# Patient Record
Sex: MALE | Race: WHITE | NOT HISPANIC OR LATINO | Employment: FULL TIME | URBAN - METROPOLITAN AREA
[De-identification: names, ages, dates, MRNs, and addresses within clinical notes are randomized per-mention and may not be internally consistent; named-entity substitution may affect disease eponyms.]

---

## 2017-01-16 RX ORDER — METFORMIN HYDROCHLORIDE 500 MG/1
2000 TABLET, EXTENDED RELEASE ORAL
Qty: 120 TABLET | Refills: 0 | Status: SHIPPED | OUTPATIENT
Start: 2017-01-16 | End: 2017-02-13 | Stop reason: SDUPTHER

## 2017-01-26 ENCOUNTER — OFFICE VISIT (OUTPATIENT)
Dept: INTERNAL MEDICINE | Age: 57
End: 2017-01-26

## 2017-01-26 VITALS
HEIGHT: 68 IN | WEIGHT: 256.7 LBS | DIASTOLIC BLOOD PRESSURE: 70 MMHG | HEART RATE: 86 BPM | OXYGEN SATURATION: 95 % | TEMPERATURE: 97.4 F | SYSTOLIC BLOOD PRESSURE: 130 MMHG | BODY MASS INDEX: 38.91 KG/M2

## 2017-01-26 DIAGNOSIS — E78.5 HYPERLIPIDEMIA, UNSPECIFIED HYPERLIPIDEMIA TYPE: ICD-10-CM

## 2017-01-26 DIAGNOSIS — I10 ESSENTIAL HYPERTENSION: Primary | ICD-10-CM

## 2017-01-26 DIAGNOSIS — E11.9 TYPE 2 DIABETES MELLITUS WITHOUT COMPLICATION, WITHOUT LONG-TERM CURRENT USE OF INSULIN (HCC): ICD-10-CM

## 2017-01-26 DIAGNOSIS — F52.21 ERECTILE DYSFUNCTION OF NONORGANIC ORIGIN: ICD-10-CM

## 2017-01-26 PROBLEM — G47.33 OBSTRUCTIVE SLEEP APNEA SYNDROME: Status: ACTIVE | Noted: 2017-01-26

## 2017-01-26 PROBLEM — D36.9 TUBULAR ADENOMA: Status: ACTIVE | Noted: 2017-01-26

## 2017-01-26 PROBLEM — K21.9 GASTROESOPHAGEAL REFLUX DISEASE: Status: ACTIVE | Noted: 2017-01-26

## 2017-01-26 PROBLEM — B00.9 HERPES SIMPLEX VIRUS (HSV) INFECTION: Status: ACTIVE | Noted: 2017-01-26

## 2017-01-26 PROCEDURE — 99214 OFFICE O/P EST MOD 30 MIN: CPT | Performed by: INTERNAL MEDICINE

## 2017-01-26 RX ORDER — TRAZODONE HYDROCHLORIDE 50 MG/1
100 TABLET ORAL NIGHTLY
Qty: 30 TABLET | Refills: 3 | Status: SHIPPED | OUTPATIENT
Start: 2017-01-26 | End: 2017-05-23 | Stop reason: SDUPTHER

## 2017-01-26 NOTE — MR AVS SNAPSHOT
Carrington Biggs   1/26/2017 7:20 AM   Office Visit    Dept Phone:  [phone]   Encounter #:  [id number]    Provider:  Moreno Tapia MD   Department:  [facility] PRIMARY [facility]ARE                Your Full Care Plan              Today's Medication Changes          These changes are accurate as of: 1/26/17  7:48 AM.  If you have any questions, ask your nurse or doctor.               Medication(s)that have changed:     traZODone 50 MG tablet   Commonly known as:  DESYREL   Take 2 tablets by mouth Every Night.   What changed:  See the new instructions.   Changed by:  Moreno Tapia MD            Where to Get Your Medications      These medications were sent to [facility]/pharmacy #[id number] - [place], [place] - [facility] [address] AT [facility] - [phone]  - [phone] [place] [address]    Hours:  24-hours Phone:  [phone]     traZODone 50 MG tablet                  Your Updated Medication List          This list is accurate as of: 1/26/17  7:48 AM.  Always use your most recent med list.                atorvastatin 40 MG tablet   Commonly known as:  LIPITOR   TAKE 1 TABLET BY MOUTH EVERY DAY       losartan 100 MG tablet   Commonly known as:  COZAAR   TAKE 1 TABLET BY MOUTH DAILY       metFORMIN  MG 24 hr tablet   Commonly known as:  GLUCOPHAGE-XR   Take 4 tablets by mouth Daily With Dinner.       pioglitazone 30 MG tablet   Commonly known as:  ACTOS   TAKE 1 TABLET ONCE DAILY.       traZODone 50 MG tablet   Commonly known as:  DESYREL   Take 2 tablets by mouth Every Night.               We Performed the Following     Ambulatory Referral to Urology     Comprehensive Metabolic Panel     Hemoglobin A1c     Lipid Panel     Microalbumin / Creatinine Urine Ratio       You Were Diagnosed With        Codes Comments    Essential hypertension    -  Primary ICD-10-CM: I10  ICD-9-CM: 401.9     Type 2 diabetes mellitus without complication, without long-term  "current use of insulin     ICD-10-CM: E11.9  ICD-9-CM: 250.00     Hyperlipidemia, unspecified hyperlipidemia type     ICD-10-CM: E78.5  ICD-9-CM: 272.4     Erectile dysfunction of nonorganic origin     ICD-10-CM: F52.21  ICD-9-CM: 302.72       Instructions     None    Patient Instructions History      Upcoming Appointments     Visit Type Date Time Department    OFFICE VISIT 2017  7:20 AM [facility]    OFFICE VISIT 2017  7:40 AM [facility]      [employer] Signup     [facility] [employer] allows you to send messages to your doctor, view your test results, renew your prescriptions, schedule appointments, and more. To sign up, go to eBrevia and click on the Sign Up Now link in the New User? box. Enter your Tr[employer] Activation Code exactly as it appears below along with the last four digits of your Social Security Number and your Date of Birth () to complete the sign-up process. If you do not sign up before the expiration date, you must request a new code.    Tr[employer]ates Activation Code: [email or web address]  Expires: 2017  7:47 AM    If you have questions, you can email [email or web address] or call [phone] to talk to our Tr[employer] staff. Remember, Tr[employer]ates is NOT to be used for urgent needs. For medical emergencies, dial 911.               Other Info from Your Visit           Your Appointments     May 23, 2017  7:40 AM EDT   Office Visit with Moreno Tapia MD   [facility] (--)    [address]  [facility] [id number] [phone]           Arrive 15 minutes prior to appointment.              Allergies     Rosuvastatin Calcium        Reason for Visit     Diabetes     Hypertension           Vital Signs     Blood Pressure Pulse Temperature Height Weight Oxygen Saturation    130/70 86 97.4 °F (36.3 °C) 68\" (172.7 cm) 256 lb 11.2 oz (116 kg) 95%    Body Mass Index Smoking Status                39.03 kg/m2 Current Every Day Smoker   "        Problems and Diagnoses Noted     Adiposity    Diabetes    Sexual dysfunction    Acid reflux disease    Herpes simplex virus (HSV) infection    High cholesterol or triglycerides    High blood pressure    Sleep apnea    Tubular adenoma

## 2017-01-26 NOTE — PROGRESS NOTES
"Carrington Shell / 56 y.o. / male  01/26/2017    VITALS    Visit Vitals   • /70   • Pulse 86   • Temp 97.4 °F (36.3 °C)   • Ht 68\" (172.7 cm)   • Wt 256 lb 11.2 oz (116 kg)   • SpO2 95%   • BMI 39.03 kg/m2     BP Readings from Last 3 Encounters:   01/26/17 130/70   01/04/16 148/74   08/10/15 138/76     Wt Readings from Last 3 Encounters:   01/26/17 256 lb 11.2 oz (116 kg)   01/04/16 264 lb 8.1 oz (120 kg)   08/10/15 261 lb 15.9 oz (119 kg)      Body mass index is 39.03 kg/(m^2).    CC:  Main reason(s) for today's visit: Diabetes and Hypertension      HPI:     Patient presents today for follow-up of several medical issues.  He was last seen by me approximately one year ago.    Hypertension: He is compliant with medication, dietary salt restriction and walking.  Of note he has lost approximately 8 pounds since last year.  There are no medication side effects noted.    Diabetes: He is compliant with to medications.  Because his last A1c was 9.5, insulin was begun, however because of financial issues, insulin is prohibitively expensive, and that was discontinued as of 2/2016.    Hyperlipidemia: Patient's compliant with medication, dietary fat restriction, and is fasting for lab work today    Health maintenance: Patient is advised to give flu shot, and see the ophthalmologist sometime within next 6 months    ______________________________________________________    ASSESSMENT & PLAN:    1. Essential hypertension    2. Type 2 diabetes mellitus without complication, without long-term current use of insulin    3. Hyperlipidemia, unspecified hyperlipidemia type    4. Erectile dysfunction of nonorganic origin      Orders Placed This Encounter   Procedures   • Comprehensive Metabolic Panel   • Lipid Panel   • Hemoglobin A1c   • Microalbumin / Creatinine Urine Ratio   • Ambulatory Referral to Urology       Summary/Discussion:     · #1 hypertension stable on current medical regimen.  Plan: Same meds, check CMP, blood pressure " check 4 months.  ·   · #2 diabetes type 2 status to be determined on 2 medications at this time.  Patient was on insulin, but was unable to continue because of the cost.  We will reassess today with A1c and decide what treatment can be provided with cost effective versus referral on to endocrinology for consideration of other interventions.  ·   · #3 hyperlipidemia on medication, status to be determined plan: Check lipids today adjust dosage if indicated.  ·   · #4 patient continues to note problems with ED.  Causes for this include his weight, underlying hypertension, medication usage diabetes and smoking.  he would like to see a urologist since the cost of Viagra is also prohibitive at this point  .    Return in about 4 months (around 5/26/2017) for Blood pressure check.    No future appointments.    ______________________________________________________    REVIEW OF SYSTEMS    Review of Systems   Respiratory: Negative for chest tightness and shortness of breath.    Cardiovascular: Negative for chest pain and palpitations.   Neurological: Negative for dizziness, syncope, speech difficulty, weakness, light-headedness and headaches.       PHYSICAL EXAMINATION    Physical Exam   Constitutional: He is oriented to person, place, and time. He appears well-developed. No distress.   Obese   Cardiovascular: Normal rate, regular rhythm, normal heart sounds and intact distal pulses.  Exam reveals no gallop and no friction rub.    No murmur heard.  Pulmonary/Chest: Effort normal and breath sounds normal. He has no wheezes. He has no rales.   Musculoskeletal: He exhibits no edema.   Neurological: He is alert and oriented to person, place, and time.   Skin: Skin is warm and dry. No rash noted.   Psychiatric: He has a normal mood and affect. His behavior is normal. Judgment and thought content normal.   Nursing note and vitals reviewed.        REVIEWED DATA:    Labs:   Lab Results   Component Value Date     08/10/2015    K  4.4 08/10/2015    AST 14 08/10/2015    ALT 19 08/10/2015    BUN 11 08/10/2015    CREATININE 0.77 08/10/2015    EGFRIFNONA 103 08/10/2015    EGFRIFAFRI 119 08/10/2015       Lab Results   Component Value Date     (H) 08/10/2015    HGBA1C 9.5 (H) 01/04/2016    HGBA1C 7.2 (H) 08/10/2015    HGBA1C 7.2 (H) 03/18/2015       Lab Results   Component Value Date    LDL 59 08/10/2015    HDL 38 (L) 08/10/2015    TRIG 335 (H) 08/10/2015       No results found for: TSH, FREET4       Lab Results   Component Value Date    WBC 10.7 08/10/2015    HGB 15.9 08/10/2015     08/10/2015        Imaging:        Medical Tests:        Summary of old records / correspondence / consultant report:        Request outside records:          ALLERGIES:    Rosuvastatin calcium    MEDICATIONS:    Current Outpatient Prescriptions   Medication Sig Dispense Refill   • atorvastatin (LIPITOR) 40 MG tablet TAKE 1 TABLET BY MOUTH EVERY DAY 90 tablet 0   • losartan (COZAAR) 100 MG tablet TAKE 1 TABLET BY MOUTH DAILY 30 tablet 3   • metFORMIN XR (GLUCOPHAGE-XR) 500 MG 24 hr tablet Take 4 tablets by mouth Daily With Dinner. 120 tablet 0   • pioglitazone (ACTOS) 30 MG tablet TAKE 1 TABLET ONCE DAILY. 30 tablet 3   • traZODone (DESYREL) 50 MG tablet Take 2 tablets by mouth Every Night. 30 tablet 3     No current facility-administered medications for this visit.        [facility]    The following portions of the patient's history were reviewed and updated as appropriate: Allergies / Current Medications / Past Medical History / Surgical History / Social History / Family History    PROBLEM LIST:    Patient Active Problem List   Diagnosis   • Colon polyp   • Diabetes mellitus   • Erectile dysfunction of nonorganic origin   • Essential hypertension   • Hyperlipidemia   • Hypertension   • Adiposity   • Gastroesophageal reflux disease   • Herpes simplex virus (HSV) infection   • Obstructive sleep apnea syndrome   • Tubular adenoma       PAST MEDICAL HX:    History  reviewed. No pertinent past medical history.    PAST SURGICAL HX:    History reviewed. No pertinent past surgical history.    SOCIAL HX:    Social History     Social History   • Marital status:      Spouse name: N/A   • Number of children: N/A   • Years of education: N/A     Social History Main Topics   • Smoking status: Current Every Day Smoker     Packs/day: 1.00     Years: 40.00     Types: Cigarettes   • Smokeless tobacco: None   • Alcohol use None   • Drug use: None   • Sexual activity: Not Asked     Other Topics Concern   • None     Social History Narrative   • None       FAMILY HX:    History reviewed. No pertinent family history.

## 2017-01-27 DIAGNOSIS — E11.9 TYPE 2 DIABETES MELLITUS WITHOUT COMPLICATION, WITHOUT LONG-TERM CURRENT USE OF INSULIN (HCC): Primary | ICD-10-CM

## 2017-01-27 LAB
ALBUMIN SERPL-MCNC: 4.6 G/DL (ref 3.5–5.2)
ALBUMIN/CREAT UR: 316.6 MG/G CREAT (ref 0–30)
ALBUMIN/GLOB SERPL: 1.9 G/DL
ALP SERPL-CCNC: 89 U/L (ref 39–117)
ALT SERPL-CCNC: 34 U/L (ref 1–41)
AST SERPL-CCNC: 19 U/L (ref 1–40)
BILIRUB SERPL-MCNC: 0.4 MG/DL (ref 0.1–1.2)
BUN SERPL-MCNC: 14 MG/DL (ref 6–20)
BUN/CREAT SERPL: 16.7 (ref 7–25)
CALCIUM SERPL-MCNC: 10.1 MG/DL (ref 8.6–10.5)
CHLORIDE SERPL-SCNC: 96 MMOL/L (ref 98–107)
CHOLEST SERPL-MCNC: 163 MG/DL (ref 0–200)
CO2 SERPL-SCNC: 29.1 MMOL/L (ref 22–29)
CREAT SERPL-MCNC: 0.84 MG/DL (ref 0.76–1.27)
CREAT UR-MCNC: 132.5 MG/DL
GLOBULIN SER CALC-MCNC: 2.4 GM/DL
GLUCOSE SERPL-MCNC: 245 MG/DL (ref 65–99)
HBA1C MFR BLD: 8.95 % (ref 4.8–5.6)
HDLC SERPL-MCNC: 35 MG/DL (ref 40–60)
LDLC SERPL CALC-MCNC: 68 MG/DL (ref 0–100)
MICROALBUMIN UR-MCNC: 419.5 UG/ML
POTASSIUM SERPL-SCNC: 4.8 MMOL/L (ref 3.5–5.2)
PROT SERPL-MCNC: 7 G/DL (ref 6–8.5)
SODIUM SERPL-SCNC: 139 MMOL/L (ref 136–145)
TRIGL SERPL-MCNC: 298 MG/DL (ref 0–150)
VLDLC SERPL CALC-MCNC: 59.6 MG/DL (ref 5–40)

## 2017-01-27 RX ORDER — GLIMEPIRIDE 4 MG/1
4 TABLET ORAL
Qty: 30 TABLET | Refills: 3 | Status: SHIPPED | OUTPATIENT
Start: 2017-01-27 | End: 2017-05-23 | Stop reason: SDUPTHER

## 2017-02-13 RX ORDER — METFORMIN HYDROCHLORIDE 500 MG/1
TABLET, EXTENDED RELEASE ORAL
Qty: 120 TABLET | Refills: 5 | Status: SHIPPED | OUTPATIENT
Start: 2017-02-13 | End: 2017-05-23 | Stop reason: SDUPTHER

## 2017-02-22 RX ORDER — ATORVASTATIN CALCIUM 40 MG/1
TABLET, FILM COATED ORAL
Qty: 90 TABLET | Refills: 1 | Status: SHIPPED | OUTPATIENT
Start: 2017-02-22 | End: 2017-08-30 | Stop reason: SDUPTHER

## 2017-03-13 RX ORDER — PIOGLITAZONEHYDROCHLORIDE 30 MG/1
TABLET ORAL
Qty: 30 TABLET | Refills: 3 | Status: SHIPPED | OUTPATIENT
Start: 2017-03-13 | End: 2017-07-12 | Stop reason: SDUPTHER

## 2017-03-28 ENCOUNTER — TELEPHONE (OUTPATIENT)
Dept: INTERNAL MEDICINE | Age: 57
End: 2017-03-28

## 2017-03-28 DIAGNOSIS — Z87.891 FORMER SMOKER: Primary | ICD-10-CM

## 2017-03-28 NOTE — TELEPHONE ENCOUNTER
Patient satisfies criteria for low-dose CT scan (age 56, current smoker, 40 year pack history).  Please schedule low-dose CT.

## 2017-04-03 ENCOUNTER — HOSPITAL ENCOUNTER (OUTPATIENT)
Dept: CT IMAGING | Facility: HOSPITAL | Age: 57
Discharge: HOME | End: 2017-04-03
Admitting: INTERNAL MEDICINE

## 2017-04-03 PROCEDURE — G0297 LDCT FOR LUNG CA SCREEN: HCPCS

## 2017-04-10 RX ORDER — TRAZODONE HYDROCHLORIDE 50 MG/1
TABLET ORAL
Qty: 30 TABLET | Refills: 3 | Status: SHIPPED | OUTPATIENT
Start: 2017-04-10 | End: 2017-05-23 | Stop reason: DRUGHIGH

## 2017-04-13 ENCOUNTER — HOSPITAL ENCOUNTER (OUTPATIENT)
Dept: GENERAL RADIOLOGY | Facility: HOSPITAL | Age: 57
Discharge: HOME | End: 2017-04-13
Admitting: INTERNAL MEDICINE

## 2017-04-13 ENCOUNTER — TELEPHONE (OUTPATIENT)
Dept: INTERNAL MEDICINE | Age: 57
End: 2017-04-13

## 2017-04-13 DIAGNOSIS — S93.401A SPRAIN OF RIGHT ANKLE, UNSPECIFIED LIGAMENT, INITIAL ENCOUNTER: Primary | ICD-10-CM

## 2017-04-13 PROCEDURE — 73610 X-RAY EXAM OF ANKLE: CPT

## 2017-04-24 RX ORDER — LOSARTAN POTASSIUM 100 MG/1
TABLET ORAL
Qty: 30 TABLET | Refills: 3 | Status: SHIPPED | OUTPATIENT
Start: 2017-04-24 | End: 2017-09-06 | Stop reason: SDUPTHER

## 2017-05-08 RX ORDER — TRAZODONE HYDROCHLORIDE 50 MG/1
TABLET ORAL
Qty: 30 TABLET | Refills: 3 | OUTPATIENT
Start: 2017-05-08

## 2017-05-08 RX ORDER — PIOGLITAZONEHYDROCHLORIDE 30 MG/1
TABLET ORAL
Qty: 30 TABLET | Refills: 3 | OUTPATIENT
Start: 2017-05-08

## 2017-05-08 RX ORDER — METFORMIN HYDROCHLORIDE 500 MG/1
TABLET, EXTENDED RELEASE ORAL
Qty: 120 TABLET | Refills: 3 | OUTPATIENT
Start: 2017-05-08

## 2017-05-23 ENCOUNTER — OFFICE VISIT (OUTPATIENT)
Dept: INTERNAL MEDICINE | Age: 57
End: 2017-05-23

## 2017-05-23 VITALS
TEMPERATURE: 98.1 F | BODY MASS INDEX: 39.59 KG/M2 | HEIGHT: 68 IN | DIASTOLIC BLOOD PRESSURE: 74 MMHG | HEART RATE: 80 BPM | WEIGHT: 261.2 LBS | OXYGEN SATURATION: 95 % | SYSTOLIC BLOOD PRESSURE: 152 MMHG

## 2017-05-23 DIAGNOSIS — M25.571 CHRONIC PAIN OF RIGHT ANKLE: ICD-10-CM

## 2017-05-23 DIAGNOSIS — G89.29 CHRONIC PAIN OF RIGHT ANKLE: ICD-10-CM

## 2017-05-23 DIAGNOSIS — E11.9 TYPE 2 DIABETES MELLITUS WITHOUT COMPLICATION, WITHOUT LONG-TERM CURRENT USE OF INSULIN (HCC): ICD-10-CM

## 2017-05-23 DIAGNOSIS — I10 ESSENTIAL HYPERTENSION: Primary | ICD-10-CM

## 2017-05-23 DIAGNOSIS — Z12.11 COLON CANCER SCREENING: ICD-10-CM

## 2017-05-23 DIAGNOSIS — Z23 ENCOUNTER FOR IMMUNIZATION: ICD-10-CM

## 2017-05-23 PROCEDURE — 90471 IMMUNIZATION ADMIN: CPT | Performed by: INTERNAL MEDICINE

## 2017-05-23 PROCEDURE — 99214 OFFICE O/P EST MOD 30 MIN: CPT | Performed by: INTERNAL MEDICINE

## 2017-05-23 PROCEDURE — 90715 TDAP VACCINE 7 YRS/> IM: CPT | Performed by: INTERNAL MEDICINE

## 2017-05-23 RX ORDER — TRAZODONE HYDROCHLORIDE 100 MG/1
100 TABLET ORAL NIGHTLY
Qty: 30 TABLET | Refills: 3 | Status: SHIPPED | OUTPATIENT
Start: 2017-05-23 | End: 2017-09-16 | Stop reason: SDUPTHER

## 2017-05-23 RX ORDER — METFORMIN HYDROCHLORIDE 500 MG/1
2000 TABLET, EXTENDED RELEASE ORAL
Qty: 120 TABLET | Refills: 5 | COMMUNITY
Start: 2017-05-23 | End: 2018-02-15 | Stop reason: SDUPTHER

## 2017-05-23 RX ORDER — GLIMEPIRIDE 4 MG/1
TABLET ORAL
Qty: 90 TABLET | Refills: 1 | Status: SHIPPED | OUTPATIENT
Start: 2017-05-23 | End: 2017-06-29 | Stop reason: SDUPTHER

## 2017-05-24 RX ORDER — GLIMEPIRIDE 4 MG/1
TABLET ORAL
Qty: 30 TABLET | Refills: 3 | OUTPATIENT
Start: 2017-05-24

## 2017-06-02 ENCOUNTER — TELEPHONE (OUTPATIENT)
Dept: INTERNAL MEDICINE | Age: 57
End: 2017-06-02

## 2017-06-02 DIAGNOSIS — G89.29 CHRONIC PAIN OF RIGHT ANKLE: Primary | ICD-10-CM

## 2017-06-02 DIAGNOSIS — M25.571 CHRONIC PAIN OF RIGHT ANKLE: Primary | ICD-10-CM

## 2017-06-02 NOTE — TELEPHONE ENCOUNTER
Pt called inquiring about his MRI of rt ankle results. I had [facility] fax results, so results are now in pt's chart for Dr Tapia to see.  Pt's # [phone]  Thanks SL

## 2017-06-29 ENCOUNTER — OFFICE VISIT (OUTPATIENT)
Dept: ENDOCRINOLOGY | Age: 57
End: 2017-06-29

## 2017-06-29 VITALS
OXYGEN SATURATION: 94 % | DIASTOLIC BLOOD PRESSURE: 72 MMHG | HEIGHT: 68 IN | HEART RATE: 84 BPM | SYSTOLIC BLOOD PRESSURE: 148 MMHG | BODY MASS INDEX: 39.89 KG/M2 | WEIGHT: 263.2 LBS

## 2017-06-29 DIAGNOSIS — E11.9 TYPE 2 DIABETES MELLITUS WITHOUT COMPLICATION, WITHOUT LONG-TERM CURRENT USE OF INSULIN (HCC): Primary | ICD-10-CM

## 2017-06-29 DIAGNOSIS — E78.5 HYPERLIPIDEMIA, UNSPECIFIED HYPERLIPIDEMIA TYPE: ICD-10-CM

## 2017-06-29 DIAGNOSIS — Z59.89 INSURANCE COVERAGE PROBLEMS: ICD-10-CM

## 2017-06-29 DIAGNOSIS — R80.9 MICROALBUMINURIA: ICD-10-CM

## 2017-06-29 DIAGNOSIS — G47.33 OBSTRUCTIVE SLEEP APNEA SYNDROME: ICD-10-CM

## 2017-06-29 DIAGNOSIS — I10 ESSENTIAL HYPERTENSION: ICD-10-CM

## 2017-06-29 PROBLEM — Z59.71 INSURANCE COVERAGE PROBLEMS: Status: ACTIVE | Noted: 2017-06-29

## 2017-06-29 PROCEDURE — 99204 OFFICE O/P NEW MOD 45 MIN: CPT | Performed by: INTERNAL MEDICINE

## 2017-06-29 RX ORDER — GLIMEPIRIDE 4 MG/1
TABLET ORAL
Qty: 180 TABLET | Refills: 2 | Status: SHIPPED | OUTPATIENT
Start: 2017-06-29 | End: 2018-04-07 | Stop reason: SDUPTHER

## 2017-06-29 SDOH — ECONOMIC STABILITY - INCOME SECURITY: OTHER PROBLEMS RELATED TO HOUSING AND ECONOMIC CIRCUMSTANCES: Z59.89

## 2017-06-29 NOTE — PROGRESS NOTES
Subjective   Carrington Biggs is a 56 y.o. male.     HPI Comments: New pt ref by Dr Moreno Tapia  For dm2, hypertension / testing bs  0 x day / last dm eye exam 4 yrs ago / last dm foot exam today with dr Anaya    Diabetes   Hypoglycemia symptoms include headaches.   Hypertension   Associated symptoms include headaches.      Patient's a 56-year-old male who was referred for diabetic consultation by Dr. Tapia.  He has known diabetes mellitus since 2012 and was started on insulin in 2013.  He stopped using insulin one year ago because of a high deductible and co-pay.  He is on metformin  mg 4 tablets once a day, glimepiride 4 mg every morning and Actos 30 mg once a day.  He does not check his blood sugars at home.  He denies any hypoglycemic episode.  He is not compliant with his diet.  He has not seen a dietitian or diabetes educator in the past.  He has gained 7 pounds since January 2017.  Hemoglobin A1c done in January 2017 was 8.95%    He has occasional blurry vision.  His last eye examination was in 2013.  He has microalbuminuria on urine sample taken last January 2017.  He is on losartan He has intermittent numbness on his right hand when he worsen his computer.    He has hyperlipidemia and has been on Lipitor 40 mg once a day.  He denies any myalgia.  His last meal was 9 AM.    He has hypertension and is on losartan 100 mg once a day.  He denies any history of heart attack or stroke.  He denies any chest pain or shortness of breath.    He is currently seeing an orthopedic surgeon for chronic swelling of his right ankle.  MRI showed posterior tibial and Achilles tendinopathy.    He has sleep apnea but is unable to tolerate a CPAP.    The following portions of the patient's history were reviewed and updated as appropriate: allergies, current medications, past family history, past medical history, past social history, past surgical history and problem list.    Review of Systems   Constitutional: Negative.   "  Eyes: Positive for visual disturbance ( blurry ).   Respiratory: Negative.    Cardiovascular: Positive for leg swelling (ankles ).   Gastrointestinal: Negative.    Endocrine: Negative.    Genitourinary: Positive for frequency.   Musculoskeletal: Negative.    Skin: Negative.    Allergic/Immunologic: Negative.    Neurological: Positive for headaches.   Hematological: Bruises/bleeds easily ( bruise ).   Psychiatric/Behavioral: Positive for sleep disturbance (sleep apnea on taking trazadone ).       Objective      Vitals:    06/29/17 1106   BP: 148/72   BP Location: Right arm   Patient Position: Sitting   Cuff Size: Large Adult   Pulse: 84   SpO2: 94%   Weight: 263 lb 3.2 oz (119 kg)   Height: 68\" (172.7 cm)     Physical Exam   Constitutional: He is oriented to person, place, and time. He appears well-developed and well-nourished. No distress.   HENT:   Head: Normocephalic.   Nose: Nose normal.   Mouth/Throat: No oropharyngeal exudate.   Eyes: Conjunctivae and EOM are normal. Right eye exhibits no discharge. Left eye exhibits no discharge. No scleral icterus.   Neck: Neck supple. No JVD present. No tracheal deviation present. No thyromegaly present.   Cardiovascular: Normal rate, regular rhythm and normal heart sounds.  Exam reveals no friction rub.    No murmur heard.  Pulmonary/Chest: Effort normal and breath sounds normal. No respiratory distress. He has no wheezes. He has no rales.   Abdominal: Soft. Bowel sounds are normal. He exhibits no distension and no mass. There is no tenderness.   Musculoskeletal: Normal range of motion. He exhibits no edema or deformity.   Mild tenderness on right posterior tibial tendon.  No tenderness on right Achilles tendon   Lymphadenopathy:     He has no cervical adenopathy.   Neurological: He is alert and oriented to person, place, and time. He displays normal reflexes.   Intact light touch on both upper and lower extremities.  Negative Tinel's sign bilaterally.     Skin: Skin is " warm and dry. No erythema.     Office Visit on 01/26/2017   Component Date Value Ref Range Status   • Glucose 01/26/2017 245* 65 - 99 mg/dL Final   • BUN 01/26/2017 14  6 - 20 mg/dL Final   • Creatinine 01/26/2017 0.84  0.76 - 1.27 mg/dL Final   • eGFR Non African Am 01/26/2017 95  >60 mL/min/1.73 Final   • eGFR African Am 01/26/2017 115  >60 mL/min/1.73 Final   • BUN/Creatinine Ratio 01/26/2017 16.7  7.0 - 25.0 Final   • Sodium 01/26/2017 139  136 - 145 mmol/L Final   • Potassium 01/26/2017 4.8  3.5 - 5.2 mmol/L Final   • Chloride 01/26/2017 96* 98 - 107 mmol/L Final   • Total CO2 01/26/2017 29.1* 22.0 - 29.0 mmol/L Final   • Calcium 01/26/2017 10.1  8.6 - 10.5 mg/dL Final   • Total Protein 01/26/2017 7.0  6.0 - 8.5 g/dL Final   • Albumin 01/26/2017 4.60  3.50 - 5.20 g/dL Final   • Globulin 01/26/2017 2.4  gm/dL Final   • A/G Ratio 01/26/2017 1.9  g/dL Final   • Total Bilirubin 01/26/2017 0.4  0.1 - 1.2 mg/dL Final   • Alkaline Phosphatase 01/26/2017 89  39 - 117 U/L Final   • AST (SGOT) 01/26/2017 19  1 - 40 U/L Final   • ALT (SGPT) 01/26/2017 34  1 - 41 U/L Final   • Total Cholesterol 01/26/2017 163  0 - 200 mg/dL Final   • Triglycerides 01/26/2017 298* 0 - 150 mg/dL Final   • HDL Cholesterol 01/26/2017 35* 40 - 60 mg/dL Final   • VLDL Cholesterol 01/26/2017 59.6* 5 - 40 mg/dL Final   • LDL Cholesterol  01/26/2017 68  0 - 100 mg/dL Final   • Hemoglobin A1C 01/26/2017 8.95* 4.80 - 5.60 % Final    Comment: Hemoglobin A1C Ranges:  Increased Risk for Diabetes  5.7% to 6.4%  Diabetes                     >= 6.5%  Diabetic Goal                < 7.0%     • Creatinine, Urine 01/26/2017 132.5  Not Estab. mg/dL Final   • Microalbumin, Urine 01/26/2017 419.5  Not Estab. ug/mL Final    Comment: Results confirmed on  dilution.     • Microalbumin/Creatinine Ratio Urine 01/26/2017 316.6* 0.0 - 30.0 mg/g creat Final     Assessment/Plan   Carrington was seen today for diabetes and hypertension.    Diagnoses and all orders for this  visit:    Type 2 diabetes mellitus without complication, without long-term current use of insulin  -     Comprehensive Metabolic Panel  -     Hemoglobin A1c  -     C-Peptide  -     Microalbumin / Creatinine Urine Ratio  -     Glutamic Acid Decarboxylase  -     Ambulatory Referral to Diabetic Education    Hyperlipidemia, unspecified hyperlipidemia type  -     Lipid Panel    Essential hypertension    Obstructive sleep apnea syndrome    Microalbuminuria  -     Microalbumin / Creatinine Urine Ratio    Insurance coverage problems    Other orders  -     glimepiride (AMARYL) 4 MG tablet; One tablet twice a day      Increase glimepiride to 4 mg twice a day.  Continue metformin  mg 4 tablets once a day.  Appointment with diabetes educator.  Consider starting on Relion N.  Recheck urine microalbumin.  Continue losartan.  Continue Lipitor  Follow-up with orthopedic surgeon.    Send copy of my notes and labs to Dr. Tapia.    RTC 4 mos

## 2017-07-05 LAB
ALBUMIN SERPL-MCNC: 4.6 G/DL (ref 3.5–5.2)
ALBUMIN/CREAT UR: 181.8 MG/G CREAT (ref 0–30)
ALBUMIN/GLOB SERPL: 1.8 G/DL
ALP SERPL-CCNC: 78 U/L (ref 39–117)
ALT SERPL-CCNC: 20 U/L (ref 1–41)
AST SERPL-CCNC: 18 U/L (ref 1–40)
BILIRUB SERPL-MCNC: 0.2 MG/DL (ref 0.1–1.2)
BUN SERPL-MCNC: 12 MG/DL (ref 6–20)
BUN/CREAT SERPL: 16.2 (ref 7–25)
C PEPTIDE SERPL-MCNC: 6.6 NG/ML (ref 1.1–4.4)
CALCIUM SERPL-MCNC: 10 MG/DL (ref 8.6–10.5)
CHLORIDE SERPL-SCNC: 101 MMOL/L (ref 98–107)
CHOLEST SERPL-MCNC: 172 MG/DL (ref 0–200)
CO2 SERPL-SCNC: 30.1 MMOL/L (ref 22–29)
CREAT SERPL-MCNC: 0.74 MG/DL (ref 0.76–1.27)
CREAT UR-MCNC: 87.9 MG/DL
GAD65 AB SER IA-ACNC: <5 U/ML (ref 0–5)
GLOBULIN SER CALC-MCNC: 2.6 GM/DL
GLUCOSE SERPL-MCNC: 213 MG/DL (ref 65–99)
HBA1C MFR BLD: 7.12 % (ref 4.8–5.6)
HDLC SERPL-MCNC: 39 MG/DL (ref 40–60)
LDLC SERPL CALC-MCNC: ABNORMAL MG/DL
MICROALBUMIN UR-MCNC: 159.8 UG/ML
POTASSIUM SERPL-SCNC: 4.3 MMOL/L (ref 3.5–5.2)
PROT SERPL-MCNC: 7.2 G/DL (ref 6–8.5)
SODIUM SERPL-SCNC: 145 MMOL/L (ref 136–145)
TRIGL SERPL-MCNC: 428 MG/DL (ref 0–150)
VLDLC SERPL CALC-MCNC: ABNORMAL MG/DL

## 2017-07-07 ENCOUNTER — PREP FOR SURGERY (OUTPATIENT)
Dept: OTHER | Facility: HOSPITAL | Age: 57
End: 2017-07-07

## 2017-07-07 DIAGNOSIS — Z86.010 HISTORY OF COLON POLYPS: Primary | ICD-10-CM

## 2017-07-07 RX ORDER — SODIUM CHLORIDE, SODIUM LACTATE, POTASSIUM CHLORIDE, CALCIUM CHLORIDE 600; 310; 30; 20 MG/100ML; MG/100ML; MG/100ML; MG/100ML
30 INJECTION, SOLUTION INTRAVENOUS CONTINUOUS
Status: CANCELLED | OUTPATIENT
Start: 2017-08-07

## 2017-07-07 RX ORDER — SODIUM CHLORIDE 0.9 % (FLUSH) 0.9 %
1-10 SYRINGE (ML) INJECTION AS NEEDED
Status: CANCELLED | OUTPATIENT
Start: 2017-08-07

## 2017-07-11 ENCOUNTER — OFFICE VISIT (OUTPATIENT)
Dept: ORTHOPEDIC SURGERY | Facility: CLINIC | Age: 57
End: 2017-07-11

## 2017-07-11 VITALS — BODY MASS INDEX: 39.86 KG/M2 | TEMPERATURE: 97.7 F | HEIGHT: 68 IN | WEIGHT: 263 LBS

## 2017-07-11 DIAGNOSIS — M67.88 ACHILLES TENDONOSIS: ICD-10-CM

## 2017-07-11 DIAGNOSIS — M25.571 CHRONIC PAIN OF RIGHT ANKLE: Primary | ICD-10-CM

## 2017-07-11 DIAGNOSIS — G89.29 CHRONIC PAIN OF RIGHT ANKLE: Primary | ICD-10-CM

## 2017-07-11 DIAGNOSIS — M76.821 TIBIALIS POSTERIOR TENDONITIS, RIGHT: ICD-10-CM

## 2017-07-11 PROBLEM — M76.829 TIBIALIS POSTERIOR TENDONITIS: Status: ACTIVE | Noted: 2017-07-11

## 2017-07-11 PROCEDURE — 99204 OFFICE O/P NEW MOD 45 MIN: CPT | Performed by: ORTHOPAEDIC SURGERY

## 2017-07-11 NOTE — PROGRESS NOTES
"Patient:  Carrington Biggs is a 56 y.o. male    Chief Complaint/ Reason for Visit:    Chief Complaint   Patient presents with   • Right Ankle - Establish Care, Pain       HPI:  The patient presents today complaining of mild pain in the medial and anteromedial aspects of his right ankle present now to some degree for about 4 months.  He says that it all started when he was doing some work at his home at [facility] and a \"[place]\" rolled and hit the anteromedial aspect of his right leg.  He had no ankle pain before that.  He had no antecedent ankle pain, nor does he have any history of prior injury or problems with his right ankle.  The pain is typically aching in nature.  He did have some bruising after this first injury, but that is largely resolved.  The patient has a sedentary job and says when he is seated or driving his car really doesn't bother her much.  He does say that he has a mild ache when he walks across the parking lot such as the come into the office today.  When he gets out of bed in the morning, he has no pain.  He had some swelling initially, but doesn't have much swelling anymore.  He has no calf pain, chest pain, or shortness of breath.  He has no numbness, tingling, weakness, or any sensory or neurologic complaints.      PMH:    Past Medical History:   Diagnosis Date   • Erectile dysfunction    • Hypertension    • Sleep apnea    • Type 2 diabetes mellitus        PSH:    Past Surgical History:   Procedure Laterality Date   • COLONOSCOPY  2012    Dr. Arizmendi   • INCISION AND DRAINAGE ABSCESS         Social Hx:    Social History     Social History   • Marital status:      Spouse name: N/A   • Number of children: 0   • Years of education: N/A     Occupational History   • salesman      Social History Main Topics   • Smoking status: Current Every Day Smoker     Packs/day: 1.00     Years: 40.00     Types: Cigarettes   • Smokeless tobacco: Never Used      Comment: April 3, 2017 low-dose CT scan " negative   • Alcohol use 3.6 oz/week     6 Shots of liquor per week   • Drug use: No   • Sexual activity: Not on file     Other Topics Concern   • Not on file     Social History Narrative       Family Hx:    Family History   Problem Relation Age of Onset   • Stroke Mother    • Diabetes Mother    • Stroke Father    • Aneurysm Father    • Rheum arthritis Sister    • Hypertension Brother    • Diabetes Sister    • Hypertension Sister    • Hyperlipidemia Sister    • Hypertension Brother        Meds:    Current Outpatient Prescriptions:   •  atorvastatin (LIPITOR) 40 MG tablet, TAKE 1 TABLET BY MOUTH EVERY DAY, Disp: 90 tablet, Rfl: 1  •  glimepiride (AMARYL) 4 MG tablet, One tablet twice a day, Disp: 180 tablet, Rfl: 2  •  losartan (COZAAR) 100 MG tablet, TAKE 1 TABLET BY MOUTH DAILY, Disp: 30 tablet, Rfl: 3  •  metFORMIN ER (GLUCOPHAGE-XR) 500 MG 24 hr tablet, Take 4 tablets by mouth Daily With Dinner., Disp: 120 tablet, Rfl: 5  •  pioglitazone (ACTOS) 30 MG tablet, TAKE 1 TABLET ONCE DAILY., Disp: 30 tablet, Rfl: 3  •  traZODone (DESYREL) 100 MG tablet, Take 1 tablet by mouth Every Night., Disp: 30 tablet, Rfl: 3    Allergies:    Allergies   Allergen Reactions   • Rosuvastatin Calcium Myalgia       ROS:  Review of Systems   Constitutional: Negative for chills, diaphoresis, fever and unexpected weight change.   HENT: Negative for hearing loss, nosebleeds, sore throat and tinnitus.    Eyes: Negative for pain and visual disturbance.   Respiratory: Negative for cough, shortness of breath and wheezing.    Cardiovascular: Negative for chest pain and palpitations.   Gastrointestinal: Negative for abdominal pain, diarrhea, nausea and vomiting.   Endocrine: Negative for cold intolerance, heat intolerance and polydipsia.   Genitourinary: Negative for difficulty urinating, dysuria and hematuria.   Musculoskeletal: Positive for arthralgias. Negative for joint swelling and myalgias.   Skin: Negative for rash and wound.  "  Allergic/Immunologic: Negative for environmental allergies.   Neurological: Negative for dizziness, syncope and numbness.   Hematological: Does not bruise/bleed easily.   Psychiatric/Behavioral: Negative for dysphoric mood and sleep disturbance. The patient is not nervous/anxious.        Vitals:    07/11/17 0807   Temp: 97.7 °F (36.5 °C)   TempSrc: Temporal Artery    Weight: 263 lb (119 kg)   Height: 68\" (172.7 cm)   Body mass index is 39.99 kg/(m^2).      Physical Exam    The patient is awake, alert, and oriented ×3.  The patient is in no acute distress.  He has a very pleasant affect.  Judgment, comprehension, and insight are all within normal limits for the purposes of today's examination.  Breathing is regular and unlabored with a respiratory rate of 14/m.  Extraocular movements and pupillary responses are symmetrically intact. Sclerae are anicteric.   Hearing is within normal limits.  Speech is within normal limits.  There is no jugular venous distention.    The patient's gait is lumbering, though it is a smooth, symmetrical heel toe progression.  Balance is normal.  Motor strength is 5 over 5 throughout both lower extremities in all muscle groups.  There is no atrophy or asymmetry.  The patient's pedal pulses are 1+ and regular symmetrically in both feet with a current heart rate of about 72 bpm.  Skin and nails are unremarkable.  Both calves are soft and nontender with no palpable venous cord.  There is no cellulitis, lymphangitis, or popliteal lymphadenopathy in either lower extremity.    Sensory exam symmetrically intact light touch in both lower extremities.    Right ankle:  There is no swelling, bruising, or discoloration.  There is a small scar less than 10 mm in diameter on the anteromedial distal right leg where the \"[place]\" contacted.  He has focal point tenderness at the tip of the medial malleolus, and the underlying tibialis posterior tendon.  The tenderness is actually more so over the bone " itself as opposed to the tibialis posterior tendon at this time.  There is no swelling, bruising, or discoloration.  There is no crepitus, and the patient has a full active range of motion of the right ankle and subtalar joints.  Anterior drawer 0.  He has no subtalar laxity.  He has no lateral tenderness whatsoever.  The peroneal tendon function is intact.  His Achilles tendons are very tight, but they themselves are intact and nontender.      Radiology: X-rays: 3 views of the patient's right ankle were reviewed on the [facility] system.  These were dated April 13 of this year, and did not show any obvious acute abnormalities.  Incidental note was made of some calcific Achilles tendinosis in the form of some small calcific inclusions in the distal right Achilles tendon.  I see no evidence of fracture or other obvious acute abnormality.    MRI right ankle: Also was reviewed today from May this year.  It appears to me as though the patient has some subtle edema in the medial malleolus, and perhaps some tendinosis in the right tibialis posterior.  Incidental note is made of the Achilles tendinosis that was suggested on the plain x-rays.  Comparison images were not available for either the MRI or the plain x-rays that I reviewed today, other than comparing these 2 studies to each other.          Assessment:  1. Chronic pain of right ankle    - Ambulatory Referral to Physical Therapy Evaluate and treat, Ortho    2. Tibialis posterior tendonitis, right      3. Achilles tendonosis          Plan:     I performed a prescription medication management today in that I prescribed a transcutaneous anti-inflammatory/analgesic preparation that the patient is going to acquire at [facility] pharmacy today.  Proper usage instructions as well as precautions were discussed with the patient and he voiced understanding.    I've also prescribed physical therapy and emphasized its importance.  I specifically advised the patient  that he would need to adopt Achilles tendon stretching as a lifelong commitment.    I also recommended that he consult with his primary care physician, Dr. Tapia, regarding safe, sustainable, permanent weight loss strategies, as obviously weight loss will help facilitate her recovery from this ankle pain.    In addition, we discussed footwear and it seems as though he is more comfortable in a supportive boots and he should continue those as needed.  I do not feel any instability on exam, and therefore do not think bracing would add anything to this patient's care.    I will see him back as needed.       Orders Placed This Encounter   Procedures   • Ambulatory Referral to Physical Therapy Evaluate and treat, Ortho     Referral Priority:   Routine     Referral Type:   Therapy     Referral Reason:   Specialty Services Required     Requested Specialty:   Physical Therapy     Number of Visits Requested:   1

## 2017-07-12 RX ORDER — PIOGLITAZONEHYDROCHLORIDE 30 MG/1
TABLET ORAL
Qty: 30 TABLET | Refills: 2 | Status: SHIPPED | OUTPATIENT
Start: 2017-07-12 | End: 2018-02-15 | Stop reason: SDUPTHER

## 2017-07-13 RX ORDER — PIOGLITAZONEHYDROCHLORIDE 30 MG/1
TABLET ORAL
Qty: 30 TABLET | Refills: 2 | Status: SHIPPED | OUTPATIENT
Start: 2017-07-13 | End: 2017-11-02 | Stop reason: SDUPTHER

## 2017-08-04 ENCOUNTER — TELEPHONE (OUTPATIENT)
Dept: GASTROENTEROLOGY | Facility: CLINIC | Age: 57
End: 2017-08-04

## 2017-08-07 ENCOUNTER — HOSPITAL ENCOUNTER (OUTPATIENT)
Facility: HOSPITAL | Age: 57
Setting detail: HOSPITAL OUTPATIENT SURGERY
Discharge: HOME | End: 2017-08-07
Attending: INTERNAL MEDICINE | Admitting: INTERNAL MEDICINE

## 2017-08-07 ENCOUNTER — ANESTHESIA EVENT (OUTPATIENT)
Dept: GASTROENTEROLOGY | Facility: HOSPITAL | Age: 57
End: 2017-08-07

## 2017-08-07 ENCOUNTER — ANESTHESIA (OUTPATIENT)
Dept: GASTROENTEROLOGY | Facility: HOSPITAL | Age: 57
End: 2017-08-07

## 2017-08-07 VITALS
DIASTOLIC BLOOD PRESSURE: 72 MMHG | SYSTOLIC BLOOD PRESSURE: 140 MMHG | OXYGEN SATURATION: 97 % | RESPIRATION RATE: 16 BRPM | HEIGHT: 68 IN | BODY MASS INDEX: 38.95 KG/M2 | TEMPERATURE: 98 F | HEART RATE: 65 BPM | WEIGHT: 257 LBS

## 2017-08-07 DIAGNOSIS — Z86.010 HISTORY OF COLON POLYPS: ICD-10-CM

## 2017-08-07 LAB — GLUCOSE BLDC GLUCOMTR-MCNC: 167 MG/DL (ref 70–130)

## 2017-08-07 PROCEDURE — 82962 GLUCOSE BLOOD TEST: CPT

## 2017-08-07 PROCEDURE — 25010000002 PROPOFOL 10 MG/ML EMULSION: Performed by: ANESTHESIOLOGY

## 2017-08-07 RX ORDER — PROMETHAZINE HYDROCHLORIDE 25 MG/1
25 TABLET ORAL ONCE AS NEEDED
Status: DISCONTINUED | OUTPATIENT
Start: 2017-08-07 | End: 2017-08-07 | Stop reason: HOSPADM

## 2017-08-07 RX ORDER — SODIUM CHLORIDE, SODIUM LACTATE, POTASSIUM CHLORIDE, CALCIUM CHLORIDE 600; 310; 30; 20 MG/100ML; MG/100ML; MG/100ML; MG/100ML
30 INJECTION, SOLUTION INTRAVENOUS CONTINUOUS
Status: DISCONTINUED | OUTPATIENT
Start: 2017-08-07 | End: 2017-08-07 | Stop reason: HOSPADM

## 2017-08-07 RX ORDER — HYDRALAZINE HYDROCHLORIDE 20 MG/ML
5 INJECTION INTRAMUSCULAR; INTRAVENOUS
Status: DISCONTINUED | OUTPATIENT
Start: 2017-08-07 | End: 2017-08-07 | Stop reason: HOSPADM

## 2017-08-07 RX ORDER — EPHEDRINE SULFATE 50 MG/ML
5 INJECTION, SOLUTION INTRAVENOUS ONCE AS NEEDED
Status: DISCONTINUED | OUTPATIENT
Start: 2017-08-07 | End: 2017-08-07 | Stop reason: HOSPADM

## 2017-08-07 RX ORDER — LABETALOL HYDROCHLORIDE 5 MG/ML
5 INJECTION, SOLUTION INTRAVENOUS
Status: DISCONTINUED | OUTPATIENT
Start: 2017-08-07 | End: 2017-08-07 | Stop reason: HOSPADM

## 2017-08-07 RX ORDER — PROMETHAZINE HYDROCHLORIDE 25 MG/ML
12.5 INJECTION, SOLUTION INTRAMUSCULAR; INTRAVENOUS ONCE AS NEEDED
Status: DISCONTINUED | OUTPATIENT
Start: 2017-08-07 | End: 2017-08-07 | Stop reason: HOSPADM

## 2017-08-07 RX ORDER — DIPHENHYDRAMINE HYDROCHLORIDE 50 MG/ML
12.5 INJECTION INTRAMUSCULAR; INTRAVENOUS
Status: DISCONTINUED | OUTPATIENT
Start: 2017-08-07 | End: 2017-08-07 | Stop reason: HOSPADM

## 2017-08-07 RX ORDER — PROMETHAZINE HYDROCHLORIDE 25 MG/1
25 SUPPOSITORY RECTAL ONCE AS NEEDED
Status: DISCONTINUED | OUTPATIENT
Start: 2017-08-07 | End: 2017-08-07 | Stop reason: HOSPADM

## 2017-08-07 RX ORDER — HYDROMORPHONE HYDROCHLORIDE 1 MG/ML
0.5 INJECTION, SOLUTION INTRAMUSCULAR; INTRAVENOUS; SUBCUTANEOUS
Status: DISCONTINUED | OUTPATIENT
Start: 2017-08-07 | End: 2017-08-07 | Stop reason: HOSPADM

## 2017-08-07 RX ORDER — NALOXONE HCL 0.4 MG/ML
0.2 VIAL (ML) INJECTION AS NEEDED
Status: DISCONTINUED | OUTPATIENT
Start: 2017-08-07 | End: 2017-08-07 | Stop reason: HOSPADM

## 2017-08-07 RX ORDER — ONDANSETRON 2 MG/ML
4 INJECTION INTRAMUSCULAR; INTRAVENOUS ONCE AS NEEDED
Status: DISCONTINUED | OUTPATIENT
Start: 2017-08-07 | End: 2017-08-07 | Stop reason: HOSPADM

## 2017-08-07 RX ORDER — PROPOFOL 10 MG/ML
VIAL (ML) INTRAVENOUS AS NEEDED
Status: DISCONTINUED | OUTPATIENT
Start: 2017-08-07 | End: 2017-08-07 | Stop reason: SURG

## 2017-08-07 RX ORDER — PROMETHAZINE HYDROCHLORIDE 12.5 MG/1
12.5 TABLET ORAL ONCE AS NEEDED
Status: DISCONTINUED | OUTPATIENT
Start: 2017-08-07 | End: 2017-08-07 | Stop reason: HOSPADM

## 2017-08-07 RX ORDER — LIDOCAINE HYDROCHLORIDE 20 MG/ML
INJECTION, SOLUTION INFILTRATION; PERINEURAL AS NEEDED
Status: DISCONTINUED | OUTPATIENT
Start: 2017-08-07 | End: 2017-08-07 | Stop reason: SURG

## 2017-08-07 RX ORDER — OXYCODONE AND ACETAMINOPHEN 7.5; 325 MG/1; MG/1
1 TABLET ORAL ONCE AS NEEDED
Status: DISCONTINUED | OUTPATIENT
Start: 2017-08-07 | End: 2017-08-07 | Stop reason: HOSPADM

## 2017-08-07 RX ORDER — PROPOFOL 10 MG/ML
VIAL (ML) INTRAVENOUS CONTINUOUS PRN
Status: DISCONTINUED | OUTPATIENT
Start: 2017-08-07 | End: 2017-08-07 | Stop reason: SURG

## 2017-08-07 RX ORDER — FLUMAZENIL 0.1 MG/ML
0.2 INJECTION INTRAVENOUS AS NEEDED
Status: DISCONTINUED | OUTPATIENT
Start: 2017-08-07 | End: 2017-08-07 | Stop reason: HOSPADM

## 2017-08-07 RX ORDER — FENTANYL CITRATE 50 UG/ML
50 INJECTION, SOLUTION INTRAMUSCULAR; INTRAVENOUS
Status: DISCONTINUED | OUTPATIENT
Start: 2017-08-07 | End: 2017-08-07 | Stop reason: HOSPADM

## 2017-08-07 RX ORDER — HYDROCODONE BITARTRATE AND ACETAMINOPHEN 7.5; 325 MG/1; MG/1
1 TABLET ORAL ONCE AS NEEDED
Status: DISCONTINUED | OUTPATIENT
Start: 2017-08-07 | End: 2017-08-07 | Stop reason: HOSPADM

## 2017-08-07 RX ORDER — SODIUM CHLORIDE 0.9 % (FLUSH) 0.9 %
1-10 SYRINGE (ML) INJECTION AS NEEDED
Status: DISCONTINUED | OUTPATIENT
Start: 2017-08-07 | End: 2017-08-07 | Stop reason: HOSPADM

## 2017-08-07 RX ADMIN — SODIUM CHLORIDE, POTASSIUM CHLORIDE, SODIUM LACTATE AND CALCIUM CHLORIDE 30 ML/HR: 600; 310; 30; 20 INJECTION, SOLUTION INTRAVENOUS at 10:42

## 2017-08-07 RX ADMIN — LIDOCAINE HYDROCHLORIDE 50 MG: 20 INJECTION, SOLUTION INFILTRATION; PERINEURAL at 11:25

## 2017-08-07 RX ADMIN — PROPOFOL 200 MCG/KG/MIN: 10 INJECTION, EMULSION INTRAVENOUS at 11:28

## 2017-08-07 RX ADMIN — PROPOFOL 100 MG: 10 INJECTION, EMULSION INTRAVENOUS at 11:28

## 2017-08-07 NOTE — ANESTHESIA PREPROCEDURE EVALUATION
Anesthesia Evaluation     Patient summary reviewed and Nursing notes reviewed          Airway   Mallampati: III  possible difficult intubation  Dental - normal exam     Pulmonary     breath sounds clear to auscultation  (+) sleep apnea,   Cardiovascular     ECG reviewed  Rhythm: regular  Rate: normal    (+) hypertension, hyperlipidemia      Neuro/Psych  (+) psychiatric history,    GI/Hepatic/Renal/Endo    (+) morbid obesity, GERD, renal disease,     Musculoskeletal (-) negative ROS    Abdominal    Substance History - negative use     OB/GYN negative ob/gyn ROS         Other                                        Anesthesia Plan    ASA 3     MAC   total IV anesthesia  intravenous induction   Anesthetic plan and risks discussed with patient.

## 2017-08-07 NOTE — H&P
CC: Here for endoscopic evaluation.     HPI: History of colon polyps.       Past Medical History:   Diagnosis Date   • Erectile dysfunction    • Hypertension    • Sleep apnea    • Type 2 diabetes mellitus        Past Surgical History:   Procedure Laterality Date   • COLONOSCOPY  2012    Dr. Arizmendi   • INCISION AND DRAINAGE ABSCESS         Prior to Admission medications    Medication Sig Start Date End Date Taking? Authorizing Provider   atorvastatin (LIPITOR) 40 MG tablet TAKE 1 TABLET BY MOUTH EVERY DAY 2/22/17  Yes Moreno Tapia MD   glimepiride (AMARYL) 4 MG tablet One tablet twice a day 6/29/17  Yes Bayron Anaya MD   losartan (COZAAR) 100 MG tablet TAKE 1 TABLET BY MOUTH DAILY 4/24/17  Yes Moreno Tapia MD   metFORMIN ER (GLUCOPHAGE-XR) 500 MG 24 hr tablet Take 4 tablets by mouth Daily With Dinner. 5/23/17  Yes Moreno Tapia MD   pioglitazone (ACTOS) 30 MG tablet TAKE 1 TABLET ONCE DAILY. 7/12/17  Yes Moreno Tapia MD   traZODone (DESYREL) 100 MG tablet Take 1 tablet by mouth Every Night. 5/23/17  Yes Moreno Tapia MD   pioglitazone (ACTOS) 30 MG tablet TAKE 1 TABLET ONCE DAILY. 7/13/17   Moreno Tapia MD       Allergies   Allergen Reactions   • Rosuvastatin Calcium Myalgia       Family History   Problem Relation Age of Onset   • Stroke Mother    • Diabetes Mother    • Stroke Father    • Aneurysm Father    • Rheum arthritis Sister    • Hypertension Brother    • Diabetes Sister    • Hypertension Sister    • Hyperlipidemia Sister    • Hypertension Brother        OBJECTIVE:    Patient's vital signs reviewed. No acute distress.     Chest is clear, no wheezing or rales. Normal symmetric air entry throughout both lung fields. No chest wall deformities or tenderness.    S1 and S2 normal, no murmurs, clicks, gallops or rubs. Regular rate and rhythm. Chest is clear; no wheezes or rales. No edema or JVD.    The abdomen is soft without tenderness, guarding, mass, rebound or organomegaly. Bowel sounds are  normal. No CVA tenderness or inguinal adenopathy noted.    Patient is alert, oriented and with an intact memory.    Assessment: History of colon polyps.     Plan: Colonoscopy.

## 2017-08-07 NOTE — PLAN OF CARE
Problem: Patient Care Overview (Adult)  Goal: Plan of Care Review  Outcome: Ongoing (interventions implemented as appropriate)    08/07/17 1035   Coping/Psychosocial Response Interventions   Plan Of Care Reviewed With patient   Patient Care Overview   Progress progress toward functional goals as expected       Goal: Adult Individualization and Mutuality  Outcome: Ongoing (interventions implemented as appropriate)    08/07/17 1035   Individualization   Patient Specific Preferences [facility]       Goal: Discharge Needs Assessment  Outcome: Ongoing (interventions implemented as appropriate)    08/07/17 1035   Discharge Needs Assessment   Concerns To Be Addressed denies needs/concerns at this time   Discharge Disposition home or self-care   Living Environment   Transportation Available car         Problem: GI Endoscopy (Adult)  Goal: Signs and Symptoms of Listed Potential Problems Will be Absent or Manageable (GI Endoscopy)  Outcome: Ongoing (interventions implemented as appropriate)    08/07/17 1035   GI Endoscopy   Problems Assessed (GI Endoscopy) all   Problems Present (GI Endoscopy) none

## 2017-08-07 NOTE — ANESTHESIA POSTPROCEDURE EVALUATION
"Patient: Carrington Biggs    Procedure Summary     Date Anesthesia Start Anesthesia Stop Room / Location    08/07/17 1125 1147  [facility]       Procedure Diagnosis Surgeon Provider    COLONOSCOPY to [facility] and TI (N/A ) History of colon polyps  (History of colon polyps [Z86.010]) MD Dylon Pruitt MD          Anesthesia Type: MAC  Last vitals  BP        Temp        Pulse       Resp        SpO2          Post Anesthesia Care and Evaluation    Patient location during evaluation: bedside  Patient participation: complete - patient participated  Level of consciousness: awake  Pain score: 2  Pain management: adequate  Airway patency: patent  Anesthetic complications: No anesthetic complications    Cardiovascular status: acceptable  Respiratory status: acceptable  Hydration status: acceptable    Comments: /76 (BP Location: Left arm, Patient Position: Lying)  Pulse 71  Temp 36.7 °C (98 °F) (Oral)   Resp 16  Ht 68\" (172.7 cm)  Wt 257 lb (117 kg)  SpO2 94%  BMI 39.08 kg/m2        "

## 2017-08-08 RX ORDER — METFORMIN HYDROCHLORIDE 500 MG/1
TABLET, EXTENDED RELEASE ORAL
Qty: 120 TABLET | Refills: 3 | Status: SHIPPED | OUTPATIENT
Start: 2017-08-08 | End: 2017-11-02 | Stop reason: SDUPTHER

## 2017-08-14 ENCOUNTER — APPOINTMENT (OUTPATIENT)
Dept: DIABETES SERVICES | Facility: HOSPITAL | Age: 57
End: 2017-08-14
Attending: INTERNAL MEDICINE

## 2017-08-30 RX ORDER — ATORVASTATIN CALCIUM 40 MG/1
TABLET, FILM COATED ORAL
Qty: 90 TABLET | Refills: 1 | Status: SHIPPED | OUTPATIENT
Start: 2017-08-30 | End: 2018-02-19 | Stop reason: SDUPTHER

## 2017-09-06 RX ORDER — LOSARTAN POTASSIUM 100 MG/1
TABLET ORAL
Qty: 30 TABLET | Refills: 3 | Status: SHIPPED | OUTPATIENT
Start: 2017-09-06 | End: 2018-01-09 | Stop reason: SDUPTHER

## 2017-09-18 RX ORDER — TRAZODONE HYDROCHLORIDE 100 MG/1
TABLET ORAL
Qty: 30 TABLET | Refills: 3 | Status: SHIPPED | OUTPATIENT
Start: 2017-09-18 | End: 2018-01-09 | Stop reason: SDUPTHER

## 2017-10-10 ENCOUNTER — TELEPHONE (OUTPATIENT)
Dept: GASTROENTEROLOGY | Facility: CLINIC | Age: 57
End: 2017-10-10

## 2017-10-10 NOTE — TELEPHONE ENCOUNTER
----- Message from Audrey Gongora sent at 10/5/2017  3:59 PM EDT -----  Contact: PT  Pt calling to talk to you again about Dr Danielle needs to change the code to preventative maintenance on his Colonoscopy he just had stating he has never had polyps.  Stated he needs this taken care of and once it is done then everybody will be happy.  CALLED [facility] [phone] TO GET COPY OF PATIENTS LAST COLONOSCOPY ON 8-5-2005. TO SEE IF IT MENTIONS POLYPS. PATIENT STATED WHEN DOING OPEN ACCESS HEALTH HISTORYHE HAD POLYPS BEFORE.. WILL SEE IF IT IS DOCUMENTED THERE. SPOKE TO LARISSA AT [facility] RECORDS HAVE BEEN DESTROYED. WILL TALK TO DR. DANIELLE

## 2017-11-01 ENCOUNTER — TELEPHONE (OUTPATIENT)
Dept: GASTROENTEROLOGY | Facility: CLINIC | Age: 57
End: 2017-11-01

## 2017-11-01 NOTE — TELEPHONE ENCOUNTER
----- Message from Chano Danielle MD sent at 11/1/2017 10:08 AM EDT -----  Correction made.  ----- Message -----     From: Leni Michelle MA     Sent: 10/20/2017   8:49 AM       To: Chano Danielle MD, Leni Michelle MA    1960  Pt has called several times complaining of his c/scope done 8-7-17 is coded.he says he does not have a personal history of colon polyps. His referral from Dr. Tapia was for screening c/scope. The only documentation of polyps is where I did his Open Access health history. Will you please review and see if you can change coding to reflect screening. He is very upset if it stays coded this way it will cost him $300. Thanks    Called let patient let him know DR. Danielle has fixed procedure to reflect he did not have a history of polyps. He is to call me after he talks to his insurance and everything is resolved.

## 2017-11-02 ENCOUNTER — OFFICE VISIT (OUTPATIENT)
Dept: ENDOCRINOLOGY | Age: 57
End: 2017-11-02

## 2017-11-02 VITALS
HEIGHT: 68 IN | WEIGHT: 263.8 LBS | DIASTOLIC BLOOD PRESSURE: 68 MMHG | SYSTOLIC BLOOD PRESSURE: 124 MMHG | HEART RATE: 76 BPM | BODY MASS INDEX: 39.98 KG/M2 | OXYGEN SATURATION: 96 %

## 2017-11-02 DIAGNOSIS — Z23 NEED FOR IMMUNIZATION AGAINST INFLUENZA: ICD-10-CM

## 2017-11-02 DIAGNOSIS — E11.9 TYPE 2 DIABETES MELLITUS WITHOUT COMPLICATION, WITHOUT LONG-TERM CURRENT USE OF INSULIN (HCC): Primary | ICD-10-CM

## 2017-11-02 DIAGNOSIS — Z59.89 INSURANCE COVERAGE PROBLEMS: ICD-10-CM

## 2017-11-02 DIAGNOSIS — R80.9 MICROALBUMINURIA: ICD-10-CM

## 2017-11-02 DIAGNOSIS — I10 ESSENTIAL HYPERTENSION: ICD-10-CM

## 2017-11-02 DIAGNOSIS — E78.5 HYPERLIPIDEMIA, UNSPECIFIED HYPERLIPIDEMIA TYPE: ICD-10-CM

## 2017-11-02 DIAGNOSIS — Z23 NEED FOR IMMUNIZATION AGAINST INFLUENZA: Primary | ICD-10-CM

## 2017-11-02 PROCEDURE — 90686 IIV4 VACC NO PRSV 0.5 ML IM: CPT | Performed by: INTERNAL MEDICINE

## 2017-11-02 PROCEDURE — 90471 IMMUNIZATION ADMIN: CPT | Performed by: INTERNAL MEDICINE

## 2017-11-02 PROCEDURE — 99214 OFFICE O/P EST MOD 30 MIN: CPT | Performed by: INTERNAL MEDICINE

## 2017-11-02 SDOH — ECONOMIC STABILITY - INCOME SECURITY: OTHER PROBLEMS RELATED TO HOUSING AND ECONOMIC CIRCUMSTANCES: Z59.89

## 2017-11-02 NOTE — PROGRESS NOTES
Subjective   Carrington Biggs is a 56 y.o. male.     HPI Comments: F/u for dm 2, hyperlipidemia, hypertension, sleep apnea  / testing bs 1-3 x day  / last dm eye exam 8/15/17 with dr Cannon / last dm foot exam 6/29/17 with dr kuo       Diabetes     Hypertension   Identifiable causes of hypertension include sleep apnea.   Hyperlipidemia     Sleep Apnea   Associated symptoms include numbness (in fingers  ).          Patient's a 56-year-old male who came in for followup.    He has known diabetes mellitus since 2012 and was started on insulin in 2013.  He stopped using insulin one year ago because of a high deductible and co-pay.  He is on metformin  mg 4 tablets once a day, glimepiride 4 mg BID and Actos 30 mg once a day.  He denies any hypoglycemic episode.  He is more compliant with his diet.  He has no weight change since 6/17.  He has not seen a dietitian or diabetes educator in the past.  -241.  -238.  He denies any hypoglycemic episodes.       He has occasional blurry vision.  His last eye examination was in 8/17.  He has microalbuminuria on urine sample taken last June 2017.  He is on losartan He has intermittent numbness on his right hand when he works on his computer.     He has hyperlipidemia and has been on Lipitor 40 mg once a day.  He denies any myalgia.  His last meal was 7 AM.  Lipid profile done in October 2017 are as follows: Cholesterol 146.  Triglycerides 193.  HDL 38.  LDL 70.     He has hypertension and is on losartan 100 mg once a day.  He denies any history of heart attack or stroke.  He denies any chest pain or shortness of breath.     He has seen an orthopedic surgeon for chronic swelling of his right ankle.  MRI showed posterior tibial and Achilles tendinopathy.  He did not get steroid injection.     He has sleep apnea but is unable to tolerate a CPAP.  He occasionally wakes himself snoring.  He wakes up rested.     The following portions of the patient's history were  "reviewed and updated as appropriate: allergies, current medications, past family history, past medical history, past social history, past surgical history and problem list.    Review of Systems   Constitutional: Negative.    HENT: Negative.    Eyes: Positive for visual disturbance.   Respiratory: Negative.    Cardiovascular: Negative.    Gastrointestinal: Negative.    Endocrine: Negative.    Genitourinary: Negative.    Musculoskeletal: Negative.    Skin: Negative.    Allergic/Immunologic: Negative.    Neurological: Positive for numbness (in fingers  ).   Hematological: Negative.    Psychiatric/Behavioral: Sleep disturbance:  sleep apnea unable to tolerate machine        Objective      Vitals:    11/02/17 1024   BP: 124/68   BP Location: Right arm   Patient Position: Sitting   Cuff Size: Large Adult   Pulse: 76   SpO2: 96%   Weight: 263 lb 12.8 oz (120 kg)   Height: 68\" (172.7 cm)     Physical Exam   Constitutional: He is oriented to person, place, and time. He appears well-developed and well-nourished. No distress.   HENT:   Head: Normocephalic.   Nose: Nose normal.   Mouth/Throat: No oropharyngeal exudate.   Eyes: Conjunctivae and EOM are normal. Right eye exhibits no discharge. Left eye exhibits no discharge. No scleral icterus.   Neck: Normal range of motion. Neck supple. No JVD present. No tracheal deviation present. No thyromegaly present.   Cardiovascular: Normal rate, regular rhythm, normal heart sounds and intact distal pulses.  Exam reveals no gallop and no friction rub.    No murmur heard.  Pulmonary/Chest: Effort normal and breath sounds normal. No respiratory distress. He has no wheezes. He has no rales. He exhibits no tenderness.   Abdominal: Soft. Bowel sounds are normal. He exhibits no distension and no mass. There is no tenderness.   Musculoskeletal: Normal range of motion. He exhibits no edema, tenderness or deformity.   Lymphadenopathy:     He has no cervical adenopathy.   Neurological: He is alert " and oriented to person, place, and time. He has normal reflexes. He displays normal reflexes. Coordination normal.   Intact light touch   Skin: Skin is warm and dry. No rash noted. No erythema.   Psychiatric: He has a normal mood and affect. His behavior is normal.     Admission on 08/07/2017, Discharged on 08/07/2017   Component Date Value Ref Range Status   • Glucose 08/07/2017 167* 70 - 130 mg/dL Final     Assessment/Plan   Carrington was seen today for diabetes, hypertension, hyperlipidemia and sleep apnea.    Diagnoses and all orders for this visit:    Type 2 diabetes mellitus without complication, without long-term current use of insulin  -     Hemoglobin A1c  -     Comprehensive Metabolic Panel    Hyperlipidemia, unspecified hyperlipidemia type    Microalbuminuria    Essential hypertension    Insurance coverage problems      Continue metformin ER, glimepiride, and Actos.  Check hemoglobin A1c.  Continue Lipitor 40 mg once a day and low-fat diet.  Continue losartan 100 mg per day per Dr. Tapia.  Advised to use CPAP regularly.  Flu vaccine given today.    Send copy of my notes and labs to Dr. Tapia.    RTC 4 mos.

## 2017-11-03 LAB
ALBUMIN SERPL-MCNC: 4.8 G/DL (ref 3.5–5.2)
ALBUMIN/GLOB SERPL: 1.9 G/DL
ALP SERPL-CCNC: 74 U/L (ref 39–117)
ALT SERPL-CCNC: 24 U/L (ref 1–41)
AST SERPL-CCNC: 18 U/L (ref 1–40)
BILIRUB SERPL-MCNC: 0.5 MG/DL (ref 0.1–1.2)
BUN SERPL-MCNC: 13 MG/DL (ref 6–20)
BUN/CREAT SERPL: 15.5 (ref 7–25)
CALCIUM SERPL-MCNC: 10 MG/DL (ref 8.6–10.5)
CHLORIDE SERPL-SCNC: 100 MMOL/L (ref 98–107)
CO2 SERPL-SCNC: 29.1 MMOL/L (ref 22–29)
CREAT SERPL-MCNC: 0.84 MG/DL (ref 0.76–1.27)
GFR SERPLBLD CREATININE-BSD FMLA CKD-EPI: 115 ML/MIN/1.73
GFR SERPLBLD CREATININE-BSD FMLA CKD-EPI: 95 ML/MIN/1.73
GLOBULIN SER CALC-MCNC: 2.5 GM/DL
GLUCOSE SERPL-MCNC: 117 MG/DL (ref 65–99)
HBA1C MFR BLD: 6.57 % (ref 4.8–5.6)
POTASSIUM SERPL-SCNC: 5.1 MMOL/L (ref 3.5–5.2)
PROT SERPL-MCNC: 7.3 G/DL (ref 6–8.5)
SODIUM SERPL-SCNC: 142 MMOL/L (ref 136–145)

## 2018-01-09 RX ORDER — PIOGLITAZONEHYDROCHLORIDE 30 MG/1
TABLET ORAL
Qty: 30 TABLET | Refills: 2 | Status: SHIPPED | OUTPATIENT
Start: 2018-01-09 | End: 2018-04-07 | Stop reason: SDUPTHER

## 2018-01-09 RX ORDER — TRAZODONE HYDROCHLORIDE 100 MG/1
TABLET ORAL
Qty: 30 TABLET | Refills: 3 | Status: SHIPPED | OUTPATIENT
Start: 2018-01-09 | End: 2018-04-02 | Stop reason: SDUPTHER

## 2018-01-09 RX ORDER — LOSARTAN POTASSIUM 100 MG/1
TABLET ORAL
Qty: 30 TABLET | Refills: 3 | Status: SHIPPED | OUTPATIENT
Start: 2018-01-09 | End: 2018-04-07 | Stop reason: SDUPTHER

## 2018-01-29 RX ORDER — METFORMIN HYDROCHLORIDE 500 MG/1
TABLET, EXTENDED RELEASE ORAL
Qty: 120 TABLET | Refills: 3 | Status: SHIPPED | OUTPATIENT
Start: 2018-01-29 | End: 2018-07-01 | Stop reason: SDUPTHER

## 2018-02-15 ENCOUNTER — OFFICE VISIT (OUTPATIENT)
Dept: INTERNAL MEDICINE | Age: 58
End: 2018-02-15

## 2018-02-15 ENCOUNTER — HOSPITAL ENCOUNTER (OUTPATIENT)
Dept: GENERAL RADIOLOGY | Facility: HOSPITAL | Age: 58
Discharge: HOME | End: 2018-02-15
Admitting: INTERNAL MEDICINE

## 2018-02-15 VITALS
WEIGHT: 263.8 LBS | OXYGEN SATURATION: 96 % | HEART RATE: 84 BPM | DIASTOLIC BLOOD PRESSURE: 82 MMHG | HEIGHT: 68 IN | BODY MASS INDEX: 39.98 KG/M2 | TEMPERATURE: 98 F | SYSTOLIC BLOOD PRESSURE: 152 MMHG

## 2018-02-15 DIAGNOSIS — R07.89 CHEST WALL PAIN: Primary | ICD-10-CM

## 2018-02-15 PROCEDURE — 71046 X-RAY EXAM CHEST 2 VIEWS: CPT

## 2018-02-15 PROCEDURE — 99214 OFFICE O/P EST MOD 30 MIN: CPT | Performed by: INTERNAL MEDICINE

## 2018-02-15 RX ORDER — MELOXICAM 15 MG/1
15 TABLET ORAL DAILY
Qty: 10 TABLET | Refills: 0 | Status: SHIPPED | OUTPATIENT
Start: 2018-02-15 | End: 2018-03-01 | Stop reason: SDUPTHER

## 2018-02-15 NOTE — PROGRESS NOTES
"Carrington Shell / 57 y.o. / male  02/15/2018  VITALS    Visit Vitals   • /82   • Pulse 84   • Temp 98 °F (36.7 °C)   • Ht 172.7 cm (67.99\")   • Wt 120 kg (263 lb 12.8 oz)   • SpO2 96%   • BMI 40.12 kg/m2       BP Readings from Last 3 Encounters:   02/15/18 152/82   11/02/17 124/68   08/07/17 140/72     Wt Readings from Last 3 Encounters:   02/15/18 120 kg (263 lb 12.8 oz)   11/02/17 120 kg (263 lb 12.8 oz)   08/07/17 117 kg (257 lb)      Body mass index is 40.12 kg/(m^2).    CC:  Main reason(s) for today's visit: URI      HPI: Patient presents today with pleuritic chest pain and back pain.  Symptoms have been noted over the past 3-4 days.  Symptoms began gradually, over 10 last night, today for over 10 in severity aching in quality and has been continuous.  Last evening, patient was only able to lie flat on his back, but feels better now that he is up and around at work today.  He tried some of his wife's albuterol last evening but noticed no significant change in symptoms.  Patient continues to smoke.  He is aware of the health risk associated with smoking and was counseled to quit today.  Family history significant for the fact that his wife recently had the flu and pneumonia within the past 2 weeks.  Laboratory November 2017 creatinine 0.84 with a GFR of 95.       Patient Care Team:  Moreno Tapia MD as PCP - General  Dann Alvarado MD as Consulting Physician (Orthopedic Surgery)  Deidra Cannon MD as Consulting Physician (Ophthalmology)  Bayron Anaya MD as Consulting Physician (Endocrinology)  ____________________________________________________________________    ASSESSMENT & PLAN:    Problem List Items Addressed This Visit     None        No orders of the defined types were placed in this encounter.      Summary/Discussion:  · #1 chest wall pain in [place] by physical exam today in the exam room.  I suspect this may be due to an ongoing viral illness.  We'll check chest x-ray today to rule out " any other sort of pathology, will treat with anti-inflammatory as above.  Patient will use this over the weekend, contact me early next week if his symptoms have not improved.  If they worsen, he is advised to proceed to the emergency room.          No Follow-up on file.    Future Appointments  Date Time Provider Department Center   3/16/2018 11:00 AM Bayron Anaya MD [facility] None       ______________________________________________________    REVIEW OF SYSTEMS    Review of Systems   Constitutional: Negative for appetite change, chills, diaphoresis, fatigue and fever.   Respiratory: Positive for wheezing. Negative for cough and shortness of breath.    Gastrointestinal: Negative for diarrhea, nausea and vomiting.   Skin: Negative for rash.   Hematological: Negative for adenopathy.         PHYSICAL EXAMINATION    Physical Exam   Constitutional: He is oriented to person, place, and time. He appears well-developed and well-nourished. No distress.   HENT:   Right Ear: Tympanic membrane and ear canal normal.   Left Ear: Tympanic membrane and ear canal normal.   Nose: Right sinus exhibits no maxillary sinus tenderness and no frontal sinus tenderness. Left sinus exhibits no maxillary sinus tenderness and no frontal sinus tenderness.   Neck: Normal range of motion. Neck supple.   Pulmonary/Chest: Effort normal and breath sounds normal.   Musculoskeletal:   Chest wall is tender to palpation.  T2 and T3 right side costochondral junction reproduces pain that he has been having.   Lymphadenopathy:     He has no cervical adenopathy.   Neurological: He is alert and oriented to person, place, and time.   Skin: Skin is warm and dry. He is not diaphoretic.   Psychiatric: He has a normal mood and affect. His behavior is normal. Judgment and thought content normal.   Nursing note and vitals reviewed.      REVIEWED DATA:    Labs:     Lab Results   Component Value Date     11/02/2017    K 5.1 11/02/2017    AST 18  11/02/2017    ALT 24 11/02/2017    BUN 13 11/02/2017    CREATININE 0.84 11/02/2017    CREATININE 0.74 (L) 06/29/2017    CREATININE 0.84 01/26/2017    EGFRIFNONA 95 11/02/2017    EGFRIFAFRI 115 11/02/2017       Lab Results   Component Value Date    HGBA1C 6.57 (H) 11/02/2017    HGBA1C 7.12 (H) 06/29/2017    HGBA1C 8.95 (H) 01/26/2017     (H) 11/02/2017     (H) 06/29/2017     (H) 01/26/2017    MICROALBUR 159.8 06/29/2017       Lab Results   Component Value Date    LDL CANCELED 06/29/2017    LDL 68 01/26/2017    LDL 59 08/10/2015    HDL 39 (L) 06/29/2017    TRIG 428 (H) 06/29/2017       No results found for: TSH, FREET4    Lab Results   Component Value Date    WBC 10.7 08/10/2015    HGB 15.9 08/10/2015     08/10/2015       Imaging:         Medical Tests:         Summary of old records / correspondence / consultant report:         Request outside records:         ALLERGIES  Allergies   Allergen Reactions   • Rosuvastatin Calcium Myalgia        MEDICATIONS  Current Outpatient Prescriptions   Medication Sig Dispense Refill   • atorvastatin (LIPITOR) 40 MG tablet TAKE 1 TABLET BY MOUTH EVERY DAY 90 tablet 1   • glimepiride (AMARYL) 4 MG tablet One tablet twice a day 180 tablet 2   • losartan (COZAAR) 100 MG tablet TAKE 1 TABLET BY MOUTH DAILY 30 tablet 3   • metFORMIN ER (GLUCOPHAGE-XR) 500 MG 24 hr tablet TAKE 4 TABLETS BY MOUTH EVERY DAY WITH DINNER 120 tablet 3   • pioglitazone (ACTOS) 30 MG tablet TAKE 1 TABLET ONCE DAILY. 30 tablet 2   • traZODone (DESYREL) 100 MG tablet TAKE 1 TABLET BY MOUTH EVERY NIGHT. 30 tablet 3     No current facility-administered medications for this visit.        PFSH:     The following portions of the patient's history were reviewed and updated as appropriate: Allergies / Current Medications / Past Medical History / Surgical History / Social History / Family History    PROBLEM LIST   Patient Active Problem List   Diagnosis   • Colon polyp   • Type 2 diabetes  mellitus, without long-term current use of insulin   • Erectile dysfunction of nonorganic origin   • Essential hypertension   • Hyperlipidemia   • Adiposity   • Gastroesophageal reflux disease   • Herpes simplex virus (HSV) infection   • Obstructive sleep apnea syndrome   • Tubular adenoma   • Encounter for immunization   • Colon cancer screening   • Chronic pain of right ankle   • Microalbuminuria   • Insurance coverage problems   • Tibialis posterior tendonitis   • Achilles tendonosis       PAST MEDICAL HISTORY  Past Medical History:   Diagnosis Date   • Erectile dysfunction    • Hypertension    • Sleep apnea    • Type 2 diabetes mellitus        SURGICAL HISTORY  Past Surgical History:   Procedure Laterality Date   • COLONOSCOPY  2012    Dr. Arizmendi   • COLONOSCOPY N/A 8/7/2017    Procedure: COLONOSCOPY to Cecum and TI;  Surgeon: Chano Danielle MD;  Location: [facility] ENDOSCOPY;  Service:    • INCISION AND DRAINAGE ABSCESS         SOCIAL HISTORY  Social History     Social History   • Marital status:      Spouse name: N/A   • Number of children: 0   • Years of education: N/A     Occupational History   • salesman      Social History Main Topics   • Smoking status: Current Every Day Smoker     Packs/day: 1.00     Years: 40.00     Types: Cigarettes   • Smokeless tobacco: Never Used      Comment: April 3, 2017 low-dose CT scan negative   • Alcohol use 3.6 oz/week     6 Shots of liquor per week   • Drug use: No   • Sexual activity: Not Asked     Other Topics Concern   • None     Social History Narrative       FAMILY HISTORY  Family History   Problem Relation Age of Onset   • Stroke Mother    • Diabetes Mother    • Stroke Father    • Aneurysm Father    • Rheum arthritis Sister    • Hypertension Brother    • Diabetes Sister    • Hypertension Sister    • Hyperlipidemia Sister    • Hypertension Brother          **Faiza Disclaimer:   Much of this encounter note is an electronic transcription/translation of spoken  language to printed text. The electronic translation of spoken language may permit erroneous, or at times, nonsensical words or phrases to be inadvertently transcribed. Although I have reviewed the note for such errors, some may still exist.

## 2018-02-19 DIAGNOSIS — E78.5 HYPERLIPIDEMIA, UNSPECIFIED HYPERLIPIDEMIA TYPE: Primary | ICD-10-CM

## 2018-02-20 RX ORDER — ATORVASTATIN CALCIUM 40 MG/1
TABLET, FILM COATED ORAL
Qty: 90 TABLET | Refills: 1 | Status: SHIPPED | OUTPATIENT
Start: 2018-02-20 | End: 2018-08-30 | Stop reason: SDUPTHER

## 2018-03-01 ENCOUNTER — TELEPHONE (OUTPATIENT)
Dept: INTERNAL MEDICINE | Age: 58
End: 2018-03-01

## 2018-03-01 RX ORDER — MELOXICAM 15 MG/1
15 TABLET ORAL DAILY
Qty: 10 TABLET | Refills: 0 | Status: SHIPPED | OUTPATIENT
Start: 2018-03-01 | End: 2018-03-26

## 2018-03-01 NOTE — TELEPHONE ENCOUNTER
Pt called requesting 10 more of the Meloxicam, stating he is still recovering from the soreness. Pt stated if this doesn't help he will be back in to see Dr Tapia.  Pt's # [phone]  [facility]  Thanks SP

## 2018-03-26 ENCOUNTER — OFFICE VISIT (OUTPATIENT)
Dept: INTERNAL MEDICINE | Age: 58
End: 2018-03-26

## 2018-03-26 ENCOUNTER — TELEPHONE (OUTPATIENT)
Dept: INTERNAL MEDICINE | Age: 58
End: 2018-03-26

## 2018-03-26 ENCOUNTER — HOSPITAL ENCOUNTER (OUTPATIENT)
Dept: GENERAL RADIOLOGY | Facility: HOSPITAL | Age: 58
Discharge: HOME | End: 2018-03-26
Admitting: INTERNAL MEDICINE

## 2018-03-26 VITALS
HEIGHT: 68 IN | OXYGEN SATURATION: 97 % | WEIGHT: 262.4 LBS | BODY MASS INDEX: 39.77 KG/M2 | SYSTOLIC BLOOD PRESSURE: 160 MMHG | TEMPERATURE: 97.8 F | DIASTOLIC BLOOD PRESSURE: 86 MMHG | HEART RATE: 83 BPM

## 2018-03-26 DIAGNOSIS — M62.838 CERVICAL PARASPINAL MUSCLE SPASM: Primary | ICD-10-CM

## 2018-03-26 PROCEDURE — 99214 OFFICE O/P EST MOD 30 MIN: CPT | Performed by: INTERNAL MEDICINE

## 2018-03-26 PROCEDURE — 72050 X-RAY EXAM NECK SPINE 4/5VWS: CPT

## 2018-03-26 RX ORDER — NAPROXEN 375 MG/1
375 TABLET ORAL 2 TIMES DAILY WITH MEALS
Qty: 30 TABLET | Refills: 1 | Status: SHIPPED | OUTPATIENT
Start: 2018-03-26 | End: 2018-10-25

## 2018-03-26 RX ORDER — CYCLOBENZAPRINE HCL 5 MG
5 TABLET ORAL 3 TIMES DAILY
Qty: 45 TABLET | Refills: 1 | Status: SHIPPED | OUTPATIENT
Start: 2018-03-26 | End: 2018-05-08 | Stop reason: SDUPTHER

## 2018-03-26 NOTE — PROGRESS NOTES
"    Carrington Shell / 57 y.o. / male  03/26/2018  VITALS    Visit Vitals  /86   Pulse 83   Temp 97.8 °F (36.6 °C)   Ht 172.7 cm (67.99\")   Wt 119 kg (262 lb 6.4 oz)   SpO2 97%   BMI 39.91 kg/m²       BP Readings from Last 3 Encounters:   03/26/18 160/86   02/15/18 152/82   11/02/17 124/68     Wt Readings from Last 3 Encounters:   03/26/18 119 kg (262 lb 6.4 oz)   02/15/18 120 kg (263 lb 12.8 oz)   11/02/17 120 kg (263 lb 12.8 oz)      Body mass index is 39.91 kg/m².    CC:  Main reason(s) for today's visit: Neck Pain (x 5 weeks )      HPI:     Patient presents this afternoon with pain and reduced range of motion in his neck.  This is been present for about the past 5 weeks, although the more significant discomfort occurred over the last 2 days or so.  Patient notes a feeling of tension from the area inferior to his ears to the area of the cervical spinous processes.  Pain increases with lateral rotation of the skull.,  Sharp in quality.  He does note some radiation of pain into the upper extremity on the left in the area of the triceps.  He also notes some radiation of pain to the anterior chest on the left side at the level of T2 just inferior to the sternoclavicular joint.  He has taken meloxicam for 2 prescriptions, which seemed to help some, over the last 2 weeks since he has been off the medication, the symptoms have seemed to worsen.  When he was here in February, chest x-ray was obtained and showed no evidence of disease.  Laboratory review creatinine 0.84 with an estimated GFR of 95 cc/m November 2, 2017.    Patient Care Team:  Moreno Tapia MD as PCP - General  Dann Alvarado MD as Consulting Physician (Orthopedic Surgery)  Deidra Cannon MD as Consulting Physician (Ophthalmology)  Bayron Anaya MD as Consulting Physician (Endocrinology)  ____________________________________________________________________    ASSESSMENT & PLAN:    Problem List Items Addressed This Visit     None      Visit " Diagnoses     Cervical paraspinal muscle spasm    -  Primary    Relevant Medications    naproxen (NAPROSYN) 375 MG tablet    cyclobenzaprine (FLEXERIL) 5 MG tablet    Other Relevant Orders    XR spine cervical complete 4 or 5 vw    Ambulatory Referral to Physical Therapy Evaluate and treat        Orders Placed This Encounter   Procedures   • XR spine cervical complete 4 or 5 vw   • Ambulatory Referral to Physical Therapy Evaluate and treat       Summary/Discussion:    Number-one trapezius spasm, bilaterally but right greater than left.  Patient is significantly impaired respecting movement at this time.  Will treat with naproxen 375 mg twice a day and cyclobenzaprine 5 mg 3 times a day for 15 days with 1 refill each.  We'll check x-ray today to rule out any bony pathology.  We'll also schedule physical therapy evaluation for additional modality treatment.  I do not believe based on symptoms that  this represents a radiculopathy.  he'll contact me by the end of the week with an update on his symptoms. Patient may supplement with local heat 3-4 times a day for 20 minutes as his schedule permits.  He was reminded not to use a heating pad in bed.    No Follow-up on file.    Future Appointments  Date Time Provider Department Center   7/30/2018 2:30 PM Bayron Anaya MD [facility] None       ______________________________________________________    REVIEW OF SYSTEMS    Review of Systems   Constitutional: Positive for appetite change. Negative for chills, diaphoresis and fever.   Respiratory: Negative for cough, shortness of breath and wheezing.    Cardiovascular: Negative for chest pain.   Skin: Negative for rash.   Hematological: Negative for adenopathy.         PHYSICAL EXAMINATION    Physical Exam   Constitutional: He is oriented to person, place, and time. He appears well-developed.   obese   Neck:   Neck shows reduced range of motion with respect to rotation, forward flexion, extension, there is bilateral spasm  in the range of the trapezius, more so on the right side.  There is no tenderness to palpation over the cervical spine spinous process.   Cardiovascular: Intact distal pulses.    Neurological: He is alert and oriented to person, place, and time. He has normal strength and normal reflexes. No sensory deficit.   Skin: Skin is warm and dry.   Psychiatric: He has a normal mood and affect. His behavior is normal. Judgment and thought content normal.   Nursing note and vitals reviewed.      REVIEWED DATA:    Labs:     Lab Results   Component Value Date     11/02/2017    K 5.1 11/02/2017    AST 18 11/02/2017    ALT 24 11/02/2017    BUN 13 11/02/2017    CREATININE 0.84 11/02/2017    CREATININE 0.74 (L) 06/29/2017    CREATININE 0.84 01/26/2017    EGFRIFNONA 95 11/02/2017    EGFRIFAFRI 115 11/02/2017       Lab Results   Component Value Date    HGBA1C 6.57 (H) 11/02/2017    HGBA1C 7.12 (H) 06/29/2017    HGBA1C 8.95 (H) 01/26/2017    MICROALBUR 159.8 06/29/2017       Lab Results   Component Value Date    LDL CANCELED 06/29/2017    LDL 68 01/26/2017    LDL 59 08/10/2015    HDL 39 (L) 06/29/2017    TRIG 428 (H) 06/29/2017       No results found for: TSH, FREET4    Lab Results   Component Value Date    WBC 10.7 08/10/2015    HGB 15.9 08/10/2015     08/10/2015       Imaging:         Medical Tests:         Summary of old records / correspondence / consultant report:         Request outside records:         ALLERGIES  Allergies   Allergen Reactions   • Rosuvastatin Calcium Myalgia        MEDICATIONS  Current Outpatient Prescriptions   Medication Sig Dispense Refill   • atorvastatin (LIPITOR) 40 MG tablet TAKE 1 TABLET BY MOUTH EVERY DAY 90 tablet 1   • glimepiride (AMARYL) 4 MG tablet One tablet twice a day 180 tablet 2   • losartan (COZAAR) 100 MG tablet TAKE 1 TABLET BY MOUTH DAILY 30 tablet 3   • metFORMIN ER (GLUCOPHAGE-XR) 500 MG 24 hr tablet TAKE 4 TABLETS BY MOUTH EVERY DAY WITH DINNER 120 tablet 3   •  pioglitazone (ACTOS) 30 MG tablet TAKE 1 TABLET ONCE DAILY. 30 tablet 2   • traZODone (DESYREL) 100 MG tablet TAKE 1 TABLET BY MOUTH EVERY NIGHT. 30 tablet 3   • cyclobenzaprine (FLEXERIL) 5 MG tablet Take 1 tablet by mouth 3 (Three) Times a Day. 45 tablet 1   • naproxen (NAPROSYN) 375 MG tablet Take 1 tablet by mouth 2 (Two) Times a Day With Meals. 30 tablet 1     No current facility-administered medications for this visit.        PFSH:     The following portions of the patient's history were reviewed and updated as appropriate: Allergies / Current Medications / Past Medical History / Surgical History / Social History / Family History    PROBLEM LIST   Patient Active Problem List   Diagnosis   • Colon polyp   • Type 2 diabetes mellitus, without long-term current use of insulin   • Erectile dysfunction of nonorganic origin   • Essential hypertension   • Hyperlipidemia   • Adiposity   • Gastroesophageal reflux disease   • Herpes simplex virus (HSV) infection   • Obstructive sleep apnea syndrome   • Tubular adenoma   • Encounter for immunization   • Colon cancer screening   • Chronic pain of right ankle   • Microalbuminuria   • Insurance coverage problems   • Tibialis posterior tendonitis   • Achilles tendonosis       PAST MEDICAL HISTORY  Past Medical History:   Diagnosis Date   • Erectile dysfunction    • Hypertension    • Sleep apnea    • Type 2 diabetes mellitus        SURGICAL HISTORY  Past Surgical History:   Procedure Laterality Date   • COLONOSCOPY  2012    Dr. Arizmendi   • COLONOSCOPY N/A 8/7/2017    Procedure: COLONOSCOPY to Cecum and TI;  Surgeon: Chano Danielle MD;  Location: [facility] ENDOSCOPY;  Service:    • INCISION AND DRAINAGE ABSCESS         SOCIAL HISTORY  Social History     Social History   • Marital status:    • Number of children: 0     Occupational History   • salesman      Social History Main Topics   • Smoking status: Current Every Day Smoker     Packs/day: 1.00     Years: 40.00      Types: Cigarettes   • Smokeless tobacco: Never Used      Comment: April 3, 2017 low-dose CT scan negative   • Alcohol use 3.6 oz/week     6 Shots of liquor per week   • Drug use: No     Other Topics Concern   • Not on file       FAMILY HISTORY  Family History   Problem Relation Age of Onset   • Stroke Mother    • Diabetes Mother    • Stroke Father    • Aneurysm Father    • Rheum arthritis Sister    • Hypertension Brother    • Diabetes Sister    • Hypertension Sister    • Hyperlipidemia Sister    • Hypertension Brother          **Faiza Disclaimer:   Much of this encounter note is an electronic transcription/translation of spoken language to printed text. The electronic translation of spoken language may permit erroneous, or at times, nonsensical words or phrases to be inadvertently transcribed. Although I have reviewed the note for such errors, some may still exist.

## 2018-03-26 NOTE — TELEPHONE ENCOUNTER
----- Message from Moreno Tapia MD sent at 3/26/2018  1:40 PM EDT -----  X-ray showed evidence of arthritic changes as expected.  Please let me know if the medication is not helpful.

## 2018-04-02 RX ORDER — TRAZODONE HYDROCHLORIDE 100 MG/1
TABLET ORAL
Qty: 30 TABLET | Refills: 0 | Status: SHIPPED | OUTPATIENT
Start: 2018-04-02 | End: 2018-04-26 | Stop reason: SDUPTHER

## 2018-04-09 ENCOUNTER — TREATMENT (OUTPATIENT)
Dept: PHYSICAL THERAPY | Facility: CLINIC | Age: 58
End: 2018-04-09

## 2018-04-09 DIAGNOSIS — M62.838 CERVICAL PARASPINAL MUSCLE SPASM: Primary | ICD-10-CM

## 2018-04-09 DIAGNOSIS — M54.2 PAIN, NECK: ICD-10-CM

## 2018-04-09 PROCEDURE — 97110 THERAPEUTIC EXERCISES: CPT | Performed by: PHYSICAL THERAPIST

## 2018-04-09 PROCEDURE — 97161 PT EVAL LOW COMPLEX 20 MIN: CPT | Performed by: PHYSICAL THERAPIST

## 2018-04-09 RX ORDER — LOSARTAN POTASSIUM 100 MG/1
TABLET ORAL
Qty: 30 TABLET | Refills: 0 | Status: SHIPPED | OUTPATIENT
Start: 2018-04-09 | End: 2018-05-14 | Stop reason: SDUPTHER

## 2018-04-09 RX ORDER — PIOGLITAZONEHYDROCHLORIDE 30 MG/1
TABLET ORAL
Qty: 30 TABLET | Refills: 2 | Status: SHIPPED | OUTPATIENT
Start: 2018-04-09 | End: 2018-09-14 | Stop reason: SDUPTHER

## 2018-04-09 RX ORDER — GLIMEPIRIDE 4 MG/1
TABLET ORAL
Qty: 180 TABLET | Refills: 0 | Status: SHIPPED | OUTPATIENT
Start: 2018-04-09 | End: 2018-07-12 | Stop reason: SDUPTHER

## 2018-04-09 NOTE — PROGRESS NOTES
Physical Therapy Initial Evaluation and Plan of Care    Patient: Carrington Biggs   : 1960  Diagnosis/ICD-10 Code:  Cervical paraspinal muscle spasm [M62.838]  Referring practitioner: Moreno Tapia MD    Subjective Evaluation    History of Present Illness  Mechanism of injury: Pt presents with neck pain for the last several weeks.  Insidious onset, but possibly riding on a [employer] on the weekends.  Been riding [employer] for 20 yrs.  The symptoms are much better since starting the muscle relaxer and anti-inflammatory.  The meds have helped a lot, but the symptoms are not gone.      No prior Hx of neck injury.      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      Quality of life: good    Pain  No pain reported  Current pain ratin  At best pain ratin  At worst pain ratin  Location: Cervical Paraspinals  Quality: pressure, dull ache and tight  Relieving factors: medications and heat (Ice Hot)  Aggravating factors: sleeping (Cervical Extension, Driving [employer], driving)  Progression: improved    Social Support  Lives in: one-story house and multiple-level home  Lives with: Cat.    Hand dominance: right    Diagnostic Tests  X-ray: normal             Objective       Active Range of Motion   Cervical/Thoracic Spine   Cervical    Flexion: 56 degrees   Extension: 43 degrees   Left lateral flexion: 20 degrees   Right lateral flexion: 26 degrees   Left rotation: 41 degrees   Right rotation: 60 degrees     Strength/Myotome Testing     Left Shoulder     Planes of Motion   Flexion: 5   Abduction: 5   External rotation at 0°: 5     Isolated Muscles   Middle trapezius: 5   Serratus anterior: 4+     Right Shoulder     Planes of Motion   Flexion: 5   Abduction: 5   External rotation at 0°: 5     Isolated Muscles   Middle trapezius: 5   Serratus anterior: 4     Left Elbow   Flexion: 5  Extension: 5    Right Elbow    Flexion: 5  Extension: 5    Left Wrist/Hand   Wrist extension: 5  Wrist flexion: 5    Right Wrist/Hand   Wrist extension: 5  Wrist flexion: 5         Assessment & Plan     Assessment  Impairments: abnormal or restricted ROM, activity intolerance, impaired physical strength, lacks appropriate home exercise program and pain with function  Assessment details: Pt presents to PT with symptoms consistent with a cervical strain.  Pt would benefit from skilled PT intervention to address the deficits noted.     Prognosis: good  Functional Limitations: carrying objects and lifting  Goals  Plan Goals: SHORT TERM GOALS:  Time for Goal Achievement: 4 weeks   1.  Patient to be compliant with HEP  2.  Pt to report increased ease to drive and exercise with less pain.  3.  Increased cervical and thoracic segmental mobility to allow for increased ease with driving and ADL's.  4.  Pt. to be independent with CT stabilization exercises to allow for improved posture while in clinic with fatiguing exercises.    LONG TERM GOALS: Time for Goal Achievement: 8 weeks  1.  Pt to score < 4% perceived disability on Neck Index  2.  Pain level < 3/10 at worse with all activities  3.  Increased cervical AROM to WFL to allow for driving and household tasks without restrictions.  4.  Pt able to perform sports, drive, lift and daily activities without complaints of weakness or pain limiting function.        Plan  Therapy options: will be seen for skilled physical therapy services  Planned modality interventions: cryotherapy, electrical stimulation/Russian stimulation, TENS, thermotherapy (hydrocollator packs) and ultrasound  Other planned modality interventions: Dry Needling  Planned therapy interventions: manual therapy, home exercise program, functional ROM exercises, flexibility, neuromuscular re-education, postural training, soft tissue mobilization, spinal/joint mobilization, strengthening, stretching and body mechanics training  Frequency: 2x  week  Duration in visits: 10  Treatment plan discussed with: patient        Manual Therapy:         mins  31250;  Therapeutic Exercise:    10     mins  58657;     Neuromuscular Nichelle:        mins  27155;    Therapeutic Activity:          mins  45158;     Gait Training:           mins  87102;     Ultrasound:          mins  29454;    Electrical Stimulation:         mins  58071 ( );  Dry Needling          mins self-pay    Timed Treatment:   10   mins   Total Treatment:     60   mins    PT SIGNATURE: Fabien Meyers PT   [place] Lic #[id number]    DATE TREATMENT INITIATED: 4-9-18    Initial Certification   Certification Period: 7-8-18  I certify that the therapy services are furnished while this patient is under my care.  The services outlined above are required by this patient, and will be reviewed every 90 days.     PHYSICIAN: Moreno Tapia MD      DATE:     Please sign and return via fax to [phone].. Thank you, [facility].

## 2018-04-13 ENCOUNTER — TREATMENT (OUTPATIENT)
Dept: PHYSICAL THERAPY | Facility: CLINIC | Age: 58
End: 2018-04-13

## 2018-04-13 DIAGNOSIS — M62.838 CERVICAL PARASPINAL MUSCLE SPASM: Primary | ICD-10-CM

## 2018-04-13 DIAGNOSIS — M54.2 PAIN, NECK: ICD-10-CM

## 2018-04-13 PROCEDURE — 97110 THERAPEUTIC EXERCISES: CPT | Performed by: PHYSICAL THERAPIST

## 2018-04-13 PROCEDURE — 97140 MANUAL THERAPY 1/> REGIONS: CPT | Performed by: PHYSICAL THERAPIST

## 2018-04-13 NOTE — PROGRESS NOTES
Physical Therapy Daily Progress Note  Visit: 2    Carrington Biggs reports: I am doing ok, but no real difference yet in my symptoms.  The heat seems to help more then the cold.    Subjective     Objective   See Exercise, Manual, and Modality Logs for complete treatment.     Reviewed HEP.    Assessment & Plan     Assessment  Assessment details: Pt fausto (I) w/ HEP.  The pt romy Rx well while in the clinic.      Plan  Plan details: Progress ROM / strengthening /stabilization / functional activity as tolerated                   Manual Therapy:    15     mins  49119;  Therapeutic Exercise:    25     mins  96307;     Neuromuscular Nichelle:        mins  55768;    Therapeutic Activity:          mins  36112;     Gait Training:           mins  70736;     Ultrasound:          mins  88675;    Electrical Stimulation:         mins  84414 ( );  Dry Needling          mins self-pay    Timed Treatment:   40   mins   Total Treatment:     52   mins    Fabien Meyers PT  [place] Lic. # [id number]  Physical Therapist

## 2018-04-18 ENCOUNTER — TREATMENT (OUTPATIENT)
Dept: PHYSICAL THERAPY | Facility: CLINIC | Age: 58
End: 2018-04-18

## 2018-04-18 DIAGNOSIS — M62.838 CERVICAL PARASPINAL MUSCLE SPASM: Primary | ICD-10-CM

## 2018-04-18 DIAGNOSIS — M54.2 PAIN, NECK: ICD-10-CM

## 2018-04-18 PROCEDURE — 97140 MANUAL THERAPY 1/> REGIONS: CPT | Performed by: PHYSICAL THERAPIST

## 2018-04-18 PROCEDURE — 97110 THERAPEUTIC EXERCISES: CPT | Performed by: PHYSICAL THERAPIST

## 2018-04-18 NOTE — PROGRESS NOTES
Physical Therapy Daily Progress Note  Visit: 3    Carrington Biggs reports: My neck really felt better after my last visit.  My symptoms are overall feeling better.      Subjective     Objective   See Exercise, Manual, and Modality Logs for complete treatment.       Assessment & Plan     Assessment  Assessment details: Pt demnano improved cervical mobility upon MT.  Will continue work to restore the cervical and thoracic mobility.      Plan  Plan details: Progress ROM / strengthening / stabilization / functional activity as tolerated                   Manual Therapy:    12     mins  20508;  Therapeutic Exercise:    30     mins  02116;     Neuromuscular Nichelle:        mins  16012;    Therapeutic Activity:          mins  20735;     Gait Training:           mins  05779;     Ultrasound:          mins  89039;    Electrical Stimulation:         mins  36054 ( );  Dry Needling          mins self-pay    Timed Treatment:   42   mins   Total Treatment:     57   mins    Fabien Meyers PT  [place] Lic. # [id number]  Physical Therapist

## 2018-04-20 ENCOUNTER — TREATMENT (OUTPATIENT)
Dept: PHYSICAL THERAPY | Facility: CLINIC | Age: 58
End: 2018-04-20

## 2018-04-20 DIAGNOSIS — M62.838 CERVICAL PARASPINAL MUSCLE SPASM: Primary | ICD-10-CM

## 2018-04-20 DIAGNOSIS — M54.2 PAIN, NECK: ICD-10-CM

## 2018-04-20 PROCEDURE — 97110 THERAPEUTIC EXERCISES: CPT | Performed by: PHYSICAL THERAPIST

## 2018-04-20 PROCEDURE — 97140 MANUAL THERAPY 1/> REGIONS: CPT | Performed by: PHYSICAL THERAPIST

## 2018-04-20 NOTE — PROGRESS NOTES
Physical Therapy Daily Progress Note  Visit: 4    Carrington Biggs reports: I am really sore and stiff this morning.  I think that I may have over done it with my HEP and stretching on my own.      Subjective     Objective   See Exercise, Manual, and Modality Logs for complete treatment.       Assessment & Plan     Assessment  Assessment details: Pt is progressing well, with improved cervical mobility.      Plan  Plan details: Progress ROM / strengthening /stabilization / functional activity as tolerated                   Manual Therapy:    12     mins  83844;  Therapeutic Exercise:    35     mins  32122;     Neuromuscular Nichelle:        mins  29516;    Therapeutic Activity:          mins  04600;     Gait Training:           mins  99861;     Ultrasound:          mins  16005;    Electrical Stimulation:         mins  44869 ( );  Dry Needling          mins self-pay    Timed Treatment:   47   mins   Total Treatment:     62   mins    Fabien Meyers PT  [place] Lic. # [id number]  Physical Therapist

## 2018-04-25 ENCOUNTER — TREATMENT (OUTPATIENT)
Dept: PHYSICAL THERAPY | Facility: CLINIC | Age: 58
End: 2018-04-25

## 2018-04-25 DIAGNOSIS — M62.838 CERVICAL PARASPINAL MUSCLE SPASM: Primary | ICD-10-CM

## 2018-04-25 DIAGNOSIS — M54.2 PAIN, NECK: ICD-10-CM

## 2018-04-25 PROCEDURE — 97110 THERAPEUTIC EXERCISES: CPT | Performed by: PHYSICAL THERAPIST

## 2018-04-25 PROCEDURE — 97140 MANUAL THERAPY 1/> REGIONS: CPT | Performed by: PHYSICAL THERAPIST

## 2018-04-25 NOTE — PROGRESS NOTES
Physical Therapy Daily Progress Note  Visit: 5    Carrington Biggs reports: My neck is feeling better but it is pulling on the front (L) side.      Subjective     Objective   See Exercise, Manual, and Modality Logs for complete treatment.       Assessment & Plan     Assessment  Assessment details: The pt is romy Rx well.  Was more aggressive with MT to promote cervical rotation with good tolerance.      Plan  Plan details: Progress ROM / strengthening /stabilization / functional activity as tolerated                   Manual Therapy:    15     mins  93265;  Therapeutic Exercise:    40     mins  75061;     Neuromuscular Nichelle:        mins  50498;    Therapeutic Activity:          mins  63112;     Gait Training:           mins  31697;     Ultrasound:          mins  78328;    Electrical Stimulation:         mins  06004 ( );  Dry Needling          mins self-pay    Timed Treatment:   55   mins   Total Treatment:     70   mins    Fabien Meyers PT  [place] Lic. # [id number]  Physical Therapist

## 2018-04-26 RX ORDER — TRAZODONE HYDROCHLORIDE 100 MG/1
TABLET ORAL
Qty: 30 TABLET | Refills: 5 | Status: SHIPPED | OUTPATIENT
Start: 2018-04-26 | End: 2018-09-19 | Stop reason: SDUPTHER

## 2018-04-30 ENCOUNTER — TREATMENT (OUTPATIENT)
Dept: PHYSICAL THERAPY | Facility: CLINIC | Age: 58
End: 2018-04-30

## 2018-04-30 DIAGNOSIS — M62.838 CERVICAL PARASPINAL MUSCLE SPASM: Primary | ICD-10-CM

## 2018-04-30 DIAGNOSIS — M54.2 PAIN, NECK: ICD-10-CM

## 2018-04-30 PROCEDURE — 97140 MANUAL THERAPY 1/> REGIONS: CPT | Performed by: PHYSICAL THERAPIST

## 2018-04-30 PROCEDURE — 97110 THERAPEUTIC EXERCISES: CPT | Performed by: PHYSICAL THERAPIST

## 2018-04-30 NOTE — PROGRESS NOTES
Physical Therapy Daily Progress Note  Visit: 6    Carrington Biggs reports: I am feeling pretty good.  I have been doing my HEP and it is helping.    Subjective     Objective   See Exercise, Manual, and Modality Logs for complete treatment.       Assessment & Plan     Assessment  Assessment details: Pt fausto improved mobility of the cervical spine upon MT.      Plan  Plan details: Progress ROM / strengthening / stabilization / functional activity as tolerated                   Manual Therapy:    15     mins  57581;  Therapeutic Exercise:    40     mins  26842;     Neuromuscular Nichelle:        mins  46237;    Therapeutic Activity:          mins  03081;     Gait Training:           mins  72624;     Ultrasound:          mins  35618;    Electrical Stimulation:         mins  12060 ( );  Dry Needling          mins self-pay    Timed Treatment:   55   mins   Total Treatment:     70   mins    Fabien Meyers PT  [place] Lic. # [id number]  Physical Therapist

## 2018-05-08 DIAGNOSIS — M62.838 CERVICAL PARASPINAL MUSCLE SPASM: ICD-10-CM

## 2018-05-08 RX ORDER — CYCLOBENZAPRINE HCL 5 MG
5 TABLET ORAL 3 TIMES DAILY
Qty: 45 TABLET | Refills: 1 | Status: SHIPPED | OUTPATIENT
Start: 2018-05-08 | End: 2018-07-31 | Stop reason: SDUPTHER

## 2018-05-14 RX ORDER — LOSARTAN POTASSIUM 100 MG/1
100 TABLET ORAL DAILY
Qty: 90 TABLET | Refills: 1 | Status: SHIPPED | OUTPATIENT
Start: 2018-05-14 | End: 2018-11-16 | Stop reason: SDUPTHER

## 2018-07-02 RX ORDER — METFORMIN HYDROCHLORIDE 500 MG/1
TABLET, EXTENDED RELEASE ORAL
Qty: 120 TABLET | Refills: 3 | Status: SHIPPED | OUTPATIENT
Start: 2018-07-02 | End: 2018-10-25 | Stop reason: SDUPTHER

## 2018-07-12 RX ORDER — GLIMEPIRIDE 4 MG/1
4 TABLET ORAL 2 TIMES DAILY
Qty: 60 TABLET | Refills: 0 | Status: SHIPPED | OUTPATIENT
Start: 2018-07-12 | End: 2018-10-25 | Stop reason: SDUPTHER

## 2018-07-31 DIAGNOSIS — M62.838 CERVICAL PARASPINAL MUSCLE SPASM: ICD-10-CM

## 2018-08-01 RX ORDER — CYCLOBENZAPRINE HCL 5 MG
TABLET ORAL
Qty: 45 TABLET | Refills: 1 | Status: SHIPPED | OUTPATIENT
Start: 2018-08-01 | End: 2018-10-25

## 2018-08-21 RX ORDER — METFORMIN HYDROCHLORIDE 500 MG/1
TABLET, EXTENDED RELEASE ORAL
Qty: 120 TABLET | Refills: 3 | Status: SHIPPED | OUTPATIENT
Start: 2018-08-21 | End: 2019-05-17 | Stop reason: SDUPTHER

## 2018-08-30 DIAGNOSIS — E78.5 HYPERLIPIDEMIA, UNSPECIFIED HYPERLIPIDEMIA TYPE: ICD-10-CM

## 2018-08-30 RX ORDER — ATORVASTATIN CALCIUM 40 MG/1
TABLET, FILM COATED ORAL
Qty: 30 TABLET | Refills: 0 | Status: SHIPPED | OUTPATIENT
Start: 2018-08-30 | End: 2018-10-09 | Stop reason: SDUPTHER

## 2018-09-14 RX ORDER — PIOGLITAZONEHYDROCHLORIDE 30 MG/1
TABLET ORAL
Qty: 30 TABLET | Refills: 0 | Status: SHIPPED | OUTPATIENT
Start: 2018-09-14 | End: 2018-10-25 | Stop reason: ALTCHOICE

## 2018-09-19 RX ORDER — TRAZODONE HYDROCHLORIDE 100 MG/1
TABLET ORAL
Qty: 30 TABLET | Refills: 1 | Status: SHIPPED | OUTPATIENT
Start: 2018-09-19 | End: 2019-01-07 | Stop reason: SDUPTHER

## 2018-10-09 DIAGNOSIS — E78.5 HYPERLIPIDEMIA, UNSPECIFIED HYPERLIPIDEMIA TYPE: ICD-10-CM

## 2018-10-09 RX ORDER — ATORVASTATIN CALCIUM 40 MG/1
TABLET, FILM COATED ORAL
Qty: 15 TABLET | Refills: 0 | Status: SHIPPED | OUTPATIENT
Start: 2018-10-09 | End: 2019-01-04 | Stop reason: SDUPTHER

## 2018-10-15 RX ORDER — GLIMEPIRIDE 4 MG/1
TABLET ORAL
Qty: 60 TABLET | Refills: 0 | Status: SHIPPED | OUTPATIENT
Start: 2018-10-15 | End: 2018-10-25 | Stop reason: SDUPTHER

## 2018-10-22 RX ORDER — PIOGLITAZONEHYDROCHLORIDE 30 MG/1
TABLET ORAL
Qty: 30 TABLET | Refills: 0 | OUTPATIENT
Start: 2018-10-22

## 2018-10-23 DIAGNOSIS — E78.5 HYPERLIPIDEMIA, UNSPECIFIED HYPERLIPIDEMIA TYPE: ICD-10-CM

## 2018-10-23 RX ORDER — ATORVASTATIN CALCIUM 40 MG/1
TABLET, FILM COATED ORAL
Qty: 15 TABLET | Refills: 0 | OUTPATIENT
Start: 2018-10-23

## 2018-10-25 ENCOUNTER — OFFICE VISIT (OUTPATIENT)
Dept: ENDOCRINOLOGY | Age: 58
End: 2018-10-25

## 2018-10-25 VITALS
BODY MASS INDEX: 40.41 KG/M2 | WEIGHT: 266.6 LBS | SYSTOLIC BLOOD PRESSURE: 132 MMHG | DIASTOLIC BLOOD PRESSURE: 78 MMHG | HEIGHT: 68 IN

## 2018-10-25 DIAGNOSIS — E78.5 HYPERLIPIDEMIA, UNSPECIFIED HYPERLIPIDEMIA TYPE: ICD-10-CM

## 2018-10-25 DIAGNOSIS — R80.9 MICROALBUMINURIA: ICD-10-CM

## 2018-10-25 DIAGNOSIS — E11.9 TYPE 2 DIABETES MELLITUS WITHOUT COMPLICATION, WITHOUT LONG-TERM CURRENT USE OF INSULIN (HCC): Primary | ICD-10-CM

## 2018-10-25 DIAGNOSIS — G47.33 OBSTRUCTIVE SLEEP APNEA SYNDROME: ICD-10-CM

## 2018-10-25 DIAGNOSIS — I10 ESSENTIAL HYPERTENSION: ICD-10-CM

## 2018-10-25 PROCEDURE — 99214 OFFICE O/P EST MOD 30 MIN: CPT | Performed by: INTERNAL MEDICINE

## 2018-10-25 RX ORDER — GLIMEPIRIDE 4 MG/1
TABLET ORAL
Qty: 180 TABLET | Refills: 2 | Status: SHIPPED | OUTPATIENT
Start: 2018-10-25 | End: 2018-12-04 | Stop reason: SDUPTHER

## 2018-10-25 NOTE — PROGRESS NOTES
Subjective   Carrington Biggs is a 57 y.o. male here to be seen for DM2, HLD, HTN, and sleep apnea. Test BS 1-3x daily. Last DM eye exam 8/15/17 with Dr. Cannon. Last DM foot exam 6/29/17 with Dr. Anaya.     History of Present Illness        Patient's a 57 year-old male who has been lost to follow-up since 11/17     He has known diabetes mellitus since 2012 and was started on insulin in 2013.  He stopped using insulin one year ago because of a high deductible ($ 9300)and co-pay.  He is on metformin  mg 4 tablets once a day, glimepiride 4 mg BID and Actos 30 mg once a day.  He denies any hypoglycemic episode.  He is more compliant with his diet. -263.  RBS at supper 127-335.  He has gained 3 lbs since 11/17.  He has not seen a dietitian or diabetes educator in the past.  -241.  -238.  He denies any hypoglycemic episodes.       He has occasional blurry vision.  His last eye examination was in 8/17.  He has microalbuminuria on urine sample taken last June 2017.  He is on losartan He has intermittent numbness on his right hand when he works on his computer.     He has hyperlipidemia and has been on Lipitor 40 mg once a day.  He denies any myalgia.  His last meal was 8 AM     He has hypertension and is on losartan 100 mg once a day.  He denies any history of heart attack or stroke.  He denies any chest pain or shortness of breath.     He has sleep apnea but is unable to tolerate a CPAP.  He occasionally wakes himself snoring.  He wakes up rested.    The following portions of the patient's history were reviewed and updated as appropriate: current medications, past family history, past medical history, past social history, past surgical history and problem list.    Review of Systems   Constitutional: Positive for fatigue.   HENT: Negative for trouble swallowing.    Eyes: Positive for visual disturbance.   Respiratory: Negative for choking.    Cardiovascular: Negative for palpitations.  "  Gastrointestinal: Negative for constipation.   Endocrine: Negative for cold intolerance.   Genitourinary: Negative for difficulty urinating.   Musculoskeletal: Positive for myalgias.   Skin: Negative for rash.   Allergic/Immunologic: Negative.    Neurological: Negative for headaches.   Hematological: Does not bruise/bleed easily.   Psychiatric/Behavioral: Negative for sleep disturbance.       Objective      Vitals:    10/25/18 1555   BP: 132/78   Weight: 121 kg (266 lb 9.6 oz)   Height: 172.7 cm (67.99\")     Physical Exam   Constitutional: He is oriented to person, place, and time. He appears well-developed and well-nourished. No distress.   HENT:   Head: Normocephalic.   Nose: Nose normal.   Mouth/Throat: No oropharyngeal exudate.   Eyes: Conjunctivae and EOM are normal. Right eye exhibits no discharge. Left eye exhibits no discharge. No scleral icterus.   Neck: Neck supple. No JVD present. No tracheal deviation present. No thyromegaly present.   Cardiovascular: Normal rate, regular rhythm, normal heart sounds and intact distal pulses.  Exam reveals no gallop and no friction rub.    No murmur heard.  Pulmonary/Chest: Effort normal and breath sounds normal. No respiratory distress. He has no wheezes. He has no rales. He exhibits no tenderness.   Abdominal: Soft. Bowel sounds are normal. He exhibits no distension and no mass. There is no tenderness. There is no rebound and no guarding.   Musculoskeletal: Normal range of motion. He exhibits no edema or deformity.   Lymphadenopathy:     He has no cervical adenopathy.   Neurological: He is alert and oriented to person, place, and time.   Intact light touch in distal lower ext   Skin: Skin is warm and dry. No rash noted.   Psychiatric: He has a normal mood and affect. His behavior is normal.         Assessment/Plan   Carrington was seen today for follow-up.    Diagnoses and all orders for this visit:    Type 2 diabetes mellitus without complication, without long-term " current use of insulin (CMS/[facility])  -     Comprehensive Metabolic Panel  -     Hemoglobin A1c  -     TSH  -     T4, Free  -     Microalbumin / Creatinine Urine Ratio - Urine, Clean Catch  -     Semaglutide (OZEMPIC) 0.25 or 0.5 MG/DOSE solution pen-injector; Inject 0.5 mg under the skin into the appropriate area as directed 1 (One) Time Per Week.  -     glimepiride (AMARYL) 4 MG tablet; one tablet twice a day    Hyperlipidemia, unspecified hyperlipidemia type  -     Comprehensive Metabolic Panel  -     Lipid Panel  -     TSH  -     T4, Free    Essential hypertension  -     Comprehensive Metabolic Panel    Obstructive sleep apnea syndrome  -     Comprehensive Metabolic Panel  -     TSH  -     T4, Free    Microalbuminuria  -     Comprehensive Metabolic Panel  -     Microalbumin / Creatinine Urine Ratio - Urine, Clean Catch      Discontinue Actos.  Start Ozempic 0.25 mg weekly for 4 weeks then 0.5 mg weekly thereafter.  Side effects and precautions discussed with patient.  Continue glimepiride 4 mg twice a day.  May decrease glimepiride to 4 mg once a day if he developes recurrent low sugars after Ozempic is started.  Continue metformin  mg 4 tablets once a day.  Continue Lipitor 40 mg per day.  Continue losartan 100 mg per day.  Flu vaccine this fall.    Send copy of my note to Dr. Moreno Tapia.    RTC 4 mos.

## 2018-11-16 RX ORDER — LOSARTAN POTASSIUM 100 MG/1
TABLET ORAL
Qty: 90 TABLET | Refills: 1 | Status: SHIPPED | OUTPATIENT
Start: 2018-11-16 | End: 2019-01-04 | Stop reason: SDUPTHER

## 2018-11-30 DIAGNOSIS — I10 ESSENTIAL HYPERTENSION: Primary | ICD-10-CM

## 2018-11-30 DIAGNOSIS — E78.49 OTHER HYPERLIPIDEMIA: ICD-10-CM

## 2018-11-30 DIAGNOSIS — E11.9 TYPE 2 DIABETES MELLITUS WITHOUT COMPLICATION, WITHOUT LONG-TERM CURRENT USE OF INSULIN (HCC): ICD-10-CM

## 2018-11-30 DIAGNOSIS — R68.89 ABNORMAL ENDOCRINE LABORATORY TEST FINDING: ICD-10-CM

## 2018-12-04 DIAGNOSIS — E11.9 TYPE 2 DIABETES MELLITUS WITHOUT COMPLICATION, WITHOUT LONG-TERM CURRENT USE OF INSULIN (HCC): ICD-10-CM

## 2018-12-04 RX ORDER — GLIMEPIRIDE 4 MG/1
TABLET ORAL
Qty: 180 TABLET | Refills: 1 | Status: SHIPPED | OUTPATIENT
Start: 2018-12-04 | End: 2020-07-15 | Stop reason: SDUPTHER

## 2018-12-13 DIAGNOSIS — M62.838 CERVICAL PARASPINAL MUSCLE SPASM: ICD-10-CM

## 2018-12-13 RX ORDER — CYCLOBENZAPRINE HCL 5 MG
TABLET ORAL
Qty: 45 TABLET | Refills: 1 | OUTPATIENT
Start: 2018-12-13

## 2018-12-18 RX ORDER — ATORVASTATIN CALCIUM 40 MG/1
TABLET, FILM COATED ORAL
Qty: 30 TABLET | Refills: 0 | Status: SHIPPED | OUTPATIENT
Start: 2018-12-18 | End: 2019-01-04

## 2018-12-24 RX ORDER — TRAZODONE HYDROCHLORIDE 100 MG/1
TABLET ORAL
Qty: 30 TABLET | Refills: 5 | OUTPATIENT
Start: 2018-12-24

## 2018-12-28 DIAGNOSIS — M62.838 CERVICAL PARASPINAL MUSCLE SPASM: ICD-10-CM

## 2018-12-28 RX ORDER — TRAZODONE HYDROCHLORIDE 100 MG/1
TABLET ORAL
Qty: 30 TABLET | Refills: 5 | OUTPATIENT
Start: 2018-12-28

## 2018-12-31 RX ORDER — CYCLOBENZAPRINE HCL 5 MG
TABLET ORAL
Qty: 45 TABLET | Refills: 1 | OUTPATIENT
Start: 2018-12-31

## 2019-01-04 ENCOUNTER — OFFICE VISIT (OUTPATIENT)
Dept: INTERNAL MEDICINE | Age: 59
End: 2019-01-04

## 2019-01-04 VITALS
TEMPERATURE: 97.4 F | DIASTOLIC BLOOD PRESSURE: 60 MMHG | WEIGHT: 256.2 LBS | SYSTOLIC BLOOD PRESSURE: 155 MMHG | HEART RATE: 82 BPM | HEIGHT: 68 IN | BODY MASS INDEX: 38.83 KG/M2 | OXYGEN SATURATION: 92 %

## 2019-01-04 DIAGNOSIS — E66.01 CLASS 2 SEVERE OBESITY DUE TO EXCESS CALORIES WITH SERIOUS COMORBIDITY AND BODY MASS INDEX (BMI) OF 39.0 TO 39.9 IN ADULT (HCC): ICD-10-CM

## 2019-01-04 DIAGNOSIS — Z23 NEED FOR INFLUENZA VACCINATION: ICD-10-CM

## 2019-01-04 DIAGNOSIS — F17.200 CURRENT SMOKER: ICD-10-CM

## 2019-01-04 DIAGNOSIS — E78.5 HYPERLIPIDEMIA, UNSPECIFIED HYPERLIPIDEMIA TYPE: ICD-10-CM

## 2019-01-04 DIAGNOSIS — I10 ESSENTIAL HYPERTENSION: Primary | ICD-10-CM

## 2019-01-04 LAB
ALBUMIN SERPL-MCNC: 4.5 G/DL (ref 3.5–5.2)
ALBUMIN/GLOB SERPL: 2.4 G/DL
ALP SERPL-CCNC: 109 U/L (ref 39–117)
ALT SERPL-CCNC: 33 U/L (ref 1–41)
AST SERPL-CCNC: 21 U/L (ref 1–40)
BILIRUB SERPL-MCNC: 0.4 MG/DL (ref 0.1–1.2)
BUN SERPL-MCNC: 14 MG/DL (ref 6–20)
BUN/CREAT SERPL: 20.3 (ref 7–25)
CALCIUM SERPL-MCNC: 9.9 MG/DL (ref 8.6–10.5)
CHLORIDE SERPL-SCNC: 95 MMOL/L (ref 98–107)
CHOLEST SERPL-MCNC: 174 MG/DL (ref 0–200)
CO2 SERPL-SCNC: 29.8 MMOL/L (ref 22–29)
CREAT SERPL-MCNC: 0.69 MG/DL (ref 0.76–1.27)
GLOBULIN SER CALC-MCNC: 1.9 GM/DL
GLUCOSE SERPL-MCNC: 253 MG/DL (ref 65–99)
HDLC SERPL-MCNC: 34 MG/DL (ref 40–60)
LDLC SERPL CALC-MCNC: 76 MG/DL (ref 0–100)
POTASSIUM SERPL-SCNC: 4.7 MMOL/L (ref 3.5–5.2)
PROT SERPL-MCNC: 6.4 G/DL (ref 6–8.5)
SODIUM SERPL-SCNC: 139 MMOL/L (ref 136–145)
TRIGL SERPL-MCNC: 319 MG/DL (ref 0–150)
VLDLC SERPL CALC-MCNC: 63.8 MG/DL (ref 5–40)

## 2019-01-04 PROCEDURE — 90674 CCIIV4 VAC NO PRSV 0.5 ML IM: CPT | Performed by: INTERNAL MEDICINE

## 2019-01-04 PROCEDURE — 90471 IMMUNIZATION ADMIN: CPT | Performed by: INTERNAL MEDICINE

## 2019-01-04 PROCEDURE — 99214 OFFICE O/P EST MOD 30 MIN: CPT | Performed by: INTERNAL MEDICINE

## 2019-01-04 RX ORDER — LOSARTAN POTASSIUM 100 MG/1
100 TABLET ORAL DAILY
Qty: 54 TABLET | Refills: 0 | Status: SHIPPED | OUTPATIENT
Start: 2019-01-04 | End: 2019-04-19 | Stop reason: SDUPTHER

## 2019-01-04 RX ORDER — ATORVASTATIN CALCIUM 40 MG/1
40 TABLET, FILM COATED ORAL DAILY
Qty: 54 TABLET | Refills: 0 | Status: SHIPPED | OUTPATIENT
Start: 2019-01-04 | End: 2019-01-28 | Stop reason: SDUPTHER

## 2019-01-04 RX ORDER — HYDROCHLOROTHIAZIDE 25 MG/1
25 TABLET ORAL DAILY
Qty: 30 TABLET | Refills: 3 | Status: SHIPPED | OUTPATIENT
Start: 2019-01-04 | End: 2019-05-08 | Stop reason: SDUPTHER

## 2019-01-04 NOTE — PROGRESS NOTES
"[facility] INTERNAL MEDICINE  MD Carrington BENOIT Fabien Shell / 58 y.o. / male  01/04/2019      ASSESSMENT & PLAN:    Problem List Items Addressed This Visit        Unprioritized    Essential hypertension - Primary    Relevant Medications    losartan (COZAAR) 100 MG tablet    hydrochlorothiazide (HYDRODIURIL) 25 MG tablet    Other Relevant Orders    Comprehensive Metabolic Panel    Lipid Panel    Adiposity      Other Visit Diagnoses     Current smoker            Orders Placed This Encounter   Procedures   • Comprehensive Metabolic Panel   • Lipid Panel       Summary/Discussion:    Number-one hypertension stage I systolic elevation, needs improved control.  Goal treatment for someone with diabetes is in the 130 range.  Consequently I will add hydrochlorothiazide 25 mg per day recommend blood pressure check in 4 weeks.  Patient will see  after I leave the area.heck CMP and lipid today follow-up results online.Answers for HPI/ROS submitted by the patient on 1/3/2019   Sore throat  Chronicity: new  Onset: in the past 7 days  Progression since onset: rapidly improving  Fever: no fever  Fever duration: less than 1 day  Pain - numeric: 2/10  hoarse voice: Yes  plugged ear sensation: No  swollen glands: No  strep: No  mono: No      #2 weight control encouraged patient to continue his efforts at exercise and diet.    #3 current smoker encouraged patient to quit again today, he will inquire with his insurance coverage whether or not they will cover a low-dose CT scan for lung cancer screening.      Return in about 4 weeks (around 2/1/2019), or DR Perry, for Blood pressure check.    ____________________________________________________________________    VITALS    Visit Vitals  /60   Pulse 82   Temp 97.4 °F (36.3 °C)   Ht 172.7 cm (67.99\")   Wt 116 kg (256 lb 3.2 oz)   SpO2 92%   BMI 38.96 kg/m²       BP Readings from Last 3 Encounters:   01/04/19 155/60   10/25/18 132/78   03/26/18 160/86     Wt Readings from " Last 3 Encounters:   01/04/19 116 kg (256 lb 3.2 oz)   10/25/18 121 kg (266 lb 9.6 oz)   03/26/18 119 kg (262 lb 6.4 oz)      Body mass index is 38.96 kg/m².    CC:  Main reason(s) for today's visit: Hypertension and URI      HPI:     Patient presents this morning for follow-up of hypertension.  He is compliant with medication, dietary salt restriction, is exercising with walking, and is actually lost about 10 pounds since October.    Upper respiratory symptoms since about December 22.  He did no cough, sore throat, runny nose but the seemingly have improved at this point.  Still mildly productive but not purulent.  Patient has not as yet received his influenza vaccine.  Fever with the illness that he had.  He is not ill with anything similar to this.     She continues to smoke, he realizes that as a smoker is more prone to respiratory infections, and he'll take longer to resolve.  Is also aware of the risk of death secondary to lung cancer as he continues to smoke.  Of note, he has reduced the amount of smoking by 50% over the past 5 months.  Last chest x-ray February 2018 no evidence of disease.  Patient is interested in low dose CT scanning.  He will check with his insurance company to see if they are willing to cover this test as he is only 58 years old at this point.    Patient Care Team:  Moreno Tapia MD as PCP - General  Dann Alvarado MD as Consulting Physician (Orthopedic Surgery)  Deidra Cannon MD as Consulting Physician (Ophthalmology)  Bayron Anaya MD as Consulting Physician (Endocrinology)  ____________________________________________________________________    REVIEW OF SYSTEMS    Review of Systems   Constitutional: Negative for appetite change, chills, diaphoresis and fever.   HENT: Positive for congestion and sore throat. Negative for drooling, ear discharge, ear pain and trouble swallowing.    Respiratory: Positive for cough. Negative for chest tightness, shortness of breath and  stridor.    Cardiovascular: Negative for chest pain and palpitations.   Gastrointestinal: Positive for abdominal pain and diarrhea. Negative for vomiting.   Musculoskeletal: Positive for neck pain.   Neurological: Positive for headaches. Negative for dizziness, syncope, speech difficulty, weakness and light-headedness.         PHYSICAL EXAMINATION    Physical Exam   Constitutional: He is oriented to person, place, and time. He appears well-developed. No distress.   obese   Cardiovascular: Normal rate, regular rhythm, normal heart sounds and intact distal pulses. Exam reveals no gallop and no friction rub.   No murmur heard.  Pulmonary/Chest: Effort normal and breath sounds normal. He has no wheezes. He has no rales.   Musculoskeletal: He exhibits no edema.   Neurological: He is alert and oriented to person, place, and time.   Skin: Skin is warm and dry. No rash noted.   Psychiatric: He has a normal mood and affect. His behavior is normal. Judgment and thought content normal.   Nursing note and vitals reviewed.        REVIEWED DATA:    Labs:     Lab Results   Component Value Date     11/02/2017    K 5.1 11/02/2017    AST 18 11/02/2017    ALT 24 11/02/2017    BUN 13 11/02/2017    CREATININE 0.84 11/02/2017    CREATININE 0.74 (L) 06/29/2017    CREATININE 0.84 01/26/2017    EGFRIFNONA 95 11/02/2017    EGFRIFAFRI 115 11/02/2017       Lab Results   Component Value Date    HGBA1C 6.57 (H) 11/02/2017    HGBA1C 7.12 (H) 06/29/2017    HGBA1C 8.95 (H) 01/26/2017    MICROALBUR 159.8 06/29/2017       Lab Results   Component Value Date    LDL CANCELED 06/29/2017    LDL 68 01/26/2017    LDL 59 08/10/2015    HDL 39 (L) 06/29/2017    TRIG 428 (H) 06/29/2017       No results found for: TSH, FREET4    Lab Results   Component Value Date    WBC 10.7 08/10/2015    HGB 15.9 08/10/2015     08/10/2015       Lab Results   Component Value Date    PSA 1.0 08/10/2015         Imaging:         Medical Tests:         Summary of old  records / correspondence / consultant report:         Request outside records:         ALLERGIES  Allergies   Allergen Reactions   • Rosuvastatin Calcium Myalgia        MEDICATIONS  Current Outpatient Medications   Medication Sig Dispense Refill   • atorvastatin (LIPITOR) 40 MG tablet TAKE 1 TABLET BY MOUTH EVERY DAY **MAKE APPT** 15 tablet 0   • glimepiride (AMARYL) 4 MG tablet one tablet twice a day 180 tablet 1   • hydrochlorothiazide (HYDRODIURIL) 25 MG tablet Take 1 tablet by mouth Daily. 30 tablet 3   • losartan (COZAAR) 100 MG tablet TAKE 1 TABLET BY MOUTH EVERY DAY 90 tablet 1   • metFORMIN ER (GLUCOPHAGE-XR) 500 MG 24 hr tablet TAKE 4 TABLETS BY MOUTH EVERY DAY WITH DINNER 120 tablet 3   • Semaglutide (OZEMPIC) 0.25 or 0.5 MG/DOSE solution pen-injector Inject 0.5 mg under the skin into the appropriate area as directed 1 (One) Time Per Week. 3 pen 2   • traZODone (DESYREL) 100 MG tablet TAKE 1 TABLET BY MOUTH EVERY NIGHT. 30 tablet 1     No current facility-administered medications for this visit.        PFSH:     The following portions of the patient's history were reviewed and updated as appropriate: Allergies / Current Medications / Past Medical History / Surgical History / Social History / Family History    PROBLEM LIST   Patient Active Problem List   Diagnosis   • Colon polyp   • Type 2 diabetes mellitus, without long-term current use of insulin (CMS/[facility])   • Erectile dysfunction of nonorganic origin   • Essential hypertension   • Hyperlipidemia   • Adiposity   • Gastroesophageal reflux disease   • Herpes simplex virus (HSV) infection   • Obstructive sleep apnea syndrome   • Tubular adenoma   • Encounter for immunization   • Colon cancer screening   • Chronic pain of right ankle   • Microalbuminuria   • Insurance coverage problems   • Tibialis posterior tendonitis   • Achilles tendonosis       PAST MEDICAL HISTORY  Past Medical History:   Diagnosis Date   • Erectile dysfunction    • Hypertension    •  Injury of neck    • Sleep apnea    • Type 2 diabetes mellitus (CMS/HCC)        SURGICAL HISTORY  Past Surgical History:   Procedure Laterality Date   • COLONOSCOPY  2012    Dr. Arizmendi   • COLONOSCOPY N/A 8/7/2017    Procedure: COLONOSCOPY to Cecum and TI;  Surgeon: Chano Danielle MD;  Location: [facility] ENDOSCOPY;  Service:    • INCISION AND DRAINAGE ABSCESS         SOCIAL HISTORY  Social History     Socioeconomic History   • Marital status:      Spouse name: Not on file   • Number of children: 0   • Years of education: Not on file   • Highest education level: Not on file   Occupational History   • Occupation: [employer]   Tobacco Use   • Smoking status: Current Every Day Smoker     Packs/day: 1.00     Years: 40.00     Pack years: 40.00     Types: Cigarettes   • Smokeless tobacco: Never Used   • Tobacco comment: April 3, 2017 low-dose CT scan negative   Substance and Sexual Activity   • Alcohol use: Yes     Alcohol/week: 6.0 oz     Types: 10 Shots of liquor per week   • Drug use: No       FAMILY HISTORY  Family History   Problem Relation Age of Onset   • Stroke Mother    • Diabetes Mother    • Stroke Father    • Aneurysm Father    • Rheum arthritis Sister    • Hypertension Brother    • Diabetes Sister    • Hypertension Sister    • Hyperlipidemia Sister    • Hypertension Brother        Health Maintenance Due   Topic   • ANNUAL PHYSICAL    • HEPATITIS A VACCINE ADULT (1 of 2)   • PNEUMOCOCCAL VACCINE (19-64 MEDIUM RISK) (1 of 1 - PPSV23)   • ZOSTER VACCINE (1 of 2)   • HEPATITIS C SCREENING    • LUNG CANCER SCREENING    • HEMOGLOBIN A1C    • DIABETIC FOOT EXAM    • LIPID PANEL    • URINE MICROALBUMIN    • INFLUENZA VACCINE    • DIABETIC EYE EXAM        Overdue health maintenance interventions  reviewed with patient.    Dictated utilizing Dragon voice recognition software

## 2019-01-07 ENCOUNTER — TELEPHONE (OUTPATIENT)
Dept: INTERNAL MEDICINE | Age: 59
End: 2019-01-07

## 2019-01-07 RX ORDER — TRAZODONE HYDROCHLORIDE 100 MG/1
100 TABLET ORAL NIGHTLY
Qty: 30 TABLET | Refills: 3 | Status: SHIPPED | OUTPATIENT
Start: 2019-01-07 | End: 2019-05-23 | Stop reason: SDUPTHER

## 2019-01-07 NOTE — PROGRESS NOTES
Glucose and triglycerides will improve with continued weight loss. HDL will increase with more exercise. All other results are acceptable. Please follow up with Endocrinology regarding control of your sugar.

## 2019-01-07 NOTE — TELEPHONE ENCOUNTER
Patient called in today stating that he was seen last Friday and wants to know if he can get a refill on his Trazadone 100mg. Patient states he forgot to ask, and he wasn't asked if he needed refills.

## 2019-01-28 ENCOUNTER — OFFICE VISIT (OUTPATIENT)
Dept: INTERNAL MEDICINE | Age: 59
End: 2019-01-28

## 2019-01-28 VITALS
HEIGHT: 68 IN | SYSTOLIC BLOOD PRESSURE: 118 MMHG | WEIGHT: 254 LBS | BODY MASS INDEX: 38.49 KG/M2 | OXYGEN SATURATION: 97 % | DIASTOLIC BLOOD PRESSURE: 66 MMHG | RESPIRATION RATE: 12 BRPM | TEMPERATURE: 98.4 F | HEART RATE: 83 BPM

## 2019-01-28 DIAGNOSIS — I10 ESSENTIAL HYPERTENSION: Primary | ICD-10-CM

## 2019-01-28 DIAGNOSIS — E78.5 HYPERLIPIDEMIA, UNSPECIFIED HYPERLIPIDEMIA TYPE: ICD-10-CM

## 2019-01-28 DIAGNOSIS — F17.210 CIGARETTE NICOTINE DEPENDENCE WITHOUT COMPLICATION: ICD-10-CM

## 2019-01-28 PROCEDURE — 99406 BEHAV CHNG SMOKING 3-10 MIN: CPT | Performed by: INTERNAL MEDICINE

## 2019-01-28 PROCEDURE — 99214 OFFICE O/P EST MOD 30 MIN: CPT | Performed by: INTERNAL MEDICINE

## 2019-01-28 RX ORDER — ATORVASTATIN CALCIUM 40 MG/1
40 TABLET, FILM COATED ORAL DAILY
Qty: 90 TABLET | Refills: 1 | Status: SHIPPED | OUTPATIENT
Start: 2019-01-28 | End: 2019-09-12 | Stop reason: SDUPTHER

## 2019-01-28 NOTE — PROGRESS NOTES
"  Carrington Biggs is a 58 y.o. male who presents with   Chief Complaint   Patient presents with   • Hypertension   • Diabetes     Endocrinology treatment and management with     • Nicotine Dependence     1.5 packs of cigarettes daily for about 30 years.  Patient requesting low-dose CT scan of the lungs follow-up.  Last one in April 2017-negative.   .    58-year-old male presents for continuity of medical care.  He is a former patient of Dr. Tapia and presents for blood pressure check; persisting cough and hoarseness since around [place].  Also he is a cigarette smoker and admits to at least 1.5 packs of cigarettes daily for the last 30 years or so.  He does not express any desire to quit smoking and says he tried Chantix in the past and it caused him to gain weight so he quit taking it and went back to smoking.  He is not interested in trying the NicoDerm or Nicorette modalities of smoking cessation and says he likes to smoke and enjoys it and has no desire to quit.      Hypertension   This is a chronic problem. The current episode started more than 1 year ago. The problem is controlled. Current antihypertension treatment includes diuretics and angiotensin blockers. Compliance problems include diet and exercise.    Diabetes   He has type 2 (Endocrinology treatment, management and lab testing as per history.) diabetes mellitus.   Nicotine Dependence   Presents for follow-up visit. His urge triggers include company of smokers. His first smoke is from 6 to 8 AM. Number of cigarettes per day: 30.      \"I advised the patient of the risks in continuing to use tobacco,\" and \"I provided this patient with smoking cessation advice and discussed how to quit smoking,\" and \"patient has expressed no willingness to quit\".  Total time spent with this patient in discussing these matters and also discussing a follow-up low-dose CT scan of the lungs-5 minutes    The following portions of the patient's history were reviewed " and updated as appropriate: allergies, current medications, past medical history and problem list.    Review of Systems   Constitutional: Negative.    HENT: Negative.    Eyes: Negative.    Respiratory: Negative.    Cardiovascular: Negative.    Genitourinary: Negative.    Musculoskeletal: Negative.    Skin: Negative.    Neurological: Negative.    Psychiatric/Behavioral: Negative.        Objective   Physical Exam   Constitutional: He is oriented to person, place, and time. He appears well-developed and well-nourished. No distress.   HENT:   Head: Normocephalic and atraumatic.   Eyes: Conjunctivae and EOM are normal. Pupils are equal, round, and reactive to light.   Neck: Normal range of motion. Neck supple. No thyromegaly present.   Neck exam negative.  Carotid auscultation normal-no bruits heard.   Cardiovascular: Normal rate, regular rhythm, normal heart sounds and intact distal pulses. Exam reveals no gallop and no friction rub.   No murmur heard.  Pulmonary/Chest: Effort normal and breath sounds normal. No respiratory distress. He has no wheezes. He has no rales. He exhibits no tenderness.   Neurological: He is alert and oriented to person, place, and time.   Psychiatric: He has a normal mood and affect. His behavior is normal. Judgment and thought content normal.   Nursing note and vitals reviewed.      Assessment/Plan   Carrington was seen today for hypertension, diabetes and nicotine dependence.    Diagnoses and all orders for this visit:    Essential hypertension    Cigarette nicotine dependence without complication  -     CT Chest Low Dose Wo; Future        Plan: The patient's blood pressure appears adequate and stable at 118/66.  He will continue all current medication as prescribed and I will see him in 6 months for follow-up for this.    His diabetes will continue to be managed by endocrinology () and all lab testing, prescription medication and treatment will be rendered through that office as it has  been done in the past.    As noted above I spent 5 minutes with this patient discussing smoking cessation.  He has no desire at this time to quit smoking in spite of the warnings regarding the possibilities of lung cancer.

## 2019-02-12 ENCOUNTER — HOSPITAL ENCOUNTER (OUTPATIENT)
Dept: CT IMAGING | Facility: HOSPITAL | Age: 59
Discharge: HOME | End: 2019-02-12
Admitting: INTERNAL MEDICINE

## 2019-02-12 DIAGNOSIS — F17.210 CIGARETTE NICOTINE DEPENDENCE WITHOUT COMPLICATION: ICD-10-CM

## 2019-02-12 PROCEDURE — G0297 LDCT FOR LUNG CA SCREEN: HCPCS

## 2019-02-14 ENCOUNTER — TELEPHONE (OUTPATIENT)
Dept: INTERNAL MEDICINE | Age: 59
End: 2019-02-14

## 2019-02-14 NOTE — TELEPHONE ENCOUNTER
Patient wanted CT results from CT done on 2/12/19. I told him we were waiting on Dr. Perry to review. Patient would appreciate either sending to his my chart or calling him back.

## 2019-02-18 NOTE — TELEPHONE ENCOUNTER
These results have been dictated by DARLINE Brennan. They have been released to [facility]. MM

## 2019-02-27 ENCOUNTER — RESULTS ENCOUNTER (OUTPATIENT)
Dept: ENDOCRINOLOGY | Age: 59
End: 2019-02-27

## 2019-02-27 DIAGNOSIS — I10 ESSENTIAL HYPERTENSION: ICD-10-CM

## 2019-02-27 DIAGNOSIS — R68.89 ABNORMAL ENDOCRINE LABORATORY TEST FINDING: ICD-10-CM

## 2019-02-27 DIAGNOSIS — E78.49 OTHER HYPERLIPIDEMIA: ICD-10-CM

## 2019-02-27 DIAGNOSIS — E11.9 TYPE 2 DIABETES MELLITUS WITHOUT COMPLICATION, WITHOUT LONG-TERM CURRENT USE OF INSULIN (HCC): ICD-10-CM

## 2019-02-28 LAB
ALBUMIN SERPL-MCNC: 4.7 G/DL (ref 3.5–5.2)
ALBUMIN/CREAT UR: 117.2 MG/G CREAT (ref 0–30)
ALBUMIN/GLOB SERPL: 1.9 G/DL
ALP SERPL-CCNC: 99 U/L (ref 39–117)
ALT SERPL-CCNC: 45 U/L (ref 1–41)
AST SERPL-CCNC: 29 U/L (ref 1–40)
BILIRUB SERPL-MCNC: 0.4 MG/DL (ref 0.1–1.2)
BUN SERPL-MCNC: 15 MG/DL (ref 6–20)
BUN/CREAT SERPL: 17.9 (ref 7–25)
CALCIUM SERPL-MCNC: 10.2 MG/DL (ref 8.6–10.5)
CHLORIDE SERPL-SCNC: 94 MMOL/L (ref 98–107)
CHOLEST SERPL-MCNC: 137 MG/DL (ref 0–200)
CO2 SERPL-SCNC: 28.6 MMOL/L (ref 22–29)
CREAT SERPL-MCNC: 0.84 MG/DL (ref 0.76–1.27)
CREAT UR-MCNC: 134.7 MG/DL
GLOBULIN SER CALC-MCNC: 2.5 GM/DL
GLUCOSE SERPL-MCNC: 231 MG/DL (ref 65–99)
HBA1C MFR BLD: 9.8 % (ref 4.8–5.6)
HDLC SERPL-MCNC: 26 MG/DL (ref 40–60)
INTERPRETATION: NORMAL
LDLC SERPL CALC-MCNC: 57 MG/DL (ref 0–100)
Lab: NORMAL
MICROALBUMIN UR-MCNC: 157.9 UG/ML
POTASSIUM SERPL-SCNC: 4.7 MMOL/L (ref 3.5–5.2)
PROT SERPL-MCNC: 7.2 G/DL (ref 6–8.5)
SODIUM SERPL-SCNC: 140 MMOL/L (ref 136–145)
T4 FREE SERPL-MCNC: 1.18 NG/DL (ref 0.93–1.7)
TRIGL SERPL-MCNC: 268 MG/DL (ref 0–150)
TSH SERPL DL<=0.005 MIU/L-ACNC: 2.34 MIU/ML (ref 0.27–4.2)
VLDLC SERPL CALC-MCNC: 53.6 MG/DL (ref 5–40)

## 2019-03-12 NOTE — PROGRESS NOTES
Subjective   Carrington Biggs is a 58 y.o. male.     F/u for dm 2, hyperlipidemia, hypertension,sleep apnea/ testing bs 2  x day /last dm eye exam 8/15/17 with dr Cannon / last dm foot exam today with dr Anaya     Diabetes   He presents for his follow-up diabetic visit. He has type 2 diabetes mellitus. No MedicAlert identification noted. The initial diagnosis of diabetes was made 10 years ago. Pertinent negatives for hypoglycemia include no confusion, dizziness, headaches, hunger, mood changes, nervousness/anxiousness, pallor, seizures, sleepiness, speech difficulty, sweats or tremors. Associated symptoms include foot paresthesias, polyuria and visual change. Pertinent negatives for diabetes include no chest pain, no fatigue, no foot ulcerations, no polydipsia, no polyphagia, no weakness and no weight loss. Pertinent negatives for hypoglycemia complications include no blackouts, no hospitalization, no nocturnal hypoglycemia, no required assistance and no required glucagon injection. Symptoms are worsening. Diabetic complications include PVD. Pertinent negatives for diabetic complications include no CVA, heart disease, nephropathy, peripheral neuropathy or retinopathy. There are no known risk factors for coronary artery disease. Current diabetic treatment includes oral agent (triple therapy). He is compliant with treatment some of the time. Insulin injections are given by patient. Rotation sites for injection include the abdominal wall. His weight is fluctuating minimally. He is following a generally healthy and low salt diet. When asked about meal planning, he reported none. He has not had a previous visit with a dietitian. He rarely participates in exercise. He monitors blood glucose at home 1-2 x per day. He monitors urine at home <1 x per month. Blood glucose monitoring compliance is good. His home blood glucose trend is increasing steadily. His breakfast blood glucose is taken after 10 am. His breakfast  blood glucose range is generally >200 mg/dl. His lunch blood glucose is taken after 3 pm. His lunch blood glucose range is generally >200 mg/dl. His dinner blood glucose is taken between 5-6 pm. His dinner blood glucose range is generally >200 mg/dl. His highest blood glucose is 180-200 mg/dl. His overall blood glucose range is 180-200 mg/dl. He does not see a podiatrist.Eye exam is not current.          Patient's a 57 year-old male who has been lost to follow-up since 11/17     He has known diabetes mellitus since 2012 and was started on insulin in 2013.  He is on metformin  mg 4 tablets once a day, glimepiride 4 mg BID, and Ozempic 0.5 mg weekly.  -350.  -358.  He has lost 15 lbs since 10/18. He denies any hypoglycemic episodes.       He has occasional blurry vision.  His last eye examination was in 8/17.  He has microalbuminuria on urine sample taken last 2/19.  He is on losartan.  He has burning sensation in his feet.for past 2 weeks.     He has hyperlipidemia and has been on Lipitor 40 mg once a day.  He denies any myalgia.       He has hypertension and is on losartan 100 mg once a day.  He denies any history of heart attack or stroke.  He denies any chest pain or shortness of breath.     He has sleep apnea but is unable to tolerate a CPAP.  He occasionally wakes himself snoring.  He wakes up rested.    He has reduced his alcohol intake.  He smokes 1 ppd.     The following portions of the patient's history were reviewed and updated as appropriate: allergies, current medications, past family history, past medical history, past social history, past surgical history and problem list.    Review of Systems   Constitutional: Negative.  Negative for fatigue and weight loss.   HENT: Negative.    Eyes: Negative.    Respiratory: Negative.    Cardiovascular: Negative for chest pain and leg swelling.   Gastrointestinal: Negative.    Endocrine: Positive for polyuria. Negative for polydipsia and  "polyphagia.   Genitourinary: Negative.    Musculoskeletal: Negative.    Skin: Negative for pallor.   Allergic/Immunologic: Negative.    Neurological: Positive for numbness (feet ). Negative for dizziness, tremors, seizures, speech difficulty, weakness and headaches.   Psychiatric/Behavioral: Positive for sleep disturbance (sleep apnea unable to tolerate CPAP machine ). Negative for confusion. The patient is not nervous/anxious.        Objective      Vitals:    03/13/19 0900   BP: 134/72   BP Location: Right arm   Patient Position: Sitting   Cuff Size: Large Adult   Pulse: 83   SpO2: 94%   Weight: 114 kg (251 lb 9.6 oz)   Height: 172.7 cm (67.99\")     Physical Exam   Constitutional: He is oriented to person, place, and time. He appears well-developed and well-nourished. No distress.   HENT:   Head: Normocephalic.   Nose: Nose normal.   Mouth/Throat: No oropharyngeal exudate.   Eyes: Conjunctivae and EOM are normal. Right eye exhibits no discharge. Left eye exhibits no discharge. No scleral icterus.   Neck: Neck supple. No JVD present. No tracheal deviation present. No thyromegaly present.   Cardiovascular: Normal rate, regular rhythm, normal heart sounds and intact distal pulses. Exam reveals no gallop and no friction rub.   No murmur heard.  Pulmonary/Chest: Effort normal and breath sounds normal. No stridor. No respiratory distress. He has no wheezes. He has no rales.   Abdominal: Soft. Bowel sounds are normal. He exhibits no distension and no mass. There is no tenderness. There is no guarding.   Musculoskeletal: Normal range of motion. He exhibits no edema, tenderness or deformity.   Lymphadenopathy:     He has no cervical adenopathy.   Neurological: He is alert and oriented to person, place, and time. He displays normal reflexes.   Intact light touch in distal lower ext   Skin: Skin is warm and dry.   Psychiatric: He has a normal mood and affect. His behavior is normal.     Results Encounter on 02/27/2019 "   Component Date Value Ref Range Status   • Glucose 02/27/2019 231* 65 - 99 mg/dL Final   • BUN 02/27/2019 15  6 - 20 mg/dL Final   • Creatinine 02/27/2019 0.84  0.76 - 1.27 mg/dL Final   • eGFR Non  Am 02/27/2019 94  >60 mL/min/1.73 Final   • eGFR African Am 02/27/2019 114  >60 mL/min/1.73 Final   • BUN/Creatinine Ratio 02/27/2019 17.9  7.0 - 25.0 Final   • Sodium 02/27/2019 140  136 - 145 mmol/L Final   • Potassium 02/27/2019 4.7  3.5 - 5.2 mmol/L Final   • Chloride 02/27/2019 94* 98 - 107 mmol/L Final   • Total CO2 02/27/2019 28.6  22.0 - 29.0 mmol/L Final   • Calcium 02/27/2019 10.2  8.6 - 10.5 mg/dL Final   • Total Protein 02/27/2019 7.2  6.0 - 8.5 g/dL Final   • Albumin 02/27/2019 4.70  3.50 - 5.20 g/dL Final   • Globulin 02/27/2019 2.5  gm/dL Final   • A/G Ratio 02/27/2019 1.9  g/dL Final   • Total Bilirubin 02/27/2019 0.4  0.1 - 1.2 mg/dL Final   • Alkaline Phosphatase 02/27/2019 99  39 - 117 U/L Final   • AST (SGOT) 02/27/2019 29  1 - 40 U/L Final   • ALT (SGPT) 02/27/2019 45* 1 - 41 U/L Final   • Total Cholesterol 02/27/2019 137  0 - 200 mg/dL Final   • Triglycerides 02/27/2019 268* 0 - 150 mg/dL Final   • HDL Cholesterol 02/27/2019 26* 40 - 60 mg/dL Final   • VLDL Cholesterol 02/27/2019 53.6* 5 - 40 mg/dL Final   • LDL Cholesterol  02/27/2019 57  0 - 100 mg/dL Final   • Hemoglobin A1C 02/27/2019 9.80* 4.80 - 5.60 % Final    Comment: Hemoglobin A1C Ranges:  Increased Risk for Diabetes  5.7% to 6.4%  Diabetes                     >= 6.5%  Diabetic Goal                < 7.0%     • Creatinine, Urine 02/27/2019 134.7  Not Estab. mg/dL Final   • Microalbumin, Urine 02/27/2019 157.9  Not Estab. ug/mL Final   • Microalbumin/Creatinine Ratio 02/27/2019 117.2* 0.0 - 30.0 mg/g creat Final    Comment:                      Normal:                0.0 -  30.0                       Albuminuria:          31.0 - 300.0                       Clinical albuminuria:       >300.0     • Free T4 02/27/2019 1.18  0.93 - 1.70  ng/dL Final   • TSH 02/27/2019 2.340  0.270 - 4.200 mIU/mL Final   • Interpretation 02/27/2019 Note   Final    Supplemental report is available.   • PDF Image 02/27/2019 Not applicable   Final     Assessment/Plan   Carrington was seen today for diabetes, hyperlipidemia, hypertension and sleep apnea.    Diagnoses and all orders for this visit:    Microalbuminuria    Essential hypertension    Type 2 diabetes mellitus with peripheral neuropathy (CMS/HCC)    Hyperlipidemia, unspecified hyperlipidemia type    Type 2 diabetes mellitus with microalbuminuria, without long-term current use of insulin (CMS/[facility])    Other orders  -     Insulin Glargine (TOUJEO SOLOSTAR) 300 UNIT/ML solution pen-injector; Inject 15 Units under the skin into the appropriate area as directed Every Evening.      Discontinue Ozempic.  Start Toujeo 15 units every evening.  Continue glimepiride 4 mg twice a day,and metformin  mg 4 tablets once a day.  Check blood sugar twice a day call with results in 1 week.  Advised to have a eye exam and yearly thereafter  Continue losartan 100 mg/day.  Continue Lipitor 40 mg/day.  Advised to discontinue smoking    Send copy of my note to Dr. Perry    RTC 4 mos

## 2019-03-13 ENCOUNTER — OFFICE VISIT (OUTPATIENT)
Dept: ENDOCRINOLOGY | Age: 59
End: 2019-03-13

## 2019-03-13 VITALS
HEIGHT: 68 IN | SYSTOLIC BLOOD PRESSURE: 134 MMHG | OXYGEN SATURATION: 94 % | WEIGHT: 251.6 LBS | DIASTOLIC BLOOD PRESSURE: 72 MMHG | BODY MASS INDEX: 38.13 KG/M2 | HEART RATE: 83 BPM

## 2019-03-13 DIAGNOSIS — R80.9 TYPE 2 DIABETES MELLITUS WITH MICROALBUMINURIA, WITHOUT LONG-TERM CURRENT USE OF INSULIN (HCC): ICD-10-CM

## 2019-03-13 DIAGNOSIS — R80.9 MICROALBUMINURIA: Primary | ICD-10-CM

## 2019-03-13 DIAGNOSIS — I10 ESSENTIAL HYPERTENSION: ICD-10-CM

## 2019-03-13 DIAGNOSIS — E78.5 HYPERLIPIDEMIA, UNSPECIFIED HYPERLIPIDEMIA TYPE: ICD-10-CM

## 2019-03-13 DIAGNOSIS — E11.29 TYPE 2 DIABETES MELLITUS WITH MICROALBUMINURIA, WITHOUT LONG-TERM CURRENT USE OF INSULIN (HCC): ICD-10-CM

## 2019-03-13 DIAGNOSIS — E11.42 TYPE 2 DIABETES MELLITUS WITH PERIPHERAL NEUROPATHY (HCC): ICD-10-CM

## 2019-03-13 PROCEDURE — 99214 OFFICE O/P EST MOD 30 MIN: CPT | Performed by: INTERNAL MEDICINE

## 2019-03-13 RX ORDER — BLOOD SUGAR DIAGNOSTIC
1 STRIP MISCELLANEOUS DAILY
Qty: 100 EACH | Refills: 5 | Status: SHIPPED | OUTPATIENT
Start: 2019-03-13

## 2019-04-19 RX ORDER — LOSARTAN POTASSIUM 100 MG/1
100 TABLET ORAL DAILY
Qty: 90 TABLET | Refills: 0 | Status: SHIPPED | OUTPATIENT
Start: 2019-04-19 | End: 2019-10-01 | Stop reason: SDUPTHER

## 2019-05-08 DIAGNOSIS — I10 ESSENTIAL HYPERTENSION: ICD-10-CM

## 2019-05-08 RX ORDER — HYDROCHLOROTHIAZIDE 25 MG/1
25 TABLET ORAL DAILY
Qty: 90 TABLET | Refills: 1 | Status: SHIPPED | OUTPATIENT
Start: 2019-05-08 | End: 2019-11-02 | Stop reason: SDUPTHER

## 2019-05-17 RX ORDER — METFORMIN HYDROCHLORIDE 500 MG/1
500 TABLET, EXTENDED RELEASE ORAL
Qty: 120 TABLET | Refills: 2 | Status: SHIPPED | OUTPATIENT
Start: 2019-05-17 | End: 2019-07-31 | Stop reason: SDUPTHER

## 2019-05-23 ENCOUNTER — TELEPHONE (OUTPATIENT)
Dept: INTERNAL MEDICINE | Age: 59
End: 2019-05-23

## 2019-05-23 RX ORDER — TRAZODONE HYDROCHLORIDE 100 MG/1
100 TABLET ORAL NIGHTLY
Qty: 30 TABLET | Refills: 2 | Status: SHIPPED | OUTPATIENT
Start: 2019-05-23 | End: 2019-07-24 | Stop reason: SDUPTHER

## 2019-07-18 DIAGNOSIS — R80.9 TYPE 2 DIABETES MELLITUS WITH MICROALBUMINURIA, WITHOUT LONG-TERM CURRENT USE OF INSULIN (HCC): ICD-10-CM

## 2019-07-18 DIAGNOSIS — E11.29 TYPE 2 DIABETES MELLITUS WITH MICROALBUMINURIA, WITHOUT LONG-TERM CURRENT USE OF INSULIN (HCC): ICD-10-CM

## 2019-07-18 DIAGNOSIS — I10 ESSENTIAL HYPERTENSION: Primary | ICD-10-CM

## 2019-07-18 DIAGNOSIS — E78.5 HYPERLIPIDEMIA, UNSPECIFIED HYPERLIPIDEMIA TYPE: ICD-10-CM

## 2019-07-24 RX ORDER — TRAZODONE HYDROCHLORIDE 100 MG/1
TABLET ORAL
Qty: 30 TABLET | Refills: 2 | Status: SHIPPED | OUTPATIENT
Start: 2019-07-24 | End: 2019-10-30 | Stop reason: SDUPTHER

## 2019-07-26 ENCOUNTER — RESULTS ENCOUNTER (OUTPATIENT)
Dept: ENDOCRINOLOGY | Age: 59
End: 2019-07-26

## 2019-07-26 DIAGNOSIS — I10 ESSENTIAL HYPERTENSION: ICD-10-CM

## 2019-07-26 DIAGNOSIS — R80.9 TYPE 2 DIABETES MELLITUS WITH MICROALBUMINURIA, WITHOUT LONG-TERM CURRENT USE OF INSULIN (HCC): ICD-10-CM

## 2019-07-26 DIAGNOSIS — E11.29 TYPE 2 DIABETES MELLITUS WITH MICROALBUMINURIA, WITHOUT LONG-TERM CURRENT USE OF INSULIN (HCC): ICD-10-CM

## 2019-07-26 DIAGNOSIS — E78.5 HYPERLIPIDEMIA, UNSPECIFIED HYPERLIPIDEMIA TYPE: ICD-10-CM

## 2019-07-26 LAB
ALBUMIN SERPL-MCNC: 4.6 G/DL (ref 3.5–5.2)
ALBUMIN/GLOB SERPL: 1.6 G/DL
ALP SERPL-CCNC: 92 U/L (ref 39–117)
ALT SERPL-CCNC: 33 U/L (ref 1–41)
AST SERPL-CCNC: 23 U/L (ref 1–40)
BILIRUB SERPL-MCNC: 0.5 MG/DL (ref 0.2–1.2)
BUN SERPL-MCNC: 14 MG/DL (ref 6–20)
BUN/CREAT SERPL: 17.1 (ref 7–25)
CALCIUM SERPL-MCNC: 10.3 MG/DL (ref 8.6–10.5)
CHLORIDE SERPL-SCNC: 93 MMOL/L (ref 98–107)
CHOLEST SERPL-MCNC: 181 MG/DL (ref 0–200)
CO2 SERPL-SCNC: 31.3 MMOL/L (ref 22–29)
CREAT SERPL-MCNC: 0.82 MG/DL (ref 0.76–1.27)
GLOBULIN SER CALC-MCNC: 2.8 GM/DL
GLUCOSE SERPL-MCNC: 357 MG/DL (ref 65–99)
HBA1C MFR BLD: 9.7 % (ref 4.8–5.6)
HDLC SERPL-MCNC: 36 MG/DL (ref 40–60)
INTERPRETATION: NORMAL
LDLC SERPL CALC-MCNC: 70 MG/DL (ref 0–100)
Lab: NORMAL
POTASSIUM SERPL-SCNC: 4.7 MMOL/L (ref 3.5–5.2)
PROT SERPL-MCNC: 7.4 G/DL (ref 6–8.5)
SODIUM SERPL-SCNC: 138 MMOL/L (ref 136–145)
TRIGL SERPL-MCNC: 374 MG/DL (ref 0–150)
VLDLC SERPL CALC-MCNC: 74.8 MG/DL

## 2019-07-31 ENCOUNTER — TELEPHONE (OUTPATIENT)
Dept: ENDOCRINOLOGY | Age: 59
End: 2019-07-31

## 2019-07-31 ENCOUNTER — TELEPHONE (OUTPATIENT)
Dept: INTERNAL MEDICINE | Age: 59
End: 2019-07-31

## 2019-07-31 RX ORDER — METFORMIN HYDROCHLORIDE 500 MG/1
500 TABLET, EXTENDED RELEASE ORAL
Qty: 120 TABLET | Refills: 5 | Status: SHIPPED | OUTPATIENT
Start: 2019-07-31 | End: 2020-01-20

## 2019-07-31 NOTE — TELEPHONE ENCOUNTER
Pt notified that Endo should be filling, pt stated he was unsure and wanted to start with PCP. Pt stated he will reach out to Endo for refill. MM

## 2019-07-31 NOTE — TELEPHONE ENCOUNTER
If he is seeing endocrinology for diabetic treatment and management than logically they should be the ones to be refilling his metformin

## 2019-07-31 NOTE — TELEPHONE ENCOUNTER
Pt currently sees Endo for DM care.  Pt was NC/NS on 7/30/19.   Do you want us to refill metformin or send to Endo.?

## 2019-07-31 NOTE — TELEPHONE ENCOUNTER
Pt called and stated he is out of his metformin and cant get it filled till aug 7th but before shee took over the pt stated pelaez had him taking 4pills a day.     Pls advise,    [phone]

## 2019-07-31 NOTE — TELEPHONE ENCOUNTER
----- Message from Dannielle Parson sent at 7/31/2019  4:04 PM EDT -----  Contact: patient   Dr castanon office needs yson to write script for   Metformin 4 x daily   Send to   [facility]     Patient is out due to take them 4 x daily

## 2019-08-08 NOTE — PROGRESS NOTES
Subjective   Carrington Biggs is a 58 y.o. male.     F/u for dm 2, hyperlipidemia, hypertension, sleep apnea / testing bs 2 x day / last dm eye exam 8/5/17 with dr Cannon  / last dm foot exam 3/13/19 with dr Monte        He has known diabetes mellitus since 2012 and was started on insulin in 2013.  He is on metformin  mg 4 tablets once a day, and glimepiride 4 mg BID.  He was on Toujeo 15 units once a day until 1 month ago when he did not refill the prescription because of high cost.  Fasting glucose off Toujeo has been in the 200s.  He denies hypoglycemic episodes.  He has lost 8 pounds since March 2019. He denies any hypoglycemic episodes.  Hemoglobin A1c done in July 2019 was 9.7%.  He wants to switch to human insulin to reduce cost.  He has a high deductible plan    He has occasional blurry vision.  His last eye examination was in 8/17.  He has microalbuminuria on urine sample taken last 2/19.  He is on losartan.  He denies burning in his feet     He has hyperlipidemia and has been on Lipitor 40 mg once a day.  He denies any myalgia.  Lipid profile done in July 2019 are as follows: Cholesterol 181.  HDL 36.  LDL 70.  Triglycerides 374.     He has hypertension and is on losartan 100 mg once a day.  He denies any history of heart attack or stroke.  He denies any chest pain or shortness of breath.     He has sleep apnea but is unable to tolerate a CPAP.  He occasionally wakes himself snoring.  He wakes up rested.     He drinks 4 shots of bourbon every other day.  He smokes 1 ppd.  He denies alcohol or tobacco related illness.      The following portions of the patient's history were reviewed and updated as appropriate: allergies, current medications, past family history, past medical history, past social history, past surgical history and problem list.    Review of Systems   Constitutional: Negative.  Negative for fatigue.   HENT: Negative.  Negative for ear pain and sinus pressure.    Eyes: Negative.   "  Respiratory: Positive for wheezing.    Cardiovascular: Negative.    Gastrointestinal: Negative.    Endocrine: Negative.    Genitourinary: Negative.  Negative for dysuria.   Musculoskeletal: Negative.  Negative for arthralgias.   Skin: Negative.    Allergic/Immunologic: Negative.    Neurological: Negative.  Negative for dizziness and numbness.   Hematological: Negative.    Psychiatric/Behavioral: Sleep disturbance: sleep apnea unable to tolerate CPAP machine        Objective      Vitals:    08/09/19 1007   BP: 116/72   BP Location: Right arm   Patient Position: Sitting   Cuff Size: Large Adult   Pulse: 82   SpO2: 93%   Weight: 110 kg (243 lb)   Height: 172.7 cm (67.99\")     Physical Exam   Constitutional: He appears well-developed and well-nourished. No distress.   HENT:   Head: Normocephalic.   Right Ear: External ear normal.   Left Ear: External ear normal.   Nose: Nose normal.   Mouth/Throat: Oropharynx is clear and moist. No oropharyngeal exudate.   Eyes: Conjunctivae and EOM are normal. Right eye exhibits no discharge. Left eye exhibits no discharge. No scleral icterus.   Neck: Neck supple. No JVD present. No tracheal deviation present. No thyromegaly present.   Cardiovascular: Normal rate, regular rhythm, normal heart sounds and intact distal pulses. Exam reveals no gallop and no friction rub.   No murmur heard.  Pulmonary/Chest: Effort normal and breath sounds normal. No stridor. No respiratory distress. He has no wheezes. He has no rales. He exhibits no tenderness.   Abdominal: Soft. Bowel sounds are normal. He exhibits no distension and no mass. There is no tenderness. There is no guarding.   Musculoskeletal: Normal range of motion. He exhibits no edema, tenderness or deformity.   Lymphadenopathy:     He has no cervical adenopathy.   Neurological: He is alert. He displays normal reflexes. He exhibits normal muscle tone. Coordination normal.   Skin: Skin is warm and dry. No rash noted. No erythema. "   Psychiatric: He has a normal mood and affect. His behavior is normal.     Results Encounter on 07/26/2019   Component Date Value Ref Range Status   • Glucose 07/26/2019 357* 65 - 99 mg/dL Final   • BUN 07/26/2019 14  6 - 20 mg/dL Final   • Creatinine 07/26/2019 0.82  0.76 - 1.27 mg/dL Final   • eGFR Non  Am 07/26/2019 96  >60 mL/min/1.73 Final   • eGFR African Am 07/26/2019 117  >60 mL/min/1.73 Final   • BUN/Creatinine Ratio 07/26/2019 17.1  7.0 - 25.0 Final   • Sodium 07/26/2019 138  136 - 145 mmol/L Final   • Potassium 07/26/2019 4.7  3.5 - 5.2 mmol/L Final   • Chloride 07/26/2019 93* 98 - 107 mmol/L Final   • Total CO2 07/26/2019 31.3* 22.0 - 29.0 mmol/L Final   • Calcium 07/26/2019 10.3  8.6 - 10.5 mg/dL Final   • Total Protein 07/26/2019 7.4  6.0 - 8.5 g/dL Final   • Albumin 07/26/2019 4.60  3.50 - 5.20 g/dL Final   • Globulin 07/26/2019 2.8  gm/dL Final   • A/G Ratio 07/26/2019 1.6  g/dL Final   • Total Bilirubin 07/26/2019 0.5  0.2 - 1.2 mg/dL Final   • Alkaline Phosphatase 07/26/2019 92  39 - 117 U/L Final   • AST (SGOT) 07/26/2019 23  1 - 40 U/L Final   • ALT (SGPT) 07/26/2019 33  1 - 41 U/L Final   • Total Cholesterol 07/26/2019 181  0 - 200 mg/dL Final   • Triglycerides 07/26/2019 374* 0 - 150 mg/dL Final   • HDL Cholesterol 07/26/2019 36* 40 - 60 mg/dL Final   • VLDL Cholesterol 07/26/2019 74.8  mg/dL Final   • LDL Cholesterol  07/26/2019 70  0 - 100 mg/dL Final   • Hemoglobin A1C 07/26/2019 9.70* 4.80 - 5.60 % Final    Comment: Hemoglobin A1C Ranges:  Increased Risk for Diabetes  5.7% to 6.4%  Diabetes                     >= 6.5%  Diabetic Goal                < 7.0%     • Interpretation 07/26/2019 Note   Final    Supplemental report is available.   • PDF Image 07/26/2019 Not applicable   Final     Assessment/Plan   Carrington was seen today for diabetes, hyperlipidemia, hypertension and sleep apnea.    Diagnoses and all orders for this visit:    Type 2 diabetes mellitus with microalbuminuria,  without long-term current use of insulin (CMS/[facility])    Type 2 diabetes mellitus with peripheral neuropathy (CMS/[facility])    Microalbuminuria    Essential hypertension    Hyperlipidemia, unspecified hyperlipidemia type    Tobacco abuse    Other orders  -     insulin NPH (humuLIN N,novoLIN N) 100 UNIT/ML injection; 20 units at bedtime  -     Insulin Syringes, Disposable, U-100 0.5 ML misc; Use daily      Discontinue Toujeo because of cost.  Start Relion N 20 units at bedtime.  Continue glimepiride 4 mg twice daily and metformin  mg 4 tablets daily.  Check blood sugar twice a day and call with results in 1 week.  Advised to have an eye examination yearly  Continue losartan  Continue Lipitor 40 mg/day.  Discussed about smoking cessation.  Reduce alcohol intake.  Flu vaccine this fall    Copy of my note sent to Dr. Perry and Dr. Cannon    RTC 4 mos.

## 2019-08-09 ENCOUNTER — OFFICE VISIT (OUTPATIENT)
Dept: ENDOCRINOLOGY | Age: 59
End: 2019-08-09

## 2019-08-09 VITALS
SYSTOLIC BLOOD PRESSURE: 116 MMHG | HEART RATE: 82 BPM | BODY MASS INDEX: 36.83 KG/M2 | DIASTOLIC BLOOD PRESSURE: 72 MMHG | OXYGEN SATURATION: 93 % | WEIGHT: 243 LBS | HEIGHT: 68 IN

## 2019-08-09 DIAGNOSIS — R80.9 TYPE 2 DIABETES MELLITUS WITH MICROALBUMINURIA, WITHOUT LONG-TERM CURRENT USE OF INSULIN (HCC): Primary | ICD-10-CM

## 2019-08-09 DIAGNOSIS — I10 ESSENTIAL HYPERTENSION: ICD-10-CM

## 2019-08-09 DIAGNOSIS — Z72.0 TOBACCO ABUSE: ICD-10-CM

## 2019-08-09 DIAGNOSIS — E11.42 TYPE 2 DIABETES MELLITUS WITH PERIPHERAL NEUROPATHY (HCC): ICD-10-CM

## 2019-08-09 DIAGNOSIS — R80.9 MICROALBUMINURIA: ICD-10-CM

## 2019-08-09 DIAGNOSIS — E11.29 TYPE 2 DIABETES MELLITUS WITH MICROALBUMINURIA, WITHOUT LONG-TERM CURRENT USE OF INSULIN (HCC): Primary | ICD-10-CM

## 2019-08-09 DIAGNOSIS — E78.5 HYPERLIPIDEMIA, UNSPECIFIED HYPERLIPIDEMIA TYPE: ICD-10-CM

## 2019-08-09 PROCEDURE — 99214 OFFICE O/P EST MOD 30 MIN: CPT | Performed by: INTERNAL MEDICINE

## 2019-09-12 DIAGNOSIS — E78.5 HYPERLIPIDEMIA, UNSPECIFIED HYPERLIPIDEMIA TYPE: ICD-10-CM

## 2019-09-12 RX ORDER — ATORVASTATIN CALCIUM 40 MG/1
40 TABLET, FILM COATED ORAL DAILY
Qty: 30 TABLET | Refills: 0 | Status: SHIPPED | OUTPATIENT
Start: 2019-09-12 | End: 2019-10-30 | Stop reason: SDUPTHER

## 2019-10-01 RX ORDER — LOSARTAN POTASSIUM 100 MG/1
TABLET ORAL
Qty: 90 TABLET | Refills: 0 | Status: SHIPPED | OUTPATIENT
Start: 2019-10-01 | End: 2019-12-30

## 2019-10-21 RX ORDER — TRAZODONE HYDROCHLORIDE 100 MG/1
TABLET ORAL
Qty: 90 TABLET | Refills: 0 | OUTPATIENT
Start: 2019-10-21

## 2019-10-24 ENCOUNTER — TELEPHONE (OUTPATIENT)
Dept: INTERNAL MEDICINE | Age: 59
End: 2019-10-24

## 2019-10-24 NOTE — TELEPHONE ENCOUNTER
Regarding: Prescription Question  Contact: [phone]  ----- Message from [employer], Generic sent at 10/24/2019 11:06 AM EDT -----    I WOULD LIKE TO RE SCRIPT MY TRAZADONE PLEASE AND ATORVASTATIN PLEASE WITH [facility] [address]     WITH THIS GREAT INSURANCE I HAVE THE DOCTORS VISIT WILL COST ME $189   PLEASE ADVISE

## 2019-10-24 NOTE — TELEPHONE ENCOUNTER
Pt needs appt for refills - please see refill Hx     11.30.17 Tapia - Canceled   12.27.17 Tapia - Canceled   02.15.18 Shee - Acute OV   03.26.18 Shee - Acute OV   01.24.19  Shee - Canceled   01.28.19 Shee - Seen Regular Check OV   07.30.19 Shee - N/S     Please advise

## 2019-10-24 NOTE — TELEPHONE ENCOUNTER
I cannot help what kind of insurance he has or does not have but I cannot refill medications without the recommended office visits being performed.  He was due to an appointment in July according to the last notation entered.  Give him enough medication to last 30 days pending an appointment being made.

## 2019-10-30 ENCOUNTER — OFFICE VISIT (OUTPATIENT)
Dept: INTERNAL MEDICINE | Age: 59
End: 2019-10-30

## 2019-10-30 VITALS
OXYGEN SATURATION: 98 % | DIASTOLIC BLOOD PRESSURE: 70 MMHG | TEMPERATURE: 97.6 F | BODY MASS INDEX: 37.44 KG/M2 | RESPIRATION RATE: 13 BRPM | WEIGHT: 247 LBS | SYSTOLIC BLOOD PRESSURE: 120 MMHG | HEART RATE: 72 BPM | HEIGHT: 68 IN

## 2019-10-30 DIAGNOSIS — I10 ESSENTIAL HYPERTENSION: Primary | ICD-10-CM

## 2019-10-30 DIAGNOSIS — E11.42 TYPE 2 DIABETES MELLITUS WITH PERIPHERAL NEUROPATHY (HCC): ICD-10-CM

## 2019-10-30 DIAGNOSIS — Z23 ENCOUNTER FOR IMMUNIZATION: ICD-10-CM

## 2019-10-30 DIAGNOSIS — E78.5 HYPERLIPIDEMIA, UNSPECIFIED HYPERLIPIDEMIA TYPE: ICD-10-CM

## 2019-10-30 PROCEDURE — 99214 OFFICE O/P EST MOD 30 MIN: CPT | Performed by: INTERNAL MEDICINE

## 2019-10-30 RX ORDER — ATORVASTATIN CALCIUM 40 MG/1
40 TABLET, FILM COATED ORAL DAILY
Qty: 90 TABLET | Refills: 1 | Status: SHIPPED | OUTPATIENT
Start: 2019-10-30 | End: 2020-05-27

## 2019-10-30 RX ORDER — TRAZODONE HYDROCHLORIDE 100 MG/1
100 TABLET ORAL
Qty: 90 TABLET | Refills: 1 | Status: SHIPPED | OUTPATIENT
Start: 2019-10-30 | End: 2020-04-15

## 2019-10-30 NOTE — PROGRESS NOTES
Carrington Biggs is a 58 y.o. male who presents with   Chief Complaint   Patient presents with   • Hypertension     Hydrochlorothiazide 25 mg; losartan 100 mg   • Hyperlipidemia     Atorvastatin 40 mg daily.  Lab testing being done through endocrinology   • Diabetes     Treatment, management and lab testing being done through endocrinology-Dr. Anaya   • Requests flu shot   • Refill requests     Trazodone for sleep; atorvastatin-lipids   .    58-year-old male.  Blood pressure checkup.  No complaints or problems.  Diabetic management and lipid lab testing being done through endocrinology as noted above.      Hypertension   This is a chronic problem. The current episode started more than 1 year ago. The problem is unchanged. The problem is controlled. Treatments tried: As above.  Efficacious.  Tolerated. Compliance problems include diet and exercise.    Hyperlipidemia   This is a chronic problem. The current episode started more than 1 year ago. The problem is controlled. Recent lipid tests were reviewed and are normal. Current antihyperlipidemic treatment includes statins. The current treatment provides moderate improvement of lipids. Compliance problems include adherence to diet and adherence to exercise.    Diabetes   He has type 2 (Care, treatment, management and lab testing all being done through endocrinology) diabetes mellitus.        The following portions of the patient's history were reviewed and updated as appropriate: allergies, current medications, past medical history and problem list.    Review of Systems   Constitutional: Negative.    HENT: Negative.    Eyes: Negative.    Respiratory: Negative.    Cardiovascular: Negative.    Genitourinary: Negative.    Musculoskeletal: Negative.    Skin: Negative.    Neurological: Negative.    Psychiatric/Behavioral: Negative.        Objective   Physical Exam   Constitutional: He is oriented to person, place, and time. He appears well-developed and well-nourished.  No distress.   HENT:   Head: Normocephalic and atraumatic.   Eyes: Conjunctivae and EOM are normal. Pupils are equal, round, and reactive to light.   Neck: Normal range of motion. Neck supple. No thyromegaly present.   Neck exam negative.  Carotid auscultation normal-no bruits heard.   Cardiovascular: Normal rate, regular rhythm, normal heart sounds and intact distal pulses. Exam reveals no gallop and no friction rub.   No murmur heard.  Pulmonary/Chest: Effort normal and breath sounds normal. No respiratory distress. He has no wheezes. He has no rales. He exhibits no tenderness.   Neurological: He is alert and oriented to person, place, and time.   Psychiatric: He has a normal mood and affect. His behavior is normal. Judgment and thought content normal.   Nursing note and vitals reviewed.      Assessment/Plan   Carrington was seen today for hypertension, hyperlipidemia, diabetes, requests flu shot and refill requests.    Diagnoses and all orders for this visit:    Essential hypertension    Hyperlipidemia, unspecified hyperlipidemia type  -     atorvastatin (LIPITOR) 40 MG tablet; Take 1 tablet by mouth Daily.    Type 2 diabetes mellitus with peripheral neuropathy (CMS/[facility])    Encounter for immunization    Other orders  -     traZODone (DESYREL) 100 MG tablet; Take 1 tablet by mouth every night at bedtime.        Plan: Blood pressure stable at 120/70.  Continue current treatment as prescribed.    Annual physical advised for 6 months.  He will likely not need to come fasting since labs are being done through endocrinology.    After the physical we will see him in 6 months for a blood pressure check and going forward we will continue the same pattern of a physical yearly with 6-month visits in between for a general blood pressure checkup.    Flu shot given per request

## 2019-11-02 DIAGNOSIS — I10 ESSENTIAL HYPERTENSION: ICD-10-CM

## 2019-11-04 RX ORDER — HYDROCHLOROTHIAZIDE 25 MG/1
TABLET ORAL
Qty: 90 TABLET | Refills: 3 | Status: SHIPPED | OUTPATIENT
Start: 2019-11-04 | End: 2020-11-16

## 2019-11-06 DIAGNOSIS — R80.9 TYPE 2 DIABETES MELLITUS WITH MICROALBUMINURIA, WITHOUT LONG-TERM CURRENT USE OF INSULIN (HCC): Primary | ICD-10-CM

## 2019-11-06 DIAGNOSIS — E78.5 HYPERLIPIDEMIA, UNSPECIFIED HYPERLIPIDEMIA TYPE: ICD-10-CM

## 2019-11-06 DIAGNOSIS — I10 ESSENTIAL HYPERTENSION: ICD-10-CM

## 2019-11-06 DIAGNOSIS — E11.29 TYPE 2 DIABETES MELLITUS WITH MICROALBUMINURIA, WITHOUT LONG-TERM CURRENT USE OF INSULIN (HCC): Primary | ICD-10-CM

## 2019-11-25 ENCOUNTER — RESULTS ENCOUNTER (OUTPATIENT)
Dept: ENDOCRINOLOGY | Age: 59
End: 2019-11-25

## 2019-11-25 DIAGNOSIS — E78.5 HYPERLIPIDEMIA, UNSPECIFIED HYPERLIPIDEMIA TYPE: ICD-10-CM

## 2019-11-25 DIAGNOSIS — E11.29 TYPE 2 DIABETES MELLITUS WITH MICROALBUMINURIA, WITHOUT LONG-TERM CURRENT USE OF INSULIN (HCC): ICD-10-CM

## 2019-11-25 DIAGNOSIS — R80.9 TYPE 2 DIABETES MELLITUS WITH MICROALBUMINURIA, WITHOUT LONG-TERM CURRENT USE OF INSULIN (HCC): ICD-10-CM

## 2019-11-25 DIAGNOSIS — I10 ESSENTIAL HYPERTENSION: ICD-10-CM

## 2019-11-26 LAB
ALBUMIN SERPL-MCNC: 4.9 G/DL (ref 3.5–5.2)
ALBUMIN/GLOB SERPL: 2 G/DL
ALP SERPL-CCNC: 95 U/L (ref 39–117)
ALT SERPL-CCNC: 31 U/L (ref 1–41)
AST SERPL-CCNC: 20 U/L (ref 1–40)
BILIRUB SERPL-MCNC: 0.4 MG/DL (ref 0.2–1.2)
BUN SERPL-MCNC: 13 MG/DL (ref 6–20)
BUN/CREAT SERPL: 14.1 (ref 7–25)
CALCIUM SERPL-MCNC: 10.2 MG/DL (ref 8.6–10.5)
CHLORIDE SERPL-SCNC: 94 MMOL/L (ref 98–107)
CHOLEST SERPL-MCNC: 153 MG/DL (ref 0–200)
CO2 SERPL-SCNC: 31.7 MMOL/L (ref 22–29)
CREAT SERPL-MCNC: 0.92 MG/DL (ref 0.76–1.27)
GLOBULIN SER CALC-MCNC: 2.4 GM/DL
GLUCOSE SERPL-MCNC: 244 MG/DL (ref 65–99)
HBA1C MFR BLD: 9 % (ref 4.8–5.6)
HDLC SERPL-MCNC: 33 MG/DL (ref 40–60)
INTERPRETATION: NORMAL
LDLC SERPL CALC-MCNC: 57 MG/DL (ref 0–100)
Lab: NORMAL
POTASSIUM SERPL-SCNC: 5 MMOL/L (ref 3.5–5.2)
PROT SERPL-MCNC: 7.3 G/DL (ref 6–8.5)
SODIUM SERPL-SCNC: 140 MMOL/L (ref 136–145)
TRIGL SERPL-MCNC: 315 MG/DL (ref 0–150)
VLDLC SERPL CALC-MCNC: 63 MG/DL

## 2019-12-09 NOTE — PROGRESS NOTES
Subjective   Carrington Biggs is a 58 y.o. male.     F/u for dm 2, hyperlipidemia, hypertension, sleep apnea / testing bs 1  x day / last dm eye exam 8/5/17 with dr Cannon / last dm foot exam 3/13/19 with dr Monte / flu vaccine @ [facility]         He has known diabetes mellitus since 2012 and was started on insulin in 2013.  He is on  Novolin N 20 q hs,  metformin  mg 4 tablets once a day, and glimepiride 4 mg BID.   Fasting glucose 156-252.  Blood sugars 2 hours after supper 197-328 he denies hypoglycemic episodes.  He has gained 3 pounds since August 2019.  He has a high deductible plan.  Hemoglobin A1c done in November 2019 is 9.0%.     He has occasional blurry vision.  His last eye examination was in 8/17.  He has an appt next week. He has microalbuminuria on urine sample taken last 2/19.  He is on losartan.  He denies burning in his feet     He has hyperlipidemia and has been on Lipitor 40 mg once a day.  He denies any myalgia.    Lipid profile done in November 2019 are as follows: Cholesterol 153.  HDL 33.  LDL 57.  Triglycerides 315.     He has hypertension and is on losartan 100 mg once a day.  He denies any history of heart attack or stroke.  He denies any chest pain or shortness of breath.     He has sleep apnea but is unable to tolerate a CPAP.  He occasionally wakes himself snoring.  He wakes up rested.     He drinks 4 shots of bourbon every week.  He smokes 1.5  ppd.  He denies alcohol or tobacco related illness.      The following portions of the patient's history were reviewed and updated as appropriate: allergies, current medications, past family history, past medical history, past social history, past surgical history and problem list.    Review of Systems   Psychiatric/Behavioral: Positive for sleep disturbance (sleep apnea unable to tolerate CPAP machine ).     Objective      Vitals:    12/10/19 1225   BP: 124/68   BP Location: Right arm   Patient Position: Sitting   Cuff Size: Large Adult  "  Pulse: 75   SpO2: 96%   Weight: 112 kg (246 lb 12.8 oz)   Height: 172.7 cm (67.99\")     Physical Exam   Constitutional: He is oriented to person, place, and time. He appears well-developed and well-nourished. No distress.   HENT:   Head: Normocephalic.   Right Ear: External ear normal.   Left Ear: External ear normal.   Nose: Nose normal.   Mouth/Throat: Oropharynx is clear and moist. No oropharyngeal exudate.   Eyes: Conjunctivae and EOM are normal. Right eye exhibits no discharge. Left eye exhibits no discharge.   Neck: Neck supple. No JVD present. No tracheal deviation present. No thyromegaly present.   Cardiovascular: Normal rate, regular rhythm, normal heart sounds and intact distal pulses. Exam reveals no gallop and no friction rub.   No murmur heard.  Pulmonary/Chest: Effort normal and breath sounds normal. No stridor. He has no wheezes.   Abdominal: Soft. Bowel sounds are normal. There is no tenderness. There is no guarding.   Musculoskeletal: Normal range of motion. He exhibits no edema, tenderness or deformity.   Lymphadenopathy:     He has no cervical adenopathy.   Neurological: He is alert and oriented to person, place, and time. He displays normal reflexes.   Intact light touch on lower ext     Results Encounter on 11/25/2019   Component Date Value Ref Range Status   • Hemoglobin A1C 11/26/2019 9.00* 4.80 - 5.60 % Final    Comment: Hemoglobin A1C Ranges:  Increased Risk for Diabetes  5.7% to 6.4%  Diabetes                     >= 6.5%  Diabetic Goal                < 7.0%     • Glucose 11/26/2019 244* 65 - 99 mg/dL Final   • BUN 11/26/2019 13  6 - 20 mg/dL Final   • Creatinine 11/26/2019 0.92  0.76 - 1.27 mg/dL Final   • eGFR Non  Am 11/26/2019 84  >60 mL/min/1.73 Final   • eGFR African Am 11/26/2019 102  >60 mL/min/1.73 Final   • BUN/Creatinine Ratio 11/26/2019 14.1  7.0 - 25.0 Final   • Sodium 11/26/2019 140  136 - 145 mmol/L Final   • Potassium 11/26/2019 5.0  3.5 - 5.2 mmol/L Final   • " Chloride 11/26/2019 94* 98 - 107 mmol/L Final   • Total CO2 11/26/2019 31.7* 22.0 - 29.0 mmol/L Final   • Calcium 11/26/2019 10.2  8.6 - 10.5 mg/dL Final   • Total Protein 11/26/2019 7.3  6.0 - 8.5 g/dL Final   • Albumin 11/26/2019 4.90  3.50 - 5.20 g/dL Final   • Globulin 11/26/2019 2.4  gm/dL Final   • A/G Ratio 11/26/2019 2.0  g/dL Final   • Total Bilirubin 11/26/2019 0.4  0.2 - 1.2 mg/dL Final   • Alkaline Phosphatase 11/26/2019 95  39 - 117 U/L Final   • AST (SGOT) 11/26/2019 20  1 - 40 U/L Final   • ALT (SGPT) 11/26/2019 31  1 - 41 U/L Final   • Total Cholesterol 11/26/2019 153  0 - 200 mg/dL Final   • Triglycerides 11/26/2019 315* 0 - 150 mg/dL Final   • HDL Cholesterol 11/26/2019 33* 40 - 60 mg/dL Final   • VLDL Cholesterol 11/26/2019 63  mg/dL Final   • LDL Cholesterol  11/26/2019 57  0 - 100 mg/dL Final   • Interpretation 11/26/2019 Note   Final    Supplemental report is available.   • PDF Image 11/26/2019 Not applicable   Final     Assessment/Plan   Carrington was seen today for diabetes, hyperlipidemia, hypertension and sleep apnea.    Diagnoses and all orders for this visit:    Type 2 diabetes mellitus with microalbuminuria, without long-term current use of insulin (CMS/[facility])    Type 2 diabetes mellitus with peripheral neuropathy (CMS/[facility])    Essential hypertension    Hyperlipidemia, unspecified hyperlipidemia type    Obstructive sleep apnea syndrome    Microalbuminuria      Increase Novolin N to 24 units at bedtime  Continue metformin  mg 4 tab once a day and glimepiride 4 mg BID.  Continiue Lipitor.  Continue losartan.    Copy of my note sent to Dr. Perry    RTC 4 mos.

## 2019-12-10 ENCOUNTER — OFFICE VISIT (OUTPATIENT)
Dept: ENDOCRINOLOGY | Age: 59
End: 2019-12-10

## 2019-12-10 VITALS
BODY MASS INDEX: 37.41 KG/M2 | WEIGHT: 246.8 LBS | HEIGHT: 68 IN | HEART RATE: 75 BPM | SYSTOLIC BLOOD PRESSURE: 124 MMHG | OXYGEN SATURATION: 96 % | DIASTOLIC BLOOD PRESSURE: 68 MMHG

## 2019-12-10 DIAGNOSIS — R80.9 TYPE 2 DIABETES MELLITUS WITH MICROALBUMINURIA, WITHOUT LONG-TERM CURRENT USE OF INSULIN (HCC): Primary | ICD-10-CM

## 2019-12-10 DIAGNOSIS — E11.42 TYPE 2 DIABETES MELLITUS WITH PERIPHERAL NEUROPATHY (HCC): ICD-10-CM

## 2019-12-10 DIAGNOSIS — R80.9 MICROALBUMINURIA: ICD-10-CM

## 2019-12-10 DIAGNOSIS — G47.33 OBSTRUCTIVE SLEEP APNEA SYNDROME: ICD-10-CM

## 2019-12-10 DIAGNOSIS — E78.5 HYPERLIPIDEMIA, UNSPECIFIED HYPERLIPIDEMIA TYPE: ICD-10-CM

## 2019-12-10 DIAGNOSIS — E11.29 TYPE 2 DIABETES MELLITUS WITH MICROALBUMINURIA, WITHOUT LONG-TERM CURRENT USE OF INSULIN (HCC): Primary | ICD-10-CM

## 2019-12-10 DIAGNOSIS — I10 ESSENTIAL HYPERTENSION: ICD-10-CM

## 2019-12-10 PROCEDURE — 99214 OFFICE O/P EST MOD 30 MIN: CPT | Performed by: INTERNAL MEDICINE

## 2019-12-13 DIAGNOSIS — E11.9 TYPE 2 DIABETES MELLITUS WITHOUT COMPLICATION, WITHOUT LONG-TERM CURRENT USE OF INSULIN (HCC): ICD-10-CM

## 2019-12-13 RX ORDER — GLIMEPIRIDE 4 MG/1
TABLET ORAL
Qty: 180 TABLET | Refills: 1 | Status: SHIPPED | OUTPATIENT
Start: 2019-12-13 | End: 2020-07-15 | Stop reason: SDUPTHER

## 2019-12-30 RX ORDER — LOSARTAN POTASSIUM 100 MG/1
TABLET ORAL
Qty: 90 TABLET | Refills: 0 | Status: SHIPPED | OUTPATIENT
Start: 2019-12-30 | End: 2020-03-30

## 2020-01-20 RX ORDER — METFORMIN HYDROCHLORIDE 500 MG/1
TABLET, EXTENDED RELEASE ORAL
Qty: 360 TABLET | Refills: 1 | Status: SHIPPED | OUTPATIENT
Start: 2020-01-20 | End: 2020-09-21

## 2020-03-26 DIAGNOSIS — E11.9 TYPE 2 DIABETES MELLITUS WITHOUT COMPLICATION, WITHOUT LONG-TERM CURRENT USE OF INSULIN (HCC): Primary | ICD-10-CM

## 2020-03-26 DIAGNOSIS — I10 ESSENTIAL HYPERTENSION: ICD-10-CM

## 2020-03-26 DIAGNOSIS — E78.5 HYPERLIPIDEMIA, UNSPECIFIED HYPERLIPIDEMIA TYPE: ICD-10-CM

## 2020-03-30 RX ORDER — LOSARTAN POTASSIUM 100 MG/1
TABLET ORAL
Qty: 90 TABLET | Refills: 2 | Status: SHIPPED | OUTPATIENT
Start: 2020-03-30 | End: 2021-02-19 | Stop reason: SDUPTHER

## 2020-04-07 ENCOUNTER — RESULTS ENCOUNTER (OUTPATIENT)
Dept: ENDOCRINOLOGY | Age: 60
End: 2020-04-07

## 2020-04-07 DIAGNOSIS — E11.9 TYPE 2 DIABETES MELLITUS WITHOUT COMPLICATION, WITHOUT LONG-TERM CURRENT USE OF INSULIN (HCC): ICD-10-CM

## 2020-04-07 DIAGNOSIS — E78.5 HYPERLIPIDEMIA, UNSPECIFIED HYPERLIPIDEMIA TYPE: ICD-10-CM

## 2020-04-07 DIAGNOSIS — I10 ESSENTIAL HYPERTENSION: ICD-10-CM

## 2020-04-15 RX ORDER — TRAZODONE HYDROCHLORIDE 100 MG/1
TABLET ORAL
Qty: 90 TABLET | Refills: 1 | Status: SHIPPED | OUTPATIENT
Start: 2020-04-15 | End: 2020-10-16

## 2020-05-20 ENCOUNTER — OFFICE VISIT (OUTPATIENT)
Dept: INTERNAL MEDICINE | Age: 60
End: 2020-05-20

## 2020-05-20 VITALS
WEIGHT: 244 LBS | DIASTOLIC BLOOD PRESSURE: 80 MMHG | BODY MASS INDEX: 36.98 KG/M2 | HEART RATE: 84 BPM | RESPIRATION RATE: 13 BRPM | SYSTOLIC BLOOD PRESSURE: 140 MMHG | HEIGHT: 68 IN | OXYGEN SATURATION: 93 % | TEMPERATURE: 97.4 F

## 2020-05-20 DIAGNOSIS — R80.9 TYPE 2 DIABETES MELLITUS WITH MICROALBUMINURIA, WITHOUT LONG-TERM CURRENT USE OF INSULIN (HCC): ICD-10-CM

## 2020-05-20 DIAGNOSIS — E78.5 HYPERLIPIDEMIA, UNSPECIFIED HYPERLIPIDEMIA TYPE: ICD-10-CM

## 2020-05-20 DIAGNOSIS — I10 ESSENTIAL HYPERTENSION: ICD-10-CM

## 2020-05-20 DIAGNOSIS — Z00.00 ANNUAL PHYSICAL EXAM: Primary | ICD-10-CM

## 2020-05-20 DIAGNOSIS — E11.29 TYPE 2 DIABETES MELLITUS WITH MICROALBUMINURIA, WITHOUT LONG-TERM CURRENT USE OF INSULIN (HCC): ICD-10-CM

## 2020-05-20 PROCEDURE — 99396 PREV VISIT EST AGE 40-64: CPT | Performed by: INTERNAL MEDICINE

## 2020-05-20 NOTE — PROGRESS NOTES
"  Carrington Biggs is a 59 y.o. male who presents with   Chief Complaint   Patient presents with   • Annual Exam   • Hypertension     Hydrochlorothiazide 25 mg daily; losartan 100 mg daily.  Patient is not monitoring his blood pressure at home   • Hyperlipidemia     Endocrinology care, treatment, management and lab testing-    • Diabetes     Endocrinology care, treatment, management and lab testing-Dr. Anaya patient is due to see him in July he says.   .    59-year-old male.  Annual physical.  No complaints.  History as above with endocrinology care, treatment and management of his diabetes and hyperlipidemia.    Hypertension   This is a chronic problem. The current episode started more than 1 year ago. The problem has been waxing and waning since onset. The problem is controlled. Current antihypertension treatment includes beta blockers. The current treatment provides mild improvement. Compliance problems include diet and exercise.    Hyperlipidemia   This is a chronic (Care, treatment, management and lab testing per endocrinology as noted) problem. The current episode started more than 1 year ago. The problem is controlled.   Diabetes   He has type 2 (Care, treatment, management and lab testing per endocrinology as noted) diabetes mellitus.        /80   Pulse 84   Temp 97.4 °F (36.3 °C) (Temporal)   Resp 13   Ht 172.7 cm (67.99\")   Wt 111 kg (244 lb)   SpO2 93%   BMI 37.11 kg/m²     The following portions of the patient's history were reviewed and updated as appropriate: allergies, current medications, past family history, past medical history, past social history, past surgical history and problem list.    Review of Systems   Constitutional: Negative.    HENT: Negative.    Eyes: Negative.    Respiratory: Negative.    Cardiovascular: Negative.    Genitourinary: Negative.    Musculoskeletal: Negative.    Skin: Negative.    Neurological: Negative.    Psychiatric/Behavioral: Negative.  "       Objective   Physical Exam   Constitutional: He is oriented to person, place, and time. He appears well-developed and well-nourished. No distress.   HENT:   Head: Normocephalic and atraumatic.   Right Ear: External ear normal.   Left Ear: External ear normal.   Nose: Nose normal.   Mouth/Throat: Oropharynx is clear and moist. No oropharyngeal exudate.   Eyes: Pupils are equal, round, and reactive to light. Conjunctivae and EOM are normal. Right eye exhibits no discharge. Left eye exhibits no discharge. No scleral icterus.   Neck: Normal range of motion. Neck supple. No JVD present. No tracheal deviation present. No thyromegaly present.   Neck exam negative.  Carotid auscultation normal-no bruits heard.   Cardiovascular: Normal rate, regular rhythm, normal heart sounds and intact distal pulses. Exam reveals no gallop and no friction rub.   No murmur heard.  Pulmonary/Chest: Effort normal and breath sounds normal. No respiratory distress. He has no wheezes. He has no rales. He exhibits no tenderness.   Abdominal: Soft. Bowel sounds are normal. He exhibits no distension and no mass. There is no tenderness. No hernia.   Genitourinary:   Genitourinary Comments: MULUGETA's and PSA evaluations being done via urology-Dr. Perez   Musculoskeletal: Normal range of motion. He exhibits no edema, tenderness or deformity.   Lymphadenopathy:     He has no cervical adenopathy.   Neurological: He is alert and oriented to person, place, and time. He has normal reflexes. He displays normal reflexes. No cranial nerve deficit. He exhibits normal muscle tone. Coordination normal.   Skin: Skin is warm and dry. No rash noted. He is not diaphoretic. No erythema. No pallor.   Psychiatric: He has a normal mood and affect. His behavior is normal. Judgment and thought content normal.   Nursing note and vitals reviewed.      Assessment/Plan   Carrington was seen today for annual exam, hypertension, hyperlipidemia and diabetes.    Diagnoses and all  orders for this visit:    Annual physical exam    Essential hypertension    Hyperlipidemia, unspecified hyperlipidemia type    Type 2 diabetes mellitus with microalbuminuria, without long-term current use of insulin (CMS/[facility])      Plan: The patient will continue endocrinology care, treatment, management and lab testing for his hyperlipidemia and diabetes.  He is to see his endocrinologist-Dr. Anaya in July.    Patient blood pressure is mildly elevated today at 140/80.  He was advised to monitor this at home trying to keep it consistently at or below 130/80.  If it is there consistently we will see him in 6 months for a general blood pressure checkup.  If not we will see him in the next 4 weeks or so as needed.    Follow-up annual physical with any necessary lab updates advised for 1 year.    Smoking cessation counseling not done today.  The patient states he has no desire to quit smoking cigarettes.  Low-dose CT scanning of the lungs done in 2017-negative.  He does not seem interested in pursuing this at this time and is willing to assume the risks of his decision.    Included in today's exam was face to face time spent in preventative counseling regarding weight loss, daily exercise, and minimizing sweets and carbohydrates.  Additionally, health maintenance needs were discussed and reviewed as well.

## 2020-05-27 DIAGNOSIS — E78.5 HYPERLIPIDEMIA, UNSPECIFIED HYPERLIPIDEMIA TYPE: ICD-10-CM

## 2020-05-27 RX ORDER — ATORVASTATIN CALCIUM 40 MG/1
TABLET, FILM COATED ORAL
Qty: 90 TABLET | Refills: 1 | Status: SHIPPED | OUTPATIENT
Start: 2020-05-27 | End: 2020-12-18

## 2020-07-08 NOTE — PROGRESS NOTES
Subjective   Carrington Biggs is a 59 y.o. male.     F/u for dm 2, hyperlipidemia, hypertension, sleep apnea / testing bs 1 x day / last dm eye exam 8/5/17 with dr Cannon / last dm foot exam 7/15/20  with dr Anaya      He has known diabetes mellitus since 2012 and was started on insulin in 2013.  He is on  Novolin N 24 q hs,  metformin  mg 4 tablets once a day, and glimepiride 4 mg BID.   Fasting glucose 160-170.  Suppertime glucose around 150.  Blood sugars 1 hours after supper 220.  He denies hypoglycemic episodes.  He has lost 3 pounds since 12/19.  He has a high deductible plan.      He has occasional blurry vision.  His last eye examination was in 8/17.  He has microalbuminuria on urine sample taken last 2/19.  He is on losartan.  He denies burning in his feet     He has hyperlipidemia and has been on Lipitor 40 mg once a day.  He denies any myalgia.    Lipid profile done in July 23, 2020 are as follows: Cholesterol 162.  HDL 34.  LDL 59.  Triglycerides 347.     He has hypertension and is on losartan 100 mg once a day.  He denies any history of heart attack or stroke.  He denies any chest pain or shortness of breath.     He has sleep apnea but is unable to tolerate a CPAP.  He occasionally wakes himself snoring.  He wakes up rested.     He drinks 4 shots of bourbon every week.  He smokes 1.5  ppd.  He denies alcohol or tobacco related illness.      He has been seeing Dr. Perez for erectile dysfunction.  He is using sildenafil 20 mg 5 tabs/day as needed.    The following portions of the patient's history were reviewed and updated as appropriate: allergies, current medications, past family history, past medical history, past social history, past surgical history and problem list.    Review of Systems   Constitutional: Negative.  Negative for chills, fatigue and fever.   HENT: Negative.    Eyes: Negative.    Respiratory: Negative.  Negative for chest tightness, shortness of breath and wheezing.   "  Cardiovascular: Negative.  Negative for chest pain, palpitations and leg swelling.   Gastrointestinal: Negative.  Negative for abdominal distention and abdominal pain.   Endocrine: Negative.    Genitourinary: Negative.  Negative for difficulty urinating, frequency and urgency.   Musculoskeletal: Negative.  Negative for back pain, joint swelling, myalgias and neck pain.   Skin: Negative for color change, rash and wound.   Neurological: Negative.  Negative for dizziness, tremors, seizures, light-headedness, numbness and headaches.   Hematological: Negative.  Does not bruise/bleed easily.   Psychiatric/Behavioral: Positive for sleep disturbance (sleep apnea unable to tolerate CPAP machine ). Negative for agitation and confusion. The patient is nervous/anxious (work ).      Objective      Vitals:    07/15/20 1506   BP: 126/60   BP Location: Right arm   Patient Position: Sitting   Cuff Size: Large Adult   Pulse: 70   SpO2: 97%   Weight: 110 kg (243 lb 6.4 oz)   Height: 172.7 cm (67.99\")     Physical Exam   Constitutional: He is oriented to person, place, and time. He appears well-developed and well-nourished. No distress.   HENT:   Head: Normocephalic.   Right Ear: External ear normal.   Left Ear: External ear normal.   Nose: Nose normal.   Mouth/Throat: Oropharynx is clear and moist. No oropharyngeal exudate.   Eyes: Conjunctivae and EOM are normal. Right eye exhibits no discharge. Left eye exhibits no discharge. No scleral icterus.   Neck: Neck supple. No JVD present. No tracheal deviation present. No thyromegaly present.   Cardiovascular: Normal rate, regular rhythm, normal heart sounds and intact distal pulses. Exam reveals no gallop and no friction rub.   No murmur heard.  Pulmonary/Chest: Effort normal and breath sounds normal. No stridor. No respiratory distress. He has no wheezes. He has no rales. He exhibits no tenderness.   Abdominal: Soft. Bowel sounds are normal. He exhibits no distension and no mass. There " is no tenderness. There is no rebound and no guarding.   Musculoskeletal: Normal range of motion. He exhibits no edema, tenderness or deformity.   Lymphadenopathy:     He has no cervical adenopathy.   Neurological: He is alert and oriented to person, place, and time. He displays normal reflexes.   Intact light touch on lower ext   Skin: Skin is warm and dry. No rash noted. He is not diaphoretic. No erythema. No pallor.   Psychiatric: He has a normal mood and affect. His behavior is normal.     Results Encounter on 04/07/2020   Component Date Value Ref Range Status   • Glucose 07/08/2020 174* 65 - 99 mg/dL Final   • BUN 07/08/2020 13  6 - 24 mg/dL Final   • Creatinine 07/08/2020 0.94  0.76 - 1.27 mg/dL Final   • eGFR Non  Am 07/08/2020 88  >59 mL/min/1.73 Final   • eGFR African Am 07/08/2020 102  >59 mL/min/1.73 Final   • BUN/Creatinine Ratio 07/08/2020 14  9 - 20 Final   • Sodium 07/08/2020 139  134 - 144 mmol/L Final   • Potassium 07/08/2020 4.5  3.5 - 5.2 mmol/L Final   • Chloride 07/08/2020 92* 96 - 106 mmol/L Final   • Total CO2 07/08/2020 29  20 - 29 mmol/L Final   • Calcium 07/08/2020 10.2  8.7 - 10.2 mg/dL Final   • Total Protein 07/08/2020 7.1  6.0 - 8.5 g/dL Final   • Albumin 07/08/2020 4.7  3.8 - 4.9 g/dL Final   • Globulin 07/08/2020 2.4  1.5 - 4.5 g/dL Final   • A/G Ratio 07/08/2020 2.0  1.2 - 2.2 Final   • Total Bilirubin 07/08/2020 0.6  0.0 - 1.2 mg/dL Final   • Alkaline Phosphatase 07/08/2020 93  39 - 117 IU/L Final   • AST (SGOT) 07/08/2020 25  0 - 40 IU/L Final   • ALT (SGPT) 07/08/2020 28  0 - 44 IU/L Final   • Total Cholesterol 07/08/2020 162  100 - 199 mg/dL Final   • Triglycerides 07/08/2020 347* 0 - 149 mg/dL Final   • HDL Cholesterol 07/08/2020 34* >39 mg/dL Final   • VLDL Cholesterol 07/08/2020 69* 5 - 40 mg/dL Final   • LDL Cholesterol  07/08/2020 59  0 - 99 mg/dL Final   • Hemoglobin A1C 07/08/2020 9.3* 4.8 - 5.6 % Final    Comment:          Prediabetes: 5.7 - 6.4           Diabetes:  >6.4           Glycemic control for adults with diabetes: <7.0     • Interpretation 07/08/2020 Note   Final    Supplemental report is available.   • PDF Image 07/08/2020 Not applicable   Final     Assessment/Plan   Carrington was seen today for diabetes, hyperlipidemia, hypertension and sleep apnea.    Diagnoses and all orders for this visit:    Type 2 diabetes mellitus with microalbuminuria, without long-term current use of insulin (CMS/[facility])  -     Microalbumin / Creatinine Urine Ratio - Urine, Clean Catch    Type 2 diabetes mellitus without complication, without long-term current use of insulin (CMS/[facility])  -     glimepiride (AMARYL) 4 MG tablet; one tablet twice a day  -     Microalbumin / Creatinine Urine Ratio - Urine, Clean Catch    Essential hypertension    Hyperlipidemia, unspecified hyperlipidemia type    Obstructive sleep apnea syndrome    Type 2 diabetes mellitus with peripheral neuropathy (CMS/[facility])  -     Microalbumin / Creatinine Urine Ratio - Urine, Clean Catch    Microalbuminuria  -     Microalbumin / Creatinine Urine Ratio - Urine, Clean Catch    Insurance coverage problems    Tobacco abuse    Other orders  -     insulin NPH (humuLIN N,novoLIN N) 100 UNIT/ML injection; 30 units at bedtime      Increase Novolin N 30 units at bedtime.  Continue metformin  mg 4 tablets once a day, and glimepiride 4 mg twice daily.  Continue Lipitor 40 mg/day.  Continue losartan 100 mg/day.  Discussed about smoking cessation.  Suggest Pneumovax.  Flu vaccine this fall.    Copy of my note sent to Dr. Perry and Dr. Perez.    RTC 4 mos.

## 2020-07-15 ENCOUNTER — OFFICE VISIT (OUTPATIENT)
Dept: ENDOCRINOLOGY | Age: 60
End: 2020-07-15

## 2020-07-15 VITALS
HEART RATE: 70 BPM | BODY MASS INDEX: 36.89 KG/M2 | HEIGHT: 68 IN | SYSTOLIC BLOOD PRESSURE: 126 MMHG | WEIGHT: 243.4 LBS | DIASTOLIC BLOOD PRESSURE: 60 MMHG | OXYGEN SATURATION: 97 %

## 2020-07-15 DIAGNOSIS — E78.5 HYPERLIPIDEMIA, UNSPECIFIED HYPERLIPIDEMIA TYPE: ICD-10-CM

## 2020-07-15 DIAGNOSIS — R80.9 MICROALBUMINURIA: ICD-10-CM

## 2020-07-15 DIAGNOSIS — R80.9 TYPE 2 DIABETES MELLITUS WITH MICROALBUMINURIA, WITHOUT LONG-TERM CURRENT USE OF INSULIN (HCC): Primary | ICD-10-CM

## 2020-07-15 DIAGNOSIS — Z59.89 INSURANCE COVERAGE PROBLEMS: ICD-10-CM

## 2020-07-15 DIAGNOSIS — I10 ESSENTIAL HYPERTENSION: ICD-10-CM

## 2020-07-15 DIAGNOSIS — E11.9 TYPE 2 DIABETES MELLITUS WITHOUT COMPLICATION, WITHOUT LONG-TERM CURRENT USE OF INSULIN (HCC): ICD-10-CM

## 2020-07-15 DIAGNOSIS — G47.33 OBSTRUCTIVE SLEEP APNEA SYNDROME: ICD-10-CM

## 2020-07-15 DIAGNOSIS — E11.42 TYPE 2 DIABETES MELLITUS WITH PERIPHERAL NEUROPATHY (HCC): ICD-10-CM

## 2020-07-15 DIAGNOSIS — Z72.0 TOBACCO ABUSE: ICD-10-CM

## 2020-07-15 DIAGNOSIS — E11.29 TYPE 2 DIABETES MELLITUS WITH MICROALBUMINURIA, WITHOUT LONG-TERM CURRENT USE OF INSULIN (HCC): Primary | ICD-10-CM

## 2020-07-15 PROCEDURE — 99214 OFFICE O/P EST MOD 30 MIN: CPT | Performed by: INTERNAL MEDICINE

## 2020-07-15 RX ORDER — GLIMEPIRIDE 4 MG/1
TABLET ORAL
Qty: 180 TABLET | Refills: 1 | Status: SHIPPED | OUTPATIENT
Start: 2020-07-15 | End: 2021-04-20

## 2020-07-15 SDOH — ECONOMIC STABILITY - INCOME SECURITY: OTHER PROBLEMS RELATED TO HOUSING AND ECONOMIC CIRCUMSTANCES: Z59.89

## 2020-07-16 LAB
ALBUMIN SERPL-MCNC: 4.7 G/DL (ref 3.8–4.9)
ALBUMIN/CREAT UR: 199 MG/G CREAT (ref 0–29)
ALBUMIN/GLOB SERPL: 2 {RATIO} (ref 1.2–2.2)
ALP SERPL-CCNC: 93 IU/L (ref 39–117)
ALT SERPL-CCNC: 28 IU/L (ref 0–44)
AST SERPL-CCNC: 25 IU/L (ref 0–40)
BILIRUB SERPL-MCNC: 0.6 MG/DL (ref 0–1.2)
BUN SERPL-MCNC: 13 MG/DL (ref 6–24)
BUN/CREAT SERPL: 14 (ref 9–20)
CALCIUM SERPL-MCNC: 10.2 MG/DL (ref 8.7–10.2)
CHLORIDE SERPL-SCNC: 92 MMOL/L (ref 96–106)
CHOLEST SERPL-MCNC: 162 MG/DL (ref 100–199)
CO2 SERPL-SCNC: 29 MMOL/L (ref 20–29)
CREAT SERPL-MCNC: 0.94 MG/DL (ref 0.76–1.27)
CREAT UR-MCNC: 112.6 MG/DL
GLOBULIN SER CALC-MCNC: 2.4 G/DL (ref 1.5–4.5)
GLUCOSE SERPL-MCNC: 174 MG/DL (ref 65–99)
HBA1C MFR BLD: 9.3 % (ref 4.8–5.6)
HDLC SERPL-MCNC: 34 MG/DL
INTERPRETATION: NORMAL
LDLC SERPL CALC-MCNC: 59 MG/DL (ref 0–99)
Lab: NORMAL
MICROALBUMIN UR-MCNC: 224 UG/ML
POTASSIUM SERPL-SCNC: 4.5 MMOL/L (ref 3.5–5.2)
PROT SERPL-MCNC: 7.1 G/DL (ref 6–8.5)
SODIUM SERPL-SCNC: 139 MMOL/L (ref 134–144)
TRIGL SERPL-MCNC: 347 MG/DL (ref 0–149)
VLDLC SERPL CALC-MCNC: 69 MG/DL (ref 5–40)

## 2020-09-03 ENCOUNTER — OFFICE VISIT (OUTPATIENT)
Dept: INTERNAL MEDICINE | Age: 60
End: 2020-09-03

## 2020-09-03 VITALS
DIASTOLIC BLOOD PRESSURE: 70 MMHG | BODY MASS INDEX: 36.68 KG/M2 | TEMPERATURE: 97 F | HEIGHT: 68 IN | HEART RATE: 57 BPM | RESPIRATION RATE: 13 BRPM | OXYGEN SATURATION: 98 % | WEIGHT: 242 LBS | SYSTOLIC BLOOD PRESSURE: 120 MMHG

## 2020-09-03 DIAGNOSIS — I10 ESSENTIAL HYPERTENSION: ICD-10-CM

## 2020-09-03 DIAGNOSIS — M54.6 ACUTE RIGHT-SIDED THORACIC BACK PAIN: Primary | ICD-10-CM

## 2020-09-03 PROCEDURE — 99214 OFFICE O/P EST MOD 30 MIN: CPT | Performed by: INTERNAL MEDICINE

## 2020-09-03 RX ORDER — CYCLOBENZAPRINE HCL 10 MG
10 TABLET ORAL 3 TIMES DAILY PRN
Qty: 30 TABLET | Refills: 0 | Status: SHIPPED | OUTPATIENT
Start: 2020-09-03 | End: 2020-09-11 | Stop reason: SDUPTHER

## 2020-09-03 RX ORDER — TRAMADOL HYDROCHLORIDE 50 MG/1
50 TABLET ORAL EVERY 6 HOURS PRN
Qty: 30 TABLET | Refills: 0 | Status: SHIPPED | OUTPATIENT
Start: 2020-09-03 | End: 2020-09-11 | Stop reason: SDUPTHER

## 2020-09-03 NOTE — PROGRESS NOTES
"Carrington Biggs is a 59 y.o. male who presents with   Chief Complaint   Patient presents with   • Back Pain     Right lower thoracic back pain-2 weeks.  No injuries   • Hypertension     Hydrochlorothiazide 25 mg; losartan 100 mg   • Diabetes     Endocrinology care, treatment and management-Dr. Anaya   .    59-year-old male.  Right lower thoracic back pain for 2 weeks.  Difficulty sleeping because of pain.  Hurts to lay down.  Hurts to sit.  Has been taking Advil gel, 2 3 times daily-no benefit.  No radiating pain to the flank or into the groin.  No genitourinary symptoms.    Back Pain   This is a new problem. The current episode started 1 to 4 weeks ago. The problem occurs constantly. The problem has been waxing and waning since onset. The pain is present in the thoracic spine. The quality of the pain is described as aching. The pain does not radiate. The symptoms are aggravated by lying down and sitting. He has tried NSAIDs for the symptoms. The treatment provided no relief.   Hypertension   This is a chronic problem. The current episode started more than 1 year ago. The problem is unchanged. The problem is controlled. Current antihypertension treatment includes diuretics and angiotensin blockers. The current treatment provides moderate improvement. Compliance problems include diet and exercise.    Diabetes   He has type 2 (Endocrinology management as noted) diabetes mellitus.        /70   Pulse 57   Temp 97 °F (36.1 °C)   Resp 13   Ht 172.7 cm (67.99\")   Wt 110 kg (242 lb)   SpO2 98%   BMI 36.80 kg/m²     The following portions of the patient's history were reviewed and updated as appropriate: allergies, current medications, past medical history and problem list.    Review of Systems   Constitutional: Negative.    HENT: Negative.    Eyes: Negative.    Respiratory: Negative.    Cardiovascular: Negative.    Genitourinary: Negative.    Musculoskeletal: Positive for back pain.   Skin: Negative.  "   Neurological: Negative.    Psychiatric/Behavioral: Negative.        Objective   Physical Exam   Constitutional: He is oriented to person, place, and time. He appears well-developed and well-nourished. No distress.   HENT:   Head: Normocephalic and atraumatic.   Eyes: Pupils are equal, round, and reactive to light. Conjunctivae and EOM are normal.   Neck: Normal range of motion. Neck supple. No thyromegaly present.   Neck exam negative.  Carotid auscultation normal-no bruits heard.   Cardiovascular: Normal rate, regular rhythm, normal heart sounds and intact distal pulses. Exam reveals no gallop and no friction rub.   No murmur heard.  Pulmonary/Chest: Effort normal and breath sounds normal. No respiratory distress. He has no wheezes. He has no rales. He exhibits no tenderness.   Musculoskeletal:   Patient subjectively describes discomfort in the right lower thoracic back.  Visibly there are no lesions to be seen to suggest herpes.  He has no palpable tenderness.  There is no radiating pain into the groin or around the right flank.   Neurological: He is alert and oriented to person, place, and time.   Psychiatric: He has a normal mood and affect. His behavior is normal. Judgment and thought content normal.   Nursing note and vitals reviewed.      Assessment/Plan   Carrington was seen today for back pain, hypertension and diabetes.    Diagnoses and all orders for this visit:    Acute right-sided thoracic back pain  -     traMADol (ULTRAM) 50 MG tablet; Take 1 tablet by mouth Every 6 (Six) Hours As Needed for Moderate Pain .  -     cyclobenzaprine (FLEXERIL) 10 MG tablet; Take 1 tablet by mouth 3 (Three) Times a Day As Needed for Muscle Spasms.    Essential hypertension      Plan: Short-term tramadol/cyclobenzaprine prescription as above for week.    Physical therapy advised-declined    X-rays discussed-declined at this point    Blood pressure stable.  Continue current treatment    Follow-up for today's issue PRN

## 2020-09-09 ENCOUNTER — TELEPHONE (OUTPATIENT)
Dept: INTERNAL MEDICINE | Age: 60
End: 2020-09-09

## 2020-09-09 DIAGNOSIS — M54.6 ACUTE RIGHT-SIDED THORACIC BACK PAIN: Primary | ICD-10-CM

## 2020-09-09 NOTE — TELEPHONE ENCOUNTER
Orders entered for thoracic spine x-rays and chest x-ray with right rib detail.  Radiology should be calling him regarding scheduling.  Let him know.

## 2020-09-09 NOTE — TELEPHONE ENCOUNTER
Patient called in and stated he is requesting orders to have a x-ray done on his back right side half way up is were he is feeling the pain the most.         Patient call back [phone]

## 2020-09-11 ENCOUNTER — HOSPITAL ENCOUNTER (OUTPATIENT)
Dept: GENERAL RADIOLOGY | Facility: HOSPITAL | Age: 60
Discharge: HOME | End: 2020-09-11

## 2020-09-11 DIAGNOSIS — M54.6 ACUTE RIGHT-SIDED THORACIC BACK PAIN: ICD-10-CM

## 2020-09-11 PROCEDURE — 72072 X-RAY EXAM THORAC SPINE 3VWS: CPT

## 2020-09-11 PROCEDURE — 71101 X-RAY EXAM UNILAT RIBS/CHEST: CPT

## 2020-09-11 RX ORDER — TRAMADOL HYDROCHLORIDE 50 MG/1
50 TABLET ORAL EVERY 6 HOURS PRN
Qty: 30 TABLET | Refills: 0 | Status: SHIPPED | OUTPATIENT
Start: 2020-09-11 | End: 2020-11-17

## 2020-09-11 RX ORDER — CYCLOBENZAPRINE HCL 10 MG
10 TABLET ORAL 3 TIMES DAILY PRN
Qty: 30 TABLET | Refills: 0 | Status: SHIPPED | OUTPATIENT
Start: 2020-09-11 | End: 2020-09-25 | Stop reason: SDUPTHER

## 2020-09-11 NOTE — TELEPHONE ENCOUNTER
PATIENT CALLED FOR MED REFILL FOR   traMADol (ULTRAM) 50 MG tablet  HE IS PRESCRIBED EVERY 6 HOURS. HE TOOK TWO LAST NIGHT SO HE COULD SLEEP, WHICH HE WAS ABLE TO DO. THE PAIN IS REALLY WHEN HE LIES DOWN. HE CAN SIT AND WALK WITH TOLERABLE PAIN    cyclobenzaprine (FLEXERIL) 10 MG tablet    [facility]/pharmacy #[id number] - [place], [place] - [address] [place] [address] AT [facility] - [phone]  - [phone] FX  [phone]    HE IS STILL WAITING ON THE XRAY AUTHORIZATION    PLEASE CALL [phone]

## 2020-09-11 NOTE — TELEPHONE ENCOUNTER
Patient is requesting a refill on Tramadol 50mg q6h #30 filled on 09.03.20 and Flexeril 10 mg TID #30.     Please advise if we can refill prescriptions.

## 2020-09-11 NOTE — TELEPHONE ENCOUNTER
Do it.  If the tramadol is going to be needed for longer term then we are going to have to start doing [place], urine drug screens and drug contract signing etc. etc.  Also we may need to start talking about pain management referral

## 2020-09-21 RX ORDER — METFORMIN HYDROCHLORIDE 500 MG/1
TABLET, EXTENDED RELEASE ORAL
Qty: 360 TABLET | Refills: 1 | Status: SHIPPED | OUTPATIENT
Start: 2020-09-21 | End: 2021-07-29

## 2020-09-25 ENCOUNTER — TELEPHONE (OUTPATIENT)
Dept: INTERNAL MEDICINE | Age: 60
End: 2020-09-25

## 2020-09-25 DIAGNOSIS — M54.6 ACUTE RIGHT-SIDED THORACIC BACK PAIN: ICD-10-CM

## 2020-09-25 RX ORDER — CYCLOBENZAPRINE HCL 10 MG
10 TABLET ORAL 3 TIMES DAILY PRN
Qty: 21 TABLET | Refills: 0 | Status: SHIPPED | OUTPATIENT
Start: 2020-09-25 | End: 2020-11-17

## 2020-09-25 RX ORDER — TRAMADOL HYDROCHLORIDE 50 MG/1
50 TABLET ORAL EVERY 6 HOURS PRN
Qty: 30 TABLET | Refills: 0 | Status: CANCELLED | OUTPATIENT
Start: 2020-09-25

## 2020-09-25 RX ORDER — CYCLOBENZAPRINE HCL 10 MG
10 TABLET ORAL 3 TIMES DAILY PRN
Qty: 30 TABLET | Refills: 0 | Status: CANCELLED | OUTPATIENT
Start: 2020-09-25

## 2020-09-25 NOTE — TELEPHONE ENCOUNTER
Patient calling and states he is seeing a urologist on Monday for his kidney stone. However he states he would like an appointment to have a bone scan of his spine. He states he is having a lot of pain when he lays down still.     Patient also requesting a refill on:  - traMADol (ULTRAM) 50 MG tablet  - cyclobenzaprine (FLEXERIL) 10 MG tablet  Verified [facility] on [address].  Patient is completely out of these medications. Patient states he only takes them at night to help him sleep.    Patient can be reached at [phone].

## 2020-09-25 NOTE — TELEPHONE ENCOUNTER
Please advise on refills for flexeril and tramadol? Pt received refill on flexeril #30 and tramadol #30 on 09.11.20.     Bone scan request sent to Dr. Perry for approval.     PLEASE ADVISE

## 2020-09-29 ENCOUNTER — HOSPITAL ENCOUNTER (INPATIENT)
Facility: HOSPITAL | Age: 60
LOS: 3 days | Discharge: HOME | End: 2020-10-03
Attending: EMERGENCY MEDICINE | Admitting: INTERNAL MEDICINE

## 2020-09-29 ENCOUNTER — APPOINTMENT (OUTPATIENT)
Dept: GENERAL RADIOLOGY | Facility: HOSPITAL | Age: 60
End: 2020-09-29

## 2020-09-29 DIAGNOSIS — J96.01 ACUTE RESPIRATORY FAILURE WITH HYPOXIA (HCC): Primary | ICD-10-CM

## 2020-09-29 DIAGNOSIS — J18.9 PNEUMONIA OF BOTH LOWER LOBES DUE TO INFECTIOUS ORGANISM: ICD-10-CM

## 2020-09-29 DIAGNOSIS — E11.65 TYPE 2 DIABETES MELLITUS WITH HYPERGLYCEMIA, WITH LONG-TERM CURRENT USE OF INSULIN (HCC): ICD-10-CM

## 2020-09-29 DIAGNOSIS — Z79.4 TYPE 2 DIABETES MELLITUS WITH HYPERGLYCEMIA, WITH LONG-TERM CURRENT USE OF INSULIN (HCC): ICD-10-CM

## 2020-09-29 LAB
BASOPHILS # BLD AUTO: 0.05 10*3/MM3 (ref 0–0.2)
BASOPHILS NFR BLD AUTO: 0.3 % (ref 0–1.5)
DEPRECATED RDW RBC AUTO: 41 FL (ref 37–54)
EOSINOPHIL # BLD AUTO: 0.22 10*3/MM3 (ref 0–0.4)
EOSINOPHIL NFR BLD AUTO: 1.3 % (ref 0.3–6.2)
ERYTHROCYTE [DISTWIDTH] IN BLOOD BY AUTOMATED COUNT: 12.1 % (ref 12.3–15.4)
HCT VFR BLD AUTO: 46.7 % (ref 37.5–51)
HGB BLD-MCNC: 15.6 G/DL (ref 13–17.7)
IMM GRANULOCYTES # BLD AUTO: 0.1 10*3/MM3 (ref 0–0.05)
IMM GRANULOCYTES NFR BLD AUTO: 0.6 % (ref 0–0.5)
LYMPHOCYTES # BLD AUTO: 2.4 10*3/MM3 (ref 0.7–3.1)
LYMPHOCYTES NFR BLD AUTO: 14.2 % (ref 19.6–45.3)
MCH RBC QN AUTO: 31.1 PG (ref 26.6–33)
MCHC RBC AUTO-ENTMCNC: 33.4 G/DL (ref 31.5–35.7)
MCV RBC AUTO: 93.2 FL (ref 79–97)
MONOCYTES # BLD AUTO: 1.16 10*3/MM3 (ref 0.1–0.9)
MONOCYTES NFR BLD AUTO: 6.9 % (ref 5–12)
NEUTROPHILS NFR BLD AUTO: 12.97 10*3/MM3 (ref 1.7–7)
NEUTROPHILS NFR BLD AUTO: 76.7 % (ref 42.7–76)
NRBC BLD AUTO-RTO: 0 /100 WBC (ref 0–0.2)
PLATELET # BLD AUTO: 256 10*3/MM3 (ref 140–450)
PMV BLD AUTO: 9.6 FL (ref 6–12)
RBC # BLD AUTO: 5.01 10*6/MM3 (ref 4.14–5.8)
WBC # BLD AUTO: 16.9 10*3/MM3 (ref 3.4–10.8)

## 2020-09-29 PROCEDURE — 93010 ELECTROCARDIOGRAM REPORT: CPT | Performed by: INTERNAL MEDICINE

## 2020-09-29 PROCEDURE — 84145 PROCALCITONIN (PCT): CPT | Performed by: EMERGENCY MEDICINE

## 2020-09-29 PROCEDURE — 84484 ASSAY OF TROPONIN QUANT: CPT | Performed by: EMERGENCY MEDICINE

## 2020-09-29 PROCEDURE — 71045 X-RAY EXAM CHEST 1 VIEW: CPT

## 2020-09-29 PROCEDURE — 83880 ASSAY OF NATRIURETIC PEPTIDE: CPT | Performed by: EMERGENCY MEDICINE

## 2020-09-29 PROCEDURE — 93005 ELECTROCARDIOGRAM TRACING: CPT | Performed by: EMERGENCY MEDICINE

## 2020-09-29 PROCEDURE — 80053 COMPREHEN METABOLIC PANEL: CPT | Performed by: EMERGENCY MEDICINE

## 2020-09-29 PROCEDURE — 85025 COMPLETE CBC W/AUTO DIFF WBC: CPT | Performed by: EMERGENCY MEDICINE

## 2020-09-29 PROCEDURE — 99285 EMERGENCY DEPT VISIT HI MDM: CPT

## 2020-09-29 RX ORDER — SODIUM CHLORIDE 0.9 % (FLUSH) 0.9 %
10 SYRINGE (ML) INJECTION AS NEEDED
Status: DISCONTINUED | OUTPATIENT
Start: 2020-09-29 | End: 2020-10-03 | Stop reason: HOSPADM

## 2020-09-29 RX ORDER — METHYLPREDNISOLONE SODIUM SUCCINATE 125 MG/2ML
125 INJECTION, POWDER, LYOPHILIZED, FOR SOLUTION INTRAMUSCULAR; INTRAVENOUS ONCE
Status: COMPLETED | OUTPATIENT
Start: 2020-09-29 | End: 2020-09-30

## 2020-09-30 ENCOUNTER — APPOINTMENT (OUTPATIENT)
Dept: CT IMAGING | Facility: HOSPITAL | Age: 60
End: 2020-09-30

## 2020-09-30 PROBLEM — D72.829 LEUKOCYTOSIS: Status: ACTIVE | Noted: 2020-09-30

## 2020-09-30 PROBLEM — E66.9 OBESITY (BMI 30-39.9): Status: ACTIVE | Noted: 2020-09-30

## 2020-09-30 PROBLEM — J96.01 ACUTE RESPIRATORY FAILURE WITH HYPOXIA: Status: ACTIVE | Noted: 2020-09-30

## 2020-09-30 PROBLEM — N20.0 RIGHT RENAL STONE: Status: ACTIVE | Noted: 2020-09-30

## 2020-09-30 PROBLEM — Z78.9 ALCOHOL USE: Status: ACTIVE | Noted: 2020-09-30

## 2020-09-30 PROBLEM — F10.90 ALCOHOL USE: Status: ACTIVE | Noted: 2020-09-30

## 2020-09-30 LAB
ALBUMIN SERPL-MCNC: 3.9 G/DL (ref 3.5–5.2)
ALBUMIN/GLOB SERPL: 1.3 G/DL
ALP SERPL-CCNC: 98 U/L (ref 39–117)
ALT SERPL W P-5'-P-CCNC: 25 U/L (ref 1–41)
ANION GAP SERPL CALCULATED.3IONS-SCNC: 11.7 MMOL/L (ref 5–15)
ANION GAP SERPL CALCULATED.3IONS-SCNC: 14.4 MMOL/L (ref 5–15)
AST SERPL-CCNC: 26 U/L (ref 1–40)
B PARAPERT DNA SPEC QL NAA+PROBE: NOT DETECTED
B PERT DNA SPEC QL NAA+PROBE: NOT DETECTED
BACTERIA UR QL AUTO: NORMAL /HPF
BILIRUB SERPL-MCNC: 0.3 MG/DL (ref 0–1.2)
BILIRUB UR QL STRIP: NEGATIVE
BUN SERPL-MCNC: 15 MG/DL (ref 6–20)
BUN SERPL-MCNC: 16 MG/DL (ref 6–20)
BUN/CREAT SERPL: 18.8 (ref 7–25)
BUN/CREAT SERPL: 20.8 (ref 7–25)
C PNEUM DNA NPH QL NAA+NON-PROBE: NOT DETECTED
CALCIUM SPEC-SCNC: 9 MG/DL (ref 8.6–10.5)
CALCIUM SPEC-SCNC: 9.5 MG/DL (ref 8.6–10.5)
CHLORIDE SERPL-SCNC: 95 MMOL/L (ref 98–107)
CHLORIDE SERPL-SCNC: 98 MMOL/L (ref 98–107)
CLARITY UR: CLEAR
CO2 SERPL-SCNC: 29.6 MMOL/L (ref 22–29)
CO2 SERPL-SCNC: 30.3 MMOL/L (ref 22–29)
COLOR UR: YELLOW
CREAT SERPL-MCNC: 0.77 MG/DL (ref 0.76–1.27)
CREAT SERPL-MCNC: 0.8 MG/DL (ref 0.76–1.27)
D DIMER PPP FEU-MCNC: <0.27 MCGFEU/ML (ref 0–0.49)
D-LACTATE SERPL-SCNC: 1.2 MMOL/L (ref 0.5–2)
DEPRECATED RDW RBC AUTO: 39.9 FL (ref 37–54)
ERYTHROCYTE [DISTWIDTH] IN BLOOD BY AUTOMATED COUNT: 12.1 % (ref 12.3–15.4)
FLUAV H1 2009 PAND RNA NPH QL NAA+PROBE: NOT DETECTED
FLUAV H1 HA GENE NPH QL NAA+PROBE: NOT DETECTED
FLUAV H3 RNA NPH QL NAA+PROBE: NOT DETECTED
FLUAV SUBTYP SPEC NAA+PROBE: NOT DETECTED
FLUBV RNA ISLT QL NAA+PROBE: NOT DETECTED
GFR SERPL CREATININE-BSD FRML MDRD: 103 ML/MIN/1.73
GFR SERPL CREATININE-BSD FRML MDRD: 99 ML/MIN/1.73
GLOBULIN UR ELPH-MCNC: 3 GM/DL
GLUCOSE BLDC GLUCOMTR-MCNC: 353 MG/DL (ref 70–130)
GLUCOSE BLDC GLUCOMTR-MCNC: 414 MG/DL (ref 70–130)
GLUCOSE BLDC GLUCOMTR-MCNC: 458 MG/DL (ref 70–130)
GLUCOSE SERPL-MCNC: 378 MG/DL (ref 65–99)
GLUCOSE SERPL-MCNC: 428 MG/DL (ref 65–99)
GLUCOSE UR STRIP-MCNC: ABNORMAL MG/DL
HADV DNA SPEC NAA+PROBE: NOT DETECTED
HBA1C MFR BLD: 9.25 % (ref 4.8–5.6)
HCOV 229E RNA SPEC QL NAA+PROBE: NOT DETECTED
HCOV HKU1 RNA SPEC QL NAA+PROBE: NOT DETECTED
HCOV NL63 RNA SPEC QL NAA+PROBE: NOT DETECTED
HCOV OC43 RNA SPEC QL NAA+PROBE: NOT DETECTED
HCT VFR BLD AUTO: 45.8 % (ref 37.5–51)
HGB BLD-MCNC: 15.3 G/DL (ref 13–17.7)
HGB UR QL STRIP.AUTO: NEGATIVE
HMPV RNA NPH QL NAA+NON-PROBE: NOT DETECTED
HPIV1 RNA SPEC QL NAA+PROBE: NOT DETECTED
HPIV2 RNA SPEC QL NAA+PROBE: NOT DETECTED
HPIV3 RNA NPH QL NAA+PROBE: NOT DETECTED
HPIV4 P GENE NPH QL NAA+PROBE: NOT DETECTED
HYALINE CASTS UR QL AUTO: NORMAL /LPF
KETONES UR QL STRIP: ABNORMAL
L PNEUMO1 AG UR QL IA: NEGATIVE
LEUKOCYTE ESTERASE UR QL STRIP.AUTO: NEGATIVE
M PNEUMO IGG SER IA-ACNC: NOT DETECTED
MCH RBC QN AUTO: 30.5 PG (ref 26.6–33)
MCHC RBC AUTO-ENTMCNC: 33.4 G/DL (ref 31.5–35.7)
MCV RBC AUTO: 91.4 FL (ref 79–97)
NITRITE UR QL STRIP: NEGATIVE
NT-PROBNP SERPL-MCNC: 47.2 PG/ML (ref 0–900)
PH UR STRIP.AUTO: <=5 [PH] (ref 5–8)
PLATELET # BLD AUTO: 273 10*3/MM3 (ref 140–450)
PMV BLD AUTO: 9.5 FL (ref 6–12)
POTASSIUM SERPL-SCNC: 3.8 MMOL/L (ref 3.5–5.2)
POTASSIUM SERPL-SCNC: 4.9 MMOL/L (ref 3.5–5.2)
PROCALCITONIN SERPL-MCNC: 0.06 NG/ML (ref 0–0.25)
PROT SERPL-MCNC: 6.9 G/DL (ref 6–8.5)
PROT UR QL STRIP: ABNORMAL
RBC # BLD AUTO: 5.01 10*6/MM3 (ref 4.14–5.8)
RBC # UR: NORMAL /HPF
REF LAB TEST METHOD: NORMAL
RHINOVIRUS RNA SPEC NAA+PROBE: NOT DETECTED
RSV RNA NPH QL NAA+NON-PROBE: NOT DETECTED
S PNEUM AG SPEC QL LA: NEGATIVE
SARS-COV-2 RNA NPH QL NAA+NON-PROBE: NOT DETECTED
SODIUM SERPL-SCNC: 139 MMOL/L (ref 136–145)
SODIUM SERPL-SCNC: 140 MMOL/L (ref 136–145)
SP GR UR STRIP: >=1.03 (ref 1–1.03)
SQUAMOUS #/AREA URNS HPF: NORMAL /HPF
TROPONIN T SERPL-MCNC: <0.01 NG/ML (ref 0–0.03)
UROBILINOGEN UR QL STRIP: ABNORMAL
WBC # BLD AUTO: 13.86 10*3/MM3 (ref 3.4–10.8)
WBC UR QL AUTO: NORMAL /HPF

## 2020-09-30 PROCEDURE — 25010000002 ENOXAPARIN PER 10 MG: Performed by: NURSE PRACTITIONER

## 2020-09-30 PROCEDURE — 82962 GLUCOSE BLOOD TEST: CPT

## 2020-09-30 PROCEDURE — 87205 SMEAR GRAM STAIN: CPT | Performed by: NURSE PRACTITIONER

## 2020-09-30 PROCEDURE — 80048 BASIC METABOLIC PNL TOTAL CA: CPT | Performed by: NURSE PRACTITIONER

## 2020-09-30 PROCEDURE — 25010000002 MORPHINE PER 10 MG: Performed by: EMERGENCY MEDICINE

## 2020-09-30 PROCEDURE — 0202U NFCT DS 22 TRGT SARS-COV-2: CPT | Performed by: EMERGENCY MEDICINE

## 2020-09-30 PROCEDURE — 0 DIATRIZOATE MEGLUMINE & SODIUM PER 1 ML: Performed by: INTERNAL MEDICINE

## 2020-09-30 PROCEDURE — 87899 AGENT NOS ASSAY W/OPTIC: CPT | Performed by: NURSE PRACTITIONER

## 2020-09-30 PROCEDURE — 36415 COLL VENOUS BLD VENIPUNCTURE: CPT | Performed by: NURSE PRACTITIONER

## 2020-09-30 PROCEDURE — 85027 COMPLETE CBC AUTOMATED: CPT | Performed by: NURSE PRACTITIONER

## 2020-09-30 PROCEDURE — 83605 ASSAY OF LACTIC ACID: CPT | Performed by: EMERGENCY MEDICINE

## 2020-09-30 PROCEDURE — 63710000001 INSULIN LISPRO (HUMAN) PER 5 UNITS: Performed by: NURSE PRACTITIONER

## 2020-09-30 PROCEDURE — 94799 UNLISTED PULMONARY SVC/PX: CPT

## 2020-09-30 PROCEDURE — 63710000001 INSULIN REGULAR HUMAN PER 5 UNITS: Performed by: EMERGENCY MEDICINE

## 2020-09-30 PROCEDURE — 25010000002 AZITHROMYCIN PER 500 MG: Performed by: EMERGENCY MEDICINE

## 2020-09-30 PROCEDURE — 83036 HEMOGLOBIN GLYCOSYLATED A1C: CPT | Performed by: NURSE PRACTITIONER

## 2020-09-30 PROCEDURE — 74176 CT ABD & PELVIS W/O CONTRAST: CPT

## 2020-09-30 PROCEDURE — 63710000001 INSULIN ISOPHANE HUMAN PER 5 UNITS: Performed by: INTERNAL MEDICINE

## 2020-09-30 PROCEDURE — 71250 CT THORAX DX C-: CPT

## 2020-09-30 PROCEDURE — 25010000002 ONDANSETRON PER 1 MG: Performed by: EMERGENCY MEDICINE

## 2020-09-30 PROCEDURE — 81001 URINALYSIS AUTO W/SCOPE: CPT | Performed by: NURSE PRACTITIONER

## 2020-09-30 PROCEDURE — 25010000002 METHYLPREDNISOLONE PER 125 MG: Performed by: EMERGENCY MEDICINE

## 2020-09-30 PROCEDURE — 87040 BLOOD CULTURE FOR BACTERIA: CPT | Performed by: EMERGENCY MEDICINE

## 2020-09-30 PROCEDURE — 25010000002 METHYLPREDNISOLONE PER 125 MG: Performed by: NURSE PRACTITIONER

## 2020-09-30 PROCEDURE — 25010000002 CEFTRIAXONE PER 250 MG: Performed by: EMERGENCY MEDICINE

## 2020-09-30 PROCEDURE — 87070 CULTURE OTHR SPECIMN AEROBIC: CPT | Performed by: NURSE PRACTITIONER

## 2020-09-30 PROCEDURE — 85379 FIBRIN DEGRADATION QUANT: CPT | Performed by: NURSE PRACTITIONER

## 2020-09-30 RX ORDER — HYDROCHLOROTHIAZIDE 25 MG/1
25 TABLET ORAL DAILY
Status: DISCONTINUED | OUTPATIENT
Start: 2020-09-30 | End: 2020-10-03 | Stop reason: HOSPADM

## 2020-09-30 RX ORDER — SODIUM CHLORIDE 0.9 % (FLUSH) 0.9 %
10 SYRINGE (ML) INJECTION AS NEEDED
Status: DISCONTINUED | OUTPATIENT
Start: 2020-09-30 | End: 2020-10-03 | Stop reason: HOSPADM

## 2020-09-30 RX ORDER — SODIUM CHLORIDE 0.9 % (FLUSH) 0.9 %
10 SYRINGE (ML) INJECTION EVERY 12 HOURS SCHEDULED
Status: DISCONTINUED | OUTPATIENT
Start: 2020-09-30 | End: 2020-10-03 | Stop reason: HOSPADM

## 2020-09-30 RX ORDER — ONDANSETRON 2 MG/ML
4 INJECTION INTRAMUSCULAR; INTRAVENOUS EVERY 6 HOURS PRN
Status: DISCONTINUED | OUTPATIENT
Start: 2020-09-30 | End: 2020-10-03 | Stop reason: HOSPADM

## 2020-09-30 RX ORDER — IPRATROPIUM BROMIDE AND ALBUTEROL SULFATE 2.5; .5 MG/3ML; MG/3ML
3 SOLUTION RESPIRATORY (INHALATION) EVERY 4 HOURS PRN
Status: DISCONTINUED | OUTPATIENT
Start: 2020-09-30 | End: 2020-10-03 | Stop reason: HOSPADM

## 2020-09-30 RX ORDER — CYCLOBENZAPRINE HCL 10 MG
10 TABLET ORAL 3 TIMES DAILY PRN
Status: DISCONTINUED | OUTPATIENT
Start: 2020-09-30 | End: 2020-10-03 | Stop reason: HOSPADM

## 2020-09-30 RX ORDER — CEFTRIAXONE SODIUM 1 G/50ML
1 INJECTION, SOLUTION INTRAVENOUS ONCE
Status: COMPLETED | OUTPATIENT
Start: 2020-09-30 | End: 2020-09-30

## 2020-09-30 RX ORDER — ONDANSETRON 2 MG/ML
4 INJECTION INTRAMUSCULAR; INTRAVENOUS ONCE
Status: COMPLETED | OUTPATIENT
Start: 2020-09-30 | End: 2020-09-30

## 2020-09-30 RX ORDER — CEFTRIAXONE SODIUM 1 G/50ML
1 INJECTION, SOLUTION INTRAVENOUS EVERY 24 HOURS
Status: DISCONTINUED | OUTPATIENT
Start: 2020-10-01 | End: 2020-10-03 | Stop reason: HOSPADM

## 2020-09-30 RX ORDER — GUAIFENESIN 600 MG/1
600 TABLET, EXTENDED RELEASE ORAL EVERY 12 HOURS SCHEDULED
Status: DISCONTINUED | OUTPATIENT
Start: 2020-09-30 | End: 2020-10-03 | Stop reason: HOSPADM

## 2020-09-30 RX ORDER — NICOTINE 21 MG/24HR
1 PATCH, TRANSDERMAL 24 HOURS TRANSDERMAL
Status: DISCONTINUED | OUTPATIENT
Start: 2020-09-30 | End: 2020-10-03 | Stop reason: HOSPADM

## 2020-09-30 RX ORDER — SODIUM CHLORIDE 9 MG/ML
75 INJECTION, SOLUTION INTRAVENOUS CONTINUOUS
Status: DISCONTINUED | OUTPATIENT
Start: 2020-09-30 | End: 2020-10-01

## 2020-09-30 RX ORDER — ATORVASTATIN CALCIUM 20 MG/1
40 TABLET, FILM COATED ORAL DAILY
Status: DISCONTINUED | OUTPATIENT
Start: 2020-09-30 | End: 2020-10-03 | Stop reason: HOSPADM

## 2020-09-30 RX ORDER — METHYLPREDNISOLONE SODIUM SUCCINATE 125 MG/2ML
60 INJECTION, POWDER, LYOPHILIZED, FOR SOLUTION INTRAMUSCULAR; INTRAVENOUS EVERY 8 HOURS
Status: DISCONTINUED | OUTPATIENT
Start: 2020-09-30 | End: 2020-10-01

## 2020-09-30 RX ORDER — BISACODYL 5 MG/1
5 TABLET, DELAYED RELEASE ORAL DAILY PRN
Status: DISCONTINUED | OUTPATIENT
Start: 2020-09-30 | End: 2020-10-03 | Stop reason: HOSPADM

## 2020-09-30 RX ORDER — MORPHINE SULFATE 2 MG/ML
4 INJECTION, SOLUTION INTRAMUSCULAR; INTRAVENOUS ONCE
Status: COMPLETED | OUTPATIENT
Start: 2020-09-30 | End: 2020-09-30

## 2020-09-30 RX ORDER — ONDANSETRON 4 MG/1
4 TABLET, FILM COATED ORAL EVERY 6 HOURS PRN
Status: DISCONTINUED | OUTPATIENT
Start: 2020-09-30 | End: 2020-10-03 | Stop reason: HOSPADM

## 2020-09-30 RX ORDER — IPRATROPIUM BROMIDE AND ALBUTEROL SULFATE 2.5; .5 MG/3ML; MG/3ML
3 SOLUTION RESPIRATORY (INHALATION)
Status: DISCONTINUED | OUTPATIENT
Start: 2020-09-30 | End: 2020-10-03 | Stop reason: HOSPADM

## 2020-09-30 RX ORDER — LOSARTAN POTASSIUM 100 MG/1
100 TABLET ORAL DAILY
Status: DISCONTINUED | OUTPATIENT
Start: 2020-09-30 | End: 2020-10-03 | Stop reason: HOSPADM

## 2020-09-30 RX ORDER — NITROGLYCERIN 0.4 MG/1
0.4 TABLET SUBLINGUAL
Status: DISCONTINUED | OUTPATIENT
Start: 2020-09-30 | End: 2020-10-03 | Stop reason: HOSPADM

## 2020-09-30 RX ORDER — CALCIUM CARBONATE 200(500)MG
2 TABLET,CHEWABLE ORAL 2 TIMES DAILY PRN
Status: DISCONTINUED | OUTPATIENT
Start: 2020-09-30 | End: 2020-10-03 | Stop reason: HOSPADM

## 2020-09-30 RX ORDER — TRAMADOL HYDROCHLORIDE 50 MG/1
50 TABLET ORAL EVERY 6 HOURS PRN
Status: DISCONTINUED | OUTPATIENT
Start: 2020-09-30 | End: 2020-10-03 | Stop reason: HOSPADM

## 2020-09-30 RX ORDER — DEXTROSE MONOHYDRATE 25 G/50ML
25 INJECTION, SOLUTION INTRAVENOUS
Status: DISCONTINUED | OUTPATIENT
Start: 2020-09-30 | End: 2020-10-03 | Stop reason: HOSPADM

## 2020-09-30 RX ORDER — TRAZODONE HYDROCHLORIDE 100 MG/1
100 TABLET ORAL NIGHTLY
Status: DISCONTINUED | OUTPATIENT
Start: 2020-09-30 | End: 2020-10-03 | Stop reason: HOSPADM

## 2020-09-30 RX ORDER — NICOTINE POLACRILEX 4 MG
15 LOZENGE BUCCAL
Status: DISCONTINUED | OUTPATIENT
Start: 2020-09-30 | End: 2020-10-03 | Stop reason: HOSPADM

## 2020-09-30 RX ADMIN — ONDANSETRON HYDROCHLORIDE 4 MG: 2 INJECTION, SOLUTION INTRAMUSCULAR; INTRAVENOUS at 02:47

## 2020-09-30 RX ADMIN — METHYLPREDNISOLONE SODIUM SUCCINATE 125 MG: 125 INJECTION, POWDER, FOR SOLUTION INTRAMUSCULAR; INTRAVENOUS at 00:05

## 2020-09-30 RX ADMIN — LOSARTAN POTASSIUM 100 MG: 100 TABLET, FILM COATED ORAL at 12:17

## 2020-09-30 RX ADMIN — INSULIN HUMAN 5 UNITS: 100 INJECTION, SOLUTION PARENTERAL at 03:37

## 2020-09-30 RX ADMIN — GUAIFENESIN 600 MG: 600 TABLET, EXTENDED RELEASE ORAL at 13:53

## 2020-09-30 RX ADMIN — SODIUM CHLORIDE, PRESERVATIVE FREE 10 ML: 5 INJECTION INTRAVENOUS at 09:15

## 2020-09-30 RX ADMIN — TRAZODONE HYDROCHLORIDE 100 MG: 100 TABLET ORAL at 21:44

## 2020-09-30 RX ADMIN — IPRATROPIUM BROMIDE AND ALBUTEROL SULFATE 3 ML: 2.5; .5 SOLUTION RESPIRATORY (INHALATION) at 13:51

## 2020-09-30 RX ADMIN — SODIUM CHLORIDE, PRESERVATIVE FREE 10 ML: 5 INJECTION INTRAVENOUS at 21:54

## 2020-09-30 RX ADMIN — NICOTINE 1 PATCH: 21 PATCH, EXTENDED RELEASE TRANSDERMAL at 13:53

## 2020-09-30 RX ADMIN — METHYLPREDNISOLONE SODIUM SUCCINATE 60 MG: 125 INJECTION, POWDER, FOR SOLUTION INTRAMUSCULAR; INTRAVENOUS at 18:53

## 2020-09-30 RX ADMIN — AZITHROMYCIN MONOHYDRATE 500 MG: 500 INJECTION, POWDER, LYOPHILIZED, FOR SOLUTION INTRAVENOUS at 03:35

## 2020-09-30 RX ADMIN — GUAIFENESIN 600 MG: 600 TABLET, EXTENDED RELEASE ORAL at 21:44

## 2020-09-30 RX ADMIN — IPRATROPIUM BROMIDE AND ALBUTEROL SULFATE 3 ML: 2.5; .5 SOLUTION RESPIRATORY (INHALATION) at 11:07

## 2020-09-30 RX ADMIN — ATORVASTATIN CALCIUM 40 MG: 20 TABLET, FILM COATED ORAL at 11:24

## 2020-09-30 RX ADMIN — MORPHINE SULFATE 4 MG: 2 INJECTION, SOLUTION INTRAMUSCULAR; INTRAVENOUS at 02:47

## 2020-09-30 RX ADMIN — IPRATROPIUM BROMIDE AND ALBUTEROL SULFATE 3 ML: 2.5; .5 SOLUTION RESPIRATORY (INHALATION) at 21:56

## 2020-09-30 RX ADMIN — INSULIN LISPRO 12 UNITS: 100 INJECTION, SOLUTION INTRAVENOUS; SUBCUTANEOUS at 12:17

## 2020-09-30 RX ADMIN — IPRATROPIUM BROMIDE AND ALBUTEROL SULFATE 3 ML: 2.5; .5 SOLUTION RESPIRATORY (INHALATION) at 16:26

## 2020-09-30 RX ADMIN — SODIUM CHLORIDE 1000 ML: 9 INJECTION, SOLUTION INTRAVENOUS at 03:39

## 2020-09-30 RX ADMIN — METHYLPREDNISOLONE SODIUM SUCCINATE 60 MG: 125 INJECTION, POWDER, FOR SOLUTION INTRAMUSCULAR; INTRAVENOUS at 11:24

## 2020-09-30 RX ADMIN — INSULIN HUMAN 30 UNITS: 100 INJECTION, SUSPENSION SUBCUTANEOUS at 21:46

## 2020-09-30 RX ADMIN — DIATRIZOATE MEGLUMINE AND DIATRIZOATE SODIUM 30 ML: 600; 100 SOLUTION ORAL; RECTAL at 12:58

## 2020-09-30 RX ADMIN — ENOXAPARIN SODIUM 40 MG: 40 INJECTION SUBCUTANEOUS at 11:24

## 2020-09-30 RX ADMIN — INSULIN LISPRO 14 UNITS: 100 INJECTION, SOLUTION INTRAVENOUS; SUBCUTANEOUS at 17:05

## 2020-09-30 RX ADMIN — HYDROCHLOROTHIAZIDE 25 MG: 25 TABLET ORAL at 12:17

## 2020-09-30 RX ADMIN — CEFTRIAXONE SODIUM 1 G: 1 INJECTION, SOLUTION INTRAVENOUS at 02:47

## 2020-10-01 LAB
ANION GAP SERPL CALCULATED.3IONS-SCNC: 10 MMOL/L (ref 5–15)
BUN SERPL-MCNC: 22 MG/DL (ref 6–20)
BUN/CREAT SERPL: 31.4 (ref 7–25)
CALCIUM SPEC-SCNC: 9 MG/DL (ref 8.6–10.5)
CHLORIDE SERPL-SCNC: 97 MMOL/L (ref 98–107)
CO2 SERPL-SCNC: 30 MMOL/L (ref 22–29)
CREAT SERPL-MCNC: 0.7 MG/DL (ref 0.76–1.27)
DEPRECATED RDW RBC AUTO: 39.8 FL (ref 37–54)
ERYTHROCYTE [DISTWIDTH] IN BLOOD BY AUTOMATED COUNT: 11.8 % (ref 12.3–15.4)
GFR SERPL CREATININE-BSD FRML MDRD: 115 ML/MIN/1.73
GLUCOSE BLDC GLUCOMTR-MCNC: 324 MG/DL (ref 70–130)
GLUCOSE BLDC GLUCOMTR-MCNC: 352 MG/DL (ref 70–130)
GLUCOSE BLDC GLUCOMTR-MCNC: 353 MG/DL (ref 70–130)
GLUCOSE BLDC GLUCOMTR-MCNC: 369 MG/DL (ref 70–130)
GLUCOSE SERPL-MCNC: 379 MG/DL (ref 65–99)
HCT VFR BLD AUTO: 41.7 % (ref 37.5–51)
HGB BLD-MCNC: 14 G/DL (ref 13–17.7)
MCH RBC QN AUTO: 30.6 PG (ref 26.6–33)
MCHC RBC AUTO-ENTMCNC: 33.6 G/DL (ref 31.5–35.7)
MCV RBC AUTO: 91.2 FL (ref 79–97)
PLATELET # BLD AUTO: 267 10*3/MM3 (ref 140–450)
PMV BLD AUTO: 9.8 FL (ref 6–12)
POTASSIUM SERPL-SCNC: 4.2 MMOL/L (ref 3.5–5.2)
RBC # BLD AUTO: 4.57 10*6/MM3 (ref 4.14–5.8)
SODIUM SERPL-SCNC: 137 MMOL/L (ref 136–145)
WBC # BLD AUTO: 16.71 10*3/MM3 (ref 3.4–10.8)

## 2020-10-01 PROCEDURE — 25010000002 METHYLPREDNISOLONE PER 125 MG: Performed by: NURSE PRACTITIONER

## 2020-10-01 PROCEDURE — 94799 UNLISTED PULMONARY SVC/PX: CPT

## 2020-10-01 PROCEDURE — 63710000001 INSULIN LISPRO (HUMAN) PER 5 UNITS: Performed by: NURSE PRACTITIONER

## 2020-10-01 PROCEDURE — 25010000002 ENOXAPARIN PER 10 MG: Performed by: NURSE PRACTITIONER

## 2020-10-01 PROCEDURE — 85027 COMPLETE CBC AUTOMATED: CPT | Performed by: NURSE PRACTITIONER

## 2020-10-01 PROCEDURE — 94760 N-INVAS EAR/PLS OXIMETRY 1: CPT

## 2020-10-01 PROCEDURE — 80048 BASIC METABOLIC PNL TOTAL CA: CPT | Performed by: INTERNAL MEDICINE

## 2020-10-01 PROCEDURE — 63710000001 INSULIN ISOPHANE HUMAN PER 5 UNITS: Performed by: INTERNAL MEDICINE

## 2020-10-01 PROCEDURE — 82962 GLUCOSE BLOOD TEST: CPT

## 2020-10-01 PROCEDURE — 25010000002 METHYLPREDNISOLONE PER 40 MG: Performed by: INTERNAL MEDICINE

## 2020-10-01 PROCEDURE — 25010000002 CEFTRIAXONE PER 250 MG: Performed by: NURSE PRACTITIONER

## 2020-10-01 RX ORDER — METHYLPREDNISOLONE SODIUM SUCCINATE 40 MG/ML
20 INJECTION, POWDER, LYOPHILIZED, FOR SOLUTION INTRAMUSCULAR; INTRAVENOUS EVERY 8 HOURS
Status: DISCONTINUED | OUTPATIENT
Start: 2020-10-01 | End: 2020-10-02

## 2020-10-01 RX ADMIN — NICOTINE 1 PATCH: 21 PATCH, EXTENDED RELEASE TRANSDERMAL at 08:35

## 2020-10-01 RX ADMIN — INSULIN LISPRO 12 UNITS: 100 INJECTION, SOLUTION INTRAVENOUS; SUBCUTANEOUS at 17:52

## 2020-10-01 RX ADMIN — ATORVASTATIN CALCIUM 40 MG: 20 TABLET, FILM COATED ORAL at 08:35

## 2020-10-01 RX ADMIN — INSULIN LISPRO 12 UNITS: 100 INJECTION, SOLUTION INTRAVENOUS; SUBCUTANEOUS at 12:23

## 2020-10-01 RX ADMIN — INSULIN HUMAN 20 UNITS: 100 INJECTION, SUSPENSION SUBCUTANEOUS at 17:53

## 2020-10-01 RX ADMIN — TRAZODONE HYDROCHLORIDE 100 MG: 100 TABLET ORAL at 21:11

## 2020-10-01 RX ADMIN — HYDROCHLOROTHIAZIDE 25 MG: 25 TABLET ORAL at 08:35

## 2020-10-01 RX ADMIN — METHYLPREDNISOLONE SODIUM SUCCINATE 20 MG: 40 INJECTION, POWDER, LYOPHILIZED, FOR SOLUTION INTRAMUSCULAR; INTRAVENOUS at 18:37

## 2020-10-01 RX ADMIN — GUAIFENESIN 600 MG: 600 TABLET, EXTENDED RELEASE ORAL at 08:35

## 2020-10-01 RX ADMIN — IPRATROPIUM BROMIDE AND ALBUTEROL SULFATE 3 ML: 2.5; .5 SOLUTION RESPIRATORY (INHALATION) at 12:00

## 2020-10-01 RX ADMIN — ENOXAPARIN SODIUM 40 MG: 40 INJECTION SUBCUTANEOUS at 13:17

## 2020-10-01 RX ADMIN — INSULIN LISPRO 10 UNITS: 100 INJECTION, SOLUTION INTRAVENOUS; SUBCUTANEOUS at 08:35

## 2020-10-01 RX ADMIN — METHYLPREDNISOLONE SODIUM SUCCINATE 60 MG: 125 INJECTION, POWDER, FOR SOLUTION INTRAMUSCULAR; INTRAVENOUS at 11:15

## 2020-10-01 RX ADMIN — IPRATROPIUM BROMIDE AND ALBUTEROL SULFATE 3 ML: 2.5; .5 SOLUTION RESPIRATORY (INHALATION) at 23:29

## 2020-10-01 RX ADMIN — IPRATROPIUM BROMIDE AND ALBUTEROL SULFATE 3 ML: 2.5; .5 SOLUTION RESPIRATORY (INHALATION) at 16:20

## 2020-10-01 RX ADMIN — IPRATROPIUM BROMIDE AND ALBUTEROL SULFATE 3 ML: 2.5; .5 SOLUTION RESPIRATORY (INHALATION) at 07:20

## 2020-10-01 RX ADMIN — GUAIFENESIN 600 MG: 600 TABLET, EXTENDED RELEASE ORAL at 21:11

## 2020-10-01 RX ADMIN — SODIUM CHLORIDE, PRESERVATIVE FREE 10 ML: 5 INJECTION INTRAVENOUS at 08:40

## 2020-10-01 RX ADMIN — LOSARTAN POTASSIUM 100 MG: 100 TABLET, FILM COATED ORAL at 08:35

## 2020-10-01 RX ADMIN — SODIUM CHLORIDE 75 ML/HR: 9 INJECTION, SOLUTION INTRAVENOUS at 06:53

## 2020-10-01 RX ADMIN — CEFTRIAXONE SODIUM 1 G: 1 INJECTION, SOLUTION INTRAVENOUS at 00:09

## 2020-10-01 RX ADMIN — METHYLPREDNISOLONE SODIUM SUCCINATE 60 MG: 125 INJECTION, POWDER, FOR SOLUTION INTRAMUSCULAR; INTRAVENOUS at 02:42

## 2020-10-01 NOTE — PLAN OF CARE
Goal Outcome Evaluation:  Plan of Care Reviewed With: patient  Progress: improving   Pt vital signs stable, pt remains on oxygen via nasal canula, pt needs diabetic treatment education reminders. Will continue to monitor and report changes to MD

## 2020-10-01 NOTE — PLAN OF CARE
Problem: Adult Inpatient Plan of Care  Goal: Plan of Care Review  Outcome: Ongoing, Progressing  Flowsheets (Taken 10/1/2020 0423)  Progress: improving  Plan of Care Reviewed With: patient  Outcome Summary: No c.o discomfort overnight. O2 weaned down to 2.5 L per NC, tolerating well. other VSS. Cont to have frequent, productive cough. IV fluids, antibiotics and solumedrol per orders, toleratring well. BG remains elevated. NPH given at bedtime, attempted to call provider to get rapid acting but pt said he does not take rapid acting insulin and will not allow administration of this. Educated on importance of blood glucose control and effects of hyperglycemia. A&Ox4. up ad prisca. Will cont to monitor.   Goal Outcome Evaluation:  Plan of Care Reviewed With: patient  Progress: improving  Outcome Summary: No c.o discomfort overnight. O2 weaned down to 2.5 L per NC, tolerating well. other VSS. Cont to have frequent, productive cough. IV fluids, antibiotics and solumedrol per orders, toleratring well. BG remains elevated. NPH given at bedtime, attempted to call provider to get rapid acting but pt said he does not take rapid acting insulin and will not allow administration of this. Educated on importance of blood glucose control and effects of hyperglycemia. A&Ox4. up ad prisca. Will cont to monitor.

## 2020-10-01 NOTE — PROGRESS NOTES
Name: Carrington Biggs ADMIT: 2020   : 1960  PCP: Valerio Perry MD    MRN: [id number] LOS: 1 days   AGE/SEX: 59 y.o. male  ROOM: [facility]     Subjective   Subjective   Patient feels better.  No fever or chills.  Decreased shortness of breath.  Decreased wheezing.  Decreased cough productive of dark sputum.  No chest pain.  No hemoptysis.  No PND.  No palpitation.  No ankle edema.    Review of Systems  GI.  No abdominal pain or nausea or vomiting normal bowel habits  .  No dysuria or hematuria     Objective   Objective   Vital Signs  Temp:  [97.5 °F (36.4 °C)-97.9 °F (36.6 °C)] 97.9 °F (36.6 °C)  Heart Rate:  [] 102  Resp:  [16-24] 18  BP: (129-146)/(64-78) 138/78  SpO2:  [89 %-98 %] 90 %  on  Flow (L/min):  [1.5-4] 3;   Device (Oxygen Therapy): nasal cannula    Intake/Output Summary (Last 24 hours) at 10/1/2020 1550  Last data filed at 10/1/2020 1338  Gross per 24 hour   Intake 720 ml   Output 2650 ml   Net -1930 ml     Body mass index is 36.95 kg/m².      20   Weight: 110 kg (243 lb)     Physical Exam  General.  Middle-aged gentleman alert oriented x3 no apparent distress resolved diaphoresis.  Normal mood and affect.  Appears more comfortable than yesterday.  eyes.  Pupils equal round and reactive intact extraocular musculature no pallor no jaundice normal conjunctivae and lids   ENT.  Moist mucous membrane no throat lesions or exudates  Neck.  Supple no JVD no carotid bruit no adenopathy no thyromegaly  Cardiovascular.  Regular rate and rhythm no gallops or murmur  Chest.  Poor bilateral air entry with scattered rhonchi bilaterally at the bases.  Abdomen.  Obese soft lax no tenderness no organomegaly no guarding or rebound  .  No CVA tenderness  Extremities no clubbing cyanosis or edema  CNS.  No acute focal neurological deficits      Results Review:      Results from last 7 days   Lab Units 10/01/20  0508 20  1050 20  2348   SODIUM mmol/L 137 139 140      POTASSIUM mmol/L 4.2 4.9 3.8   CHLORIDE mmol/L 97* 95* 98   CO2 mmol/L 30.0* 29.6* 30.3*   BUN mg/dL 22* 16 15   CREATININE mg/dL 0.70* 0.77 0.80   GLUCOSE mg/dL 379* 378* 428*   CALCIUM mg/dL 9.0 9.5 9.0   AST (SGOT) U/L  --   --  26   ALT (SGPT) U/L  --   --  25     Estimated Creatinine Clearance: 136.6 mL/min (A) (by C-G formula based on SCr of 0.7 mg/dL (L)).  Results from last 7 days   Lab Units 09/30/20  1050   HEMOGLOBIN A1C % 9.25*     Results from last 7 days   Lab Units 10/01/20  1146 10/01/20  0614 09/30/20  2015 09/30/20  1636 09/30/20  1111   GLUCOSE mg/dL 353* 324* 458* 414* 353*     Results from last 7 days   Lab Units 09/29/20  2348   TROPONIN T ng/mL <0.010     Results from last 7 days   Lab Units 09/29/20  2348   PROBNP pg/mL 47.2               Invalid input(s):  PHOS        Invalid input(s): LDLCALC  Results from last 7 days   Lab Units 10/01/20  0508 09/30/20  1050 09/29/20  2348   WBC 10*3/mm3 16.71* 13.86* 16.90*   HEMOGLOBIN g/dL 14.0 15.3 15.6   HEMATOCRIT % 41.7 45.8 46.7   PLATELETS 10*3/mm3 267 273 256   MCV fL 91.2 91.4 93.2   MCH pg 30.6 30.5 31.1   MCHC g/dL 33.6 33.4 33.4   RDW % 11.8* 12.1* 12.1*   RDW-SD fl 39.8 39.9 41.0   MPV fL 9.8 9.5 9.6   NEUTROPHIL % %  --   --  76.7*   LYMPHOCYTE % %  --   --  14.2*   MONOCYTES % %  --   --  6.9   EOSINOPHIL % %  --   --  1.3   BASOPHIL % %  --   --  0.3   IMM GRAN % %  --   --  0.6*   NEUTROS ABS 10*3/mm3  --   --  12.97*   LYMPHS ABS 10*3/mm3  --   --  2.40   MONOS ABS 10*3/mm3  --   --  1.16*   EOS ABS 10*3/mm3  --   --  0.22   BASOS ABS 10*3/mm3  --   --  0.05   IMMATURE GRANS (ABS) 10*3/mm3  --   --  0.10*   NRBC /100 WBC  --   --  0.0             Results from last 7 days   Lab Units 09/30/20  0108 09/29/20  2348   PROCALCITONIN ng/mL  --  0.06   LACTATE mmol/L 1.2  --              Results from last 7 days   Lab Units 09/30/20  2154 09/30/20  0052   BLOODCX   --  No growth at 24 hours  No growth at 24 hours   RESPCX  Scant growth (1+)  Normal Respiratory Crissy: NO S.aureus/MRSA or Pseudomonas aeruginosa  --      Results from last 7 days   Lab Units 09/30/20  0010   ADENOVIRUS DETECTION BY PCR  Not Detected   CORONAVIRUS 229E  Not Detected   CORONAVIRUS HKU1  Not Detected   CORONAVIRUS NL63  Not Detected   CORONAVIRUS OC43  Not Detected   HUMAN METAPNEUMOVIRUS  Not Detected   HUMAN RHINOVIRUS/ENTEROVIRUS  Not Detected   INFLUENZA B PCR  Not Detected   PARAINFLUENZA 1  Not Detected   PARAINFLUENZA VIRUS 2  Not Detected   PARAINFLUENZA VIRUS 3  Not Detected   PARAINFLUENZA VIRUS 4  Not Detected   BORDETELLA PERTUSSIS PCR  Not Detected   CHLAMYDOPHILA PNEUMONIAE PCR  Not Detected   MYCOPLAMA PNEUMO PCR  Not Detected   INFLUENZA A PCR  Not Detected   INFLUENZA A H3  Not Detected   INFLUENZA A H1  Not Detected   RSV, PCR  Not Detected     Results from last 7 days   Lab Units 09/30/20  1220   NITRITE UA  Negative   WBC UA /HPF 0-2   BACTERIA UA /HPF None Seen   SQUAM EPITHEL UA /HPF 0-2           Imaging:  Imaging Results (Last 24 Hours)     Procedure Component Value Units Date/Time    CT Abdomen Pelvis Without Contrast [[id number]] Collected: 09/30/20 1601     Updated: 09/30/20 1614    Narrative:      CT CHEST, ABDOMEN, AND PELVIS WITHOUT IV CONTRAST     HISTORY: Pneumonia, dyspnea, hypoxia, right low back and groin pain     TECHNIQUE: Radiation dose reduction techniques were utilized, including  automated exposure control and exposure modulation based on body size.   3 mm images were obtained through the chest, abdomen, and pelvis without  IV contrast.      COMPARISON: Chest CT 02/12/2019     FINDINGS:     CHEST:   There are patchy nodular and groundglass infiltrates within the bases of  both lower lobes consistent with pneumonia. No pleural effusion or  lymphadenopathy. Coronary artery calcifications. Bone windows show no  acute osseous abnormality.     ABDOMEN:   Mild fatty infiltration of the liver. Gallbladder is contracted. The  spleen is  unremarkable. 4 mm nonobstructing stone in the upper pole of  the right kidney. The left kidney is unremarkable. The adrenal glands  and pancreas are unremarkable. Tiny gas bubbles in the subcutaneous fat  of the left anterior abdominal wall probably from medication injection.  Correlate clinically     PELVIS:  Bladder unremarkable. No free fluid in the pelvis. Colon is  unremarkable. The appendix is normal. Bone windows demonstrate no acute  osseous abnormality. Degenerative changes at L5-S1       Impression:      1. Patchy nodular and groundglass infiltrates within the bases of both  lower lobes consistent with pneumonia.  2. 4 mm nonobstructing stone in the upper pole of the right kidney.        Radiation dose reduction techniques were utilized, including automated  exposure control and exposure modulation based on body size.     This report was finalized on 9/30/2020 4:10 PM by Dr. Chano Roman M.D.       CT Chest Without Contrast [[id number]] Collected: 09/30/20 1601     Updated: 09/30/20 1614    Narrative:      CT CHEST, ABDOMEN, AND PELVIS WITHOUT IV CONTRAST     HISTORY: Pneumonia, dyspnea, hypoxia, right low back and groin pain     TECHNIQUE: Radiation dose reduction techniques were utilized, including  automated exposure control and exposure modulation based on body size.   3 mm images were obtained through the chest, abdomen, and pelvis without  IV contrast.      COMPARISON: Chest CT 02/12/2019     FINDINGS:     CHEST:   There are patchy nodular and groundglass infiltrates within the bases of  both lower lobes consistent with pneumonia. No pleural effusion or  lymphadenopathy. Coronary artery calcifications. Bone windows show no  acute osseous abnormality.     ABDOMEN:   Mild fatty infiltration of the liver. Gallbladder is contracted. The  spleen is unremarkable. 4 mm nonobstructing stone in the upper pole of  the right kidney. The left kidney is unremarkable. The adrenal glands  and pancreas are  unremarkable. Tiny gas bubbles in the subcutaneous fat  of the left anterior abdominal wall probably from medication injection.  Correlate clinically     PELVIS:  Bladder unremarkable. No free fluid in the pelvis. Colon is  unremarkable. The appendix is normal. Bone windows demonstrate no acute  osseous abnormality. Degenerative changes at L5-S1       Impression:      1. Patchy nodular and groundglass infiltrates within the bases of both  lower lobes consistent with pneumonia.  2. 4 mm nonobstructing stone in the upper pole of the right kidney.        Radiation dose reduction techniques were utilized, including automated  exposure control and exposure modulation based on body size.     This report was finalized on 9/30/2020 4:10 PM by Dr. Chano Roman M.D.                I reviewed the patient's new clinical results / labs / tests / procedures      Assessment/Plan     Active Hospital Problems    Diagnosis  POA   • **Acute respiratory failure with hypoxia (CMS/HCC) [J96.01]  Yes   • Right renal stone [N20.0]  Yes   • Leukocytosis [D72.829]  Yes   • Obesity (BMI 30-39.9) [E66.9]  Yes   • Alcohol use [Z72.89]  Yes   • Tobacco abuse [Z72.0]  Yes   • Obstructive sleep apnea syndrome [G47.33]  Yes   • Type 2 diabetes mellitus with microalbuminuria, without long-term current use of insulin (CMS/HCC) [E11.29, R80.9]  Yes   • Essential hypertension [I10]  Yes      Resolved Hospital Problems   No resolved problems to display.         1.  Acute hypoxemic respiratory failure secondary to bibasilar pneumonia.  Clinically improved.  Positive relapse of the leukocytosis but most likely secondary to steroids.  Afebrile now.  Sputum is pending.  Urine Legionella and Streptococcus antigens are negative.  Negative d-dimer.  Lactate and pro calcitonin are normal.  Negative blood cultures.  Negative PCR of the respiratory tract with negative cocci.  CT scan of the chest with bilateral infiltrate at the bases.  Zithromax DC'd.  Will  decrease steroids and continue Rocephin.  Will try to wean off oxygen.  2.  Right flank pain secondary to right nonobstructive nephrolithiasis.  Improved.  CT scan without evidence of hydronephrosis.  UA was negative for infection.  At this time he does not need any intervention..    Patient nephrology follow-up.  Increase p.o. water.    3.  Tobacco abuse.  Patient was counseled.    On nicotine patch.  4.  Obstructive sleep apnea.  Patient not compliant with his sleep.  Will use oxygen while in the hospital.  5.  Type 2 diabetes.  Continue holding oral hypoglycemic.  Elevated Accu-Cheks off the steroids..  His A1c is 9.25.  Will increase NPH and continue with sliding scale insulin.    Patient has palpitation follow-up.    6.  Hypertension.  Good control on losartan and hydrochlorothiazide.  No evidence of angina or congestive heart failure.  7.  Dyslipidemia.  Continue Lipitor.  8.  Leukocytosis.  Secondary to pneumonia and steroids.  Improved temperature curve..  Will monitor.  9.  VTE prophylaxis.  Will initiate Lovenox.  Discussed my findings and plan of treatment with the patient who is eager to go home we will see how he is doing tomorrow we will try to wean him off the oxygen and steroids.      Opal Pearce MD  [facility]  10/01/20  15:50 EDT

## 2020-10-02 LAB
ALBUMIN SERPL-MCNC: 3.7 G/DL (ref 3.5–5.2)
ALBUMIN/GLOB SERPL: 1.5 G/DL
ALP SERPL-CCNC: 80 U/L (ref 39–117)
ALT SERPL W P-5'-P-CCNC: 18 U/L (ref 1–41)
ANION GAP SERPL CALCULATED.3IONS-SCNC: 5.8 MMOL/L (ref 5–15)
AST SERPL-CCNC: 12 U/L (ref 1–40)
BACTERIA SPEC RESP CULT: NORMAL
BASOPHILS # BLD AUTO: 0.03 10*3/MM3 (ref 0–0.2)
BASOPHILS NFR BLD AUTO: 0.2 % (ref 0–1.5)
BILIRUB SERPL-MCNC: 0.2 MG/DL (ref 0–1.2)
BUN SERPL-MCNC: 23 MG/DL (ref 6–20)
BUN/CREAT SERPL: 31.5 (ref 7–25)
CALCIUM SPEC-SCNC: 9.1 MG/DL (ref 8.6–10.5)
CHLORIDE SERPL-SCNC: 96 MMOL/L (ref 98–107)
CO2 SERPL-SCNC: 33.2 MMOL/L (ref 22–29)
CREAT SERPL-MCNC: 0.73 MG/DL (ref 0.76–1.27)
DEPRECATED RDW RBC AUTO: 42.3 FL (ref 37–54)
EOSINOPHIL # BLD AUTO: 0 10*3/MM3 (ref 0–0.4)
EOSINOPHIL NFR BLD AUTO: 0 % (ref 0.3–6.2)
ERYTHROCYTE [DISTWIDTH] IN BLOOD BY AUTOMATED COUNT: 12.2 % (ref 12.3–15.4)
GFR SERPL CREATININE-BSD FRML MDRD: 110 ML/MIN/1.73
GLOBULIN UR ELPH-MCNC: 2.5 GM/DL
GLUCOSE BLDC GLUCOMTR-MCNC: 277 MG/DL (ref 70–130)
GLUCOSE BLDC GLUCOMTR-MCNC: 292 MG/DL (ref 70–130)
GLUCOSE BLDC GLUCOMTR-MCNC: 296 MG/DL (ref 70–130)
GLUCOSE BLDC GLUCOMTR-MCNC: 355 MG/DL (ref 70–130)
GLUCOSE SERPL-MCNC: 308 MG/DL (ref 65–99)
GRAM STN SPEC: NORMAL
HCT VFR BLD AUTO: 43.4 % (ref 37.5–51)
HGB BLD-MCNC: 14.2 G/DL (ref 13–17.7)
IMM GRANULOCYTES # BLD AUTO: 0.13 10*3/MM3 (ref 0–0.05)
IMM GRANULOCYTES NFR BLD AUTO: 0.7 % (ref 0–0.5)
LYMPHOCYTES # BLD AUTO: 1.72 10*3/MM3 (ref 0.7–3.1)
LYMPHOCYTES NFR BLD AUTO: 9.3 % (ref 19.6–45.3)
MCH RBC QN AUTO: 30.5 PG (ref 26.6–33)
MCHC RBC AUTO-ENTMCNC: 32.7 G/DL (ref 31.5–35.7)
MCV RBC AUTO: 93.3 FL (ref 79–97)
MONOCYTES # BLD AUTO: 1.02 10*3/MM3 (ref 0.1–0.9)
MONOCYTES NFR BLD AUTO: 5.5 % (ref 5–12)
NEUTROPHILS NFR BLD AUTO: 15.57 10*3/MM3 (ref 1.7–7)
NEUTROPHILS NFR BLD AUTO: 84.3 % (ref 42.7–76)
NRBC BLD AUTO-RTO: 0 /100 WBC (ref 0–0.2)
PLATELET # BLD AUTO: 278 10*3/MM3 (ref 140–450)
PMV BLD AUTO: 9.6 FL (ref 6–12)
POTASSIUM SERPL-SCNC: 4.2 MMOL/L (ref 3.5–5.2)
PROT SERPL-MCNC: 6.2 G/DL (ref 6–8.5)
RBC # BLD AUTO: 4.65 10*6/MM3 (ref 4.14–5.8)
SODIUM SERPL-SCNC: 135 MMOL/L (ref 136–145)
WBC # BLD AUTO: 18.47 10*3/MM3 (ref 3.4–10.8)

## 2020-10-02 PROCEDURE — 94799 UNLISTED PULMONARY SVC/PX: CPT

## 2020-10-02 PROCEDURE — 94618 PULMONARY STRESS TESTING: CPT

## 2020-10-02 PROCEDURE — 82962 GLUCOSE BLOOD TEST: CPT

## 2020-10-02 PROCEDURE — 63710000001 INSULIN ISOPHANE HUMAN PER 5 UNITS: Performed by: INTERNAL MEDICINE

## 2020-10-02 PROCEDURE — 25010000002 ENOXAPARIN PER 10 MG: Performed by: NURSE PRACTITIONER

## 2020-10-02 PROCEDURE — 25010000002 METHYLPREDNISOLONE PER 40 MG: Performed by: INTERNAL MEDICINE

## 2020-10-02 PROCEDURE — 63710000001 INSULIN LISPRO (HUMAN) PER 5 UNITS: Performed by: NURSE PRACTITIONER

## 2020-10-02 PROCEDURE — 85025 COMPLETE CBC W/AUTO DIFF WBC: CPT | Performed by: INTERNAL MEDICINE

## 2020-10-02 PROCEDURE — 25010000002 CEFTRIAXONE PER 250 MG: Performed by: NURSE PRACTITIONER

## 2020-10-02 PROCEDURE — 80053 COMPREHEN METABOLIC PANEL: CPT | Performed by: INTERNAL MEDICINE

## 2020-10-02 RX ORDER — METHYLPREDNISOLONE SODIUM SUCCINATE 40 MG/ML
20 INJECTION, POWDER, LYOPHILIZED, FOR SOLUTION INTRAMUSCULAR; INTRAVENOUS DAILY
Status: DISCONTINUED | OUTPATIENT
Start: 2020-10-03 | End: 2020-10-03 | Stop reason: HOSPADM

## 2020-10-02 RX ADMIN — INSULIN LISPRO 8 UNITS: 100 INJECTION, SOLUTION INTRAVENOUS; SUBCUTANEOUS at 17:21

## 2020-10-02 RX ADMIN — INSULIN LISPRO 8 UNITS: 100 INJECTION, SOLUTION INTRAVENOUS; SUBCUTANEOUS at 12:46

## 2020-10-02 RX ADMIN — IPRATROPIUM BROMIDE AND ALBUTEROL SULFATE 3 ML: 2.5; .5 SOLUTION RESPIRATORY (INHALATION) at 06:04

## 2020-10-02 RX ADMIN — INSULIN LISPRO 8 UNITS: 100 INJECTION, SOLUTION INTRAVENOUS; SUBCUTANEOUS at 08:13

## 2020-10-02 RX ADMIN — INSULIN HUMAN 20 UNITS: 100 INJECTION, SUSPENSION SUBCUTANEOUS at 17:21

## 2020-10-02 RX ADMIN — IPRATROPIUM BROMIDE AND ALBUTEROL SULFATE 3 ML: 2.5; .5 SOLUTION RESPIRATORY (INHALATION) at 20:21

## 2020-10-02 RX ADMIN — SODIUM CHLORIDE, PRESERVATIVE FREE 10 ML: 5 INJECTION INTRAVENOUS at 08:13

## 2020-10-02 RX ADMIN — HYDROCHLOROTHIAZIDE 25 MG: 25 TABLET ORAL at 08:13

## 2020-10-02 RX ADMIN — GUAIFENESIN 600 MG: 600 TABLET, EXTENDED RELEASE ORAL at 08:13

## 2020-10-02 RX ADMIN — TRAZODONE HYDROCHLORIDE 100 MG: 100 TABLET ORAL at 21:10

## 2020-10-02 RX ADMIN — INSULIN HUMAN 20 UNITS: 100 INJECTION, SUSPENSION SUBCUTANEOUS at 08:14

## 2020-10-02 RX ADMIN — NICOTINE 1 PATCH: 21 PATCH, EXTENDED RELEASE TRANSDERMAL at 08:16

## 2020-10-02 RX ADMIN — METHYLPREDNISOLONE SODIUM SUCCINATE 20 MG: 40 INJECTION, POWDER, LYOPHILIZED, FOR SOLUTION INTRAMUSCULAR; INTRAVENOUS at 04:40

## 2020-10-02 RX ADMIN — ATORVASTATIN CALCIUM 40 MG: 20 TABLET, FILM COATED ORAL at 08:13

## 2020-10-02 RX ADMIN — LOSARTAN POTASSIUM 100 MG: 100 TABLET, FILM COATED ORAL at 08:13

## 2020-10-02 RX ADMIN — SODIUM CHLORIDE, PRESERVATIVE FREE 10 ML: 5 INJECTION INTRAVENOUS at 21:10

## 2020-10-02 RX ADMIN — SODIUM CHLORIDE, PRESERVATIVE FREE 10 ML: 5 INJECTION INTRAVENOUS at 01:37

## 2020-10-02 RX ADMIN — CEFTRIAXONE SODIUM 1 G: 1 INJECTION, SOLUTION INTRAVENOUS at 01:38

## 2020-10-02 RX ADMIN — METHYLPREDNISOLONE SODIUM SUCCINATE 20 MG: 40 INJECTION, POWDER, LYOPHILIZED, FOR SOLUTION INTRAMUSCULAR; INTRAVENOUS at 11:40

## 2020-10-02 RX ADMIN — GUAIFENESIN 600 MG: 600 TABLET, EXTENDED RELEASE ORAL at 21:09

## 2020-10-02 RX ADMIN — ENOXAPARIN SODIUM 40 MG: 40 INJECTION SUBCUTANEOUS at 12:47

## 2020-10-02 RX ADMIN — IPRATROPIUM BROMIDE AND ALBUTEROL SULFATE 3 ML: 2.5; .5 SOLUTION RESPIRATORY (INHALATION) at 15:43

## 2020-10-02 NOTE — PROGRESS NOTES
Exercise Oximetry    Patient Name:Carrington Biggs   MRN: [id number]   Date: 10/02/20             ROOM AIR BASELINE   SpO2% 93   Heart Rate    Blood Pressure      EXERCISE ON ROOM AIR SpO2% EXERCISE ON O2 @ LPM SpO2%   1 MINUTE  90 1 MINUTE    2 MINUTES  89 2 MINUTES    3 MINUTES  89 3 MINUTES    4 MINUTES  88 4 MINUTES    5 MINUTES  88 5 MINUTES    6 MINUTES  89 6 MINUTES               Distance Walked  6 minutes Distance Walked   Dyspnea (Myrtle Scale)   Dyspnea (Myrtle Scale)   Fatigue (Myrtle Scale)   Fatigue (Myrtle Scale)   SpO2% Post Exercise   SpO2% Post Exercise   HR Post Exercise   HR Post Exercise   Time to Recovery   Time to Recovery     Comments:

## 2020-10-02 NOTE — PROGRESS NOTES
Continued Stay Note  [facility]     Patient Name: Carrington Biggs  MRN: [id number]  Today's Date: 10/2/2020    Admit Date: 9/29/2020    Discharge Plan     Row Name 10/02/20 1433       Plan    Plan  return homer- CCP will follow for home o2 needs    Plan Comments  Spoke with patient at bedside.  He confirms that plan is [place] at [place].  If o2 needed, he is agreeable to using [place].  He is interested in obtaining a new PCP.  Provided him with the [facility] Physician Referral number.  He denies any additional needs.  CCP will follow. Laurel Alba RN        Discharge Codes    No documentation.       Expected Discharge Date and Time     Expected Discharge Date Expected Discharge Time    Oct 2, 2020             Laurel Alba RN

## 2020-10-02 NOTE — PROGRESS NOTES
Continued Stay Note  [facility]     Patient Name: Carrington Biggs  MRN: [id number]  Today's Date: 10/2/2020    Admit Date: 9/29/2020    Discharge Plan     Row Name 10/02/20 1534       Plan    Plan Comments  Per nursing, patient did not qualify for home o2. Laurel Alba RN    Row Name 10/02/20 5221       Plan    Plan  return homer- CCP will follow for home o2 needs    Plan Comments  Spoke with patient at bedside.  He confirms that plan is home at [place].  If o2 needed, he is agreeable to using [facility].  He is interested in obtaining a new PCP.  Provided him with the [facility] Physician Referral number.  He denies any additional needs.  CCP will follow. Laurel Alba RN        Discharge Codes    No documentation.       Expected Discharge Date and Time     Expected Discharge Date Expected Discharge Time    Oct 2, 2020             Laurel Alba RN

## 2020-10-02 NOTE — PROGRESS NOTES
Name: Carrington Biggs ADMIT: 2020   : 1960  PCP: Valerio Perry MD    MRN: [id number] LOS: 2 days   AGE/SEX: 59 y.o. male  ROOM: [facility]     Subjective   Subjective   Patient feels better.  No fever or chills.  No shortness of breath.  Decreased wheezing.  No cough.  No chest pain.  No hemoptysis.  No PND.  No palpitation.  No ankle edema.    Review of Systems    GI.  No abdominal pain or nausea or vomiting normal bowel habits  .  No dysuria or hematuria     Objective   Objective   Vital Signs  Temp:  [97.6 °F (36.4 °C)-98.2 °F (36.8 °C)] 97.9 °F (36.6 °C)  Heart Rate:  [64-88] 64  Resp:  [16-20] 18  BP: (118-138)/(65-85) 138/65  SpO2:  [92 %-96 %] 92 %  on  Flow (L/min):  [1-3] 1;   Device (Oxygen Therapy): nasal cannula    Intake/Output Summary (Last 24 hours) at 10/2/2020 1409  Last data filed at 10/2/2020 0900  Gross per 24 hour   Intake 480 ml   Output 775 ml   Net -295 ml     Body mass index is 36.95 kg/m².      20  2343   Weight: 110 kg (243 lb)     Physical Exam    General.  Middle-aged gentleman alert oriented x3 no apparent distress or diaphoresis.  Normal mood and affect.  .  eyes.  Pupils equal round and reactive intact extraocular musculature no pallor no jaundice normal conjunctivae and lids   ENT.  Moist mucous membrane no throat lesions or exudates  Neck.  Supple no JVD no carotid bruit no adenopathy no thyromegaly  Cardiovascular.  Regular rate and rhythm no gallops or murmur  Chest.  Poor bilateral air entry with scattered bilateral expiratory wheeze.  No crackles   abdomen.  Obese soft lax no tenderness no organomegaly no guarding or rebound  .  No CVA tenderness  Extremities no clubbing cyanosis or edema  CNS.  No acute focal neurological deficits      Results Review:      Results from last 7 days   Lab Units 10/02/20  0525 10/01/20  0508 20  1050 20  2348   SODIUM mmol/L 135* 137 139 140   POTASSIUM mmol/L 4.2 4.2 4.9 3.8   CHLORIDE mmol/L 96* 97* 95*  98   CO2 mmol/L 33.2* 30.0* 29.6* 30.3*   BUN mg/dL 23* 22* 16 15   CREATININE mg/dL 0.73* 0.70* 0.77 0.80   GLUCOSE mg/dL 308* 379* 378* 428*   CALCIUM mg/dL 9.1 9.0 9.5 9.0   AST (SGOT) U/L 12  --   --  26   ALT (SGPT) U/L 18  --   --  25     Estimated Creatinine Clearance: 131 mL/min (A) (by C-G formula based on SCr of 0.73 mg/dL (L)).  Results from last 7 days   Lab Units 09/30/20  1050   HEMOGLOBIN A1C % 9.25*     Results from last 7 days   Lab Units 10/02/20  1143 10/02/20  0608 10/01/20  2044 10/01/20  1629 10/01/20  1146 10/01/20  0614 09/30/20  2015 09/30/20  1636   GLUCOSE mg/dL 292* 277* 369* 352* 353* 324* 458* 414*     Results from last 7 days   Lab Units 09/29/20  2348   TROPONIN T ng/mL <0.010     Results from last 7 days   Lab Units 09/29/20  2348   PROBNP pg/mL 47.2               Invalid input(s):  PHOS        Invalid input(s): LDLCALC  Results from last 7 days   Lab Units 10/02/20  0525 10/01/20  0508 09/30/20  1050 09/29/20  2348   WBC 10*3/mm3 18.47* 16.71* 13.86* 16.90*   HEMOGLOBIN g/dL 14.2 14.0 15.3 15.6   HEMATOCRIT % 43.4 41.7 45.8 46.7   PLATELETS 10*3/mm3 278 267 273 256   MCV fL 93.3 91.2 91.4 93.2   MCH pg 30.5 30.6 30.5 31.1   MCHC g/dL 32.7 33.6 33.4 33.4   RDW % 12.2* 11.8* 12.1* 12.1*   RDW-SD fl 42.3 39.8 39.9 41.0   MPV fL 9.6 9.8 9.5 9.6   NEUTROPHIL % % 84.3*  --   --  76.7*   LYMPHOCYTE % % 9.3*  --   --  14.2*   MONOCYTES % % 5.5  --   --  6.9   EOSINOPHIL % % 0.0*  --   --  1.3   BASOPHIL % % 0.2  --   --  0.3   IMM GRAN % % 0.7*  --   --  0.6*   NEUTROS ABS 10*3/mm3 15.57*  --   --  12.97*   LYMPHS ABS 10*3/mm3 1.72  --   --  2.40   MONOS ABS 10*3/mm3 1.02*  --   --  1.16*   EOS ABS 10*3/mm3 0.00  --   --  0.22   BASOS ABS 10*3/mm3 0.03  --   --  0.05   IMMATURE GRANS (ABS) 10*3/mm3 0.13*  --   --  0.10*   NRBC /100 WBC 0.0  --   --  0.0             Results from last 7 days   Lab Units 09/30/20  0108 09/29/20  4078   PROCALCITONIN ng/mL  --  0.06   LACTATE mmol/L 1.2  --                  Results from last 7 days   Lab Units 09/30/20  2154 09/30/20  0052   BLOODCX   --  No growth at 2 days  No growth at 2 days   RESPCX  Scant growth (1+) Normal Respiratory Crissy: NO S.aureus/MRSA or Pseudomonas aeruginosa  --      Results from last 7 days   Lab Units 09/30/20  0010   ADENOVIRUS DETECTION BY PCR  Not Detected   CORONAVIRUS 229E  Not Detected   CORONAVIRUS HKU1  Not Detected   CORONAVIRUS NL63  Not Detected   CORONAVIRUS OC43  Not Detected   HUMAN METAPNEUMOVIRUS  Not Detected   HUMAN RHINOVIRUS/ENTEROVIRUS  Not Detected   INFLUENZA B PCR  Not Detected   PARAINFLUENZA 1  Not Detected   PARAINFLUENZA VIRUS 2  Not Detected   PARAINFLUENZA VIRUS 3  Not Detected   PARAINFLUENZA VIRUS 4  Not Detected   BORDETELLA PERTUSSIS PCR  Not Detected   CHLAMYDOPHILA PNEUMONIAE PCR  Not Detected   MYCOPLAMA PNEUMO PCR  Not Detected   INFLUENZA A PCR  Not Detected   INFLUENZA A H3  Not Detected   INFLUENZA A H1  Not Detected   RSV, PCR  Not Detected     Results from last 7 days   Lab Units 09/30/20  1220   NITRITE UA  Negative   WBC UA /HPF 0-2   BACTERIA UA /HPF None Seen   SQUAM EPITHEL UA /HPF 0-2           Imaging:  Imaging Results (Last 24 Hours)     ** No results found for the last 24 hours. **             I reviewed the patient's new clinical results / labs / tests / procedures      Assessment/Plan     Active Hospital Problems    Diagnosis  POA   • **Acute respiratory failure with hypoxia (CMS/[facility]) [J96.01]  Yes   • Right renal stone [N20.0]  Yes   • Leukocytosis [D72.829]  Yes   • Obesity (BMI 30-39.9) [E66.9]  Yes   • Alcohol use [Z72.89]  Yes   • Tobacco abuse [Z72.0]  Yes   • Obstructive sleep apnea syndrome [G47.33]  Yes   • Type 2 diabetes mellitus with microalbuminuria, without long-term current use of insulin (CMS/HCC) [E11.29, R80.9]  Yes   • Essential hypertension [I10]  Yes      Resolved Hospital Problems   No resolved problems to display.         1.  Acute hypoxemic respiratory failure  secondary to bibasilar pneumonia.  Clinically improved.  Positive relapse of the leukocytosis but most likely secondary to steroids.  Afebrile now.  Sputum is negative..  Urine Legionella and Streptococcus antigens are negative.  Negative d-dimer.  Lactate and pro calcitonin are normal.  Negative blood cultures till now..  Negative respiratory PCR including call back.  CT scan of the chest with bilateral infiltrate at the bases.  Zithromax DC'd.  Will further decrease steroids and continue Rocephin.  Continue trying to wean off oxygen.  I am suspecting a component of COPD.  Will need work-up for COPD as an outpatient.  2.  Right flank pain secondary to right nonobstructive nephrolithiasis.  Resolved.  CT scan without evidence of hydronephrosis.  UA was negative for infection.  At this time he does not need any intervention..    Patient has urology follow-up.  Increase p.o. water.    3.  Tobacco abuse.  Patient was counseled.    On nicotine patch.  4.  Obstructive sleep apnea.  Patient not compliant with his sleep.  Will use oxygen while in the hospital.  5.  Type 2 diabetes.  Continue holding oral hypoglycemic.  Elevated Accu-Cheks off the steroids..  His A1c is 9.25.  NPH insulin increased.  Continue with sliding scale insulin.    Patient has endocrine follow-up as an outpatient  6.  Hypertension.  Good control on losartan and hydrochlorothiazide.  No evidence of angina or congestive heart failure.  7.  Dyslipidemia.  Continue Lipitor.  8.  Leukocytosis.  Secondary to pneumonia and steroids.  No fever Will monitor.  9.  VTE prophylaxis.  Will initiate Lovenox.  Anticipate home tomorrow after weaning off oxygen and after switching to p.o. steroids and Augmentin.  Patient currently being evaluated for home oxygen.      Opal Pearce MD  [facility]  10/02/20  14:09 EDT

## 2020-10-02 NOTE — PLAN OF CARE
Goal Outcome Evaluation:  Plan of Care Reviewed With: patient  Progress: improving  Outcome Summary: Pt to be DC tomorrow; walking  ox done  today- no need for O2; BG still in high 200s; pt up ad  prisca- on .5-1 L of O2 at times; No c/o or issues.

## 2020-10-03 ENCOUNTER — READMISSION MANAGEMENT (OUTPATIENT)
Dept: CALL CENTER | Facility: HOSPITAL | Age: 60
End: 2020-10-03

## 2020-10-03 VITALS
DIASTOLIC BLOOD PRESSURE: 67 MMHG | TEMPERATURE: 97.9 F | HEART RATE: 85 BPM | WEIGHT: 243 LBS | SYSTOLIC BLOOD PRESSURE: 127 MMHG | RESPIRATION RATE: 20 BRPM | HEIGHT: 68 IN | BODY MASS INDEX: 36.83 KG/M2 | OXYGEN SATURATION: 95 %

## 2020-10-03 PROBLEM — J18.9 PNA (PNEUMONIA): Status: ACTIVE | Noted: 2020-10-03

## 2020-10-03 LAB
ANION GAP SERPL CALCULATED.3IONS-SCNC: 8.5 MMOL/L (ref 5–15)
BASOPHILS # BLD AUTO: 0.01 10*3/MM3 (ref 0–0.2)
BASOPHILS NFR BLD AUTO: 0.1 % (ref 0–1.5)
BUN SERPL-MCNC: 19 MG/DL (ref 6–20)
BUN/CREAT SERPL: 28.8 (ref 7–25)
CALCIUM SPEC-SCNC: 9 MG/DL (ref 8.6–10.5)
CHLORIDE SERPL-SCNC: 95 MMOL/L (ref 98–107)
CO2 SERPL-SCNC: 32.5 MMOL/L (ref 22–29)
CREAT SERPL-MCNC: 0.66 MG/DL (ref 0.76–1.27)
DEPRECATED RDW RBC AUTO: 41.1 FL (ref 37–54)
EOSINOPHIL # BLD AUTO: 0.04 10*3/MM3 (ref 0–0.4)
EOSINOPHIL NFR BLD AUTO: 0.3 % (ref 0.3–6.2)
ERYTHROCYTE [DISTWIDTH] IN BLOOD BY AUTOMATED COUNT: 12.1 % (ref 12.3–15.4)
GFR SERPL CREATININE-BSD FRML MDRD: 124 ML/MIN/1.73
GLUCOSE BLDC GLUCOMTR-MCNC: 239 MG/DL (ref 70–130)
GLUCOSE BLDC GLUCOMTR-MCNC: 330 MG/DL (ref 70–130)
GLUCOSE SERPL-MCNC: 269 MG/DL (ref 65–99)
HCT VFR BLD AUTO: 45.1 % (ref 37.5–51)
HGB BLD-MCNC: 15.1 G/DL (ref 13–17.7)
IMM GRANULOCYTES # BLD AUTO: 0.09 10*3/MM3 (ref 0–0.05)
IMM GRANULOCYTES NFR BLD AUTO: 0.8 % (ref 0–0.5)
LYMPHOCYTES # BLD AUTO: 2.74 10*3/MM3 (ref 0.7–3.1)
LYMPHOCYTES NFR BLD AUTO: 23.4 % (ref 19.6–45.3)
MCH RBC QN AUTO: 31 PG (ref 26.6–33)
MCHC RBC AUTO-ENTMCNC: 33.5 G/DL (ref 31.5–35.7)
MCV RBC AUTO: 92.6 FL (ref 79–97)
MONOCYTES # BLD AUTO: 0.79 10*3/MM3 (ref 0.1–0.9)
MONOCYTES NFR BLD AUTO: 6.8 % (ref 5–12)
NEUTROPHILS NFR BLD AUTO: 68.6 % (ref 42.7–76)
NEUTROPHILS NFR BLD AUTO: 8.03 10*3/MM3 (ref 1.7–7)
NRBC BLD AUTO-RTO: 0 /100 WBC (ref 0–0.2)
PLATELET # BLD AUTO: 261 10*3/MM3 (ref 140–450)
PMV BLD AUTO: 9.9 FL (ref 6–12)
POTASSIUM SERPL-SCNC: 3.9 MMOL/L (ref 3.5–5.2)
RBC # BLD AUTO: 4.87 10*6/MM3 (ref 4.14–5.8)
SODIUM SERPL-SCNC: 136 MMOL/L (ref 136–145)
WBC # BLD AUTO: 11.7 10*3/MM3 (ref 3.4–10.8)

## 2020-10-03 PROCEDURE — 63710000001 INSULIN ISOPHANE HUMAN PER 5 UNITS: Performed by: INTERNAL MEDICINE

## 2020-10-03 PROCEDURE — 80048 BASIC METABOLIC PNL TOTAL CA: CPT | Performed by: INTERNAL MEDICINE

## 2020-10-03 PROCEDURE — 25010000002 CEFTRIAXONE PER 250 MG: Performed by: NURSE PRACTITIONER

## 2020-10-03 PROCEDURE — 63710000001 INSULIN LISPRO (HUMAN) PER 5 UNITS: Performed by: NURSE PRACTITIONER

## 2020-10-03 PROCEDURE — 25010000002 METHYLPREDNISOLONE PER 40 MG: Performed by: INTERNAL MEDICINE

## 2020-10-03 PROCEDURE — 94799 UNLISTED PULMONARY SVC/PX: CPT

## 2020-10-03 PROCEDURE — 85025 COMPLETE CBC W/AUTO DIFF WBC: CPT | Performed by: INTERNAL MEDICINE

## 2020-10-03 PROCEDURE — 25010000002 ENOXAPARIN PER 10 MG: Performed by: NURSE PRACTITIONER

## 2020-10-03 PROCEDURE — 82962 GLUCOSE BLOOD TEST: CPT

## 2020-10-03 RX ORDER — CEFDINIR 300 MG/1
300 CAPSULE ORAL 2 TIMES DAILY
Qty: 14 CAPSULE | Refills: 0 | Status: SHIPPED | OUTPATIENT
Start: 2020-10-03 | End: 2020-10-10

## 2020-10-03 RX ORDER — PREDNISONE 20 MG/1
20 TABLET ORAL DAILY
Qty: 10 TABLET | Refills: 0 | Status: SHIPPED | OUTPATIENT
Start: 2020-10-03 | End: 2020-10-08

## 2020-10-03 RX ORDER — ALBUTEROL SULFATE 90 UG/1
2 AEROSOL, METERED RESPIRATORY (INHALATION) EVERY 4 HOURS PRN
Qty: 18 G | Refills: 0 | Status: SHIPPED | OUTPATIENT
Start: 2020-10-03 | End: 2020-12-29 | Stop reason: SDUPTHER

## 2020-10-03 RX ORDER — NICOTINE 21 MG/24HR
1 PATCH, TRANSDERMAL 24 HOURS TRANSDERMAL EVERY 24 HOURS
Qty: 7 EACH | Refills: 0 | Status: SHIPPED | OUTPATIENT
Start: 2020-10-03 | End: 2020-10-27 | Stop reason: SDUPTHER

## 2020-10-03 RX ADMIN — INSULIN LISPRO 10 UNITS: 100 INJECTION, SOLUTION INTRAVENOUS; SUBCUTANEOUS at 11:56

## 2020-10-03 RX ADMIN — ENOXAPARIN SODIUM 40 MG: 40 INJECTION SUBCUTANEOUS at 11:56

## 2020-10-03 RX ADMIN — INSULIN HUMAN 20 UNITS: 100 INJECTION, SUSPENSION SUBCUTANEOUS at 08:15

## 2020-10-03 RX ADMIN — INSULIN LISPRO 5 UNITS: 100 INJECTION, SOLUTION INTRAVENOUS; SUBCUTANEOUS at 08:15

## 2020-10-03 RX ADMIN — IPRATROPIUM BROMIDE AND ALBUTEROL SULFATE 3 ML: 2.5; .5 SOLUTION RESPIRATORY (INHALATION) at 07:11

## 2020-10-03 RX ADMIN — SODIUM CHLORIDE, PRESERVATIVE FREE 10 ML: 5 INJECTION INTRAVENOUS at 08:29

## 2020-10-03 RX ADMIN — IPRATROPIUM BROMIDE AND ALBUTEROL SULFATE 3 ML: 2.5; .5 SOLUTION RESPIRATORY (INHALATION) at 12:35

## 2020-10-03 RX ADMIN — LOSARTAN POTASSIUM 100 MG: 100 TABLET, FILM COATED ORAL at 08:17

## 2020-10-03 RX ADMIN — NICOTINE 1 PATCH: 21 PATCH, EXTENDED RELEASE TRANSDERMAL at 08:19

## 2020-10-03 RX ADMIN — CEFTRIAXONE SODIUM 1 G: 1 INJECTION, SOLUTION INTRAVENOUS at 01:00

## 2020-10-03 RX ADMIN — HYDROCHLOROTHIAZIDE 25 MG: 25 TABLET ORAL at 08:17

## 2020-10-03 RX ADMIN — ATORVASTATIN CALCIUM 40 MG: 20 TABLET, FILM COATED ORAL at 08:19

## 2020-10-03 RX ADMIN — METHYLPREDNISOLONE SODIUM SUCCINATE 20 MG: 40 INJECTION, POWDER, FOR SOLUTION INTRAMUSCULAR; INTRAVENOUS at 08:16

## 2020-10-03 RX ADMIN — GUAIFENESIN 600 MG: 600 TABLET, EXTENDED RELEASE ORAL at 08:17

## 2020-10-03 NOTE — PLAN OF CARE
Goal Outcome Evaluation:  Plan of Care Reviewed With: patient  Progress: improving  Outcome Summary: No acute events overnight. Rested comfortably most of shift. IV abx given per order. VSS. O2 @ 2L during sleep. Plan for discharge home today. Will ct to monitor.

## 2020-10-03 NOTE — DISCHARGE SUMMARY
Date of Admission: 9/29/2020  Date of Discharge:  10/3/2020  Primary Care Physician: Valerio Perry MD     Discharge Diagnosis:  Active Hospital Problems    Diagnosis  POA   • **PNA (pneumonia) [J18.9]  Yes   • Acute respiratory failure with hypoxia (CMS/HCC) [J96.01]  Yes   • Right renal stone [N20.0]  Yes   • Leukocytosis [D72.829]  Yes   • Obesity (BMI 30-39.9) [E66.9]  Yes   • Alcohol use [Z72.89]  Yes   • Tobacco abuse [Z72.0]  Yes   • Obstructive sleep apnea syndrome [G47.33]  Yes   • Type 2 diabetes mellitus with microalbuminuria, without long-term current use of insulin (CMS/HCC) [E11.29, R80.9]  Yes   • Essential hypertension [I10]  Yes      Resolved Hospital Problems   No resolved problems to display.       Presenting Problem/History of Present Illness:  Acute respiratory failure with hypoxia (CMS/HCC) [J96.01]  Pneumonia of both lower lobes due to infectious organism [J18.9]  Type 2 diabetes mellitus with hyperglycemia, with long-term current use of insulin (CMS/[facility]) [E11.65, Z79.4]     Mr. Biggs is a 59 y.o. smoker with a history of JACEK on Cpap, HTN, HLD, DM, renal stone that presents to [facility] complaining of increased shortness of breath and rt flank pain. Pt began having rt flank/back pain about 6 weeks ago. He had imaging showing 4 mm stone upper pole rt kidney; no acute Tspine abnormalities. Monday night when he laid down to go to sleep he felt short of breath but was able to find relief with positioning. He had some RUIZ Tuesday while at the grocery. Tuesday night when he laid down, he again felt short of breath, this time more severe without resolution. He also felt disoriented. When EMS arrived, pt sats were in the 80s. Denies recent fever, chest pain/tightness, dysuria/hematuria. He has chronic nonproductive cough. Denies hx of lung disease. He smokes 1.5 PPD x 40 years, drinks 1-2 drinks/day but not in over a week due to recent back pain. He was scheduled to see Urologist  today for CT A/P for renal stone evaluation.     Afebrile. Mild tachycardia; BP stable. Sats >= 90% on 2.5 L NC. Procal 0.06. WBC 17. Hgb 15.6. Trop neg. BNP unremarkable. Glucose 428, CO2 30.3. RVP/Covid 19 neg. Lactate 1.2. CXR patchy opacities oli lung bases may represent pna. Blood cultures were collected    Hospital Course:  The patient is a 59 y.o. male who presented with hypoxic respiratory failure, acute bronchospasm, acute pneumonia.  He was admitted and given steroids breathing treatments and antibiotics.  Infectious work-up has been negative but he has pneumonia bilaterally on CT.  He will be discharged with transition to [facility] to complete antibiotic course, oral prednisone, and albuterol inhaler.  He is going to stop smoking so have written for NicoDerm patches for the next week.  Would recommend pulmonary function testing after this acute illness is resolved to see if he needs more prolonged therapy for COPD.  He tested him yesterday and he did not desaturate with some ambulation.    COVID swab negative    Exam Today:  General AA NAD  HEENT NCAT  CV RRR  Lungs mild right-sided wheezes have resolved, still has left-sided wheezing and rhonchi  Abdomen ND NT  Extremity no cyanosis or edema  Neuro CN II through XII grossly intact    Results:  CXR  Bibasilar infiltrates.    CT Chest/Abdomen/Pelvis  1. Patchy nodular and groundglass infiltrates within the bases of both  lower lobes consistent with pneumonia.  2. 4 mm nonobstructing stone in the upper pole of the right kidney    Procedures Performed:         Consults:   Consults     Date and Time Order Name Status Description    9/30/2020 0453 Inpatient Pulmonology Consult      9/30/2020 0257 [facility] (on-call MD unless specified) Details Completed            Discharge Disposition:  Home or Self Care    Discharge Medications:     Discharge Medications      New Medications      Instructions Start Date   albuterol sulfate  (90 Base) MCG/ACT inhaler  Commonly  known as: PROVENTIL HFA;VENTOLIN HFA;PROAIR HFA   2 puffs, Inhalation, Every 4 Hours PRN      cefdinir 300 MG capsule  Commonly known as: OMNICEF   300 mg, Oral, 2 Times Daily      nicotine 21 MG/24HR patch  Commonly known as: Nicoderm CQ   1 patch, Transdermal, Every 24 Hours      predniSONE 20 MG tablet  Commonly known as: DELTASONE   20 mg, Oral, Daily         Continue These Medications      Instructions Start Date   atorvastatin 40 MG tablet  Commonly known as: LIPITOR   TAKE 1 TABLET BY MOUTH EVERY DAY      cyclobenzaprine 10 MG tablet  Commonly known as: FLEXERIL   10 mg, Oral, 3 Times Daily PRN      glimepiride 4 MG tablet  Commonly known as: AMARYL   one tablet twice a day      glucose blood test strip   Care Sen N  Dx code E11.42 testing bs 2 x day      hydroCHLOROthiazide 25 MG tablet  Commonly known as: HYDRODIURIL   TAKE 1 TABLET BY MOUTH EVERY DAY      insulin  UNIT/ML injection  Commonly known as: humuLIN N,novoLIN N   30 units at bedtime      Insulin Syringes (Disposable) U-100 0.5 ML misc   Use daily      losartan 100 MG tablet  Commonly known as: COZAAR   TAKE 1 TABLET BY MOUTH EVERY DAY      metFORMIN  MG 24 hr tablet  Commonly known as: GLUCOPHAGE-XR   TAKE 4 TABLETS BY MOUTH DAILY WITH BREAKFAST      Pen Needles 32G X 5 MM misc   1 each, Does not apply, Daily      traMADol 50 MG tablet  Commonly known as: ULTRAM   50 mg, Oral, Every 6 Hours PRN      traZODone 100 MG tablet  Commonly known as: DESYREL   TAKE 1 TABLET BY MOUTH EVERYDAY AT BEDTIME             Discharge Diet:   Diet Instructions     Advance Diet As Tolerated            Activity at Discharge:   Activity Instructions     Activity as Tolerated      Work Restrictions      Type of Restriction: Work    May Return to Work: Specific Date    Return To Work Date: 10/7/2020    With / Without Restrictions: Without Restrictions          Follow-up Appointments:  Follow-up Information     Valerio Perry MD Follow up.    Specialty:  Internal Medicine  Contact information:  [address]  [place] [address]  [phone]                   Test Results Pending at Discharge:  Pending Labs     Order Current Status    Blood Culture - Blood, Arm, Right Preliminary result    Blood Culture - Blood, Hand, Right Preliminary result           Sam Campoverde MD  10/03/20  13:04 EDT    Time Spent on Discharge Activities: >30 minutes    Dictated portions using Dragon dictation software.  During the entire encounter, I was wearing recommended PPE including face mask and eye protection. Hand sanitization was performed prior to entering room and upon exit.

## 2020-10-03 NOTE — OUTREACH NOTE
Prep Survey      Responses   [facility] facility patient discharged from?  [place]   Is LACE score < 7 ?  No   Eligibility  [facility]    Date of Admission  09/29/20   Date of Discharge  10/03/20   Discharge Disposition  Home or Self Care   Discharge diagnosis  PNA (pneumonia)   Does the patient have one of the following disease processes/diagnoses(primary or secondary)?  COPD/Pneumonia   Does the patient have Home health ordered?  No   Is there a DME ordered?  No   Prep survey completed?  Yes          Juany Peng RN

## 2020-10-05 ENCOUNTER — TRANSITIONAL CARE MANAGEMENT TELEPHONE ENCOUNTER (OUTPATIENT)
Dept: CALL CENTER | Facility: HOSPITAL | Age: 60
End: 2020-10-05

## 2020-10-05 LAB
BACTERIA SPEC AEROBE CULT: NORMAL
BACTERIA SPEC AEROBE CULT: NORMAL

## 2020-10-05 NOTE — PROGRESS NOTES
Case Management Discharge Note      Final Note: DC'd home. Laurel Alba RN    Provided Post Acute Provider List?: Refused  Refused Provider List Comment: declined    Selected Continued Care - Discharged on 10/3/2020 Admission date: 9/29/2020 - Discharge disposition: Home or Self Care    Destination    No services have been selected for the patient.              Durable Medical Equipment    No services have been selected for the patient.              Dialysis/Infusion    No services have been selected for the patient.              Home Medical Care    No services have been selected for the patient.              Therapy    No services have been selected for the patient.              Community Resources    No services have been selected for the patient.                  Transportation Services  Private: Car    Final Discharge Disposition Code: 01 - home or self-care

## 2020-10-05 NOTE — OUTREACH NOTE
Call Center TCM Note      Responses   [facility] patient discharged from?  [place]   Does the patient have one of the following disease processes/diagnoses(primary or secondary)?  COPD/Pneumonia   Was the primary reason for admission:  Pneumonia   TCM attempt successful?  Yes   Call start time  1022   Call end time  1029   Discharge diagnosis  PNA (pneumonia)   Meds reviewed with patient/caregiver?  Yes   Is the patient having any side effects they believe may be caused by any medication additions or changes?  No   Does the patient have all medications ordered at discharge?  No   Nursing Interventions  No intervention needed   Prescription comments  States they did not have the nicotine patches and they should have them today.   Is the patient taking all medications as directed (includes completed medication regime)?  No   What is preventing the patient from taking all medications as directed?  Other   Medication comments  Pt to  nicotine patches today.   Does the patient have a primary care provider?   Yes   Does the patient have an appointment with their PCP or specialist within 7 days of discharge?  Greater than 7 days   What is preventing the patient from scheduling follow up appointments within 7 days of discharge?  Earlier appointment not available   Has the patient kept scheduled appointments due by today?  N/A   Comments  Will send message to office to see if sooner appt is available.   Has home health visited the patient within 72 hours of discharge?  N/A   Pulse Ox monitoring  None [Educated about getting a pulse ox to montior oxygen levels]   Did the patient receive a copy of their discharge instructions?  Yes   Nursing interventions  Reviewed instructions with patient   What is the patient's perception of their health status since discharge?  Improving   Nursing Interventions  Nurse provided patient education   If the patient is a current smoker, are they able to teach back resources for  "cessation?  Smoking cessation medications   Is the patient/caregiver able to teach back the hierarchy of who to call/visit for symptoms/problems? PCP, Specialist, Home health nurse, Urgent Care, ED, 911  Yes   Additional teach back comments  Patient states he is doing \"pretty good\".  States he is wanting to smoke but has not.  He is going to  the nicotine patches today due to pharmacy did not have them.  States he is going to go back to work Wed.  Does not monitor bp at home but monitors bs and they were 190.  States this is a little high for him but knows this is due to taking steroids.     Is the patient/caregiver able to teach back signs and symptoms of worsening condition:  Fever/chills, Shortness of breath, Chest pain   Is the patient/caregiver able to teach back importance of completing antibiotic course of treatment?  Yes   TCM call completed?  Yes   Wrap up additional comments  Denies needs or questions at this time.            Dede Patrick LPN    10/5/2020, 10:33 EDT      "

## 2020-10-13 ENCOUNTER — READMISSION MANAGEMENT (OUTPATIENT)
Dept: CALL CENTER | Facility: HOSPITAL | Age: 60
End: 2020-10-13

## 2020-10-13 NOTE — OUTREACH NOTE
COPD/PN Week 2 Survey      Responses   [facility] patient discharged from?  [place]   Does the patient have one of the following disease processes/diagnoses(primary or secondary)?  COPD/Pneumonia   Was the primary reason for admission:  Pneumonia   Week 2 attempt successful?  No   Unsuccessful attempts  Attempt 1          Elda Ramos RN

## 2020-10-14 ENCOUNTER — READMISSION MANAGEMENT (OUTPATIENT)
Dept: CALL CENTER | Facility: HOSPITAL | Age: 60
End: 2020-10-14

## 2020-10-14 NOTE — OUTREACH NOTE
"COPD/PN Week 2 Survey      Responses   [facility] patient discharged from?  [place]   Does the patient have one of the following disease processes/diagnoses(primary or secondary)?  COPD/Pneumonia   Was the primary reason for admission:  Pneumonia   Week 2 attempt successful?  Yes   Call start time  1514   Call end time  1520   Discharge diagnosis  PNA (pneumonia)   Meds reviewed with patient/caregiver?  Yes   Is the patient taking all medications as directed (includes completed medication regime)?  Yes   Comments regarding PCP  Pt reports he's considering a new PCP. Offered HUB however patient reports he has the number.    Has the patient kept scheduled appointments due by today?  N/A   What is the patient's perception of their health status since discharge?  Improving   Are the patient's immunizations up to date?   Yes [Pt needs Shingles around 11/1/20]   If the patient is a current smoker, are they able to teach back resources for cessation?  -- [Pt reports, \"I'm dying for a cigarette\"]   Week 2 call completed?  Yes          Alma Rosa Pugh RN  "

## 2020-10-16 DIAGNOSIS — G47.09 OTHER INSOMNIA: Primary | ICD-10-CM

## 2020-10-16 RX ORDER — TRAZODONE HYDROCHLORIDE 100 MG/1
TABLET ORAL
Qty: 90 TABLET | Refills: 2 | Status: SHIPPED | OUTPATIENT
Start: 2020-10-16 | End: 2021-07-02 | Stop reason: SDUPTHER

## 2020-10-23 ENCOUNTER — TELEPHONE (OUTPATIENT)
Dept: INTERNAL MEDICINE | Age: 60
End: 2020-10-23

## 2020-10-23 RX ORDER — NICOTINE 21 MG/24HR
1 PATCH, TRANSDERMAL 24 HOURS TRANSDERMAL EVERY 24 HOURS
Qty: 7 EACH | Refills: 0 | Status: CANCELLED | OUTPATIENT
Start: 2020-10-23

## 2020-10-23 NOTE — TELEPHONE ENCOUNTER
Patient called in and is requesting a prescription for nicotine (Nicoderm CQ) 21 MG/24HR patch      Sent to [facility]/pharmacy #[id number] - [place], [place] - [address] AT [address] - [phone]  - [phone]   [phone]      Patient call back [phone]

## 2020-10-26 ENCOUNTER — READMISSION MANAGEMENT (OUTPATIENT)
Dept: CALL CENTER | Facility: HOSPITAL | Age: 60
End: 2020-10-26

## 2020-10-26 NOTE — OUTREACH NOTE
COPD/PN Week 3 Survey      Responses   [facility] patient discharged from?  [place]   Does the patient have one of the following disease processes/diagnoses(primary or secondary)?  COPD/Pneumonia   Week 3 attempt successful?  No   Unsuccessful attempts  Attempt 1          Alie Alvarez RN

## 2020-10-27 RX ORDER — NICOTINE 21 MG/24HR
1 PATCH, TRANSDERMAL 24 HOURS TRANSDERMAL EVERY 24 HOURS
Qty: 7 EACH | Refills: 0 | Status: SHIPPED | OUTPATIENT
Start: 2020-10-27 | End: 2020-11-17 | Stop reason: SDUPTHER

## 2020-10-27 NOTE — TELEPHONE ENCOUNTER
PATIENT CALLED WANTING AN UPDATE ON HIS MEDICATION REFILL REQUEST. SAYS THAT HE IS USING HIS WIFE'S NICOTINE PATCHES TO TIDE HIM OVER.    PLEASE ADVISE  [phone]

## 2020-10-29 ENCOUNTER — READMISSION MANAGEMENT (OUTPATIENT)
Dept: CALL CENTER | Facility: HOSPITAL | Age: 60
End: 2020-10-29

## 2020-10-29 NOTE — OUTREACH NOTE
COPD/PN Week 3 Survey      Responses   [facility] patient discharged from?  [place]   Does the patient have one of the following disease processes/diagnoses(primary or secondary)?  COPD/Pneumonia   Was the primary reason for admission:  Pneumonia   Week 3 attempt successful?  Yes   Call start time  1230   Call end time  1237   Discharge diagnosis  PNA (pneumonia)   Meds reviewed with patient/caregiver?  Yes   Is the patient taking all medications as directed (includes completed medication regime)?  Yes   Has the patient kept scheduled appointments due by today?  N/A   Comments  Has not found a PCP, working on it.  Does not need assistance.   Pulse Ox monitoring  None   What is the patient's perception of their health status since discharge?  Improving   If the patient is a current smoker, are they able to teach back resources for cessation?  Smoking cessation medications [Not smoking. Using patches.]   Week 3 call completed?  Yes          Sandra Chowdary RN

## 2020-10-30 DIAGNOSIS — E78.5 HYPERLIPIDEMIA, UNSPECIFIED HYPERLIPIDEMIA TYPE: ICD-10-CM

## 2020-10-30 DIAGNOSIS — E11.9 TYPE 2 DIABETES MELLITUS WITHOUT COMPLICATION, WITHOUT LONG-TERM CURRENT USE OF INSULIN (HCC): Primary | ICD-10-CM

## 2020-10-30 DIAGNOSIS — I10 ESSENTIAL HYPERTENSION: ICD-10-CM

## 2020-11-03 LAB
ALBUMIN SERPL-MCNC: 4.6 G/DL (ref 3.5–5.2)
ALBUMIN/GLOB SERPL: 2.2 G/DL
ALP SERPL-CCNC: 117 U/L (ref 39–117)
ALT SERPL-CCNC: 21 U/L (ref 1–41)
AMBIG ABBREV CMP14 DEFAULT: NORMAL
AST SERPL-CCNC: 14 U/L (ref 1–40)
BILIRUB SERPL-MCNC: 0.5 MG/DL (ref 0–1.2)
BUN SERPL-MCNC: 11 MG/DL (ref 6–20)
BUN/CREAT SERPL: 13.8 (ref 7–25)
CALCIUM SERPL-MCNC: 10.2 MG/DL (ref 8.6–10.5)
CHLORIDE SERPL-SCNC: 93 MMOL/L (ref 98–107)
CHOLEST SERPL-MCNC: 160 MG/DL (ref 0–200)
CO2 SERPL-SCNC: 33.3 MMOL/L (ref 22–29)
CREAT SERPL-MCNC: 0.8 MG/DL (ref 0.76–1.27)
GLOBULIN SER CALC-MCNC: 2.1 GM/DL
GLUCOSE SERPL-MCNC: 221 MG/DL (ref 65–99)
HBA1C MFR BLD: 9.9 % (ref 4.8–5.6)
HDLC SERPL-MCNC: 37 MG/DL (ref 40–60)
INTERPRETATION: NORMAL
LDLC SERPL CALC-MCNC: 75 MG/DL (ref 0–100)
Lab: NORMAL
POTASSIUM SERPL-SCNC: 4.8 MMOL/L (ref 3.5–5.2)
PROT SERPL-MCNC: 6.7 G/DL (ref 6–8.5)
SODIUM SERPL-SCNC: 139 MMOL/L (ref 136–145)
TRIGL SERPL-MCNC: 297 MG/DL (ref 0–150)
VLDLC SERPL CALC-MCNC: 48 MG/DL (ref 5–40)

## 2020-11-04 ENCOUNTER — RESULTS ENCOUNTER (OUTPATIENT)
Dept: ENDOCRINOLOGY | Age: 60
End: 2020-11-04

## 2020-11-04 DIAGNOSIS — I10 ESSENTIAL HYPERTENSION: ICD-10-CM

## 2020-11-04 DIAGNOSIS — E78.5 HYPERLIPIDEMIA, UNSPECIFIED HYPERLIPIDEMIA TYPE: ICD-10-CM

## 2020-11-04 DIAGNOSIS — E11.9 TYPE 2 DIABETES MELLITUS WITHOUT COMPLICATION, WITHOUT LONG-TERM CURRENT USE OF INSULIN (HCC): ICD-10-CM

## 2020-11-09 ENCOUNTER — READMISSION MANAGEMENT (OUTPATIENT)
Dept: CALL CENTER | Facility: HOSPITAL | Age: 60
End: 2020-11-09

## 2020-11-09 NOTE — OUTREACH NOTE
COPD/PN Week 4 Survey      Responses   [facility] patient discharged from?  [place]   Does the patient have one of the following disease processes/diagnoses(primary or secondary)?  COPD/Pneumonia   Was the primary reason for admission:  Pneumonia   Week 4 attempt successful?  No          Allyson Bernal LPN

## 2020-11-14 DIAGNOSIS — I10 ESSENTIAL HYPERTENSION: ICD-10-CM

## 2020-11-15 NOTE — PROGRESS NOTES
Subjective   Carrington Biggs is a 59 y.o. male.     F/u for dm 2, hyperlipidemia, hypertension, sleep apnea/ testing bs 1 x day / last dm eye exam 8/5/17 with dr Cannon / last dm foot exam today  with dr Anaya / flu vaccine @ [facility]      He has known diabetes mellitus since 2012 and was started on insulin in 2013.  He is on  Novolin N 30 q hs,  metformin  mg 4 tablets once a day, and glimepiride 4 mg BID.   Fasting glucose 200-230.  He denies hypoglycemic episodes.  He has lost 4 pounds since 12/19.  He has a high deductible plan.  Hemoglobin A1c done in November 2020 is 9.9%.     He has occasional blurry vision.  His last eye examination was in 8/17.  He has microalbuminuria on urine sample taken 7/20.  He is on losartan.  He denies burning in his feet     He has hyperlipidemia and has been on Lipitor 40 mg once a day.  He denies any myalgia.    Lipid profile done in  November 2020 are as follows: Cholesterol 160.  HDL 37.  LDL 75.  Triglycerides 297.     He has hypertension and is on losartan 100 mg once a day.  He denies any history of heart attack or stroke.  He denies any chest pain or shortness of breath     He was admitted in October 2020 for pneumonia.  He was treated with antibiotics and steroids.  Blood sugar were high at that time.  He is off prednisone    He has sleep apnea but is unable to tolerate a CPAP.  He occasionally wakes himself snoring.  He wakes up rested.     He drinks 4 shots of bourbon every week.  He quit smoking in 10/20.       He has been seeing Dr. Perez for erectile dysfunction.  He is using sildenafil 20 mg 5 tabs/day as needed.  He had an incidental 4 mm nonobstructing stone in the upper pole of right kidney      The following portions of the patient's history were reviewed and updated as appropriate: allergies, current medications, past family history, past medical history, past social history, past surgical history and problem list.     Review of Systems   Constitutional:  "Negative.  Negative for chills, fatigue and fever.   HENT: Negative.    Eyes: Negative.    Respiratory: Negative for chest tightness, shortness of breath and wheezing.    Cardiovascular: Negative for chest pain, palpitations and leg swelling.   Gastrointestinal: Negative for abdominal distention, abdominal pain, blood in stool and constipation.   Genitourinary: Positive for frequency. Negative for difficulty urinating, hematuria and urgency.   Musculoskeletal: Negative.  Negative for back pain, joint swelling and neck pain.   Neurological: Negative.  Negative for dizziness, tremors, seizures, light-headedness, numbness and headaches.   Hematological: Does not bruise/bleed easily.   Psychiatric/Behavioral: Positive for agitation. Negative for confusion and sleep disturbance. The patient is not nervous/anxious.      Objective      Vitals:    11/17/20 1135   BP: 118/56   BP Location: Right arm   Patient Position: Sitting   Cuff Size: Large Adult   Pulse: 75   SpO2: 97%   Weight: 110 kg (242 lb)   Height: 172.7 cm (67.99\")     Physical Exam  Constitutional:       General: He is not in acute distress.     Appearance: Normal appearance. He is not ill-appearing, toxic-appearing or diaphoretic.   Eyes:      General: No scleral icterus.        Right eye: No discharge.         Left eye: No discharge.      Extraocular Movements: Extraocular movements intact.      Conjunctiva/sclera: Conjunctivae normal.   Neck:      Musculoskeletal: Neck supple. No neck rigidity or muscular tenderness.      Vascular: No carotid bruit.   Cardiovascular:      Rate and Rhythm: Normal rate and regular rhythm.      Heart sounds: Normal heart sounds. No murmur. No friction rub. No gallop.    Pulmonary:      Effort: Pulmonary effort is normal. No respiratory distress.      Breath sounds: Normal breath sounds. No stridor. No wheezing, rhonchi or rales.   Chest:      Chest wall: No tenderness.   Abdominal:      General: Bowel sounds are normal. There " is no distension.      Palpations: Abdomen is soft. There is no mass.      Tenderness: There is no abdominal tenderness. There is no right CVA tenderness, guarding or rebound.      Hernia: No hernia is present.   Musculoskeletal: Normal range of motion.         General: No swelling, tenderness or deformity.   Lymphadenopathy:      Cervical: No cervical adenopathy.   Skin:     General: Skin is warm and dry.      Coloration: Skin is not jaundiced or pale.      Findings: No bruising or erythema.   Neurological:      General: No focal deficit present.      Mental Status: He is alert and oriented to person, place, and time.      Motor: No weakness.   Psychiatric:         Mood and Affect: Mood normal.         Behavior: Behavior normal.       Orders Only on 11/03/2020   Component Date Value Ref Range Status   • Glucose 11/03/2020 221* 65 - 99 mg/dL Final   • BUN 11/03/2020 11  6 - 20 mg/dL Final   • Creatinine 11/03/2020 0.80  0.76 - 1.27 mg/dL Final   • eGFR Non African Am 11/03/2020 99  >60 mL/min/1.73 Final   • eGFR African Am 11/03/2020 120  >60 mL/min/1.73 Final   • BUN/Creatinine Ratio 11/03/2020 13.8  7.0 - 25.0 Final   • Sodium 11/03/2020 139  136 - 145 mmol/L Final   • Potassium 11/03/2020 4.8  3.5 - 5.2 mmol/L Final   • Chloride 11/03/2020 93* 98 - 107 mmol/L Final   • Total CO2 11/03/2020 33.3* 22.0 - 29.0 mmol/L Final   • Calcium 11/03/2020 10.2  8.6 - 10.5 mg/dL Final   • Total Protein 11/03/2020 6.7  6.0 - 8.5 g/dL Final   • Albumin 11/03/2020 4.60  3.50 - 5.20 g/dL Final   • Globulin 11/03/2020 2.1  gm/dL Final   • A/G Ratio 11/03/2020 2.2  g/dL Final   • Total Bilirubin 11/03/2020 0.5  0.0 - 1.2 mg/dL Final   • Alkaline Phosphatase 11/03/2020 117  39 - 117 U/L Final   • AST (SGOT) 11/03/2020 14  1 - 40 U/L Final   • ALT (SGPT) 11/03/2020 21  1 - 41 U/L Final   • Total Cholesterol 11/03/2020 160  0 - 200 mg/dL Final   • Triglycerides 11/03/2020 297* 0 - 150 mg/dL Final   • HDL Cholesterol 11/03/2020 37* 40 -  60 mg/dL Final   • VLDL Cholesterol Gerhard 11/03/2020 48* 5 - 40 mg/dL Final   • LDL Chol Calc ([facility]) 11/03/2020 75  0 - 100 mg/dL Final   • Hemoglobin A1C 11/03/2020 9.90* 4.80 - 5.60 % Final    Comment: Hemoglobin A1C Ranges:  Increased Risk for Diabetes  5.7% to 6.4%  Diabetes                     >= 6.5%  Diabetic Goal                < 7.0%     • Brian Denise CMP14 Default 11/03/2020 Comment   Final    Comment: A hand-written panel/profile was received from your office. In  accordance with the [facility] Ambiguous Test Code Policy dated July 2003, we have completed your order by using the closest currently  or formerly recognized AMA panel.  We have assigned Comprehensive  Metabolic Panel (14), Test Code #532422 to this request.  If this  is not the testing you wished to receive on this specimen, please  contact the [facility] Client Inquiry/Technical Services Department  to clarify the test order.  We appreciate your business.     • Interpretation 11/03/2020 Note   Final    Supplemental report is available.   • PDF Image 11/03/2020 Not applicable   Final     Assessment/Plan   Diagnoses and all orders for this visit:    1. Type 2 diabetes mellitus with microalbuminuria, without long-term current use of insulin (CMS/[facility]) (Primary)    2. Type 2 diabetes mellitus with peripheral neuropathy (CMS/[facility])    3. Essential hypertension    4. Hyperlipidemia, unspecified hyperlipidemia type    5. Obstructive sleep apnea syndrome    6. Hepatic steatosis    7. Right renal stone    8. Microalbuminuria    Other orders  -     insulin NPH (humuLIN N,novoLIN N) 100 UNIT/ML injection; 35 units at bedtime  Dispense: 1 each; Refill: 12      Novolin N to 35 units at bedtime.    Continue Metformin  mg 4 tablets once a day and glimepiride 4 mg twice daily.  Call with blood sugars in 1 to 2 weeks.  Advised to have an eye exam in the near future.  Continue losartan.  Continue Lipitor.  Follow-up with Dr. Perez    Copy of my note sent to   Vicky and Dr. Perez    RTC 4 mos.

## 2020-11-16 RX ORDER — HYDROCHLOROTHIAZIDE 25 MG/1
TABLET ORAL
Qty: 90 TABLET | Refills: 3 | Status: SHIPPED | OUTPATIENT
Start: 2020-11-16 | End: 2021-12-17 | Stop reason: SDUPTHER

## 2020-11-17 ENCOUNTER — OFFICE VISIT (OUTPATIENT)
Dept: INTERNAL MEDICINE | Age: 60
End: 2020-11-17

## 2020-11-17 ENCOUNTER — OFFICE VISIT (OUTPATIENT)
Dept: ENDOCRINOLOGY | Age: 60
End: 2020-11-17

## 2020-11-17 VITALS
RESPIRATION RATE: 13 BRPM | DIASTOLIC BLOOD PRESSURE: 70 MMHG | BODY MASS INDEX: 36.74 KG/M2 | HEIGHT: 68 IN | WEIGHT: 242.4 LBS | TEMPERATURE: 97.1 F | SYSTOLIC BLOOD PRESSURE: 110 MMHG | OXYGEN SATURATION: 97 % | HEART RATE: 78 BPM

## 2020-11-17 VITALS
HEART RATE: 75 BPM | HEIGHT: 68 IN | WEIGHT: 242 LBS | SYSTOLIC BLOOD PRESSURE: 118 MMHG | DIASTOLIC BLOOD PRESSURE: 56 MMHG | BODY MASS INDEX: 36.68 KG/M2 | OXYGEN SATURATION: 97 %

## 2020-11-17 DIAGNOSIS — K76.0 HEPATIC STEATOSIS: ICD-10-CM

## 2020-11-17 DIAGNOSIS — J12.82 PNEUMONIA DUE TO COVID-19 VIRUS: ICD-10-CM

## 2020-11-17 DIAGNOSIS — G47.33 OBSTRUCTIVE SLEEP APNEA SYNDROME: ICD-10-CM

## 2020-11-17 DIAGNOSIS — I10 ESSENTIAL HYPERTENSION: ICD-10-CM

## 2020-11-17 DIAGNOSIS — N20.0 RIGHT RENAL STONE: ICD-10-CM

## 2020-11-17 DIAGNOSIS — E11.42 TYPE 2 DIABETES MELLITUS WITH PERIPHERAL NEUROPATHY (HCC): ICD-10-CM

## 2020-11-17 DIAGNOSIS — R80.9 TYPE 2 DIABETES MELLITUS WITH MICROALBUMINURIA, WITHOUT LONG-TERM CURRENT USE OF INSULIN (HCC): Primary | ICD-10-CM

## 2020-11-17 DIAGNOSIS — U07.1 PNEUMONIA DUE TO COVID-19 VIRUS: ICD-10-CM

## 2020-11-17 DIAGNOSIS — E11.29 TYPE 2 DIABETES MELLITUS WITH MICROALBUMINURIA, WITHOUT LONG-TERM CURRENT USE OF INSULIN (HCC): Primary | ICD-10-CM

## 2020-11-17 DIAGNOSIS — R80.9 MICROALBUMINURIA: ICD-10-CM

## 2020-11-17 DIAGNOSIS — E78.5 HYPERLIPIDEMIA, UNSPECIFIED HYPERLIPIDEMIA TYPE: ICD-10-CM

## 2020-11-17 DIAGNOSIS — R10.11 RIGHT UPPER QUADRANT ABDOMINAL PAIN: Primary | ICD-10-CM

## 2020-11-17 PROCEDURE — 99214 OFFICE O/P EST MOD 30 MIN: CPT | Performed by: INTERNAL MEDICINE

## 2020-11-17 RX ORDER — NICOTINE 21 MG/24HR
1 PATCH, TRANSDERMAL 24 HOURS TRANSDERMAL EVERY 24 HOURS
Qty: 7 EACH | Refills: 3 | Status: SHIPPED | OUTPATIENT
Start: 2020-11-17 | End: 2020-11-18

## 2020-11-17 NOTE — PROGRESS NOTES
"Carrington Biggs is a 59 y.o. male who presents with   Chief Complaint   Patient presents with   • Pneumonia/respiratory failure     [facility] 29 to October 3   • Abdominal Pain     Persisting-right upper quadrant/right lower quadrant.  Known history of hepatic steatosis, chronic alcohol use, 4 mm stone upper pole of right kidney.   .    59-year-old male with persisting right upper quadrant/right lower quadrant abdominal pain.  Recent hospitalization with bilateral pneumonia/respiratory failure.  Known history of obstructive sleep apnea.  Also known history of alcohol use-chronic with recent discovery per abdominal CT scan of hepatic steatosis.    Abdominal Pain  This is a chronic problem. The current episode started more than 1 month ago. The onset quality is gradual. The problem occurs constantly. The problem has been unchanged. The pain is located in the RUQ and RLQ. The pain is mild. The quality of the pain is dull and aching. Nothing aggravates the pain.        /70   Pulse 78   Temp 97.1 °F (36.2 °C) (Temporal)   Resp 13   Ht 172.7 cm (68\")   Wt 110 kg (242 lb 6.4 oz)   SpO2 97%   BMI 36.86 kg/m²     The following portions of the patient's history were reviewed and updated as appropriate: allergies, current medications, past medical history and problem list.    Review of Systems   Constitutional: Negative.    HENT: Negative.    Eyes: Negative.    Respiratory: Negative.    Cardiovascular: Negative.    Gastrointestinal: Positive for abdominal pain.   Genitourinary: Negative.    Musculoskeletal: Negative.    Skin: Negative.    Neurological: Negative.    Psychiatric/Behavioral: Negative.        Objective   Physical Exam  Vitals signs and nursing note reviewed.   Constitutional:       General: He is not in acute distress.     Appearance: He is well-developed. He is not diaphoretic.   HENT:      Head: Normocephalic and atraumatic.   Eyes:      Pupils: Pupils " "are equal, round, and reactive to light.   Neck:      Musculoskeletal: Normal range of motion and neck supple.      Thyroid: No thyromegaly.      Comments: Neck exam negative.  Carotid auscultation normal-no bruits heard.    Cardiovascular:      Rate and Rhythm: Normal rate and regular rhythm.      Heart sounds: Normal heart sounds. No murmur. No friction rub. No gallop.    Pulmonary:      Effort: Pulmonary effort is normal. No respiratory distress.      Breath sounds: Normal breath sounds. No wheezing or rales.   Chest:      Chest wall: No tenderness.   Abdominal:      General: Abdomen is flat. There is no distension.      Palpations: Abdomen is soft. There is no mass.      Tenderness: There is no abdominal tenderness. There is right CVA tenderness. There is no guarding or rebound.      Hernia: No hernia is present.   Skin:     General: Skin is warm and dry.      Coloration: Skin is not pale.      Findings: No erythema.   Psychiatric:         Behavior: Behavior normal.         Thought Content: Thought content normal.         Judgment: Judgment normal.         Assessment/Plan   Diagnoses and all orders for this visit:    1. Right upper quadrant abdominal pain (Primary)  -     Ambulatory Referral to Gastroenterology    2. Pneumonia due to COVID-19 virus    3. Hepatic steatosis  -     Ambulatory Referral to Gastroenterology    Other orders  -     nicotine (Nicoderm CQ) 21 MG/24HR patch; Place 1 patch on the skin as directed by provider Daily.  Dispense: 7 each; Refill: 3      Plan: Known hepatic steatosis with chronic alcohol usage per history.  Patient feels there is \"something going on\" in the right portion of his abdominal area both in the upper and lower quadrants on the right side.  He appears to have some lipoatrophy in the subcutaneous tissue of his right lower quadrant as well but he says he gives himself his insulin shots on the opposite side and never on the right side.    Patient's concern is for cirrhosis " and with hepatic steatosis present he is requesting referral to gastroenterology for another opinion.  Referral order entered per request.

## 2020-11-18 RX ORDER — NICOTINE 21 MG/24HR
1 PATCH, TRANSDERMAL 24 HOURS TRANSDERMAL EVERY 24 HOURS
Qty: 14 PATCH | Refills: 0 | Status: SHIPPED | OUTPATIENT
Start: 2020-11-18 | End: 2021-12-28

## 2020-11-23 ENCOUNTER — TELEPHONE (OUTPATIENT)
Dept: INTERNAL MEDICINE | Age: 60
End: 2020-11-23

## 2020-11-23 NOTE — TELEPHONE ENCOUNTER
PATIENT IS NEEDING A CALL ABOUT HIS REFERRAL. HE IS WAITING FOR A CALL FROM GASTRIC DOCTOR. HE HAS MET HIS DEDUCTIBLE SO NEEDS ASAP .    [phone]

## 2020-11-23 NOTE — TELEPHONE ENCOUNTER
I spoke with [facility] and they are short staffed and requested the patient call to schedule the appointment.

## 2020-11-23 NOTE — TELEPHONE ENCOUNTER
I called the patient and gave him the phone number to [facility]. He stated he will give them a call shortly.

## 2020-12-18 DIAGNOSIS — E78.5 HYPERLIPIDEMIA, UNSPECIFIED HYPERLIPIDEMIA TYPE: ICD-10-CM

## 2020-12-18 RX ORDER — ATORVASTATIN CALCIUM 40 MG/1
TABLET, FILM COATED ORAL
Qty: 90 TABLET | Refills: 1 | Status: SHIPPED | OUTPATIENT
Start: 2020-12-18 | End: 2021-07-02 | Stop reason: SDUPTHER

## 2020-12-22 ENCOUNTER — LAB (OUTPATIENT)
Dept: LAB | Facility: HOSPITAL | Age: 60
End: 2020-12-22

## 2020-12-22 ENCOUNTER — OFFICE VISIT (OUTPATIENT)
Dept: GASTROENTEROLOGY | Facility: CLINIC | Age: 60
End: 2020-12-22

## 2020-12-22 VITALS — BODY MASS INDEX: 37.56 KG/M2 | TEMPERATURE: 97.9 F | HEIGHT: 68 IN | WEIGHT: 247.8 LBS

## 2020-12-22 DIAGNOSIS — R80.9 TYPE 2 DIABETES MELLITUS WITH MICROALBUMINURIA, WITHOUT LONG-TERM CURRENT USE OF INSULIN (HCC): ICD-10-CM

## 2020-12-22 DIAGNOSIS — R10.11 RIGHT UPPER QUADRANT ABDOMINAL PAIN: ICD-10-CM

## 2020-12-22 DIAGNOSIS — E11.29 TYPE 2 DIABETES MELLITUS WITH MICROALBUMINURIA, WITHOUT LONG-TERM CURRENT USE OF INSULIN (HCC): ICD-10-CM

## 2020-12-22 DIAGNOSIS — K76.0 HEPATIC STEATOSIS: ICD-10-CM

## 2020-12-22 DIAGNOSIS — R10.11 RUQ PAIN: ICD-10-CM

## 2020-12-22 DIAGNOSIS — R10.11 RUQ PAIN: Primary | ICD-10-CM

## 2020-12-22 PROCEDURE — 86677 HELICOBACTER PYLORI ANTIBODY: CPT

## 2020-12-22 PROCEDURE — 99204 OFFICE O/P NEW MOD 45 MIN: CPT | Performed by: INTERNAL MEDICINE

## 2020-12-22 PROCEDURE — 36415 COLL VENOUS BLD VENIPUNCTURE: CPT

## 2020-12-22 NOTE — PROGRESS NOTES
PATIENT INFORMATION  Carrington Biggs       - 1960    CHIEF COMPLAINT  Chief Complaint   Patient presents with   • Abdominal Pain       HISTORY OF PRESENT ILLNESS  Recent Pneumonia admission at [facility]. Is 4 months off smoking. Presented with back pain but did pass out prior to calling EMS.    3 months of Right flank pain worse during the day , can feel at night but not a much. The longer he stands the worse it was. No rash and is getting his Shingles shots.    Weight is stable over the last month (245) Reviewed his CT form the hospital and has a renal stone and fatty liver     No relation to eating and his concern was asymmetry       REVIEWED PERTINENT RESULTS/ LABS  No results found for: CASEREPORT, FINALDX  Lab Results   Component Value Date    HGB 15.1 10/03/2020    MCV 92.6 10/03/2020     10/03/2020    ALT 21 2020    AST 14 2020    HGBA1C 9.90 (H) 2020    TRIG 297 (H) 2020      No results found.    REVIEW OF SYSTEMS  Review of Systems   Gastrointestinal: Positive for abdominal pain.        Gas   All other systems reviewed and are negative.        ACTIVE PROBLEMS  Patient Active Problem List    Diagnosis   • Hepatic steatosis [K76.0]   • Other insomnia [G47.09]   • PNA (pneumonia) [J18.9]   • Acute respiratory failure with hypoxia (CMS/HCC) [J96.01]   • Right renal stone [N20.0]   • Leukocytosis [D72.829]   • Obesity (BMI 30-39.9) [E66.9]   • Alcohol use [Z72.89]   • Tobacco abuse [Z72.0]   • Type 2 diabetes mellitus with peripheral neuropathy (CMS/HCC) [E11.42]   • Tibialis posterior tendonitis [M76.829]   • Achilles tendonosis [M67.88]   • Microalbuminuria [R80.9]   • Insurance coverage problems [Z59.8]   • Encounter for immunization [Z23]   • Colon cancer screening [Z12.11]   • Chronic pain of right ankle [M25.571, G89.29]   • Gastroesophageal reflux disease [K21.9]   • Herpes simplex virus (HSV) infection [B00.9]   • Obstructive sleep apnea syndrome [G47.33]   • Tubular  adenoma [D36.9]   • Colon polyp [K63.5]   • Type 2 diabetes mellitus with microalbuminuria, without long-term current use of insulin (CMS/HCC) [E11.29, R80.9]   • Erectile dysfunction of nonorganic origin [F52.21]   • Essential hypertension [I10]   • Hyperlipidemia [E78.5]   • Adiposity [E66.9]         PAST MEDICAL HISTORY  Past Medical History:   Diagnosis Date   • Erectile dysfunction    • Hyperlipidemia    • Hypertension    • Injury of neck    • Pneumonia    • Sleep apnea    • Type 2 diabetes mellitus (CMS/HCC)          SURGICAL HISTORY  Past Surgical History:   Procedure Laterality Date   • COLONOSCOPY      Dr. Arizmendi   • COLONOSCOPY N/A 2017    Procedure: COLONOSCOPY to Cecum and TI;  Surgeon: Chano Danielle MD;  Location: [facility] ENDOSCOPY;  Service:    • INCISION AND DRAINAGE ABSCESS           FAMILY HISTORY  Family History   Problem Relation Age of Onset   • Stroke Mother    • Diabetes Mother    • Stroke Father    • Aneurysm Father    • Rheum arthritis Sister    • Hypertension Brother    • Diabetes Sister    • Hypertension Sister    • Hyperlipidemia Sister    • Hypertension Brother    • Colon cancer Neg Hx    • Colon polyps Neg Hx          SOCIAL HISTORY  Social History     Occupational History   • Occupation: [employer]   Tobacco Use   • Smoking status: Former Smoker     Packs/day: 1.00     Years: 40.00     Pack years: 40.00     Types: Cigarettes     Quit date: 10/1/2020     Years since quittin.2   • Smokeless tobacco: Never Used   • Tobacco comment: April 3, 2017 low-dose CT scan negative   Substance and Sexual Activity   • Alcohol use: Yes     Alcohol/week: 10.0 standard drinks     Types: 10 Shots of liquor per week   • Drug use: No   • Sexual activity: Yes     Partners: Female         CURRENT MEDICATIONS    Current Outpatient Medications:   •  albuterol sulfate  (90 Base) MCG/ACT inhaler, Inhale 2 puffs Every 4 (Four) Hours As Needed for Wheezing or Shortness of Air., Disp: 18 g, Rfl:  "0  •  atorvastatin (LIPITOR) 40 MG tablet, TAKE 1 TABLET BY MOUTH EVERY DAY, Disp: 90 tablet, Rfl: 1  •  glimepiride (AMARYL) 4 MG tablet, one tablet twice a day, Disp: 180 tablet, Rfl: 1  •  glucose blood test strip, Care Sen N  Dx code E11.42 testing bs 2 x day, Disp: 100 each, Rfl: 5  •  hydroCHLOROthiazide (HYDRODIURIL) 25 MG tablet, TAKE 1 TABLET BY MOUTH EVERY DAY, Disp: 90 tablet, Rfl: 3  •  insulin NPH (humuLIN N,novoLIN N) 100 UNIT/ML injection, 35 units at bedtime, Disp: 1 each, Rfl: 12  •  Insulin Pen Needle (PEN NEEDLES) 32G X 5 MM misc, 1 each Daily., Disp: 100 each, Rfl: 5  •  Insulin Syringes, Disposable, U-100 0.5 ML misc, Use daily, Disp: 100 each, Rfl: 2  •  losartan (COZAAR) 100 MG tablet, TAKE 1 TABLET BY MOUTH EVERY DAY, Disp: 90 tablet, Rfl: 2  •  metFORMIN ER (GLUCOPHAGE-XR) 500 MG 24 hr tablet, TAKE 4 TABLETS BY MOUTH DAILY WITH BREAKFAST (Patient taking differently: Every Night. Do not give at time of or within 48 hours of iodinated intravenous contrast. Confirm renal function is normal before continuing.), Disp: 360 tablet, Rfl: 1  •  nicotine (NICODERM CQ) 21 MG/24HR patch, PLACE 1 PATCH ON THE SKIN AS DIRECTED BY PROVIDER DAILY., Disp: 14 patch, Rfl: 0  •  traZODone (DESYREL) 100 MG tablet, TAKE 1 TABLET BY MOUTH EVERYDAY AT BEDTIME, Disp: 90 tablet, Rfl: 2    ALLERGIES  Rosuvastatin calcium    VITALS  Vitals:    12/22/20 0849   Temp: 97.9 °F (36.6 °C)   TempSrc: Temporal   Weight: 112 kg (247 lb 12.8 oz)   Height: 172.7 cm (67.99\")       PHYSICAL EXAM  Debilities/Disabilities Identified: None  Emotional Behavior: Appropriate  Wt Readings from Last 3 Encounters:   12/22/20 112 kg (247 lb 12.8 oz)   11/17/20 110 kg (242 lb)   11/17/20 110 kg (242 lb 6.4 oz)     Ht Readings from Last 1 Encounters:   12/22/20 172.7 cm (67.99\")     Body mass index is 37.69 kg/m².  Physical Exam  Constitutional:       Appearance: He is well-developed.   HENT:      Head: Normocephalic and atraumatic.   Eyes:    "   General: No scleral icterus.     Pupils: Pupils are equal, round, and reactive to light.   Neck:      Musculoskeletal: Normal range of motion and neck supple.      Thyroid: No thyromegaly.   Cardiovascular:      Rate and Rhythm: Normal rate and regular rhythm.      Heart sounds: Normal heart sounds. No murmur. No gallop.    Pulmonary:      Effort: Pulmonary effort is normal.      Breath sounds: Normal breath sounds. No wheezing or rales.   Abdominal:      General: Bowel sounds are normal. There is no distension or abdominal bruit.      Palpations: Abdomen is soft. Abdomen is not rigid. There is no shifting dullness, fluid wave, hepatomegaly, splenomegaly, mass or pulsatile mass.      Tenderness: There is abdominal tenderness in the right upper quadrant and epigastric area. There is no guarding or rebound. Negative signs include Arguelles's sign.      Hernia: No hernia is present. There is no hernia in the ventral area.   Musculoskeletal: Normal range of motion.   Lymphadenopathy:      Cervical: No cervical adenopathy.   Skin:     General: Skin is warm and dry.      Findings: No erythema or rash.   Neurological:      Mental Status: He is alert and oriented to person, place, and time.   Psychiatric:         Mood and Affect: Mood normal.         Behavior: Behavior normal.         CLINICAL DATA REVIEWED   reviewed previous lab results and integrated with today's visit, reviewed notes from other physicians and/or last GI encounter, reviewed previous endoscopy results and available photos, reviewed surgical pathology results from previous biopsies    ASSESSMENT  Diagnoses and all orders for this visit:    RUQ pain  -     Helicobacter Pylori, IgA IgG IgM; Future    Hepatic steatosis    Right upper quadrant abdominal pain    Type 2 diabetes mellitus with microalbuminuria, without long-term current use of insulin (CMS/HCC)          PLAN    Avoid NSAIDS and will re-eval in 6 weeks- prior to discussing an EGD  Encouraged to see  his back doctor again as well  His last colon was 20127 so recall in 2022.  Return in about 6 weeks (around 2/2/2021).    I have discussed the above plan with the patient.  They verbalize understanding and are in agreement with the plan.  They have been advised to contact the office for any questions, concerns, or changes related to their health.

## 2020-12-23 LAB
H PYLORI IGA SER-ACNC: <9 UNITS (ref 0–8.9)
H PYLORI IGG SER IA-ACNC: 0.2 INDEX VALUE (ref 0–0.79)
H PYLORI IGM SER-ACNC: <9 UNITS (ref 0–8.9)

## 2020-12-29 RX ORDER — ALBUTEROL SULFATE 90 UG/1
2 AEROSOL, METERED RESPIRATORY (INHALATION) EVERY 4 HOURS PRN
Qty: 18 G | Refills: 1 | Status: SHIPPED | OUTPATIENT
Start: 2020-12-29 | End: 2021-11-17

## 2020-12-29 NOTE — TELEPHONE ENCOUNTER
PATIENT REQUESTED A REFILL ON albuterol sulfate  (90 Base) MCG/ACT inhaler    PATIENT CAN BE REACHED ON: [phone]     PHARMACY PREFERRED:[facility]/pharmacy #[id number] - [place], [place] - [address] AT [facility] - [phone]  - [phone] FX

## 2021-01-08 ENCOUNTER — TELEPHONE (OUTPATIENT)
Dept: GASTROENTEROLOGY | Facility: CLINIC | Age: 61
End: 2021-01-08

## 2021-01-12 ENCOUNTER — OFFICE VISIT (OUTPATIENT)
Dept: INTERNAL MEDICINE | Age: 61
End: 2021-01-12

## 2021-01-12 VITALS
HEIGHT: 68 IN | TEMPERATURE: 96.9 F | BODY MASS INDEX: 37.89 KG/M2 | SYSTOLIC BLOOD PRESSURE: 136 MMHG | WEIGHT: 250 LBS | DIASTOLIC BLOOD PRESSURE: 70 MMHG | OXYGEN SATURATION: 95 % | HEART RATE: 77 BPM

## 2021-01-12 DIAGNOSIS — F10.10 EXCESSIVE DRINKING OF ALCOHOL: Chronic | ICD-10-CM

## 2021-01-12 DIAGNOSIS — E11.29 TYPE 2 DIABETES MELLITUS WITH MICROALBUMINURIA, WITHOUT LONG-TERM CURRENT USE OF INSULIN (HCC): Chronic | ICD-10-CM

## 2021-01-12 DIAGNOSIS — K76.0 HEPATIC STEATOSIS: Chronic | ICD-10-CM

## 2021-01-12 DIAGNOSIS — G47.33 OBSTRUCTIVE SLEEP APNEA SYNDROME: Primary | Chronic | ICD-10-CM

## 2021-01-12 DIAGNOSIS — I10 ESSENTIAL HYPERTENSION: Chronic | ICD-10-CM

## 2021-01-12 DIAGNOSIS — E66.9 OBESITY (BMI 30-39.9): Chronic | ICD-10-CM

## 2021-01-12 DIAGNOSIS — E78.5 HYPERLIPIDEMIA, UNSPECIFIED HYPERLIPIDEMIA TYPE: Chronic | ICD-10-CM

## 2021-01-12 DIAGNOSIS — R80.9 TYPE 2 DIABETES MELLITUS WITH MICROALBUMINURIA, WITHOUT LONG-TERM CURRENT USE OF INSULIN (HCC): Chronic | ICD-10-CM

## 2021-01-12 PROBLEM — D36.9 TUBULAR ADENOMA: Status: RESOLVED | Noted: 2017-01-26 | Resolved: 2021-01-12

## 2021-01-12 PROBLEM — B00.9 HERPES SIMPLEX VIRUS (HSV) INFECTION: Status: RESOLVED | Noted: 2017-01-26 | Resolved: 2021-01-12

## 2021-01-12 PROBLEM — N20.0 RIGHT RENAL STONE: Status: RESOLVED | Noted: 2020-09-30 | Resolved: 2021-01-12

## 2021-01-12 PROCEDURE — 99215 OFFICE O/P EST HI 40 MIN: CPT | Performed by: INTERNAL MEDICINE

## 2021-01-12 NOTE — ASSESSMENT & PLAN NOTE
Continue atorvastatin 40 mg qd.    Lab Results   Component Value Date    LDL 75 11/03/2020    LDL 59 07/08/2020    LDL 57 11/26/2019    HDL 37 (L) 11/03/2020

## 2021-01-12 NOTE — PROGRESS NOTES
I N T E R N A L  M E D I C I N E  J U N O H  K I M,  M D      ENCOUNTER DATE:  01/12/2021    Carrington PALACIOS Shell / 60 y.o. / male      CHIEF COMPLAINT / REASON FOR OFFICE VISIT     Establish Care ((Shee))      ASSESSMENT & PLAN     Problem List Items Addressed This Visit     Type 2 diabetes mellitus with microalbuminuria, without long-term current use of insulin (CMS/[facility]) (Chronic)    Overview     *Yson         Current Assessment & Plan     Advised to stop drinking, eat earlier, watch diet better, work on wt loss.   Discuss with endocrinology about optimizing medications.     Lab Results   Component Value Date    HGBA1C 9.90 (H) 11/03/2020    HGBA1C 9.25 (H) 09/30/2020    HGBA1C 9.3 (H) 07/08/2020    CREATININE 0.80 11/03/2020    LDL 75 11/03/2020    MICROALBUR 224.0 07/15/2020             Relevant Medications    glimepiride (AMARYL) 4 MG tablet    metFORMIN ER (GLUCOPHAGE-XR) 500 MG 24 hr tablet    insulin NPH (humuLIN N,novoLIN N) 100 UNIT/ML injection    Essential hypertension (Chronic)    Current Assessment & Plan     Continue HCTZ 25 mg and losartan 100 mg qd.          Relevant Medications    losartan (COZAAR) 100 MG tablet    hydroCHLOROthiazide (HYDRODIURIL) 25 MG tablet    Hyperlipidemia (Chronic)    Current Assessment & Plan     Continue atorvastatin 40 mg qd.    Lab Results   Component Value Date    LDL 75 11/03/2020    LDL 59 07/08/2020    LDL 57 11/26/2019    HDL 37 (L) 11/03/2020             Relevant Medications    atorvastatin (LIPITOR) 40 MG tablet    Obstructive sleep apnea syndrome - Primary (Chronic)    Current Assessment & Plan     Has not been using CPAP since 2017.   Referral for CPAP titration.   Advised to avoid alcohol.          Relevant Orders    Ambulatory Referral to Sleep Medicine    Obesity (BMI 30-39.9) (Chronic)    Excessive drinking of alcohol (Chronic)    Current Assessment & Plan     Advised to quit drinking.          Hepatic steatosis (Chronic)    Overview     Per abdominal CT scan  "September 2020             Orders Placed This Encounter   Procedures   • Pneumococcal Polysaccharide Vaccine 23-Valent Greater Than or Equal To 3yo Subcutaneous / IM   • Ambulatory Referral to Sleep Medicine     No orders of the defined types were placed in this encounter.      SUMMARY/DISCUSSION  •       Next Appointment with me: Visit date not found    Return in about 3 months (around 4/12/2021) for Reassess chronic medical problems.      VITAL SIGNS     Visit Vitals  /70   Pulse 77   Temp 96.9 °F (36.1 °C)   Ht 172.7 cm (67.99\")   Wt 113 kg (250 lb)   SpO2 95%   BMI 38.02 kg/m²       BP Readings from Last 3 Encounters:   01/12/21 136/70   11/17/20 118/56   11/17/20 110/70     Wt Readings from Last 3 Encounters:   01/12/21 113 kg (250 lb)   12/22/20 112 kg (247 lb 12.8 oz)   11/17/20 110 kg (242 lb)     Body mass index is 38.02 kg/m².        HISTORY OF PRESENT ILLNESS     Former patient of Dr. Perry. Long standing history of type 2 diabetes, sees Dr. Anaya.   Lab Results   Component Value Date    HGBA1C 9.90 (H) 11/03/2020    HGBA1C 9.25 (H) 09/30/2020    HGBA1C 9.3 (H) 07/08/2020   On NPH insulin, metformin and glimepiride 4 mg BID. Denies hypoglycemia.     Not compliant on CPAP. Has severe obesity. Drinks regularly/heavily. Has steatosis of the liver on CT.     Hypertension on HCTZ and losartan. Denies chest pain.     Quit smoking in October, on nicotine patch.     Complains of right flank/RUQ abdominal pain. Sees Dr. Manning.         REVIEW OF SYSTEMS           PHYSICAL EXAMINATION     Physical Exam  Severe obesity, no acute distress  Cardiovascular: Normal rate, regular rhythm.   Pulm/Chest: Effort normal, breath sounds normal.   Abdomen protuberant, mild right abdominal tenderness to palpation (no rigidity or guarding)  Trace bilateral lower extremities edema       REVIEWED DATA     Labs:     Lab Results   Component Value Date     11/03/2020    K 4.8 11/03/2020    CALCIUM 10.2 11/03/2020    AST " 14 2020    ALT 21 2020    BUN 11 2020    CREATININE 0.80 2020    CREATININE 0.66 (L) 10/03/2020       Lab Results   Component Value Date    HGBA1C 9.90 (H) 2020    HGBA1C 9.25 (H) 2020       Lab Results   Component Value Date    LDL 75 2020    LDL 59 2020    HDL 37 (L) 2020    TRIG 297 (H) 2020       Lab Results   Component Value Date    TSH 2.340 2019    FREET4 1.18 2019            Imagin/30/20  CT CHEST, ABDOMEN, AND PELVIS WITHOUT IV CONTRAST     HISTORY: Pneumonia, dyspnea, hypoxia, right low back and groin pain     TECHNIQUE: Radiation dose reduction techniques were utilized, including  automated exposure control and exposure modulation based on body size.   3 mm images were obtained through the chest, abdomen, and pelvis without  IV contrast.      COMPARISON: Chest CT 2019     FINDINGS:     CHEST:   There are patchy nodular and groundglass infiltrates within the bases of  both lower lobes consistent with pneumonia. No pleural effusion or  lymphadenopathy. Coronary artery calcifications. Bone windows show no  acute osseous abnormality.     ABDOMEN:   Mild fatty infiltration of the liver. Gallbladder is contracted. The  spleen is unremarkable. 4 mm nonobstructing stone in the upper pole of  the right kidney. The left kidney is unremarkable. The adrenal glands  and pancreas are unremarkable. Tiny gas bubbles in the subcutaneous fat  of the left anterior abdominal wall probably from medication injection.  Correlate clinically     PELVIS:  Bladder unremarkable. No free fluid in the pelvis. Colon is  unremarkable. The appendix is normal. Bone windows demonstrate no acute  osseous abnormality. Degenerative changes at L5-S1     IMPRESSION:  1. Patchy nodular and groundglass infiltrates within the bases of both  lower lobes consistent with pneumonia.  2. 4 mm nonobstructing stone in the upper pole of the right kidney.        Radiation  dose reduction techniques were utilized, including automated  exposure control and exposure modulation based on body size.      Medical Tests:     EKG (9/30/20)    HEART RATE= 112  bpm  RR Interval= 536  ms  VA Interval= 119  ms  P Horizontal Axis=   deg  P Front Axis= 61  deg  QRSD Interval= 111  ms  QT Interval= 346  ms  QRS Axis= 81  deg  T Wave Axis= -1  deg  - OTHERWISE NORMAL ECG -  Sinus tachycardia  NO PRIOR TRACING AVAILABLE FOR COMPARISON  Electronically Signed By: Yasmeen [facility]agueW[facility]) 30-Sep-2020 16:58:11  Date and Time of Study: 2020-09-29 23:51:37      Summary of old records / correspondence / consultant report:           Request outside records:           MEDICATIONS AT THE TIME OF OFFICE VISIT     Current Outpatient Medications   Medication Sig Dispense Refill   • albuterol sulfate  (90 Base) MCG/ACT inhaler Inhale 2 puffs Every 4 (Four) Hours As Needed for Wheezing or Shortness of Air. 18 g 1   • atorvastatin (LIPITOR) 40 MG tablet TAKE 1 TABLET BY MOUTH EVERY DAY 90 tablet 1   • glimepiride (AMARYL) 4 MG tablet one tablet twice a day 180 tablet 1   • glucose blood test strip [facility] N  Dx code E11.42 testing bs 2 x day 100 each 5   • hydroCHLOROthiazide (HYDRODIURIL) 25 MG tablet TAKE 1 TABLET BY MOUTH EVERY DAY 90 tablet 3   • insulin NPH (humuLIN N,novoLIN N) 100 UNIT/ML injection 35 units at bedtime (Patient taking differently: 40 units at bedtime) 1 each 12   • Insulin Pen Needle (PEN NEEDLES) 32G X 5 MM misc 1 each Daily. 100 each 5   • Insulin Syringes, Disposable, U-100 0.5 ML misc Use daily 100 each 2   • losartan (COZAAR) 100 MG tablet TAKE 1 TABLET BY MOUTH EVERY DAY 90 tablet 2   • metFORMIN ER (GLUCOPHAGE-XR) 500 MG 24 hr tablet TAKE 4 TABLETS BY MOUTH DAILY WITH BREAKFAST (Patient taking differently: Every Night. Do not give at time of or within 48 hours of iodinated intravenous contrast. Confirm renal function is normal before continuing.) 360 tablet 1   • nicotine (NICODERM CQ)  21 MG/24HR patch PLACE 1 PATCH ON THE SKIN AS DIRECTED BY PROVIDER DAILY. 14 patch 0   • traZODone (DESYREL) 100 MG tablet TAKE 1 TABLET BY MOUTH EVERYDAY AT BEDTIME 90 tablet 2     No current facility-administered medications for this visit.            *Examiner was wearing KN95 mask, face shield and exam gloves during the entire duration of the visit. Patient was masked the entire time.   Minimum social distance of 6 ft maintained entire visit except if physical contact was necessary as documented.     **Dragon Disclaimer:   Much of this encounter note is an electronic transcription/translation of spoken language to printed text. The electronic translation of spoken language may permit erroneous, or at times, nonsensical words or phrases to be inadvertently transcribed. Although I have reviewed the note for such errors, some may still exist.     Template created by Ovidio Louise MD

## 2021-01-12 NOTE — ASSESSMENT & PLAN NOTE
Advised to stop drinking, eat earlier, watch diet better, work on wt loss.   Discuss with endocrinology about optimizing medications.     Lab Results   Component Value Date    HGBA1C 9.90 (H) 11/03/2020    HGBA1C 9.25 (H) 09/30/2020    HGBA1C 9.3 (H) 07/08/2020    CREATININE 0.80 11/03/2020    LDL 75 11/03/2020    MICROALBUR 224.0 07/15/2020

## 2021-01-13 PROCEDURE — 90732 PPSV23 VACC 2 YRS+ SUBQ/IM: CPT | Performed by: INTERNAL MEDICINE

## 2021-01-13 PROCEDURE — 90471 IMMUNIZATION ADMIN: CPT | Performed by: INTERNAL MEDICINE

## 2021-01-18 ENCOUNTER — TELEPHONE (OUTPATIENT)
Dept: SURGERY | Facility: CLINIC | Age: 61
End: 2021-01-18

## 2021-01-18 NOTE — TELEPHONE ENCOUNTER
PT CANCELLED HIS APPT FOR 1/19/2021 BECAUSE HE DOESN'T FEEL ANY BETTER AND DOESN'T WANT TO SPEND $300 FOR NOTHING.  PLEASE ADVISE.  PT # [phone]

## 2021-02-09 ENCOUNTER — APPOINTMENT (OUTPATIENT)
Dept: SLEEP MEDICINE | Facility: HOSPITAL | Age: 61
End: 2021-02-09

## 2021-02-19 RX ORDER — LOSARTAN POTASSIUM 100 MG/1
100 TABLET ORAL DAILY
Qty: 90 TABLET | Refills: 1 | Status: SHIPPED | OUTPATIENT
Start: 2021-02-19 | End: 2021-08-30

## 2021-02-22 ENCOUNTER — OFFICE VISIT (OUTPATIENT)
Dept: SLEEP MEDICINE | Facility: HOSPITAL | Age: 61
End: 2021-02-22

## 2021-02-22 VITALS
HEART RATE: 82 BPM | DIASTOLIC BLOOD PRESSURE: 60 MMHG | SYSTOLIC BLOOD PRESSURE: 130 MMHG | BODY MASS INDEX: 38.19 KG/M2 | OXYGEN SATURATION: 92 % | WEIGHT: 252 LBS | HEIGHT: 68 IN

## 2021-02-22 DIAGNOSIS — J44.9 CHRONIC OBSTRUCTIVE PULMONARY DISEASE, UNSPECIFIED COPD TYPE (HCC): ICD-10-CM

## 2021-02-22 DIAGNOSIS — G47.33 OSA (OBSTRUCTIVE SLEEP APNEA): Primary | ICD-10-CM

## 2021-02-22 PROCEDURE — G0463 HOSPITAL OUTPT CLINIC VISIT: HCPCS

## 2021-02-22 NOTE — PROGRESS NOTES
[facility]      Patient Care Team:  Preet Louise MD as PCP - General (Internal Medicine)  Dann Alvarado MD as Consulting Physician (Orthopedic Surgery)  Deidra Cannon MD as Consulting Physician (Ophthalmology)  Bayron Anaya MD as Consulting Physician (Endocrinology)  Raj Perez MD as Consulting Physician (Urology)  Rocco Manning MD as Consulting Physician (Gastroenterology)    Referring Provider: Preet Louise MD    Chief complaint:   Referred for sleep apnea    History of present illness:  Subjective   This is a 60 year old male patient, smoker, started at the age of 15 years old up to 2 packs a day but cut down to 4 cig a day since October 2020 due to pneumonia. He has hx of sleep apnea diagnosed 15 years ago. He does not recall where he had his sleep test. He stopped using his CPAP 10 years ago.     He has loud snoring which is worse when he sleeps on his back and reported witnessed apnea and episode of gasping and choking for breath at night.  He does wake up with a dry mouth.  He has restless sleep with body jerks.      Sleep schedule:  -Bedtime: 8:30 PM  -Sleep latency: Instantly  -Wake up time: 5:30 AM , does feel refreshed  -Nocturnal awakening: 3 times because of nocturia.  No difficulties going back to sleep.  -Perceived sleep hours: 9    ESS: Total score: 6.    He does not think he gained or lost weight since his initial sleep     Review of Systems  Constitutional: No fever or chills. No changes in appetite.   ENMT: Nasal congestion and postnasal drip. Mild hoarsness  Cardiovascular: No chest pain, palpitation or legs swelling.    Respiratory: Dyspnea on exertion and wheezing. Mild intermittent cough, chronic. He uses Albuterol inhaler once a day on average. He does not see a pulmonologist.   Gastrointestinal: No constipation, diarrhea, abdominal pain but acid reflux  Neurology: No headache, weakness, numbness or  dizziness.   Musculoskeletal: Pain in joints and muscles.  Psychiatry: No depression, anxiety or irritability.   Hem/Lymphatic: No swollen glands or easy bruising.  Integumentary: No rash.  Endocrinology: No excessive thirst, cold or warm intolerance.   Urinary: No dysuria, bloody urine but frequent urination.     History  Past Medical History:   Diagnosis Date   • Erectile dysfunction    • Herpes simplex virus (HSV) infection 2017   • Hyperlipidemia    • Hypertension    • Pneumonia    • Right renal stone 2020   • Sleep apnea    • Tubular adenoma 2017   • Type 2 diabetes mellitus (CMS/HCC)    ,   Past Surgical History:   Procedure Laterality Date   • COLONOSCOPY      Dr. Arizmendi   • COLONOSCOPY N/A 2017    Procedure: COLONOSCOPY to Cecum and TI;  Surgeon: Chano Danielle MD;  Location: [facility] ENDOSCOPY;  Service:    • INCISION AND DRAINAGE ABSCESS     ,   Family History   Problem Relation Age of Onset   • Stroke Mother    • Diabetes Mother    • Stroke Father    • Aneurysm Father    • Rheum arthritis Sister    • Hypertension Brother    • Diabetes Sister    • Hypertension Sister    • Hyperlipidemia Sister    • Hypertension Brother    • Colon cancer Neg Hx    • Colon polyps Neg Hx    ,   Social History     Tobacco Use   • Smoking status: Former Smoker     Packs/day: 1.00     Years: 40.00     Pack years: 40.00     Types: Cigarettes     Quit date: 10/1/2020     Years since quittin.3   • Smokeless tobacco: Never Used   • Tobacco comment:  quit smoking 10/2020   Substance Use Topics   • Alcohol use: Yes     Alcohol/week: 10.0 standard drinks     Types: 10 Shots of liquor per week   • Drug use: No       , (Not in a hospital admission)   and Allergies:  Rosuvastatin calcium    Medications:    Current Outpatient Medications:   •  albuterol sulfate  (90 Base) MCG/ACT inhaler, Inhale 2 puffs Every 4 (Four) Hours As Needed for Wheezing or Shortness of Air., Disp: 18 g, Rfl: 1  •  atorvastatin  "(LIPITOR) 40 MG tablet, TAKE 1 TABLET BY MOUTH EVERY DAY, Disp: 90 tablet, Rfl: 1  •  glimepiride (AMARYL) 4 MG tablet, one tablet twice a day, Disp: 180 tablet, Rfl: 1  •  glucose blood test strip, Care Sen N  Dx code E11.42 testing bs 2 x day, Disp: 100 each, Rfl: 5  •  hydroCHLOROthiazide (HYDRODIURIL) 25 MG tablet, TAKE 1 TABLET BY MOUTH EVERY DAY, Disp: 90 tablet, Rfl: 3  •  insulin NPH (humuLIN N,novoLIN N) 100 UNIT/ML injection, 35 units at bedtime (Patient taking differently: 40 units at bedtime), Disp: 1 each, Rfl: 12  •  Insulin Pen Needle (PEN NEEDLES) 32G X 5 MM misc, 1 each Daily., Disp: 100 each, Rfl: 5  •  Insulin Syringes, Disposable, U-100 0.5 ML misc, Use daily, Disp: 100 each, Rfl: 2  •  losartan (COZAAR) 100 MG tablet, Take 1 tablet by mouth Daily., Disp: 90 tablet, Rfl: 1  •  metFORMIN ER (GLUCOPHAGE-XR) 500 MG 24 hr tablet, TAKE 4 TABLETS BY MOUTH DAILY WITH BREAKFAST (Patient taking differently: Every Night. Do not give at time of or within 48 hours of iodinated intravenous contrast. Confirm renal function is normal before continuing.), Disp: 360 tablet, Rfl: 1  •  nicotine (NICODERM CQ) 21 MG/24HR patch, PLACE 1 PATCH ON THE SKIN AS DIRECTED BY PROVIDER DAILY., Disp: 14 patch, Rfl: 0  •  traZODone (DESYREL) 100 MG tablet, TAKE 1 TABLET BY MOUTH EVERYDAY AT BEDTIME, Disp: 90 tablet, Rfl: 2      Objective   Vital Signs:  Vitals:    02/22/21 0819   BP: 130/60   Pulse: 82   SpO2: 92%   Weight: 114 kg (252 lb)   Height: 172.7 cm (68\")     Body mass index is 38.32 kg/m².  Neck Circumference: 17.5 inches     Physical Exam:  Neck Circumference: 17.5 inches     Constitutional: Not in acute distress.  Eyes: Injected conjunctiva, EOMI. pupils equal reactive to light.  ENMT: Bates score 3. Mallampati score 3. No oral thrush. Tonsils grade 1. Narrow distance in between the posterior pharyngeal pillars (<25 %).   Neck: Large. No thyromegaly.  Trachea midline.  Heart: Regular rhythm and rate, no " murmur  Lungs:Diminished but equal air entry bilaterally. No crackles but diffuse bilateral wheezing.  Nonlabored breathing.       Abdomen: Obese.  Soft.  No tenderness.  Positive bowel sounds.  Extremities: No cyanosis, clubbing but trace pitting edema.  Warm extremities and well-perfused.  Neuro: Conscious, alert, oriented x3.  Gait is normal.  Strength 5/5 in arms and legs.  Psych: Appropriate mood and affect.    Integumentary: No rash.  Normal skin turgor.  Lymphatic: No palpable cervical or supraclavicular lymph nodes.      Assessment   1. JACEK, unknown severity  2. Probable COPD  3. Tobacco abuse  4. Obesity (BMI 38)  5. Essential HTN      Plan:  Check HST. We discussed the pathophysiology of sleep apnea, testing and therapy which include CPAP and weight loss.  Patient is agreeable with CPAP therapy if needed.  He will probably need a titration test.    Counseled for smoking cessation.    Initiate Trelegy for COPD and check PFT.  He has significant wheezing on exam and COPD is highly suspected.    Discussed the importance of Pap therapy in the setting of HTN and increased risk of cardiovascular disease.          Earle Ivy MD  02/22/21  08:42 EST    This note was dictated utilizing Dragon dictation

## 2021-03-18 ENCOUNTER — HOSPITAL ENCOUNTER (OUTPATIENT)
Dept: SLEEP MEDICINE | Facility: HOSPITAL | Age: 61
Discharge: HOME | End: 2021-03-18
Admitting: INTERNAL MEDICINE

## 2021-03-18 DIAGNOSIS — G47.33 OSA (OBSTRUCTIVE SLEEP APNEA): ICD-10-CM

## 2021-03-18 PROCEDURE — 95806 SLEEP STUDY UNATT&RESP EFFT: CPT

## 2021-03-19 ENCOUNTER — BULK ORDERING (OUTPATIENT)
Dept: CASE MANAGEMENT | Facility: OTHER | Age: 61
End: 2021-03-19

## 2021-03-19 DIAGNOSIS — Z23 IMMUNIZATION DUE: ICD-10-CM

## 2021-03-24 ENCOUNTER — TELEPHONE (OUTPATIENT)
Dept: SLEEP MEDICINE | Facility: HOSPITAL | Age: 61
End: 2021-03-24

## 2021-03-24 NOTE — TELEPHONE ENCOUNTER
LV informing patient of sleep study results , [facility] unless patient has a preferred DME . Compliance follow up needed

## 2021-04-01 DIAGNOSIS — E78.5 HYPERLIPIDEMIA, UNSPECIFIED HYPERLIPIDEMIA TYPE: ICD-10-CM

## 2021-04-01 DIAGNOSIS — E11.29 TYPE 2 DIABETES MELLITUS WITH MICROALBUMINURIA, WITHOUT LONG-TERM CURRENT USE OF INSULIN (HCC): Primary | ICD-10-CM

## 2021-04-01 DIAGNOSIS — R80.9 TYPE 2 DIABETES MELLITUS WITH MICROALBUMINURIA, WITHOUT LONG-TERM CURRENT USE OF INSULIN (HCC): Primary | ICD-10-CM

## 2021-04-01 DIAGNOSIS — I10 ESSENTIAL HYPERTENSION: ICD-10-CM

## 2021-04-12 NOTE — PROGRESS NOTES
Subjective   Carrington Biggs is a 60 y.o. male.     F/u for dm 2, hyperlipidemia, hypertension, sleep apnea/ testing bs 1 x day / last dm eye exam 8/5/17 with dr Cannon / last dm foot exam today  with dr Anaya      He has known diabetes mellitus since 2012 and was started on insulin in 2013.  He is on  Novolin N 40 q hs,  metformin  mg 4 tablets once a day, and glimepiride 4 mg BID.   Fasting glucose low 200's.  He denies hypoglycemic episodes.  He has gained 9 lbs since 11/20.  He has a high deductible plan.  Hemoglobin A1c done in 4/21 is 10.10%.     He has occasional blurry vision.  His last eye examination was in 8/17.  He has microalbuminuria on urine sample taken 7/20.  He is on losartan.  He denies burning in his feet     He has hyperlipidemia and has been on Lipitor 40 mg once a day.  He has bilateral arm weakness for several months but no pain.  He has cervical arthritis on x-ray.  He denies muscle pain.  Lipid panel done in April 2021 are as follows: Cholesterol 164.  HDL 38.  LDL 70.  Triglycerides 353.     He has hypertension and is on losartan 100 mg once a day and hydrochlorothiazide 25 mg/day..  He denies any history of heart attack or stroke.  He denies any chest pain or shortness of breath     He has sleep apnea and will start back on the CPAP.     He drinks 5 drinks on Fridays and Saturdays.  He quit smoking in 10/20.       He has been seeing Dr. Perez for erectile dysfunction.  He is using sildenafil 20 mg 5 tabs/day as needed.  He had an incidental 4 mm nonobstructing stone in the upper pole of right kidney     He had 2 Moderna Covid vaccine without major side effects  The following portions of the patient's history were reviewed and updated as appropriate: allergies, current medications, past family history, past medical history, past social history, past surgical history and problem list.    Review of Systems   Respiratory: Negative for shortness of breath.    Gastrointestinal:  "Negative.    Genitourinary: Negative.    Musculoskeletal: Positive for neck pain. Negative for myalgias.   Neurological: Negative for numbness.     Objective      Vitals:    04/21/21 1012   BP: 138/100   BP Location: Left arm   Patient Position: Sitting   Cuff Size: Adult   Pulse: 72   Resp: 16   Temp: 97.1 °F (36.2 °C)   SpO2: 98%   Weight: 114 kg (251 lb)   Height: 172.7 cm (67.99\")     Physical Exam  Constitutional:       General: He is not in acute distress.     Appearance: Normal appearance. He is normal weight. He is not ill-appearing, toxic-appearing or diaphoretic.   HENT:      Nose: No congestion or rhinorrhea.   Eyes:      General: No scleral icterus.        Right eye: No discharge.         Left eye: No discharge.   Neck:      Vascular: No carotid bruit.   Cardiovascular:      Rate and Rhythm: Normal rate and regular rhythm.      Pulses: Normal pulses.      Heart sounds: Normal heart sounds. No murmur heard.   No friction rub.   Pulmonary:      Effort: No respiratory distress.      Breath sounds: Normal breath sounds. No stridor. No wheezing, rhonchi or rales.   Chest:      Chest wall: No tenderness.   Abdominal:      General: There is no distension.      Palpations: Abdomen is soft. There is no mass.      Tenderness: There is no right CVA tenderness or left CVA tenderness.   Musculoskeletal:         General: Normal range of motion.      Cervical back: Normal range of motion and neck supple. No rigidity or tenderness.   Lymphadenopathy:      Cervical: No cervical adenopathy.   Skin:     General: Skin is warm and dry.   Neurological:      General: No focal deficit present.      Mental Status: He is alert and oriented to person, place, and time.      Comments: Intact light touch on lower ext   Psychiatric:         Mood and Affect: Mood normal.         Behavior: Behavior normal.       Results Encounter on 04/06/2021   Component Date Value Ref Range Status   • Hemoglobin A1C 04/06/2021 10.10* 4.80 - 5.60 % " Final    Comment: Hemoglobin A1C Ranges:  Increased Risk for Diabetes  5.7% to 6.4%  Diabetes                     >= 6.5%  Diabetic Goal                < 7.0%     • Glucose 04/06/2021 190* 65 - 99 mg/dL Final   • BUN 04/06/2021 17  8 - 23 mg/dL Final   • Creatinine 04/06/2021 0.90  0.76 - 1.27 mg/dL Final   • eGFR Non  Am 04/06/2021 86  >60 mL/min/1.73 Final    Comment: GFR Normal >60  Chronic Kidney Disease <60  Kidney Failure <15     • eGFR  Am 04/06/2021 104  >60 mL/min/1.73 Final   • BUN/Creatinine Ratio 04/06/2021 18.9  7.0 - 25.0 Final   • Sodium 04/06/2021 140  136 - 145 mmol/L Final   • Potassium 04/06/2021 4.9  3.5 - 5.2 mmol/L Final   • Chloride 04/06/2021 96* 98 - 107 mmol/L Final   • Total CO2 04/06/2021 32.1* 22.0 - 29.0 mmol/L Final   • Calcium 04/06/2021 10.0  8.6 - 10.5 mg/dL Final   • Total Protein 04/06/2021 6.8  6.0 - 8.5 g/dL Final   • Albumin 04/06/2021 4.70  3.50 - 5.20 g/dL Final   • Globulin 04/06/2021 2.1  gm/dL Final   • A/G Ratio 04/06/2021 2.2  g/dL Final   • Total Bilirubin 04/06/2021 0.4  0.0 - 1.2 mg/dL Final   • Alkaline Phosphatase 04/06/2021 93  39 - 117 U/L Final   • AST (SGOT) 04/06/2021 20  1 - 40 U/L Final   • ALT (SGPT) 04/06/2021 27  1 - 41 U/L Final   • Total Cholesterol 04/06/2021 164  0 - 200 mg/dL Final    Comment: Cholesterol Reference Ranges  (U.S. Department of Health and Human Services ATP III  Classifications)  Desirable          <200 mg/dL  Borderline High    200-239 mg/dL  High Risk          >240 mg/dL  Triglyceride Reference Ranges  (U.S. Department of Health and Human Services ATP III  Classifications)  Normal           <150 mg/dL  Borderline High  150-199 mg/dL  High             200-499 mg/dL  Very High        >500 mg/dL  HDL Reference Ranges  (U.S. Department of Health and Human Services ATP III  Classifcations)  Low     <40 mg/dl (major risk factor for CHD)  High    >60 mg/dl ('negative' risk factor for CHD)  LDL Reference Ranges  (U.S. Department  of Health and Human Services ATP III  Classifcations)  Optimal          <100 mg/dL  Near Optimal     100-129 mg/dL  Borderline High  130-159 mg/dL  High             160-189 mg/dL  Very High        >189 mg/dL     • Triglycerides 04/06/2021 353* 0 - 150 mg/dL Final   • HDL Cholesterol 04/06/2021 38* 40 - 60 mg/dL Final   • VLDL Cholesterol Gehrard 04/06/2021 56* 5 - 40 mg/dL Final   • LDL Chol Calc ([facility]) 04/06/2021 70  0 - 100 mg/dL Final   • Interpretation 04/06/2021 Note   Final    Supplemental report is available.   • PDF Image 04/06/2021 Not applicable   Final     Assessment/Plan   Diagnoses and all orders for this visit:    1. Type 2 diabetes mellitus with microalbuminuria, without long-term current use of insulin (CMS/[facility]) (Primary)    2. Essential hypertension    3. Hyperlipidemia, unspecified hyperlipidemia type    4. Obstructive sleep apnea syndrome    5. Hepatic steatosis      Increase Novolin N to 50 units at bedtime.  Start Novolin R 10 units before each meal 3 times daily.  Continue glimepiride 4 mg twice a day and Metformin ER 2000 mg/day.  Check blood sugar before meals and at bedtime and send results in 1 week.  Advised to have a yearly eye examination.  Continue losartan and hydrochlorothiazide  Continue Lipitor    Copy of my note sent to Dr. Louise.    RTC 4 mos    Total time 54 min

## 2021-04-19 DIAGNOSIS — E11.9 TYPE 2 DIABETES MELLITUS WITHOUT COMPLICATION, WITHOUT LONG-TERM CURRENT USE OF INSULIN (HCC): ICD-10-CM

## 2021-04-20 RX ORDER — GLIMEPIRIDE 4 MG/1
TABLET ORAL
Qty: 180 TABLET | Refills: 1 | Status: SHIPPED | OUTPATIENT
Start: 2021-04-20 | End: 2021-11-01

## 2021-04-21 ENCOUNTER — OFFICE VISIT (OUTPATIENT)
Dept: ENDOCRINOLOGY | Age: 61
End: 2021-04-21

## 2021-04-21 VITALS
SYSTOLIC BLOOD PRESSURE: 138 MMHG | OXYGEN SATURATION: 98 % | TEMPERATURE: 97.1 F | BODY MASS INDEX: 38.04 KG/M2 | DIASTOLIC BLOOD PRESSURE: 100 MMHG | RESPIRATION RATE: 16 BRPM | HEART RATE: 72 BPM | WEIGHT: 251 LBS | HEIGHT: 68 IN

## 2021-04-21 DIAGNOSIS — G47.33 OBSTRUCTIVE SLEEP APNEA SYNDROME: Chronic | ICD-10-CM

## 2021-04-21 DIAGNOSIS — K76.0 HEPATIC STEATOSIS: Chronic | ICD-10-CM

## 2021-04-21 DIAGNOSIS — E11.29 TYPE 2 DIABETES MELLITUS WITH MICROALBUMINURIA, WITHOUT LONG-TERM CURRENT USE OF INSULIN (HCC): Primary | Chronic | ICD-10-CM

## 2021-04-21 DIAGNOSIS — R80.9 TYPE 2 DIABETES MELLITUS WITH MICROALBUMINURIA, WITHOUT LONG-TERM CURRENT USE OF INSULIN (HCC): Primary | Chronic | ICD-10-CM

## 2021-04-21 DIAGNOSIS — I10 ESSENTIAL HYPERTENSION: Chronic | ICD-10-CM

## 2021-04-21 DIAGNOSIS — E78.5 HYPERLIPIDEMIA, UNSPECIFIED HYPERLIPIDEMIA TYPE: Chronic | ICD-10-CM

## 2021-04-21 PROCEDURE — 99215 OFFICE O/P EST HI 40 MIN: CPT | Performed by: INTERNAL MEDICINE

## 2021-07-02 DIAGNOSIS — E78.5 HYPERLIPIDEMIA, UNSPECIFIED HYPERLIPIDEMIA TYPE: ICD-10-CM

## 2021-07-02 DIAGNOSIS — G47.09 OTHER INSOMNIA: ICD-10-CM

## 2021-07-12 RX ORDER — ATORVASTATIN CALCIUM 40 MG/1
40 TABLET, FILM COATED ORAL DAILY
Qty: 90 TABLET | Refills: 0 | Status: SHIPPED | OUTPATIENT
Start: 2021-07-12 | End: 2021-10-04

## 2021-07-12 RX ORDER — TRAZODONE HYDROCHLORIDE 100 MG/1
100 TABLET ORAL
Qty: 30 TABLET | Refills: 0 | Status: SHIPPED | OUTPATIENT
Start: 2021-07-12 | End: 2021-08-09

## 2021-07-29 RX ORDER — METFORMIN HYDROCHLORIDE 500 MG/1
TABLET, EXTENDED RELEASE ORAL
Qty: 360 TABLET | Refills: 1 | Status: SHIPPED | OUTPATIENT
Start: 2021-07-29 | End: 2022-02-21

## 2021-08-07 DIAGNOSIS — G47.09 OTHER INSOMNIA: ICD-10-CM

## 2021-08-09 RX ORDER — TRAZODONE HYDROCHLORIDE 100 MG/1
TABLET ORAL
Qty: 90 TABLET | Refills: 1 | Status: SHIPPED | OUTPATIENT
Start: 2021-08-09 | End: 2022-02-23

## 2021-08-13 ENCOUNTER — OFFICE VISIT (OUTPATIENT)
Dept: INTERNAL MEDICINE | Age: 61
End: 2021-08-13

## 2021-08-13 VITALS
DIASTOLIC BLOOD PRESSURE: 70 MMHG | TEMPERATURE: 97.5 F | SYSTOLIC BLOOD PRESSURE: 130 MMHG | HEART RATE: 76 BPM | OXYGEN SATURATION: 95 % | WEIGHT: 255 LBS | HEIGHT: 68 IN | BODY MASS INDEX: 38.65 KG/M2

## 2021-08-13 DIAGNOSIS — E78.5 HYPERLIPIDEMIA, UNSPECIFIED HYPERLIPIDEMIA TYPE: Chronic | ICD-10-CM

## 2021-08-13 DIAGNOSIS — R49.0 HOARSENESS OF VOICE: ICD-10-CM

## 2021-08-13 DIAGNOSIS — C44.300 SKIN CANCER OF FACE: ICD-10-CM

## 2021-08-13 DIAGNOSIS — E11.29 TYPE 2 DIABETES MELLITUS WITH MICROALBUMINURIA, WITHOUT LONG-TERM CURRENT USE OF INSULIN (HCC): Primary | Chronic | ICD-10-CM

## 2021-08-13 DIAGNOSIS — R80.9 TYPE 2 DIABETES MELLITUS WITH MICROALBUMINURIA, WITHOUT LONG-TERM CURRENT USE OF INSULIN (HCC): Primary | Chronic | ICD-10-CM

## 2021-08-13 DIAGNOSIS — I10 ESSENTIAL HYPERTENSION: Chronic | ICD-10-CM

## 2021-08-13 PROBLEM — K21.9 GASTROESOPHAGEAL REFLUX DISEASE: Chronic | Status: ACTIVE | Noted: 2017-01-26

## 2021-08-13 PROCEDURE — 99214 OFFICE O/P EST MOD 30 MIN: CPT | Performed by: INTERNAL MEDICINE

## 2021-08-13 RX ORDER — PANTOPRAZOLE SODIUM 40 MG/1
40 TABLET, DELAYED RELEASE ORAL DAILY
Qty: 30 TABLET | Refills: 5 | Status: SHIPPED | OUTPATIENT
Start: 2021-08-13 | End: 2021-11-17

## 2021-08-13 NOTE — PROGRESS NOTES
I N T E R N A L  M E D I C I N E  J U N O H  K I M,  M D      ENCOUNTER DATE:  08/13/2021    Carrington PALACIOS Shell / 60 y.o. / male      CHIEF COMPLAINT / REASON FOR OFFICE VISIT     Diabetes, Hypertension, and Hyperlipidemia      ASSESSMENT & PLAN     Problem List Items Addressed This Visit        High    Essential hypertension (Chronic)    Overview     Continue losartan 100 mg and HCTZ 25 mg qd.          Relevant Medications    hydroCHLOROthiazide (HYDRODIURIL) 25 MG tablet    losartan (COZAAR) 100 MG tablet       Medium    Type 2 diabetes mellitus with microalbuminuria, without long-term current use of insulin (CMS/[facility]) - Primary (Chronic)    Overview     *Yson         Current Assessment & Plan     Lab Results   Component Value Date    HGBA1C 10.10 (H) 04/06/2021    HGBA1C 9.90 (H) 11/03/2020    HGBA1C 9.25 (H) 09/30/2020        Poorly controlled managed by endocrinologist.          Relevant Medications    insulin NPH (humuLIN N,novoLIN N) 100 UNIT/ML injection    glimepiride (AMARYL) 4 MG tablet    metFORMIN ER (GLUCOPHAGE-XR) 500 MG 24 hr tablet    Hyperlipidemia (Chronic)    Overview     Continue atorvastatin 40 mg.          Relevant Medications    atorvastatin (LIPITOR) 40 MG tablet    Hoarseness of voice    Current Assessment & Plan     ENT referral for hoarseness.          Relevant Medications    pantoprazole (PROTONIX) 40 MG EC tablet    Other Relevant Orders    Ambulatory Referral to ENT (Otolaryngology) (Completed)    Skin cancer of face    Current Assessment & Plan     S/p SOSA procedure         Relevant Medications    mupirocin (BACTROBAN) 2 % ointment        Orders Placed This Encounter   Procedures   • Ambulatory Referral to ENT (Otolaryngology)     New Medications Ordered This Visit   Medications   • pantoprazole (PROTONIX) 40 MG EC tablet     Sig: Take 1 tablet by mouth Daily.     Dispense:  30 tablet     Refill:  5       SUMMARY/DISCUSSION  •       Next Appointment with me: Visit date not  "found    Return in about 4 months (around 12/13/2021) for Reassess chronic medical problems.      VITAL SIGNS     Visit Vitals  /70 (BP Location: Left arm)   Pulse 76   Temp 97.5 °F (36.4 °C)   Ht 172.7 cm (67.99\")   Wt 116 kg (255 lb)   SpO2 95%   BMI 38.78 kg/m²       BP Readings from Last 3 Encounters:   08/13/21 130/70   04/21/21 138/100   02/22/21 130/60     Wt Readings from Last 3 Encounters:   08/13/21 116 kg (255 lb)   04/21/21 114 kg (251 lb)   02/22/21 114 kg (252 lb)     Body mass index is 38.78 kg/m².      MEDICATIONS AT THE TIME OF OFFICE VISIT     Current Outpatient Medications on File Prior to Visit   Medication Sig   • albuterol sulfate  (90 Base) MCG/ACT inhaler Inhale 2 puffs Every 4 (Four) Hours As Needed for Wheezing or Shortness of Air.   • atorvastatin (LIPITOR) 40 MG tablet Take 1 tablet by mouth Daily.   • glimepiride (AMARYL) 4 MG tablet TAKE 1 TABLET BY MOUTH TWICE A DAY   • glucose blood test strip Care Sen N  Dx code E11.42 testing bs 2 x day   • hydroCHLOROthiazide (HYDRODIURIL) 25 MG tablet TAKE 1 TABLET BY MOUTH EVERY DAY   • insulin NPH (humuLIN N,novoLIN N) 100 UNIT/ML injection 35 units at bedtime (Patient taking differently: 40 units at bedtime)   • Insulin Pen Needle (PEN NEEDLES) 32G X 5 MM misc 1 each Daily.   • Insulin Syringes, Disposable, U-100 0.5 ML misc Use daily   • losartan (COZAAR) 100 MG tablet Take 1 tablet by mouth Daily.   • metFORMIN ER (GLUCOPHAGE-XR) 500 MG 24 hr tablet TAKE 4 TABLETS BY MOUTH DAILY WITH BREAKFAST   • nicotine (NICODERM CQ) 21 MG/24HR patch PLACE 1 PATCH ON THE SKIN AS DIRECTED BY PROVIDER DAILY.   • traZODone (DESYREL) 100 MG tablet TAKE 1 TABLET BY MOUTH EVERYDAY AT BEDTIME   • mupirocin (BACTROBAN) 2 % ointment PLEASE SEE ATTACHED FOR DETAILED DIRECTIONS     No current facility-administered medications on file prior to visit.          HISTORY OF PRESENT ILLNESS     S/p SOSA procedure for facial skin cancer (left).   DM 2 remains " poorly controlled with A1c higher at > 10, managed by endocrinologist.   Hypertension is stable on medications.   Complains of chronic hoarseness, may have allergies and GERD. Denies dysphagia or throat pain. Smoker/drinker.       Patient Care Team:  Preet Louise MD as PCP - General (Internal Medicine)  Dann Alvarado MD as Consulting Physician (Orthopedic Surgery)  Deidra Cannon MD as Consulting Physician (Ophthalmology)  Bayron Anaya MD as Consulting Physician (Endocrinology)  Raj Perez MD as Consulting Physician (Urology)  Rocco Manning MD as Consulting Physician (Gastroenterology)    REVIEW OF SYSTEMS     Review of Systems   No abnormal wt change  Hoarseness  Denies cough or shortness of breath  Denies chest pain     PHYSICAL EXAMINATION     Physical Exam  Left face bandaged  Neck: No palpable mass    Pulm/Chest: Effort normal, breath sounds normal.  Cardiovascular: Normal rate, regular rhythm.       REVIEWED DATA     Labs:     Lab Results   Component Value Date     04/06/2021    K 4.9 04/06/2021    CALCIUM 10.0 04/06/2021    AST 20 04/06/2021    ALT 27 04/06/2021    BUN 17 04/06/2021    CREATININE 0.90 04/06/2021    CREATININE 0.80 11/03/2020    CREATININE 0.66 (L) 10/03/2020    EGFRIFNONA 86 04/06/2021    EGFRIFAFRI 104 04/06/2021       Lab Results   Component Value Date    HGBA1C 10.10 (H) 04/06/2021    HGBA1C 9.90 (H) 11/03/2020    HGBA1C 9.25 (H) 09/30/2020       Lab Results   Component Value Date    LDL 70 04/06/2021    LDL 75 11/03/2020    HDL 38 (L) 04/06/2021    TRIG 353 (H) 04/06/2021       Lab Results   Component Value Date    TSH 2.340 02/27/2019    FREET4 1.18 02/27/2019       Lab Results   Component Value Date    WBC 11.70 (H) 10/03/2020    HGB 15.1 10/03/2020     10/03/2020         Imaging:           Medical Tests:           Summary of old records / correspondence / consultant report:           Request outside records:             *Examiner was  wearing KN95 mask and eye protection during the entire duration of the visit. Patient was masked the entire time.   Minimum social distance of 6 ft maintained entire visit except if physical contact was necessary as documented.     **Dragon Disclaimer:   Much of this encounter note is an electronic transcription/translation of spoken language to printed text. The electronic translation of spoken language may permit erroneous, or at times, nonsensical words or phrases to be inadvertently transcribed. Although I have reviewed the note for such errors, some may still exist.       Template created by Ovidio Louise MD

## 2021-08-13 NOTE — ASSESSMENT & PLAN NOTE
Trial of pantoprazole 40 mg qd. Referral to ENT for hoarseness. Advised to quit smoking/drinking.    PMD

## 2021-08-13 NOTE — ASSESSMENT & PLAN NOTE
Lab Results   Component Value Date    HGBA1C 10.10 (H) 04/06/2021    HGBA1C 9.90 (H) 11/03/2020    HGBA1C 9.25 (H) 09/30/2020        Poorly controlled managed by endocrinologist.

## 2021-08-29 DIAGNOSIS — I10 ESSENTIAL HYPERTENSION: Primary | ICD-10-CM

## 2021-08-30 RX ORDER — LOSARTAN POTASSIUM 100 MG/1
TABLET ORAL
Qty: 90 TABLET | Refills: 3 | Status: SHIPPED | OUTPATIENT
Start: 2021-08-30 | End: 2022-09-06

## 2021-10-03 DIAGNOSIS — E78.5 HYPERLIPIDEMIA, UNSPECIFIED HYPERLIPIDEMIA TYPE: ICD-10-CM

## 2021-10-04 RX ORDER — ATORVASTATIN CALCIUM 40 MG/1
TABLET, FILM COATED ORAL
Qty: 90 TABLET | Refills: 3 | Status: SHIPPED | OUTPATIENT
Start: 2021-10-04 | End: 2022-11-30

## 2021-10-14 DIAGNOSIS — R80.9 TYPE 2 DIABETES MELLITUS WITH MICROALBUMINURIA, WITHOUT LONG-TERM CURRENT USE OF INSULIN (HCC): Primary | ICD-10-CM

## 2021-10-14 DIAGNOSIS — I10 ESSENTIAL HYPERTENSION: ICD-10-CM

## 2021-10-14 DIAGNOSIS — E78.5 HYPERLIPIDEMIA, UNSPECIFIED HYPERLIPIDEMIA TYPE: ICD-10-CM

## 2021-10-14 DIAGNOSIS — E11.29 TYPE 2 DIABETES MELLITUS WITH MICROALBUMINURIA, WITHOUT LONG-TERM CURRENT USE OF INSULIN (HCC): Primary | ICD-10-CM

## 2021-10-31 DIAGNOSIS — E11.9 TYPE 2 DIABETES MELLITUS WITHOUT COMPLICATION, WITHOUT LONG-TERM CURRENT USE OF INSULIN (HCC): ICD-10-CM

## 2021-11-01 RX ORDER — GLIMEPIRIDE 4 MG/1
TABLET ORAL
Qty: 180 TABLET | Refills: 1 | Status: SHIPPED | OUTPATIENT
Start: 2021-11-01 | End: 2021-11-17 | Stop reason: ALTCHOICE

## 2021-11-04 LAB
ALBUMIN SERPL-MCNC: 4.7 G/DL (ref 3.8–4.9)
ALBUMIN/CREAT UR: 264 MG/G CREAT (ref 0–29)
ALBUMIN/GLOB SERPL: 2 {RATIO} (ref 1.2–2.2)
ALP SERPL-CCNC: 103 IU/L (ref 44–121)
ALT SERPL-CCNC: 29 IU/L (ref 0–44)
AST SERPL-CCNC: 18 IU/L (ref 0–40)
BILIRUB SERPL-MCNC: 0.3 MG/DL (ref 0–1.2)
BUN SERPL-MCNC: 19 MG/DL (ref 8–27)
BUN/CREAT SERPL: 19 (ref 10–24)
CALCIUM SERPL-MCNC: 10.1 MG/DL (ref 8.6–10.2)
CHLORIDE SERPL-SCNC: 96 MMOL/L (ref 96–106)
CHOLEST SERPL-MCNC: 169 MG/DL (ref 100–199)
CO2 SERPL-SCNC: 30 MMOL/L (ref 20–29)
CREAT SERPL-MCNC: 1.01 MG/DL (ref 0.76–1.27)
CREAT UR-MCNC: 76.6 MG/DL
GLOBULIN SER CALC-MCNC: 2.4 G/DL (ref 1.5–4.5)
GLUCOSE SERPL-MCNC: 346 MG/DL (ref 65–99)
HBA1C MFR BLD: 10.7 % (ref 4.8–5.6)
HDLC SERPL-MCNC: 33 MG/DL
IMP & REVIEW OF LAB RESULTS: NORMAL
LDLC SERPL CALC-MCNC: 84 MG/DL (ref 0–99)
MICROALBUMIN UR-MCNC: 202.1 UG/ML
POTASSIUM SERPL-SCNC: 5.4 MMOL/L (ref 3.5–5.2)
PROT SERPL-MCNC: 7.1 G/DL (ref 6–8.5)
SODIUM SERPL-SCNC: 139 MMOL/L (ref 134–144)
TRIGL SERPL-MCNC: 317 MG/DL (ref 0–149)
VLDLC SERPL CALC-MCNC: 52 MG/DL (ref 5–40)

## 2021-11-05 NOTE — PROGRESS NOTES
Subjective   Carrington Biggs is a 60 y.o. male.     F/u for dm 2, hyperlipidemia, hypertension, sleep apnea/ testing bs 0 x day / last dm eye exam 8/5/17 with dr Cannon / last dm foot exam 4/21/21  with dr Anaya/ flu vaccine @ [facility]      He has known diabetes mellitus since 2012 and was started on insulin in 2013.  He is on  Novolin N 50 q hs,  Novolin R 10 units after supper, metformin  mg 4 tablets once a day, and glimepiride 4 mg BID.   He checks his blood sugar several times a week.  FBS and RBS > 200.  He denies hypoglycemic episodes.  He has a high deductible insurance plan.  He has no significant weight change since 4/21.  He has a high deductible plan.  Hemoglobin A1c done in 11/21 is 10.7%.     He has occasional blurry vision.  His last eye examination was in 8/17.  He has microalbuminuria on urine sample taken 11/21.  He is on losartan.  He has numbness and burning in his feet.     He has hyperlipidemia and has been on Lipitor 40 mg once a day.  He has cervical arthritis on x-ray.  He denies muscle pain.   Lipid panel done in November 2021 are as follows: Cholesterol 169.  HDL 33.  LDL 84.  Triglycerides 317.     He has hypertension and is on losartan 100 mg once a day and hydrochlorothiazide 25 mg/day..  He denies any history of heart attack or stroke.  He denies any chest pain or shortness of breath.     He has sleep apnea and is using CPAP often.     He drinks 5 drinks on Fridays and Saturdays.       He has been seeing Dr. Perez for erectile dysfunction.  He is using sildenafil 20 mg 5 tabs/day as needed.  He had an incidental 4 mm nonobstructing stone in the upper pole of right kidney.    He had skin cancer removed from left side of face.    He has seen Dr. 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    He is smoking 1 ppd.     He had 2 Moderna Covid vaccine without major side effects.      The following portions of the patient's history were reviewed and updated as appropriate: allergies,  "current medications, past family history, past medical history, past social history, past surgical history and problem list.    Review of Systems   Constitutional: Negative.    HENT: Negative.    Eyes: Negative for visual disturbance.   Respiratory: Negative for shortness of breath.    Cardiovascular: Negative for chest pain and palpitations.   Gastrointestinal: Negative.    Genitourinary: Negative.    Musculoskeletal: Negative.  Negative for myalgias.   Neurological: Positive for numbness.     Objective      Vitals:    11/17/21 1111   BP: 132/80   BP Location: Right arm   Patient Position: Sitting   Cuff Size: Large Adult   Pulse: 82   SpO2: 96%   Weight: 114 kg (251 lb 6.4 oz)   Height: 172.7 cm (67.99\")     Physical Exam  Constitutional:       General: He is not in acute distress.     Appearance: Normal appearance. He is not ill-appearing, toxic-appearing or diaphoretic.   HENT:      Nose: No congestion or rhinorrhea.      Mouth/Throat:      Pharynx: No oropharyngeal exudate or posterior oropharyngeal erythema.   Eyes:      General: No scleral icterus.        Right eye: No discharge.         Left eye: No discharge.      Extraocular Movements: Extraocular movements intact.      Conjunctiva/sclera: Conjunctivae normal.   Neck:      Vascular: No carotid bruit.   Cardiovascular:      Rate and Rhythm: Normal rate and regular rhythm.   Pulmonary:      Effort: No respiratory distress.      Breath sounds: Normal breath sounds. No stridor. No wheezing or rales.   Abdominal:      General: Bowel sounds are normal. There is no distension.      Palpations: Abdomen is soft. There is no mass.      Tenderness: There is no right CVA tenderness or left CVA tenderness.   Musculoskeletal:         General: No swelling or tenderness. Normal range of motion.      Cervical back: Normal range of motion and neck supple. No rigidity or tenderness.      Comments: No plantar ulcers.   Lymphadenopathy:      Cervical: No cervical adenopathy. "   Skin:     General: Skin is warm and dry.   Neurological:      General: No focal deficit present.      Mental Status: He is alert and oriented to person, place, and time.      Comments: Intact light touch on lower ext   Psychiatric:         Mood and Affect: Mood normal.         Behavior: Behavior normal.       Orders Only on 11/03/2021   Component Date Value Ref Range Status   • Glucose 11/03/2021 346* 65 - 99 mg/dL Final   • BUN 11/03/2021 19  8 - 27 mg/dL Final   • Creatinine 11/03/2021 1.01  0.76 - 1.27 mg/dL Final   • eGFR Non  Am 11/03/2021 80  >59 mL/min/1.73 Final   • eGFR African Am 11/03/2021 93  >59 mL/min/1.73 Final    Comment: **In accordance with recommendations from the NKF-ASN Task force,**    [facility] is in the process of updating its eGFR calculation to the    2021 CKD-EPI creatinine equation that estimates kidney function    without a race variable.     • BUN/Creatinine Ratio 11/03/2021 19  10 - 24 Final   • Sodium 11/03/2021 139  134 - 144 mmol/L Final   • Potassium 11/03/2021 5.4* 3.5 - 5.2 mmol/L Final   • Chloride 11/03/2021 96  96 - 106 mmol/L Final   • Total CO2 11/03/2021 30* 20 - 29 mmol/L Final   • Calcium 11/03/2021 10.1  8.6 - 10.2 mg/dL Final   • Total Protein 11/03/2021 7.1  6.0 - 8.5 g/dL Final   • Albumin 11/03/2021 4.7  3.8 - 4.9 g/dL Final   • Globulin 11/03/2021 2.4  1.5 - 4.5 g/dL Final   • A/G Ratio 11/03/2021 2.0  1.2 - 2.2 Final   • Total Bilirubin 11/03/2021 0.3  0.0 - 1.2 mg/dL Final   • Alkaline Phosphatase 11/03/2021 103  44 - 121 IU/L Final                  **Please note reference interval change**   • AST (SGOT) 11/03/2021 18  0 - 40 IU/L Final   • ALT (SGPT) 11/03/2021 29  0 - 44 IU/L Final   • Total Cholesterol 11/03/2021 169  100 - 199 mg/dL Final   • Triglycerides 11/03/2021 317* 0 - 149 mg/dL Final   • HDL Cholesterol 11/03/2021 33* >39 mg/dL Final   • VLDL Cholesterol Gerhard 11/03/2021 52* 5 - 40 mg/dL Final   • LDL Chol Calc ([facility]) 11/03/2021 84  0 - 99 mg/dL  Final   • Creatinine, Urine 11/03/2021 76.6  Not Estab. mg/dL Final   • Microalbumin, Urine 11/03/2021 202.1  Not Estab. ug/mL Final   • Microalbumin/Creatinine Ratio 11/03/2021 264* 0 - 29 mg/g creat Final    Comment:                        Normal:                0 -  29                         Moderately increased: 30 - 300                         Severely increased:       >300     • Hemoglobin A1C 11/03/2021 10.7* 4.8 - 5.6 % Final    Comment:          Prediabetes: 5.7 - 6.4           Diabetes: >6.4           Glycemic control for adults with diabetes: <7.0     • Interpretation 11/03/2021 Note   Final    Supplemental report is available.     Assessment/Plan   Diagnoses and all orders for this visit:    1. Type 2 diabetes mellitus with peripheral neuropathy (HCC) (Primary)  -     gabapentin (NEURONTIN) 100 MG capsule; Take 1 capsule by mouth 2 (Two) Times a Day.  Dispense: 60 capsule; Refill: 2    2. Type 2 diabetes mellitus with microalbuminuria, without long-term current use of insulin (HCC)    3. Essential hypertension    4. Hyperlipidemia, unspecified hyperlipidemia type    5. Obstructive sleep apnea syndrome    6. Vocal cord polyp    Other orders  -     insulin NPH (humuLIN N,novoLIN N) 100 UNIT/ML injection; 60 units at bedtime  Dispense: 1 each; Refill: 12  -     insulin regular (humuLIN R,novoLIN R) 100 UNIT/ML injection; 15 units 3 times a day before meals; Refill: 12      Increase Novolin N to 60 units at bedtime.  Increase Novolin R to 15 units 3 times daily with meals.  Discontinue glimepiride.  Continue metformin  mg 4 tablets daily.  Check blood sugars before meals and at bedtime and call with results in 1 to 2 weeks.  Start gabapentin 100 mg twice a day for peripheral neuropathy..  Advised to have a yearly eye exam.  Continue Lipitor 40 mg/day.  Continue losartan 100 mg/day and hydrochlorothiazide 25 mg once a day.  Continue CPAP.  Follow-up with Dr. Júnior Cui.    Copy of my note sent to   [facility] and Dr. Júnior Cui.    RTC 4 mos.

## 2021-11-17 ENCOUNTER — OFFICE VISIT (OUTPATIENT)
Dept: ENDOCRINOLOGY | Age: 61
End: 2021-11-17

## 2021-11-17 VITALS
SYSTOLIC BLOOD PRESSURE: 132 MMHG | WEIGHT: 251.4 LBS | HEIGHT: 68 IN | HEART RATE: 82 BPM | OXYGEN SATURATION: 96 % | DIASTOLIC BLOOD PRESSURE: 80 MMHG | BODY MASS INDEX: 38.1 KG/M2

## 2021-11-17 DIAGNOSIS — E11.42 TYPE 2 DIABETES MELLITUS WITH PERIPHERAL NEUROPATHY (HCC): Primary | ICD-10-CM

## 2021-11-17 DIAGNOSIS — G47.33 OBSTRUCTIVE SLEEP APNEA SYNDROME: Chronic | ICD-10-CM

## 2021-11-17 DIAGNOSIS — R80.9 TYPE 2 DIABETES MELLITUS WITH MICROALBUMINURIA, WITHOUT LONG-TERM CURRENT USE OF INSULIN (HCC): Chronic | ICD-10-CM

## 2021-11-17 DIAGNOSIS — E78.5 HYPERLIPIDEMIA, UNSPECIFIED HYPERLIPIDEMIA TYPE: Chronic | ICD-10-CM

## 2021-11-17 DIAGNOSIS — I10 ESSENTIAL HYPERTENSION: Chronic | ICD-10-CM

## 2021-11-17 DIAGNOSIS — J38.1 VOCAL CORD POLYP: ICD-10-CM

## 2021-11-17 DIAGNOSIS — E11.29 TYPE 2 DIABETES MELLITUS WITH MICROALBUMINURIA, WITHOUT LONG-TERM CURRENT USE OF INSULIN (HCC): Chronic | ICD-10-CM

## 2021-11-17 PROCEDURE — 99214 OFFICE O/P EST MOD 30 MIN: CPT | Performed by: INTERNAL MEDICINE

## 2021-11-17 RX ORDER — GABAPENTIN 100 MG/1
100 CAPSULE ORAL 2 TIMES DAILY
Qty: 60 CAPSULE | Refills: 2 | Status: SHIPPED | OUTPATIENT
Start: 2021-11-17 | End: 2022-03-16 | Stop reason: SDUPTHER

## 2021-12-17 DIAGNOSIS — I10 ESSENTIAL HYPERTENSION: ICD-10-CM

## 2021-12-17 RX ORDER — HYDROCHLOROTHIAZIDE 25 MG/1
25 TABLET ORAL DAILY
Qty: 90 TABLET | Refills: 0 | Status: SHIPPED | OUTPATIENT
Start: 2021-12-17 | End: 2022-09-05 | Stop reason: HOSPADM

## 2021-12-28 ENCOUNTER — OFFICE VISIT (OUTPATIENT)
Dept: INTERNAL MEDICINE | Age: 61
End: 2021-12-28

## 2021-12-28 VITALS
OXYGEN SATURATION: 98 % | HEART RATE: 82 BPM | BODY MASS INDEX: 37.83 KG/M2 | HEIGHT: 68 IN | TEMPERATURE: 97.7 F | SYSTOLIC BLOOD PRESSURE: 126 MMHG | WEIGHT: 249.6 LBS | DIASTOLIC BLOOD PRESSURE: 68 MMHG

## 2021-12-28 DIAGNOSIS — E11.29 TYPE 2 DIABETES MELLITUS WITH MICROALBUMINURIA, WITHOUT LONG-TERM CURRENT USE OF INSULIN (HCC): Chronic | ICD-10-CM

## 2021-12-28 DIAGNOSIS — R80.9 TYPE 2 DIABETES MELLITUS WITH MICROALBUMINURIA, WITHOUT LONG-TERM CURRENT USE OF INSULIN (HCC): Chronic | ICD-10-CM

## 2021-12-28 DIAGNOSIS — K21.9 GASTROESOPHAGEAL REFLUX DISEASE, UNSPECIFIED WHETHER ESOPHAGITIS PRESENT: Chronic | ICD-10-CM

## 2021-12-28 DIAGNOSIS — I10 ESSENTIAL HYPERTENSION: Primary | Chronic | ICD-10-CM

## 2021-12-28 DIAGNOSIS — E78.5 HYPERLIPIDEMIA, UNSPECIFIED HYPERLIPIDEMIA TYPE: Chronic | ICD-10-CM

## 2021-12-28 DIAGNOSIS — F17.219 CIGARETTE NICOTINE DEPENDENCE WITH NICOTINE-INDUCED DISORDER: ICD-10-CM

## 2021-12-28 DIAGNOSIS — Z20.822 CLOSE EXPOSURE TO COVID-19 VIRUS: ICD-10-CM

## 2021-12-28 PROCEDURE — 99214 OFFICE O/P EST MOD 30 MIN: CPT | Performed by: INTERNAL MEDICINE

## 2021-12-28 RX ORDER — NICOTINE 21 MG/24HR
1 PATCH, TRANSDERMAL 24 HOURS TRANSDERMAL EVERY 24 HOURS
Qty: 30 PATCH | Refills: 1 | Status: SHIPPED | OUTPATIENT
Start: 2021-12-28 | End: 2023-02-14

## 2021-12-28 NOTE — PATIENT INSTRUCTIONS
Constipation, Adult  Constipation is when a person has fewer than three bowel movements in a week, has difficulty having a bowel movement, or has stools (feces) that are dry, hard, or larger than normal. Constipation may be caused by an underlying condition. It may become worse with age if a person takes certain medicines and does not take in enough fluids.  Follow these instructions at home:  Eating and drinking    · Eat foods that have a lot of fiber, such as beans, whole grains, and fresh fruits and vegetables.  · Limit foods that are low in fiber and high in fat and processed sugars, such as fried or sweet foods. These include french fries, hamburgers, cookies, candies, and soda.  · Drink enough fluid to keep your urine pale yellow.    General instructions  · Exercise regularly or as told by your health care provider. Try to do 150 minutes of moderate exercise each week.  · Use the bathroom when you have the urge to go. Do not hold it in.  · Take over-the-counter and prescription medicines only as told by your health care provider. This includes any fiber supplements.  · During bowel movements:  ? Practice deep breathing while relaxing the lower abdomen.  ? Practice pelvic floor relaxation.  · Watch your condition for any changes. Let your health care provider know about them.  · Keep all follow-up visits as told by your health care provider. This is important.  Contact a health care provider if:  · You have pain that gets worse.  · You have a fever.  · You do not have a bowel movement after 4 days.  · You vomit.  · You are not hungry or you lose weight.  · You are bleeding from the opening between the buttocks (anus).  · You have thin, pencil-like stools.  Get help right away if:  · You have a fever and your symptoms suddenly get worse.  · You leak stool or have blood in your stool.  · Your abdomen is bloated.  · You have severe pain in your abdomen.  · You feel dizzy or you faint.  Summary  · Constipation is  when a person has fewer than three bowel movements in a week, has difficulty having a bowel movement, or has stools (feces) that are dry, hard, or larger than normal.  · Eat foods that have a lot of fiber, such as beans, whole grains, and fresh fruits and vegetables.  · Drink enough fluid to keep your urine pale yellow.  · Take over-the-counter and prescription medicines only as told by your health care provider. This includes any fiber supplements.  This information is not intended to replace advice given to you by your health care provider. Make sure you discuss any questions you have with your health care provider.  Document Revised: 11/04/2020 Document Reviewed: 11/04/2020  [employer] Patient Education © 2021 [employer]    ** IMPORTANT MESSAGE FROM DR. GUARDADO **    In our office, your satisfaction is VERY important to us.     You may receive a survey from [employer] by mail or E-mail for you to provide feedback about your visit. This information is invaluable for me to know what we can do to improve our services.     I ask that you please take a few minutes to complete the survey and let us know how we are doing in serving your needs. (You may receive the survey more than once for multiple visits)    Thank You !    Dr. Guardado & Staff    _________________________________________________________________________________________________________________________      ** ADDITIONAL INSTRUCTION / REMINDERS FROM DR. GUARDADO **

## 2021-12-28 NOTE — ASSESSMENT & PLAN NOTE
Smoking about 8 cig/day.   Advised to quit.   Prescription for Nicotine patch 14 mg x 6 weeks; will need prescription later for 7 mg patch for 2 weeks.   Follow-up in 2 months.

## 2021-12-28 NOTE — ASSESSMENT & PLAN NOTE
Lab Results   Component Value Date    HGBA1C 10.7 (H) 11/03/2021    HGBA1C 10.10 (H) 04/06/2021    HGBA1C 9.90 (H) 11/03/2020        Poorly controlled diabetes. Cost is a barrier to optimal care. Per endocrinologist.

## 2021-12-28 NOTE — PROGRESS NOTES
I N T E R N A L  M E D I C I N E  J U N O H  K I M,  M D      ENCOUNTER DATE:  12/28/2021    Carrington PALACIOS Shell / 60 y.o. / male      CHIEF COMPLAINT / REASON FOR OFFICE VISIT     Diabetes (4 mth F/U ), Hypertension, smoknig cessation, and Hyperlipidemia      ASSESSMENT & PLAN     Problem List Items Addressed This Visit        High    Essential hypertension - Primary (Chronic)    Overview     Continue losartan 100 mg and HCTZ 25 mg qd.          Relevant Medications    losartan (COZAAR) 100 MG tablet    hydroCHLOROthiazide (HYDRODIURIL) 25 MG tablet       Medium    Type 2 diabetes mellitus with microalbuminuria, without long-term current use of insulin (HCC) (Chronic)    Overview     *Yson         Current Assessment & Plan     Lab Results   Component Value Date    HGBA1C 10.7 (H) 11/03/2021    HGBA1C 10.10 (H) 04/06/2021    HGBA1C 9.90 (H) 11/03/2020        Poorly controlled diabetes. Cost is a barrier to optimal care. Per endocrinologist.          Relevant Medications    metFORMIN ER (GLUCOPHAGE-XR) 500 MG 24 hr tablet    insulin regular (humuLIN R,novoLIN R) 100 UNIT/ML injection    insulin NPH (humuLIN N,novoLIN N) 100 UNIT/ML injection    Hyperlipidemia (Chronic)    Overview     Continue atorvastatin 40 mg.          Relevant Medications    atorvastatin (LIPITOR) 40 MG tablet    Cigarette nicotine dependence with nicotine-induced disorder (Chronic)    Current Assessment & Plan     Smoking about 8 cig/day.   Advised to quit.   Prescription for Nicotine patch 14 mg x 6 weeks; will need prescription later for 7 mg patch for 2 weeks.   Follow-up in 2 months.          Relevant Medications    traZODone (DESYREL) 100 MG tablet    nicotine (NICODERM CQ) 14 MG/24HR patch       Low    Gastroesophageal reflux disease (Chronic)      Other Visit Diagnoses     Close exposure to COVID-19 virus        Relevant Orders    COVID-19,[facility] ROUTINE, NP/OP SWAB IN TRANSPORT MEDIA OR ESWAB 72 HR TAT - Swab, Nasopharynx        Orders  "Placed This Encounter   Procedures   • COVID-19,[facility] ROUTINE, NP/OP SWAB IN TRANSPORT MEDIA OR ESWAB 72 HR TAT - Swab, Nasopharynx     New Medications Ordered This Visit   Medications   • nicotine (NICODERM CQ) 14 MG/24HR patch     Sig: Place 1 patch on the skin as directed by provider Daily.     Dispense:  30 patch     Refill:  1       SUMMARY/DISCUSSION  •       Next Appointment with me: Visit date not found    Return in about 2 months (around 2/28/2022) for smoking cessation followup .      VITAL SIGNS     Visit Vitals  /68   Pulse 82   Temp 97.7 °F (36.5 °C)   Ht 172.7 cm (67.99\")   Wt 113 kg (249 lb 9.6 oz)   SpO2 98%   BMI 37.96 kg/m²       BP Readings from Last 3 Encounters:   12/28/21 126/68   11/17/21 132/80   08/13/21 130/70     Wt Readings from Last 3 Encounters:   12/28/21 113 kg (249 lb 9.6 oz)   11/17/21 114 kg (251 lb 6.4 oz)   08/13/21 116 kg (255 lb)     Body mass index is 37.96 kg/m².      MEDICATIONS AT THE TIME OF OFFICE VISIT     Current Outpatient Medications on File Prior to Visit   Medication Sig   • atorvastatin (LIPITOR) 40 MG tablet TAKE 1 TABLET BY MOUTH EVERY DAY   • gabapentin (NEURONTIN) 100 MG capsule Take 1 capsule by mouth 2 (Two) Times a Day.   • glucose blood test strip [facility] N  Dx code E11.42 testing bs 2 x day   • hydroCHLOROthiazide (HYDRODIURIL) 25 MG tablet Take 1 tablet by mouth Daily.   • insulin NPH (humuLIN N,novoLIN N) 100 UNIT/ML injection 52 units at bedtime   • Insulin Pen Needle (PEN NEEDLES) 32G X 5 MM misc 1 each Daily.   • insulin regular (humuLIN R,novoLIN R) 100 UNIT/ML injection inject 20 units in the morning 15 units at lunch and 20 units at dinner   • Insulin Syringes, Disposable, U-100 0.5 ML misc Use daily   • losartan (COZAAR) 100 MG tablet TAKE 1 TABLET BY MOUTH EVERY DAY   • metFORMIN ER (GLUCOPHAGE-XR) 500 MG 24 hr tablet TAKE 4 TABLETS BY MOUTH DAILY WITH BREAKFAST   • traZODone (DESYREL) 100 MG tablet TAKE 1 TABLET BY MOUTH EVERYDAY AT " BEDTIME       HISTORY OF PRESENT ILLNESS     About 5 days ago was exposed to his knees has been diagnosed with COVID-19 and he requests testing for Covid infection.  Patient denies any fever or other specific symptoms.  Diabetes remains poorly controlled and is managed by his endocrinologist.  Patient reports that glimepiride was discontinued and insulin was adjusted.  Most recent A1c was 10.7 which was higher than before.  Cost of medications seems to be a major barrier to optimal management of his diabetes.  Hypertension otherwise remained stable on medications.      Patient Care Team:  Preet Louise MD as PCP - General (Internal Medicine)  Dann Alvarado MD as Consulting Physician (Orthopedic Surgery)  Deidra Cannon MD as Consulting Physician (Ophthalmology)  Bayron Anaya MD as Consulting Physician (Endocrinology)  Raj Perez MD as Consulting Physician (Urology)  Rocco Manning MD as Consulting Physician (Gastroenterology)    REVIEW OF SYSTEMS     Review of Systems   No fever, cough or shortness of breath   No depression      PHYSICAL EXAMINATION     Physical Exam  Obese, no acute distress   Psych: Normal mood and affect. Normal thought and judgment.   Cardiovascular: Normal rate, regular rhythm.   Lungs: coarse breath sounds bilaterally without wheezing or rales       REVIEWED DATA     Labs:     Lab Results   Component Value Date     11/03/2021    K 5.4 (H) 11/03/2021    CALCIUM 10.1 11/03/2021    AST 18 11/03/2021    ALT 29 11/03/2021    BUN 19 11/03/2021    CREATININE 1.01 11/03/2021    CREATININE 0.90 04/06/2021    CREATININE 0.80 11/03/2020    EGFRIFNONA 80 11/03/2021    EGFRIFAFRI 93 11/03/2021       Lab Results   Component Value Date    HGBA1C 10.7 (H) 11/03/2021    HGBA1C 10.10 (H) 04/06/2021    HGBA1C 9.90 (H) 11/03/2020       Lab Results   Component Value Date    LDL 84 11/03/2021    LDL 70 04/06/2021    HDL 33 (L) 11/03/2021    TRIG 317 (H) 11/03/2021       Lab  Results   Component Value Date    TSH 2.340 02/27/2019    FREET4 1.18 02/27/2019       Lab Results   Component Value Date    WBC 11.70 (H) 10/03/2020    HGB 15.1 10/03/2020     10/03/2020       Lab Results   Component Value Date    MICROALBUR 202.1 11/03/2021           Imaging:           Medical Tests:           Summary of old records / correspondence / consultant report:           Request outside records:             *Examiner was wearing [id number] mask and eye protection during the entire duration of the visit. Patient was masked the entire time. Minimum social distance of 6 ft maintained entire visit except if physical contact was necessary as documented.     **Dragon Disclaimer: Much of this encounter note is an electronic transcription/translation of spoken language to printed text. The electronic translation of spoken language may permit erroneous, or at times, nonsensical words or phrases to be inadvertently transcribed. Although I have reviewed the note for such errors, some may still exist.       Template created by Ovidio Louise MD

## 2021-12-29 LAB
LABCORP SARS-COV-2, NAA 2 DAY TAT: NORMAL
SARS-COV-2 RNA RESP QL NAA+PROBE: NOT DETECTED

## 2021-12-29 NOTE — PROGRESS NOTES
MyChart:    Here are the result(s) of your test(s):     COVID-19 test is negative.     Please do not hesitate to contact me if you have questions.

## 2022-01-24 ENCOUNTER — OFFICE VISIT (OUTPATIENT)
Dept: INTERNAL MEDICINE | Age: 62
End: 2022-01-24

## 2022-01-24 ENCOUNTER — HOSPITAL ENCOUNTER (OUTPATIENT)
Dept: GENERAL RADIOLOGY | Facility: HOSPITAL | Age: 62
Discharge: HOME | End: 2022-01-24
Admitting: NURSE PRACTITIONER

## 2022-01-24 VITALS
SYSTOLIC BLOOD PRESSURE: 132 MMHG | HEIGHT: 68 IN | OXYGEN SATURATION: 95 % | WEIGHT: 243 LBS | TEMPERATURE: 96.8 F | BODY MASS INDEX: 36.83 KG/M2 | DIASTOLIC BLOOD PRESSURE: 78 MMHG | HEART RATE: 91 BPM

## 2022-01-24 DIAGNOSIS — J38.1 VOCAL CORD POLYP: ICD-10-CM

## 2022-01-24 DIAGNOSIS — R06.02 SHORTNESS OF BREATH: Primary | ICD-10-CM

## 2022-01-24 DIAGNOSIS — R49.0 HOARSENESS OF VOICE: ICD-10-CM

## 2022-01-24 DIAGNOSIS — F17.219 CIGARETTE NICOTINE DEPENDENCE WITH NICOTINE-INDUCED DISORDER: ICD-10-CM

## 2022-01-24 PROCEDURE — 99214 OFFICE O/P EST MOD 30 MIN: CPT | Performed by: NURSE PRACTITIONER

## 2022-01-24 PROCEDURE — 71046 X-RAY EXAM CHEST 2 VIEWS: CPT

## 2022-01-24 PROCEDURE — 93000 ELECTROCARDIOGRAM COMPLETE: CPT | Performed by: NURSE PRACTITIONER

## 2022-01-24 RX ORDER — ALBUTEROL SULFATE 90 UG/1
1-2 AEROSOL, METERED RESPIRATORY (INHALATION) EVERY 4 HOURS PRN
Qty: 18 G | Refills: 0 | Status: SHIPPED | OUTPATIENT
Start: 2022-01-24 | End: 2022-09-08 | Stop reason: SDUPTHER

## 2022-01-24 NOTE — PROGRESS NOTES
"Chief Complaint  Shortness of Breath and Sore Throat    Subjective          Carrington Biggs presents to [facility]  Shortness of Breath  This is a new problem. The current episode started in the past 7 days. The problem has been waxing and waning. Associated symptoms include a sore throat. Pertinent negatives include no rhinorrhea. The symptoms are aggravated by lying flat. Associated symptoms comments: Hoarseness x 1 day. Risk factors include smoking. He has tried nothing for the symptoms. His past medical history is significant for pneumonia. There is no history of CAD or COPD. (Current smoker)   Reports having SOA last week after laying down and subsequently   Current smoker, working on quitting smoking.   Left ear soreness x today.   Difficulty finishing sentences, reports usually has some degree of \"short windedness\" anyway though so this is not a huge difference for him.   No fever. Mild cough, not AM cough. No previous pulmonary function testing/ PFTs. Has JACEK, not compliant with use of CPAP due to discomfort.   Was seen by ENT last year for hoarseness, diagnosed with vocal cord polyps with concurrent fungal infection, was supposed to follow up this month for 3 month follow up but had to cancel appt.     Took home COVID test which was negative.     Objective   Vital Signs:   /78   Pulse 91   Temp 96.8 °F (36 °C) (Temporal)   Ht 172.7 cm (67.99\")   Wt 110 kg (243 lb)   SpO2 95%   BMI 36.96 kg/m²     Physical Exam  Vitals and nursing note reviewed.   Constitutional:       General: He is not in acute distress.     Appearance: He is well-developed. He is not ill-appearing.      Comments: Patient having difficulty completing sentences without having to take a deep breath. Voice is noticeably hoarse.    HENT:      Left Ear: A middle ear effusion is present.   Cardiovascular:      Rate and Rhythm: Normal rate and regular rhythm.      Heart sounds: Normal heart sounds, S1 " normal and S2 normal. No murmur heard.      Pulmonary:      Effort: Pulmonary effort is normal.      Breath sounds: Decreased air movement (throughout) present. No decreased breath sounds, wheezing, rhonchi or rales.   Skin:     General: Skin is warm and dry.   Neurological:      Mental Status: He is alert and oriented to person, place, and time.   Psychiatric:         Speech: Speech normal.         Behavior: Behavior normal. Behavior is cooperative.         Thought Content: Thought content normal.         Judgment: Judgment normal.        Result Review :   The following data was reviewed by: DARLINE Bustillo on 01/24/2022:  Common labs    Common Labsle 4/6/21 4/6/21 4/6/21 11/3/21 11/3/21 11/3/21 11/3/21    0849 0849 0849 0814 0814 0814 0814   Glucose  190 (A)  346 (A)      BUN  17  19      Creatinine  0.90  1.01      eGFR Non  Am  86  80      eGFR  Am  104  93      Sodium  140  139      Potassium  4.9  5.4 (A)      Chloride  96 (A)  96      Calcium  10.0  10.1      Total Protein  6.8  7.1      Albumin  4.70  4.7      Total Bilirubin  0.4  0.3      Alkaline Phosphatase  93  103      AST (SGOT)  20  18      ALT (SGPT)  27  29      Total Cholesterol   164  169     Triglycerides   353 (A)  317 (A)     HDL Cholesterol   38 (A)  33 (A)     LDL Cholesterol    70  84     Hemoglobin A1C 10.10 (A)      10.7 (A)   Microalbumin, Urine      202.1    (A) Abnormal value       Comments are available for some flowsheets but are not being displayed.             ECG 12 Lead    Date/Time: 1/24/2022 3:56 PM  Performed by: Marylu Brennan APRN  Authorized by: Marylu Brennan APRN   Comparison: compared with previous ECG   Rhythm: sinus rhythm  Rate: normal  Conduction: conduction normal  QRS axis: normal  Other findings: non-specific ST-T wave changes    Clinical impression: normal ECG and non-specific ECG              Assessment and Plan    Diagnoses and all orders for this visit:    1. Shortness of breath  (Primary)  CXR today to evaluate for acute abnormality.    Suspect patient may have some degree of COPD related to ongoing shortness of breath and decreased air movement on exam.  We will start him on albuterol rescue inhaler.  Advised patient to follow-up with PCP in approximately 1 month.  May need to consider pulmonary function testing at that time.  Follow up sooner as needed if worsening symptoms or ER evaluation if chest pain, worsening SOA, etc.     -     albuterol sulfate  (90 Base) MCG/ACT inhaler; Inhale 1-2 puffs Every 4 (Four) Hours As Needed for Wheezing or Shortness of Air.  Dispense: 18 g; Refill: 0  -     XR Chest PA & Lateral    2. Cigarette nicotine dependence with nicotine-induced disorder  -     albuterol sulfate  (90 Base) MCG/ACT inhaler; Inhale 1-2 puffs Every 4 (Four) Hours As Needed for Wheezing or Shortness of Air.  Dispense: 18 g; Refill: 0    3. Hoarseness of voice   Advised patient needs to follow-up with ENT as previously discussed for reevaluation of hoarseness and vocal cord polyps.  I do not think any antibiotic treatment is indicated at this time.     4. Vocal cord polyp    Other orders  -     ECG 12 Lead      Follow Up   Return in about 6 weeks (around 3/7/2022) for Recheck.  Patient was given instructions and counseling regarding his condition or for health maintenance advice. Please see specific information pulled into the AVS if appropriate.

## 2022-02-21 RX ORDER — METFORMIN HYDROCHLORIDE 500 MG/1
TABLET, EXTENDED RELEASE ORAL
Qty: 360 TABLET | Refills: 1 | Status: SHIPPED | OUTPATIENT
Start: 2022-02-21 | End: 2022-08-26

## 2022-02-23 DIAGNOSIS — G47.09 OTHER INSOMNIA: ICD-10-CM

## 2022-02-23 RX ORDER — TRAZODONE HYDROCHLORIDE 100 MG/1
TABLET ORAL
Qty: 90 TABLET | Refills: 1 | Status: SHIPPED | OUTPATIENT
Start: 2022-02-23 | End: 2022-08-26

## 2022-03-11 NOTE — PROGRESS NOTES
Subjective   Carrington Biggs is a 61 y.o. male.     F/u for dm 2, hyperlipidemia, hypertension, sleep apnea/ testing bs 0 x day / last dm eye exam 8/5/17 with dr Cannon / last dm foot exam today  with dr Anaya/        He has known diabetes mellitus since 2012 and was started on insulin in 2013.  He is on  Novolin N 60 units at bedtime, Novolin R 20 units at breakfast, 15 units at lunch, and 20 units at supper and Metformin  mg 4 tablets daily.   's.  Suppertime glucose 140-150.   He denies hypoglycemic episodes.  He has a high deductible insurance plan.  He has lost 11 pounds since November 2021.  Hemoglobin A1c done in March 2022 is 10.6%.     He denies blurry vision.  His last eye examination was in 8/17.  He has microalbuminuria on urine sample taken 11/21.  He is on losartan.  He has numbness and burning in his feet improved with gabapentin 100 mg twice a day.     He has hyperlipidemia and has been on Lipitor 40 mg once a day.  He has cervical arthritis on x-ray.  He denies muscle pain.   Lipid panel done in March 2022 are as follows: Cholesterol 197.  HDL 38.  .  Triglycerides 211.     He has hypertension and is on losartan 100 mg once a day and hydrochlorothiazide 25 mg/day..  He denies any history of heart attack or stroke.  He denies any chest pain or shortness of breath.     He has sleep apnea and is not using CPAP.  He wakes up rested.  He has no follow-up scheduled with sleep specialist.     He has been seeing Dr. Perez for erectile dysfunction.  He is using sildenafil 20 mg 5 tabs/day as needed.  He had an incidental 4 mm nonobstructing stone in the upper pole of right kidney.     He has seen Dr. 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     He is smoking 1 ppd.  He has reduced alcohol intake.     He had 3 Moderna Covid vaccine without major side effects.      The following portions of the patient's history were reviewed and updated as appropriate: allergies, current  "medications, past family history, past medical history, past social history, past surgical history and problem list.    Review of Systems   Respiratory: Negative for shortness of breath.    Cardiovascular: Negative.    Gastrointestinal: Negative.    Endocrine: Negative for cold intolerance and heat intolerance.   Genitourinary: Negative.    Musculoskeletal: Negative.  Negative for myalgias.   Neurological: Negative for numbness.   Hematological: Negative for adenopathy. Does not bruise/bleed easily.     Objective      Vitals:    03/16/22 0940   BP: 132/72   Pulse: 92   SpO2: 98%   Weight: 109 kg (240 lb 9.6 oz)   Height: 172.7 cm (67.99\")     Physical Exam  Constitutional:       General: He is not in acute distress.     Appearance: Normal appearance. He is obese. He is not ill-appearing, toxic-appearing or diaphoretic.   Eyes:      General: No scleral icterus.        Right eye: No discharge.         Left eye: No discharge.      Extraocular Movements: Extraocular movements intact.   Neck:      Vascular: No carotid bruit.   Cardiovascular:      Rate and Rhythm: Regular rhythm.      Heart sounds: Normal heart sounds. No murmur heard.    No friction rub. No gallop.   Pulmonary:      Effort: Pulmonary effort is normal. No respiratory distress.      Breath sounds: Normal breath sounds. No stridor. No rales.   Chest:      Chest wall: No tenderness.   Abdominal:      General: Bowel sounds are normal. There is no distension.      Palpations: Abdomen is soft. There is no mass.      Tenderness: There is no right CVA tenderness or left CVA tenderness.   Musculoskeletal:         General: Normal range of motion.      Cervical back: Normal range of motion and neck supple. No rigidity or tenderness.   Lymphadenopathy:      Cervical: No cervical adenopathy.   Neurological:      General: No focal deficit present.      Mental Status: He is alert and oriented to person, place, and time.      Comments: Intact light touch on lower ext "       Office Visit on 12/28/2021   Component Date Value Ref Range Status   • SARS-CoV-2, LEONARDO 12/28/2021 Not Detected  Not Detected Final    Comment: This nucleic acid amplification test was developed and its performance  characteristics determined by [facility]. Nucleic acid  amplification tests include RT-PCR and TMA. This test has not been  FDA cleared or approved. This test has been authorized by FDA under  an Emergency Use Authorization (EUA). This test is only authorized  for the duration of time the declaration that circumstances exist  justifying the authorization of the emergency use of in vitro  diagnostic tests for detection of SARS-CoV-2 virus and/or diagnosis  of COVID-19 infection under section 564(b)(1) of the Act, 21 U.S.C.  360bbb-3(b) (1), unless the authorization is terminated or revoked  sooner.  When diagnostic testing is negative, the possibility of a false  negative result should be considered in the context of a patient's  recent exposures and the presence of clinical signs and symptoms  consistent with COVID-19. An individual without symptoms of COVID-19  and who is not shedding SARS-CoV-2 virus wo                           uld expect to have a  negative (not detected) result in this assay.     • [facility] SARS-COV-2, LEONARDO 2 DAY TAT 12/28/2021 Performed   Final     Assessment/Plan   Diagnoses and all orders for this visit:    1. Type 2 diabetes mellitus with peripheral neuropathy (HCC) (Primary)    2. Type 2 diabetes mellitus with microalbuminuria, without long-term current use of insulin (HCC)    3. Essential hypertension    4. Hyperlipidemia, unspecified hyperlipidemia type    Other orders  -     insulin NPH (humuLIN N,novoLIN N) 100 UNIT/ML injection; 65 units at bedtime  Dispense: 1 each; Refill: 12  -     insulin regular (humuLIN R,novoLIN R) 100 UNIT/ML injection; inject 20 units in the morning 15 units at lunch and 25 units at dinner; Refill: 12      Increase Novolin N to 65  units at bedtime.  Increase Novolin R to 20 units at breakfast, 15 units at lunch, and 25 units at supper.  Continue losartan 100 mg/day and hydrochlorothiazide 25 mg/day.  Continue atorvastatin 40 mg/day and low-fat diet.    Copy my note sent to Dr. Louise.    RTC 4 mos.

## 2022-03-16 ENCOUNTER — OFFICE VISIT (OUTPATIENT)
Dept: ENDOCRINOLOGY | Age: 62
End: 2022-03-16

## 2022-03-16 VITALS
DIASTOLIC BLOOD PRESSURE: 72 MMHG | BODY MASS INDEX: 36.46 KG/M2 | OXYGEN SATURATION: 98 % | HEIGHT: 68 IN | WEIGHT: 240.6 LBS | SYSTOLIC BLOOD PRESSURE: 132 MMHG | HEART RATE: 92 BPM

## 2022-03-16 DIAGNOSIS — E78.5 HYPERLIPIDEMIA, UNSPECIFIED HYPERLIPIDEMIA TYPE: Chronic | ICD-10-CM

## 2022-03-16 DIAGNOSIS — R80.9 TYPE 2 DIABETES MELLITUS WITH MICROALBUMINURIA, WITHOUT LONG-TERM CURRENT USE OF INSULIN: Chronic | ICD-10-CM

## 2022-03-16 DIAGNOSIS — E11.29 TYPE 2 DIABETES MELLITUS WITH MICROALBUMINURIA, WITHOUT LONG-TERM CURRENT USE OF INSULIN: Chronic | ICD-10-CM

## 2022-03-16 DIAGNOSIS — I10 ESSENTIAL HYPERTENSION: Chronic | ICD-10-CM

## 2022-03-16 DIAGNOSIS — E11.42 TYPE 2 DIABETES MELLITUS WITH PERIPHERAL NEUROPATHY: Primary | ICD-10-CM

## 2022-03-16 PROCEDURE — 99214 OFFICE O/P EST MOD 30 MIN: CPT | Performed by: INTERNAL MEDICINE

## 2022-03-16 RX ORDER — GABAPENTIN 100 MG/1
100 CAPSULE ORAL 2 TIMES DAILY
Qty: 60 CAPSULE | Refills: 1 | Status: SHIPPED | OUTPATIENT
Start: 2022-03-16 | End: 2022-10-21

## 2022-04-30 DIAGNOSIS — F17.219 CIGARETTE NICOTINE DEPENDENCE WITH NICOTINE-INDUCED DISORDER: ICD-10-CM

## 2022-05-02 RX ORDER — NICOTINE 21 MG/24HR
1 PATCH, TRANSDERMAL 24 HOURS TRANSDERMAL EVERY 24 HOURS
Qty: 30 PATCH | Refills: 0 | OUTPATIENT
Start: 2022-05-02

## 2022-06-27 ENCOUNTER — OFFICE VISIT (OUTPATIENT)
Dept: INTERNAL MEDICINE | Age: 62
End: 2022-06-27

## 2022-06-27 VITALS
HEIGHT: 68 IN | WEIGHT: 249 LBS | SYSTOLIC BLOOD PRESSURE: 134 MMHG | TEMPERATURE: 97.7 F | DIASTOLIC BLOOD PRESSURE: 78 MMHG | BODY MASS INDEX: 37.74 KG/M2 | HEART RATE: 83 BPM | OXYGEN SATURATION: 94 %

## 2022-06-27 DIAGNOSIS — K61.1 PERI-RECTAL ABSCESS: Primary | ICD-10-CM

## 2022-06-27 DIAGNOSIS — E11.29 TYPE 2 DIABETES MELLITUS WITH MICROALBUMINURIA, WITHOUT LONG-TERM CURRENT USE OF INSULIN: Chronic | ICD-10-CM

## 2022-06-27 DIAGNOSIS — I10 ESSENTIAL HYPERTENSION: Chronic | ICD-10-CM

## 2022-06-27 DIAGNOSIS — R80.9 TYPE 2 DIABETES MELLITUS WITH MICROALBUMINURIA, WITHOUT LONG-TERM CURRENT USE OF INSULIN: Chronic | ICD-10-CM

## 2022-06-27 DIAGNOSIS — E66.9 OBESITY (BMI 30-39.9): Chronic | ICD-10-CM

## 2022-06-27 DIAGNOSIS — E78.5 HYPERLIPIDEMIA, UNSPECIFIED HYPERLIPIDEMIA TYPE: Chronic | ICD-10-CM

## 2022-06-27 PROCEDURE — 99214 OFFICE O/P EST MOD 30 MIN: CPT | Performed by: NURSE PRACTITIONER

## 2022-06-27 RX ORDER — AMOXICILLIN AND CLAVULANATE POTASSIUM 500; 125 MG/1; MG/1
1 TABLET, FILM COATED ORAL 2 TIMES DAILY
Qty: 20 TABLET | Refills: 0 | Status: SHIPPED | OUTPATIENT
Start: 2022-06-27 | End: 2022-07-01

## 2022-06-27 NOTE — ASSESSMENT & PLAN NOTE
Patient's (Body mass index is 37.87 kg/m².) indicates that they are obese (BMI >30) with health conditions that include hypertension . Weight is unchanged. BMI is is above average; BMI management plan is completed. We discussed low calorie, low carb based diet program, portion control, increasing exercise, joining a fitness center or start home based exercise program and Weight Watchers or other Commercial based weight reduction program.   
4

## 2022-06-27 NOTE — PROGRESS NOTES
I N T E R N A L  M E D I C I N E  Kimberley Woo, APRN    ENCOUNTER DATE:  06/27/2022    Carrington Biggs / 61 y.o. / male      CHIEF COMPLAINT / REASON FOR OFFICE VISIT     Per- rectal abscess/Cyst      ASSESSMENT & PLAN     Diagnoses and all orders for this visit:    1. Gina-rectal abscess (Primary)  -     Ambulatory Referral to Surgeon  -Rx Augmentin and referral to surgeon for drainage. Discussion of treatment and ER if no improvement with antibiotics- patient verbalized understanding.    Other orders  -     amoxicillin-clavulanate (Augmentin) 500-125 MG per tablet; Take 1 tablet by mouth 2 (Two) Times a Day for 10 days.  Dispense: 20 tablet; Refill: 0  -Referral to Surgeon for drainage- if not able to see soon instructed to go to the ER for drainage    2. Essential hypertension  Overview:  Continue losartan 100 mg and HCTZ 25 mg qd.    Continue Hydrochlorothiazide 25mg daily, Losartan 100mg daily. The problem is controlled. Pertinent negatives include no anxiety, blurred vision, chest pain, headaches, peripheral edema, PND, shortness of breath or sweats. Risk factors for coronary artery disease include  dyslipidemia and diabetes mellitus. Current antihypertension treatment includes ACE inhibitors. There are no compliance problems.      3. Hyperlipidemia, unspecified hyperlipidemia type  Overview:  Continue atorvastatin 40 mg.       4. Type 2 diabetes mellitus with microalbuminuria, without long-term current use of insulin (HCC)  Overview:  *Yson  Continue blood sugar monitoring ac and hs.  Continue Insulin as prescribed, Metformin 500 mg daily. There are no hypoglycemic associated symptoms. Pertinent negatives for hypoglycemia include no headaches or sweats. Pertinent negatives for diabetes include no blurred vision and no chest pain. There are no hypoglycemic complications. There are no diabetic complications. Risk factors for coronary artery disease include diabetes mellitus, dyslipidemia, hypertension and  post-menopausal. Current diabetic treatment includes oral agent (triple therapy). Her breakfast blood glucose range is generally 110-130 mg/dl. An ACE inhibitor/angiotensin II receptor blocker is being taken. Eye exam is current.    5. Obesity (BMI 30-39.9)  Assessment & Plan:  Patient's (Body mass index is 37.87 kg/m².) indicates that they are obese (BMI >30) with health conditions that include hypertension . Weight is unchanged. BMI is is above average; BMI management plan is completed. We discussed low calorie, low carb based diet program, portion control, increasing exercise, joining a fitness center or start home based exercise program and Weight Watchers or other Commercial based weight reduction program.    discussion of  decreasing/eliminating soda, caffeine, alcohol and overall caloric intake. Reduce carbohydrates and sweets in diet. Continue to improve dietary habits with lean proteins, fresh vegetables, fruits, and nuts. Improve aerobic exercise: walking/biking/swimming daily as tolerated, recommend 30 minutes/day at least 5 days a week.     SUMMARY/DISCUSSION  • Referral to Surgeon for Gina-rectal abscess  • Follow up with Endocrinology, Dr Anaya as scheduled  • Follow up with Dr Louise as scheduled  • I spent 30 min in direct care of this patient on this date of service. This time includes times spent by me in the following activities: Preparing for the visit, obtaining and/or reviewing a separately obtained history, performing a medically appropriate examination and/or evaluation, reviewing medical records, reviewing tests, ordering medications, tests, or procedures, counseling and educating the patient, documenting information in the medical record and reviewing office note/correspondence from other providers.     Return in about 3 months (around 9/27/2022) for Next scheduled follow up.      VITAL SIGNS     Visit Vitals  /78 (BP Location: Left arm)   Pulse 83   Temp 97.7 °F (36.5 °C)   Ht 172.7 cm  "(67.99\")   Wt 113 kg (249 lb)   SpO2 94%   BMI 37.87 kg/m²           BP Readings from Last 3 Encounters:   06/27/22 134/78   03/16/22 132/72   01/24/22 132/78     Wt Readings from Last 3 Encounters:   06/27/22 113 kg (249 lb)   03/16/22 109 kg (240 lb 9.6 oz)   01/24/22 110 kg (243 lb)     Body mass index is 37.87 kg/m².    Blood pressure readings recorded on patient flowsheet:  No flowsheet data found.          MEDICATIONS AT THE TIME OF OFFICE VISIT     Current Outpatient Medications on File Prior to Visit   Medication Sig Dispense Refill   • albuterol sulfate  (90 Base) MCG/ACT inhaler Inhale 1-2 puffs Every 4 (Four) Hours As Needed for Wheezing or Shortness of Air. 18 g 0   • atorvastatin (LIPITOR) 40 MG tablet TAKE 1 TABLET BY MOUTH EVERY DAY 90 tablet 3   • gabapentin (NEURONTIN) 100 MG capsule Take 1 capsule by mouth 2 (Two) Times a Day. 60 capsule 1   • glucose blood test strip Care Sen N  Dx code E11.42 testing bs 2 x day 100 each 5   • hydroCHLOROthiazide (HYDRODIURIL) 25 MG tablet Take 1 tablet by mouth Daily. 90 tablet 0   • insulin NPH (humuLIN N,novoLIN N) 100 UNIT/ML injection 65 units at bedtime 1 each 12   • Insulin Pen Needle (PEN NEEDLES) 32G X 5 MM misc 1 each Daily. 100 each 5   • insulin regular (humuLIN R,novoLIN R) 100 UNIT/ML injection inject 20 units in the morning 15 units at lunch and 25 units at dinner  12   • Insulin Syringes, Disposable, U-100 0.5 ML misc Use daily 100 each 2   • losartan (COZAAR) 100 MG tablet TAKE 1 TABLET BY MOUTH EVERY DAY 90 tablet 3   • metFORMIN ER (GLUCOPHAGE-XR) 500 MG 24 hr tablet TAKE 4 TABLETS BY MOUTH DAILY WITH BREAKFAST 360 tablet 1   • traZODone (DESYREL) 100 MG tablet TAKE 1 TABLET BY MOUTH EVERYDAY AT BEDTIME 90 tablet 1   • nicotine (NICODERM CQ) 14 MG/24HR patch Place 1 patch on the skin as directed by provider Daily. 30 patch 1     No current facility-administered medications on file prior to visit.        HISTORY OF PRESENT ILLNESS     61 " year old with Hx Gina rectal abscess and has re-occurred. States first noticed 2 days ago. Patient states mild discomfort and drainage.     Patient Care Team:  Preet Louise MD as PCP - General (Internal Medicine)  Dann Alvarado MD as Consulting Physician (Orthopedic Surgery)  Deidra Cannon MD as Consulting Physician (Ophthalmology)  Bayron Anaya MD as Consulting Physician (Endocrinology)  Raj Perez MD as Consulting Physician (Urology)  Rocco Manning MD as Consulting Physician (Gastroenterology)    REVIEW OF SYSTEMS     Review of Systems   Constitutional: Negative.    HENT: Negative.    Eyes: Negative.    Respiratory: Negative.    Cardiovascular: Negative.    Gastrointestinal: Positive for anal bleeding and rectal pain.   Endocrine: Negative.    Genitourinary: Negative.    Musculoskeletal: Negative.    Skin:        2 cm gina-rectal abscess noted, non tender to touch, scant serosanguinous drainage   Neurological: Negative.    Hematological: Negative.    Psychiatric/Behavioral: Negative.           PHYSICAL EXAMINATION     Physical Exam  Constitutional:       Appearance: He is obese.   Cardiovascular:      Rate and Rhythm: Normal rate and regular rhythm.      Pulses: Normal pulses.      Heart sounds: Normal heart sounds.   Pulmonary:      Effort: Pulmonary effort is normal.      Breath sounds: Normal breath sounds.   Abdominal:      General: Bowel sounds are normal.      Palpations: Abdomen is soft.   Musculoskeletal:         General: Normal range of motion.   Skin:     General: Skin is warm.      Findings: Lesion present.      Comments:   Has 2 cm abscess on right external rectal area- negative for redness, warmth, tenderness noted. Scant serosanguinous drainage noted    Neurological:      Mental Status: He is alert and oriented to person, place, and time.   Psychiatric:         Mood and Affect: Mood normal.           REVIEWED DATA     Labs:     Lab Results   Component Value Date      03/02/2022    K 5.2 03/02/2022    CALCIUM 10.3 (H) 03/02/2022    AST 17 03/02/2022    ALT 14 03/02/2022    BUN 12 03/02/2022    CREATININE 0.81 03/02/2022    CREATININE 1.01 11/03/2021    CREATININE 0.90 04/06/2021    EGFRIFNONA 80 11/03/2021    EGFRIFAFRI 93 11/03/2021       Lab Results   Component Value Date    HGBA1C 10.6 (H) 03/02/2022    HGBA1C 10.7 (H) 11/03/2021    HGBA1C 10.10 (H) 04/06/2021       Lab Results   Component Value Date     (H) 03/02/2022    LDL 84 11/03/2021    LDL 70 04/06/2021    HDL 38 (L) 03/02/2022    HDL 33 (L) 11/03/2021    TRIG 211 (H) 03/02/2022    TRIG 317 (H) 11/03/2021       Lab Results   Component Value Date    TSH 2.340 02/27/2019    FREET4 1.18 02/27/2019       Lab Results   Component Value Date    WBC 11.70 (H) 10/03/2020    HGB 15.1 10/03/2020     10/03/2020       Lab Results   Component Value Date    MALBCRERATIO 307 (H) 03/02/2022          Imaging:           Medical Tests:           Summary of old records / correspondence / consultant report:           Request outside records:             *Examiner was wearing [id number] mask and eye protection during the entire duration of the visit. Patient was masked the entire time. Minimum social distance of 6 ft maintained entire visit except if physical contact was necessary as documented.       Template created by DARLINE Ferrera

## 2022-07-01 ENCOUNTER — OFFICE VISIT (OUTPATIENT)
Dept: SURGERY | Facility: CLINIC | Age: 62
End: 2022-07-01

## 2022-07-01 VITALS
HEART RATE: 78 BPM | WEIGHT: 245.4 LBS | BODY MASS INDEX: 37.19 KG/M2 | SYSTOLIC BLOOD PRESSURE: 122 MMHG | HEIGHT: 68 IN | DIASTOLIC BLOOD PRESSURE: 68 MMHG | OXYGEN SATURATION: 95 %

## 2022-07-01 DIAGNOSIS — K61.1 PERIRECTAL ABSCESS: Primary | ICD-10-CM

## 2022-07-01 PROCEDURE — 99203 OFFICE O/P NEW LOW 30 MIN: CPT | Performed by: PHYSICIAN ASSISTANT

## 2022-07-01 PROCEDURE — 46050 I&D PERIANAL ABSCESS SUPFC: CPT | Performed by: PHYSICIAN ASSISTANT

## 2022-07-01 RX ORDER — AMOXICILLIN AND CLAVULANATE POTASSIUM 875; 125 MG/1; MG/1
1 TABLET, FILM COATED ORAL 2 TIMES DAILY
Qty: 10 TABLET | Refills: 0 | Status: SHIPPED | OUTPATIENT
Start: 2022-07-01 | End: 2022-07-06

## 2022-07-01 NOTE — PROGRESS NOTES
Carrington Biggs is a 61 y.o. male who is seen as a consult at the request of DARLINE Loyola for Abscess.      HPI:  Complains of a perirectal swelling with associated drainage, which has stopped at this time. His symptoms began a couple of weeks ago. It continues to feel pretty sore. Saw primary care APRN and was prescribed Augmentin on Monday, which seems to have helped--swollen area has gotten smaller and drainage has decreased since starting the antibiotic. Still sitting to one side. No systemic symptoms this time. Drainage was small amount, reddish brown.     Reports a history of multiple surgical procedures over the course of several days for a similar but more severe problem in 2013. Had fairly severe systemic symptoms at that time.       Past Medical History:   Diagnosis Date   • Achilles tendinitis of right lower extremity 07/2017   • Anal fistula 2006   • BCC (basal cell carcinoma) 07/2021    LEFT INFERIOR EYELID, S/P MOHS   • BPH (benign prostatic hyperplasia)     FOLLOWED BY DR. BROOKS BOB   • CAD (coronary artery disease) 2011   • Cervical paraspinal muscle spasm 12/2018   • Colon polyps     FOLLOWED BY DR. DARVIN CHU   • COPD (chronic obstructive pulmonary disease) (HCC)    • COVID-19 virus infection 09/2020   • Erectile dysfunction    • Excessive drinking of alcohol 09/30/2020   • Gastroesophageal reflux disease 01/26/2017   • Hepatic steatosis 11/17/2020    Per abdominal CT scan September 2020   • Herpes simplex virus (HSV) infection 01/26/2017   • Hoarseness 09/2021    FOLLOWED BY DR. JUNIOR SCHAFFER   • Hyperlipidemia    • Hypertension    • Insomnia    • Microalbuminuria    • Neck injury    • Nodule of left lung    • Obstructive sleep apnea syndrome    • Gina-rectal abscess 06/27/2022   • Pneumonia due to COVID-19 virus 09/29/2020    BILATERAL LOWER LOBES, [facility]ITT[facility] TO [facility]   • Right renal stone 09/30/2020   • Right-sided thoracic back pain    • Tibialis posterior tendinitis, right 07/2017   •  Type 2 diabetes mellitus (HCC)     NIDDM   • Vocal cord polyp 08/13/2021    FOLLOWED BY DR. JUNIOR SCHAFFER       Past Surgical History:   Procedure Laterality Date   • ANAL FISTULOTOMY N/A 03/03/2006    WITH DRAINAGE OF ABSCESS AND ANAL PAPILLECTOMY, DR. LUX MORENO AT [facility]   • CARDIAC CATHETERIZATION Left 12/27/2011    MILD NONOBSTRUCTIVE ATHEROSCLEROTIC CAD, LEFT VENTRICULAR EF 60%, MILDLY ELEVATED LEFT VENTRICULAR END DIASTOLIC PRESSURE, DR. DORINDA BISHOP AT [facility]   • COLONOSCOPY N/A 2012    DR. JOVAN MOSLEY   • COLONOSCOPY N/A 08/07/2017    SMALL EXTERNAL HEMORRHOIDS, OTHERWISE WNL, RESCOPE IN 5 YRS, DR. DARVIN CHU AT [facility]   • HEMORRHOIDECTOMY N/A    • INCISION AND DRAINAGE PERIRECTAL ABSCESS N/A 12/30/2013   • INCISION AND DRAINAGE PERIRECTAL ABSCESS N/A 06/04/2005    DR. ALFRED TURCIOS AT [facility]   • LUMBAR DISCECTOMY N/A 1995    OSTEOPHYTE L5   • MOHS SURGERY Left 08/09/2021    LEFT INFERIOR EYELID, (+)BCC, DR. DENZEL PEDERSEN   • SKIN BIOPSY Left 07/07/2021    LEFT INFERIOR EYELID, (+)BCC, GAIL GONZALEZ PA-C   • TYMPANOSTOMY TUBE PLACEMENT Bilateral        Social History:   reports that he quit smoking about 20 months ago. His smoking use included cigarettes. He started smoking about 52 years ago. He has a 60.00 pack-year smoking history. He has never used smokeless tobacco. He reports current alcohol use of about 10.0 standard drinks of alcohol per week. He reports that he does not use drugs.      Marriage status:     Family History   Problem Relation Age of Onset   • Stroke Mother    • Diabetes Mother    • Stroke Father    • Aneurysm Father    • Rheum arthritis Sister    • Diabetes Sister    • Hypertension Sister    • Hyperlipidemia Sister    • Hypertension Brother    • Hypertension Brother    • Colon cancer Neg Hx    • Colon polyps Neg Hx          Current Outpatient Medications:   •  albuterol sulfate  (90 Base) MCG/ACT inhaler, Inhale 1-2 puffs Every 4 (Four) Hours As Needed for Wheezing or  Shortness of Air., Disp: 18 g, Rfl: 0  •  atorvastatin (LIPITOR) 40 MG tablet, TAKE 1 TABLET BY MOUTH EVERY DAY, Disp: 90 tablet, Rfl: 3  •  gabapentin (NEURONTIN) 100 MG capsule, Take 1 capsule by mouth 2 (Two) Times a Day., Disp: 60 capsule, Rfl: 1  •  glucose blood test strip, Care Sen N  Dx code E11.42 testing bs 2 x day, Disp: 100 each, Rfl: 5  •  hydroCHLOROthiazide (HYDRODIURIL) 25 MG tablet, Take 1 tablet by mouth Daily., Disp: 90 tablet, Rfl: 0  •  insulin NPH (humuLIN N,novoLIN N) 100 UNIT/ML injection, 65 units at bedtime, Disp: 1 each, Rfl: 12  •  Insulin Pen Needle (PEN NEEDLES) 32G X 5 MM misc, 1 each Daily., Disp: 100 each, Rfl: 5  •  insulin regular (humuLIN R,novoLIN R) 100 UNIT/ML injection, inject 20 units in the morning 15 units at lunch and 25 units at dinner, Disp: , Rfl: 12  •  Insulin Syringes, Disposable, U-100 0.5 ML misc, Use daily, Disp: 100 each, Rfl: 2  •  losartan (COZAAR) 100 MG tablet, TAKE 1 TABLET BY MOUTH EVERY DAY, Disp: 90 tablet, Rfl: 3  •  metFORMIN ER (GLUCOPHAGE-XR) 500 MG 24 hr tablet, TAKE 4 TABLETS BY MOUTH DAILY WITH BREAKFAST, Disp: 360 tablet, Rfl: 1  •  nicotine (NICODERM CQ) 14 MG/24HR patch, Place 1 patch on the skin as directed by provider Daily., Disp: 30 patch, Rfl: 1  •  traZODone (DESYREL) 100 MG tablet, TAKE 1 TABLET BY MOUTH EVERYDAY AT BEDTIME, Disp: 90 tablet, Rfl: 1  •  amoxicillin-clavulanate (Augmentin) 875-125 MG per tablet, Take 1 tablet by mouth 2 (Two) Times a Day for 5 days., Disp: 10 tablet, Rfl: 0    Allergy  Rosuvastatin calcium    Review of Systems   Constitutional: Negative for decreased appetite and weight gain.   HENT: Negative for congestion, hearing loss and hoarse voice.    Eyes: Negative for blurred vision, discharge and visual disturbance.   Cardiovascular: Negative for chest pain, cyanosis and leg swelling.   Respiratory: Positive for cough, shortness of breath and wheezing. Negative for sleep disturbances due to breathing and snoring.     Endocrine: Negative for cold intolerance and heat intolerance.   Hematologic/Lymphatic: Does not bruise/bleed easily.   Skin: Negative for itching, poor wound healing and skin cancer.   Musculoskeletal: Negative for arthritis, back pain, joint pain and joint swelling.   Gastrointestinal: Negative for abdominal pain, change in bowel habit, bowel incontinence and constipation.   Genitourinary: Negative for bladder incontinence, dysuria and hematuria.   Neurological: Negative for brief paralysis, excessive daytime sleepiness, dizziness, focal weakness, headaches, light-headedness and weakness.   Psychiatric/Behavioral: Negative for altered mental status and hallucinations. The patient does not have insomnia.    Allergic/Immunologic: Negative for HIV exposure and persistent infections.   All other systems reviewed and are negative.      Vitals:    07/01/22 1011   BP: 122/68   Pulse: 78   SpO2: 95%   vitals reviewed  Body mass index is 37.31 kg/m².    Physical Exam  Vitals reviewed. Exam conducted with a chaperone present.   Constitutional:       General: He is not in acute distress.     Appearance: Normal appearance.   HENT:      Head: Normocephalic and atraumatic.   Eyes:      Extraocular Movements: Extraocular movements intact.   Cardiovascular:      Rate and Rhythm: Normal rate.   Pulmonary:      Breath sounds: Wheezing present.      Comments: Slightly increased respiratory effort, appears chronic.  Genitourinary:     Comments: Perianal exam: in the right posterior position, about 5cm from anal verge is an area of induration with what appears to be a healed 2mm opening. No antonio erythema or active drainage. Opening slightly bleeds but does not give way with probing of barr fistula probe.   Musculoskeletal:         General: Normal range of motion.      Cervical back: Normal range of motion.   Skin:     General: Skin is warm and dry.   Neurological:      General: No focal deficit present.      Mental Status: He is  alert.   Psychiatric:         Mood and Affect: Mood normal.         Behavior: Behavior normal.       Assessment:  1. Perirectal abscess        Plan:  Discussed case with Dr. Mason.  We recommend proceeding with incision and drainage of abscess.  The patient agrees with this plan.  Written and verbal consent was obtained, and the below procedure was performed.     Procedure: Incision and drainage of perirectal abscess    Patient gave informed consent verbally.  Patient was prepped with Betadine.  1% lidocaine with epinephrine was used as a local infiltration.  I made a cruciate incision over the previous wound opening and drained a small to moderate amount of blood-tinged pus from the abscess cavity.  I used a bar fistula probe to break up any loculations in the cavity.  The area of induration was significantly decreased after the completion of the procedure.  The wound was left open to drain. Patient tolerated well.    Patient was given after care information sheet with warning signs, and follow up appt in 7-10 days.  Lidocaine 5 % topical for discomfort.  Tylenol alternating with ibuprofen for pain.    I also increased the dose of Augmentin.  I discussed the possibility of fistula and noted that good blood glucose control, sitz baths, and antibiotics will help to decrease the chance of this as much as possible.  No swimming until healed.

## 2022-07-11 ENCOUNTER — OFFICE VISIT (OUTPATIENT)
Dept: SURGERY | Facility: CLINIC | Age: 62
End: 2022-07-11

## 2022-07-11 VITALS
OXYGEN SATURATION: 95 % | SYSTOLIC BLOOD PRESSURE: 136 MMHG | HEIGHT: 68 IN | HEART RATE: 79 BPM | WEIGHT: 251.7 LBS | DIASTOLIC BLOOD PRESSURE: 72 MMHG | TEMPERATURE: 97.8 F | BODY MASS INDEX: 38.15 KG/M2

## 2022-07-11 DIAGNOSIS — K61.1 PERIRECTAL ABSCESS: Primary | ICD-10-CM

## 2022-07-11 PROCEDURE — 99024 POSTOP FOLLOW-UP VISIT: CPT | Performed by: PHYSICIAN ASSISTANT

## 2022-07-11 NOTE — PROGRESS NOTES
"Carrington Biggs is a 61 y.o. male in for follow up of Perirectal abscess    Pt is s/p in-office I&D of a right-posterior perirectal abscess on 07/01/2022 and presents today for a follow up.   Still has a few days of Augmentin left to take.   Pt denies any pain, drainage, bleeding, or fevers since his procedure.   Denies any additional concerns at this time.     /72 (BP Location: Left arm, Patient Position: Sitting, Cuff Size: Small Adult)   Pulse 79   Temp 97.8 °F (36.6 °C)   Ht 172.7 cm (68\")   Wt 114 kg (251 lb 11.2 oz)   SpO2 95%   BMI 38.27 kg/m²   Body mass index is 38.27 kg/m².      PE:  Physical Exam  Constitutional:       General: He is not in acute distress.     Appearance: He is well-developed.   HENT:      Head: Normocephalic and atraumatic.   Abdominal:      General: There is no distension.      Palpations: Abdomen is soft.   Genitourinary:     Comments: Perianal exam: Well healing incision in the right posterior position. No surrounding erythema, edema, fluctuance, or purulent drainage. No external fistula tract openings visualized.   Musculoskeletal:         General: Normal range of motion.   Neurological:      Mental Status: He is alert.   Psychiatric:         Thought Content: Thought content normal.         Assessment:   1. Perirectal abscess    - S/p in-office I&D of perirectal abscess on 07/01/2022     Plan:  - Pt encouraged to complete full course of Augmentin.   - Recommended probiotic x1 month following ABx therapy.   - Follow up PRN for any new or worsening symptoms.   "

## 2022-08-12 NOTE — TELEPHONE ENCOUNTER
FEEDING: Milk - 1%                           Nutrition - good variety of foods  SLEEPIN, 9 hours at night  URINATION: no enuresis or daytime incontinence  STOOLS: normal  SCHOOL HISTORY:       School - [employer]        Grade - 8       Academic performance - normal       Extracurricular Activities - NO   CORRECTIVE LENSES: Yes                    CONCERNS:  none    Health Maintenance Due   Topic Date Due   • COVID-19 Vaccine (1) Never done   • HPV Vaccine (1 - Male 2-dose series) Never done   • Depression Screening  Never done   • Annual Physical (ages 3-18)  2021       Patient is due for the topics as listed above and wishes to proceed with them.  Appt scheduled to perform Well Child Exam and Depression Screening .  Pt will skip hpv vaccines today.     Recent PHQ 2/9 Score    PHQ 2:  Date Peds PHQ 2 Score Peds PHQ 2 Interpretation   2022 0 No further screening needed       PHQ 9:        Last ov-10-30-19  Next ov-5-20-20

## 2022-08-26 DIAGNOSIS — G47.09 OTHER INSOMNIA: ICD-10-CM

## 2022-08-26 DIAGNOSIS — E11.42 TYPE 2 DIABETES MELLITUS WITH PERIPHERAL NEUROPATHY: Primary | ICD-10-CM

## 2022-08-26 RX ORDER — METFORMIN HYDROCHLORIDE 500 MG/1
2000 TABLET, EXTENDED RELEASE ORAL
Qty: 360 TABLET | Refills: 0 | Status: SHIPPED | OUTPATIENT
Start: 2022-08-26 | End: 2022-10-21

## 2022-08-26 RX ORDER — TRAZODONE HYDROCHLORIDE 100 MG/1
TABLET ORAL
Qty: 90 TABLET | Refills: 1 | Status: SHIPPED | OUTPATIENT
Start: 2022-08-26 | End: 2023-02-03 | Stop reason: SDUPTHER

## 2022-09-01 ENCOUNTER — APPOINTMENT (OUTPATIENT)
Dept: CARDIOLOGY | Facility: HOSPITAL | Age: 62
End: 2022-09-01

## 2022-09-01 ENCOUNTER — HOSPITAL ENCOUNTER (INPATIENT)
Facility: HOSPITAL | Age: 62
LOS: 4 days | Discharge: HOME | End: 2022-09-05
Attending: EMERGENCY MEDICINE | Admitting: INTERNAL MEDICINE

## 2022-09-01 ENCOUNTER — APPOINTMENT (OUTPATIENT)
Dept: CT IMAGING | Facility: HOSPITAL | Age: 62
End: 2022-09-01

## 2022-09-01 ENCOUNTER — APPOINTMENT (OUTPATIENT)
Dept: GENERAL RADIOLOGY | Facility: HOSPITAL | Age: 62
End: 2022-09-01

## 2022-09-01 DIAGNOSIS — J96.01 ACUTE RESPIRATORY FAILURE WITH HYPOXIA: ICD-10-CM

## 2022-09-01 DIAGNOSIS — R06.00 DYSPNEA, UNSPECIFIED TYPE: Primary | ICD-10-CM

## 2022-09-01 DIAGNOSIS — E87.70 HYPERVOLEMIA, UNSPECIFIED HYPERVOLEMIA TYPE: ICD-10-CM

## 2022-09-01 DIAGNOSIS — R09.02 HYPOXIA: ICD-10-CM

## 2022-09-01 DIAGNOSIS — J44.9 CHRONIC OBSTRUCTIVE PULMONARY DISEASE (COPD): ICD-10-CM

## 2022-09-01 DIAGNOSIS — J96.10 CHRONIC RESPIRATORY FAILURE: ICD-10-CM

## 2022-09-01 DIAGNOSIS — I27.20 PULMONARY HYPERTENSION: ICD-10-CM

## 2022-09-01 LAB
ALBUMIN SERPL-MCNC: 3.9 G/DL (ref 3.5–5.2)
ALBUMIN/GLOB SERPL: 1.6 G/DL
ALP SERPL-CCNC: 92 U/L (ref 39–117)
ALT SERPL W P-5'-P-CCNC: 14 U/L (ref 1–41)
ANION GAP SERPL CALCULATED.3IONS-SCNC: 10.5 MMOL/L (ref 5–15)
APTT PPP: 27.3 SECONDS (ref 22.7–35.4)
AST SERPL-CCNC: 14 U/L (ref 1–40)
B PARAPERT DNA SPEC QL NAA+PROBE: NOT DETECTED
B PERT DNA SPEC QL NAA+PROBE: NOT DETECTED
BH CV LOWER VASCULAR LEFT COMMON FEMORAL AUGMENT: NORMAL
BH CV LOWER VASCULAR LEFT COMMON FEMORAL COMPETENT: NORMAL
BH CV LOWER VASCULAR LEFT COMMON FEMORAL COMPRESS: NORMAL
BH CV LOWER VASCULAR LEFT COMMON FEMORAL PHASIC: NORMAL
BH CV LOWER VASCULAR LEFT COMMON FEMORAL SPONT: NORMAL
BH CV LOWER VASCULAR LEFT DISTAL FEMORAL COMPRESS: NORMAL
BH CV LOWER VASCULAR LEFT GASTRONEMIUS COMPRESS: NORMAL
BH CV LOWER VASCULAR LEFT GREATER SAPH AK COMPRESS: NORMAL
BH CV LOWER VASCULAR LEFT GREATER SAPH BK COMPRESS: NORMAL
BH CV LOWER VASCULAR LEFT LESSER SAPH COMPRESS: NORMAL
BH CV LOWER VASCULAR LEFT MID FEMORAL AUGMENT: NORMAL
BH CV LOWER VASCULAR LEFT MID FEMORAL COMPETENT: NORMAL
BH CV LOWER VASCULAR LEFT MID FEMORAL COMPRESS: NORMAL
BH CV LOWER VASCULAR LEFT MID FEMORAL PHASIC: NORMAL
BH CV LOWER VASCULAR LEFT MID FEMORAL SPONT: NORMAL
BH CV LOWER VASCULAR LEFT PERONEAL COMPRESS: NORMAL
BH CV LOWER VASCULAR LEFT POPLITEAL AUGMENT: NORMAL
BH CV LOWER VASCULAR LEFT POPLITEAL COMPETENT: NORMAL
BH CV LOWER VASCULAR LEFT POPLITEAL COMPRESS: NORMAL
BH CV LOWER VASCULAR LEFT POPLITEAL PHASIC: NORMAL
BH CV LOWER VASCULAR LEFT POPLITEAL SPONT: NORMAL
BH CV LOWER VASCULAR LEFT POSTERIOR TIBIAL COMPRESS: NORMAL
BH CV LOWER VASCULAR LEFT PROFUNDA FEMORAL COMPRESS: NORMAL
BH CV LOWER VASCULAR LEFT PROXIMAL FEMORAL COMPRESS: NORMAL
BH CV LOWER VASCULAR LEFT SAPHENOFEMORAL JUNCTION COMPRESS: NORMAL
BH CV LOWER VASCULAR RIGHT COMMON FEMORAL AUGMENT: NORMAL
BH CV LOWER VASCULAR RIGHT COMMON FEMORAL COMPETENT: NORMAL
BH CV LOWER VASCULAR RIGHT COMMON FEMORAL COMPRESS: NORMAL
BH CV LOWER VASCULAR RIGHT COMMON FEMORAL PHASIC: NORMAL
BH CV LOWER VASCULAR RIGHT COMMON FEMORAL SPONT: NORMAL
BILIRUB SERPL-MCNC: 0.3 MG/DL (ref 0–1.2)
BUN SERPL-MCNC: 18 MG/DL (ref 8–23)
BUN/CREAT SERPL: 16.2 (ref 7–25)
C PNEUM DNA NPH QL NAA+NON-PROBE: NOT DETECTED
CALCIUM SPEC-SCNC: 9.7 MG/DL (ref 8.6–10.5)
CHLORIDE SERPL-SCNC: 99 MMOL/L (ref 98–107)
CO2 SERPL-SCNC: 33.5 MMOL/L (ref 22–29)
CREAT SERPL-MCNC: 1.11 MG/DL (ref 0.76–1.27)
DEPRECATED RDW RBC AUTO: 45.1 FL (ref 37–54)
EGFRCR SERPLBLD CKD-EPI 2021: 75.5 ML/MIN/1.73
ERYTHROCYTE [DISTWIDTH] IN BLOOD BY AUTOMATED COUNT: 13.8 % (ref 12.3–15.4)
FLUAV SUBTYP SPEC NAA+PROBE: NOT DETECTED
FLUBV RNA ISLT QL NAA+PROBE: NOT DETECTED
GLOBULIN UR ELPH-MCNC: 2.4 GM/DL
GLUCOSE SERPL-MCNC: 147 MG/DL (ref 65–99)
HADV DNA SPEC NAA+PROBE: NOT DETECTED
HCOV 229E RNA SPEC QL NAA+PROBE: NOT DETECTED
HCOV HKU1 RNA SPEC QL NAA+PROBE: NOT DETECTED
HCOV NL63 RNA SPEC QL NAA+PROBE: NOT DETECTED
HCOV OC43 RNA SPEC QL NAA+PROBE: NOT DETECTED
HCT VFR BLD AUTO: 56.3 % (ref 37.5–51)
HGB BLD-MCNC: 18.3 G/DL (ref 13–17.7)
HMPV RNA NPH QL NAA+NON-PROBE: NOT DETECTED
HOLD SPECIMEN: NORMAL
HOLD SPECIMEN: NORMAL
HPIV1 RNA ISLT QL NAA+PROBE: NOT DETECTED
HPIV2 RNA SPEC QL NAA+PROBE: NOT DETECTED
HPIV3 RNA NPH QL NAA+PROBE: NOT DETECTED
HPIV4 P GENE NPH QL NAA+PROBE: NOT DETECTED
INR PPP: 1.15 (ref 0.9–1.1)
M PNEUMO IGG SER IA-ACNC: NOT DETECTED
MAXIMAL PREDICTED HEART RATE: 159 BPM
MCH RBC QN AUTO: 29.3 PG (ref 26.6–33)
MCHC RBC AUTO-ENTMCNC: 32.5 G/DL (ref 31.5–35.7)
MCV RBC AUTO: 90.1 FL (ref 79–97)
NRBC BLD AUTO-RTO: 0.1 /100 WBC (ref 0–0.2)
NT-PROBNP SERPL-MCNC: 234 PG/ML (ref 0–900)
PLAT MORPH BLD: NORMAL
PLATELET # BLD AUTO: 217 10*3/MM3 (ref 140–450)
PMV BLD AUTO: 10.6 FL (ref 6–12)
POTASSIUM SERPL-SCNC: 4.6 MMOL/L (ref 3.5–5.2)
PROCALCITONIN SERPL-MCNC: 0.03 NG/ML (ref 0–0.25)
PROT SERPL-MCNC: 6.3 G/DL (ref 6–8.5)
PROTHROMBIN TIME: 14.6 SECONDS (ref 11.7–14.2)
QT INTERVAL: 334 MS
RBC # BLD AUTO: 6.25 10*6/MM3 (ref 4.14–5.8)
RBC MORPH BLD: NORMAL
RHINOVIRUS RNA SPEC NAA+PROBE: NOT DETECTED
RSV RNA NPH QL NAA+NON-PROBE: NOT DETECTED
SARS-COV-2 RNA NPH QL NAA+NON-PROBE: NOT DETECTED
SODIUM SERPL-SCNC: 143 MMOL/L (ref 136–145)
STRESS TARGET HR: 135 BPM
TROPONIN T SERPL-MCNC: <0.01 NG/ML (ref 0–0.03)
WBC MORPH BLD: NORMAL
WBC NRBC COR # BLD: 13.87 10*3/MM3 (ref 3.4–10.8)
WHOLE BLOOD HOLD COAG: NORMAL
WHOLE BLOOD HOLD SPECIMEN: NORMAL

## 2022-09-01 PROCEDURE — 84484 ASSAY OF TROPONIN QUANT: CPT | Performed by: PHYSICIAN ASSISTANT

## 2022-09-01 PROCEDURE — 94799 UNLISTED PULMONARY SVC/PX: CPT

## 2022-09-01 PROCEDURE — 25010000002 MAGNESIUM SULFATE 2 GM/50ML SOLUTION: Performed by: EMERGENCY MEDICINE

## 2022-09-01 PROCEDURE — 71045 X-RAY EXAM CHEST 1 VIEW: CPT

## 2022-09-01 PROCEDURE — 71275 CT ANGIOGRAPHY CHEST: CPT

## 2022-09-01 PROCEDURE — 0 IOPAMIDOL PER 1 ML: Performed by: EMERGENCY MEDICINE

## 2022-09-01 PROCEDURE — 25010000002 FUROSEMIDE PER 20 MG: Performed by: EMERGENCY MEDICINE

## 2022-09-01 PROCEDURE — 84145 PROCALCITONIN (PCT): CPT | Performed by: EMERGENCY MEDICINE

## 2022-09-01 PROCEDURE — 93010 ELECTROCARDIOGRAM REPORT: CPT | Performed by: INTERNAL MEDICINE

## 2022-09-01 PROCEDURE — 85610 PROTHROMBIN TIME: CPT | Performed by: EMERGENCY MEDICINE

## 2022-09-01 PROCEDURE — 94640 AIRWAY INHALATION TREATMENT: CPT

## 2022-09-01 PROCEDURE — 70491 CT SOFT TISSUE NECK W/DYE: CPT

## 2022-09-01 PROCEDURE — 25010000002 ENOXAPARIN PER 10 MG: Performed by: NURSE PRACTITIONER

## 2022-09-01 PROCEDURE — 93005 ELECTROCARDIOGRAM TRACING: CPT | Performed by: PHYSICIAN ASSISTANT

## 2022-09-01 PROCEDURE — 85025 COMPLETE CBC W/AUTO DIFF WBC: CPT | Performed by: PHYSICIAN ASSISTANT

## 2022-09-01 PROCEDURE — 85007 BL SMEAR W/DIFF WBC COUNT: CPT | Performed by: PHYSICIAN ASSISTANT

## 2022-09-01 PROCEDURE — 94761 N-INVAS EAR/PLS OXIMETRY MLT: CPT

## 2022-09-01 PROCEDURE — 93971 EXTREMITY STUDY: CPT

## 2022-09-01 PROCEDURE — 80053 COMPREHEN METABOLIC PANEL: CPT | Performed by: PHYSICIAN ASSISTANT

## 2022-09-01 PROCEDURE — 83880 ASSAY OF NATRIURETIC PEPTIDE: CPT | Performed by: PHYSICIAN ASSISTANT

## 2022-09-01 PROCEDURE — 85730 THROMBOPLASTIN TIME PARTIAL: CPT | Performed by: EMERGENCY MEDICINE

## 2022-09-01 PROCEDURE — 0202U NFCT DS 22 TRGT SARS-COV-2: CPT | Performed by: EMERGENCY MEDICINE

## 2022-09-01 PROCEDURE — 99284 EMERGENCY DEPT VISIT MOD MDM: CPT

## 2022-09-01 RX ORDER — GLIMEPIRIDE 4 MG/1
4 TABLET ORAL 2 TIMES DAILY
COMMUNITY
End: 2022-09-08 | Stop reason: ALTCHOICE

## 2022-09-01 RX ORDER — NICOTINE POLACRILEX 4 MG
15 LOZENGE BUCCAL
Status: DISCONTINUED | OUTPATIENT
Start: 2022-09-01 | End: 2022-09-05 | Stop reason: HOSPADM

## 2022-09-01 RX ORDER — MAGNESIUM SULFATE HEPTAHYDRATE 40 MG/ML
2 INJECTION, SOLUTION INTRAVENOUS ONCE
Status: COMPLETED | OUTPATIENT
Start: 2022-09-01 | End: 2022-09-01

## 2022-09-01 RX ORDER — ACETAMINOPHEN 325 MG/1
650 TABLET ORAL EVERY 4 HOURS PRN
Status: DISCONTINUED | OUTPATIENT
Start: 2022-09-01 | End: 2022-09-05 | Stop reason: HOSPADM

## 2022-09-01 RX ORDER — FUROSEMIDE 10 MG/ML
40 INJECTION INTRAMUSCULAR; INTRAVENOUS EVERY 12 HOURS
Status: DISCONTINUED | OUTPATIENT
Start: 2022-09-02 | End: 2022-09-03

## 2022-09-01 RX ORDER — LOSARTAN POTASSIUM 100 MG/1
100 TABLET ORAL DAILY
Status: DISCONTINUED | OUTPATIENT
Start: 2022-09-02 | End: 2022-09-05 | Stop reason: HOSPADM

## 2022-09-01 RX ORDER — SODIUM CHLORIDE 0.9 % (FLUSH) 0.9 %
10 SYRINGE (ML) INJECTION AS NEEDED
Status: DISCONTINUED | OUTPATIENT
Start: 2022-09-01 | End: 2022-09-05 | Stop reason: HOSPADM

## 2022-09-01 RX ORDER — INSULIN LISPRO 100 [IU]/ML
0-9 INJECTION, SOLUTION INTRAVENOUS; SUBCUTANEOUS
Status: DISCONTINUED | OUTPATIENT
Start: 2022-09-02 | End: 2022-09-02

## 2022-09-01 RX ORDER — FUROSEMIDE 10 MG/ML
40 INJECTION INTRAMUSCULAR; INTRAVENOUS EVERY 12 HOURS
Status: DISCONTINUED | OUTPATIENT
Start: 2022-09-02 | End: 2022-09-01

## 2022-09-01 RX ORDER — ATORVASTATIN CALCIUM 20 MG/1
40 TABLET, FILM COATED ORAL DAILY
Status: DISCONTINUED | OUTPATIENT
Start: 2022-09-02 | End: 2022-09-05 | Stop reason: HOSPADM

## 2022-09-01 RX ORDER — ONDANSETRON 2 MG/ML
4 INJECTION INTRAMUSCULAR; INTRAVENOUS EVERY 6 HOURS PRN
Status: DISCONTINUED | OUTPATIENT
Start: 2022-09-01 | End: 2022-09-05 | Stop reason: HOSPADM

## 2022-09-01 RX ORDER — TRAZODONE HYDROCHLORIDE 50 MG/1
50 TABLET ORAL NIGHTLY
Status: DISCONTINUED | OUTPATIENT
Start: 2022-09-01 | End: 2022-09-05 | Stop reason: HOSPADM

## 2022-09-01 RX ORDER — ONDANSETRON 4 MG/1
4 TABLET, FILM COATED ORAL EVERY 6 HOURS PRN
Status: DISCONTINUED | OUTPATIENT
Start: 2022-09-01 | End: 2022-09-05 | Stop reason: HOSPADM

## 2022-09-01 RX ORDER — NITROGLYCERIN 0.4 MG/1
0.4 TABLET SUBLINGUAL
Status: DISCONTINUED | OUTPATIENT
Start: 2022-09-01 | End: 2022-09-05 | Stop reason: HOSPADM

## 2022-09-01 RX ORDER — DEXTROSE MONOHYDRATE 25 G/50ML
25 INJECTION, SOLUTION INTRAVENOUS
Status: DISCONTINUED | OUTPATIENT
Start: 2022-09-01 | End: 2022-09-05 | Stop reason: HOSPADM

## 2022-09-01 RX ORDER — ENOXAPARIN SODIUM 100 MG/ML
40 INJECTION SUBCUTANEOUS NIGHTLY
Status: DISCONTINUED | OUTPATIENT
Start: 2022-09-01 | End: 2022-09-02

## 2022-09-01 RX ORDER — ALBUTEROL SULFATE 2.5 MG/3ML
2.5 SOLUTION RESPIRATORY (INHALATION)
Status: COMPLETED | OUTPATIENT
Start: 2022-09-01 | End: 2022-09-01

## 2022-09-01 RX ORDER — IPRATROPIUM BROMIDE AND ALBUTEROL SULFATE 2.5; .5 MG/3ML; MG/3ML
3 SOLUTION RESPIRATORY (INHALATION) ONCE
Status: COMPLETED | OUTPATIENT
Start: 2022-09-01 | End: 2022-09-01

## 2022-09-01 RX ORDER — ALBUTEROL SULFATE 2.5 MG/3ML
2.5 SOLUTION RESPIRATORY (INHALATION) EVERY 4 HOURS PRN
Refills: 0 | Status: DISCONTINUED | OUTPATIENT
Start: 2022-09-01 | End: 2022-09-05 | Stop reason: HOSPADM

## 2022-09-01 RX ORDER — ALUMINA, MAGNESIA, AND SIMETHICONE 2400; 2400; 240 MG/30ML; MG/30ML; MG/30ML
15 SUSPENSION ORAL EVERY 6 HOURS PRN
Status: DISCONTINUED | OUTPATIENT
Start: 2022-09-01 | End: 2022-09-05 | Stop reason: HOSPADM

## 2022-09-01 RX ORDER — FUROSEMIDE 10 MG/ML
40 INJECTION INTRAMUSCULAR; INTRAVENOUS ONCE
Status: COMPLETED | OUTPATIENT
Start: 2022-09-01 | End: 2022-09-01

## 2022-09-01 RX ADMIN — ALBUTEROL SULFATE 2.5 MG: 2.5 SOLUTION RESPIRATORY (INHALATION) at 16:04

## 2022-09-01 RX ADMIN — ALBUTEROL SULFATE 2.5 MG: 2.5 SOLUTION RESPIRATORY (INHALATION) at 15:58

## 2022-09-01 RX ADMIN — MAGNESIUM SULFATE HEPTAHYDRATE 2 G: 2 INJECTION, SOLUTION INTRAVENOUS at 16:16

## 2022-09-01 RX ADMIN — FUROSEMIDE 40 MG: 10 INJECTION, SOLUTION INTRAMUSCULAR; INTRAVENOUS at 16:10

## 2022-09-01 RX ADMIN — ENOXAPARIN SODIUM 40 MG: 100 INJECTION SUBCUTANEOUS at 21:49

## 2022-09-01 RX ADMIN — IOPAMIDOL 95 ML: 755 INJECTION, SOLUTION INTRAVENOUS at 17:22

## 2022-09-01 RX ADMIN — IPRATROPIUM BROMIDE AND ALBUTEROL SULFATE 3 ML: .5; 3 SOLUTION RESPIRATORY (INHALATION) at 15:08

## 2022-09-01 NOTE — ED NOTES
.  Nursing report ED to floor  Carrington Biggs  61 y.o.  male    HPI :   Chief Complaint   Patient presents with   • Shortness of Breath       Admitting doctor:   Tucker Baird MD    Admitting diagnosis:   The primary encounter diagnosis was Dyspnea, unspecified type. Diagnoses of Hypoxia and Hypervolemia, unspecified hypervolemia type were also pertinent to this visit.    Code status:   Current Code Status     Date Active Code Status Order ID Comments User Context       9/1/2022 1956 CPR (Attempt to Resuscitate) [id number]  Juany Aaron, DARLINE ED     Advance Care Planning Activity      Questions for Current Code Status     Question Answer    Code Status (Patient has no pulse and is not breathing) CPR (Attempt to Resuscitate)    Medical Interventions (Patient has pulse or is breathing) Full Support          Allergies:   Rosuvastatin calcium    Intake and Output    Intake/Output Summary (Last 24 hours) at 9/1/2022 1957  Last data filed at 9/1/2022 1922  Gross per 24 hour   Intake --   Output 350 ml   Net -350 ml       Weight:       09/01/22  1445   Weight: 113 kg (250 lb)       Most recent vitals:   Vitals:    09/01/22 1610 09/01/22 1819 09/01/22 1904 09/01/22 1923   BP: 151/66      Pulse: 92 92 98 87   Resp:    20   Temp:       TempSrc:       SpO2:  94% 94% 91%   Weight:       Height:           Active LDAs/IV Access:   Lines, Drains & Airways     Active LDAs     Name Placement date Placement time Site Days    Peripheral IV 09/01/22 1454 Right Antecubital 09/01/22  1454  Antecubital  less than 1                Labs (abnormal labs have a star):   Labs Reviewed   COMPREHENSIVE METABOLIC PANEL - Abnormal; Notable for the following components:       Result Value    Glucose 147 (*)     CO2 33.5 (*)     All other components within normal limits    Narrative:     GFR Normal >60  Chronic Kidney Disease <60  Kidney Failure <15     CBC WITH AUTO DIFFERENTIAL - Abnormal; Notable for the following components:    WBC  13.87 (*)     RBC 6.25 (*)     Hemoglobin 18.3 (*)     Hematocrit 56.3 (*)     All other components within normal limits   PROTIME-INR - Abnormal; Notable for the following components:    Protime 14.6 (*)     INR 1.15 (*)     All other components within normal limits   RESPIRATORY PANEL PCR W/ COVID-19 (SARS-COV-2) EDWARD/STEFFANIE/MIRTHA/PAD/COR/MAD/NIGEL IN-HOUSE, NP SWAB IN UTM/VTP, 3-4 HR TAT - Normal    Narrative:     In the setting of a positive respiratory panel with a viral infection PLUS a negative procalcitonin without other underlying concern for bacterial infection, consider observing off antibiotics or discontinuation of antibiotics and continue supportive care. If the respiratory panel is positive for atypical bacterial infection (Bordetella pertussis, Chlamydophila pneumoniae, or Mycoplasma pneumoniae), consider antibiotic de-escalation to target atypical bacterial infection.   BNP (IN-HOUSE) - Normal    Narrative:     Among patients with dyspnea, NT-proBNP is highly sensitive for the detection of acute congestive heart failure. In addition NT-proBNP of <300 pg/ml effectively rules out acute congestive heart failure with 99% negative predictive value.    Results may be falsely decreased if patient taking Biotin.     TROPONIN (IN-HOUSE) - Normal    Narrative:     Troponin T Reference Range:  <= 0.03 ng/mL-   Negative for AMI  >0.03 ng/mL-     Abnormal for myocardial necrosis.  Clinicians would have to utilize clinical acumen, EKG, Troponin and serial changes to determine if it is an Acute Myocardial Infarction or myocardial injury due to an underlying chronic condition.       Results may be falsely decreased if patient taking Biotin.     SCAN SLIDE - Normal   APTT - Normal   PROCALCITONIN - Normal    Narrative:     As a Marker for Sepsis (Non-Neonates):    1. <0.5 ng/mL represents a low risk of severe sepsis and/or septic shock.  2. >2 ng/mL represents a high risk of severe sepsis and/or septic shock.    As a Marker  "for Lower Respiratory Tract Infections that require antibiotic therapy:    PCT on Admission    Antibiotic Therapy       6-12 Hrs later    >0.5                Strongly Recommended  >0.25 - <0.5        Recommended   0.1 - 0.25          Discouraged              Remeasure/reassess PCT  <0.1                Strongly Discouraged     Remeasure/reassess PCT    As 28 day mortality risk marker: \"Change in Procalcitonin Result\" (>80% or <=80%) if Day 0 (or Day 1) and Day 4 values are available. Refer to http://www.[facility]-pct-calculator.com    Change in PCT <=80%  A decrease of PCT levels below or equal to 80% defines a positive change in PCT test result representing a higher risk for 28-day all-cause mortality of patients diagnosed with severe sepsis for septic shock.    Change in PCT >80%  A decrease of PCT levels of more than 80% defines a negative change in PCT result representing a lower risk for 28-day all-cause mortality of patients diagnosed with severe sepsis or septic shock.      RAINBOW DRAW    Narrative:     The following orders were created for panel order [place] Draw.  Procedure                               Abnormality         Status                     ---------                               -----------         ------                     Green Top (Gel)[[id number]]                                  Final result               Lavender Top[[id number]]                                     Final result               Gold Top - SST[[id number]]                                   Final result               Light Blue Top[[id number]]                                   Final result                 Please view results for these tests on the individual orders.   HEMOGLOBIN A1C   POCT GLUCOSE FINGERSTICK   POCT GLUCOSE FINGERSTICK   POCT GLUCOSE FINGERSTICK   CBC AND DIFFERENTIAL    Narrative:     The following orders were created for panel order CBC & Differential.  Procedure                               Abnormality         " Status                     ---------                               -----------         ------                     CBC Auto Differential[[id number]]        Abnormal            Final result               Scan Slide[[id number]]                   Normal              Final result                 Please view results for these tests on the individual orders.   GREEN TOP   LAVENDER TOP   GOLD TOP - SST   LIGHT BLUE TOP       EKG:   ECG 12 Lead   Final Result   HEART RATE= 93  bpm   RR Interval= 645  ms   KY Interval= 119  ms   P Horizontal Axis= -21  deg   P Front Axis= 83  deg   QRSD Interval= 97  ms   QT Interval= 334  ms   QRS Axis= 91  deg   T Wave Axis= 74  deg   - ABNORMAL ECG -   Sinus rhythm   Borderline short KY interval   Biatrial enlargement   Right axis deviation   Nonspecific T abnormalities, lateral leads   No change from previous tracing   Electronically Signed By: [facility]) ([facility]) 01-Sep-2022 15:54:11   Date and Time of Study: 2022-09-01 15:08:39          Meds given in ED:   Medications   sodium chloride 0.9 % flush 10 mL (has no administration in time range)   sodium chloride 0.9 % flush 10 mL (has no administration in time range)   Enoxaparin Sodium (LOVENOX) syringe 40 mg (has no administration in time range)   nitroglycerin (NITROSTAT) SL tablet 0.4 mg (has no administration in time range)   acetaminophen (TYLENOL) tablet 650 mg (has no administration in time range)   ondansetron (ZOFRAN) tablet 4 mg (has no administration in time range)     Or   ondansetron (ZOFRAN) injection 4 mg (has no administration in time range)   aluminum-magnesium hydroxide-simethicone (MAALOX MAX) 400-400-40 MG/5ML suspension 15 mL (has no administration in time range)   dextrose (GLUTOSE) oral gel 15 g (has no administration in time range)   dextrose (D50W) (25 g/50 mL) IV injection 25 g (has no administration in time range)   glucagon (human recombinant) (GLUCAGEN DIAGNOSTIC) injection 1 mg (has no administration in  time range)   insulin lispro (ADMELOG) injection 0-9 Units (has no administration in time range)   ipratropium-albuterol (DUO-NEB) nebulizer solution 3 mL (3 mL Nebulization Given 22 1508)   albuterol (PROVENTIL) nebulizer solution 0.083% 2.5 mg/3mL (2.5 mg Nebulization Given 22 1604)   furosemide (LASIX) injection 40 mg (40 mg Intravenous Given 22 1610)   magnesium sulfate 2g/50 mL (PREMIX) infusion (0 g Intravenous Stopped 22 1904)   iopamidol (ISOVUE-370) 76 % injection 100 mL (95 mL Intravenous Given 22 1722)       Imaging results:  CT Soft Tissue Neck With Contrast    Result Date: 2022   Essentially unremarkable CT scan of the soft tissues of the neck.    Radiation dose reduction techniques were utilized, including automated exposure control and exposure modulation based on body size.       CT Angiogram Chest    Result Date: 2022  No evidence of pulmonary embolism. Minimal density along the caudad edges of the lower lobes posteriorly is favored to represent atelectasis. Otherwise negative chest CT.  This report was finalized on 2022 6:06 PM by Dr. Thee Mahmood M.D.        Ambulatory status:   - up ad prisca w/oxygen    Social issues:   Social History     Socioeconomic History   • Marital status:    • Number of children: 0   Tobacco Use   • Smoking status: Former Smoker     Packs/day: 1.50     Years: 40.00     Pack years: 60.00     Types: Cigarettes     Start date:      Quit date: 10/1/2020     Years since quittin.9   • Smokeless tobacco: Never Used   • Tobacco comment:  quit smoking 10/2020   Vaping Use   • Vaping Use: Never used   Substance and Sexual Activity   • Alcohol use: Yes     Alcohol/week: 10.0 standard drinks     Types: 10 Shots of liquor per week     Comment: couple days a week (3-4 drinks)   • Drug use: No   • Sexual activity: Yes     Partners: Female       NIH Stroke Scale:        Nursing report ED to floor:

## 2022-09-01 NOTE — PROGRESS NOTES
Today;s left lower venous doppler preliminary results are negative for deep vein thrombosis. The preliminary results were given to HONORIO Lowe.

## 2022-09-01 NOTE — H&P
Patient Name:  Carrington Biggs  YOB: 1960  MRN:  [id number]  Admit Date:  9/1/2022  Patient Care Team:  Preet Louise MD as PCP - General (Internal Medicine)  Dann Alvarado MD as Consulting Physician (Orthopedic Surgery)  Deidra Cannon MD as Consulting Physician (Ophthalmology)  Bayron Anaya MD as Consulting Physician (Endocrinology)  Raj Perez MD as Consulting Physician (Urology)  Rocco Manning MD as Consulting Physician (Gastroenterology)  Lizbteh Aaron PA-C as Physician Assistant (Physician Assistant)  Fely Felton PA-C as Physician Assistant (Colon and Rectal Surgery)      Subjective   History Present Illness     Chief Complaint   Patient presents with   • Shortness of Breath     History of Present Illness   Mr. Biggs is a 61 y.o. smoker with a history of noninsulin-dependent type 2 diabetes, JACEK, obesity, HLD, GERD, and HTN that presents to [facility] complaining of shortness of breath for the past 2 weeks. Patient reports that his symptoms are worse at nighttime while lying down.  His symptoms have been constant for the past 3 days and is worse with exertion.  He also endorses bilateral lower extremity swelling.  Denies any chest pain at the moment but reports chest pain a week ago.  He denies any fever, chills, n/v/d. He does endorse a non productive cough occasionally.  Mild vascular congestion not excluded on chest x-ray.  CT of chest performed in the emergency department shows no evidence of pulmonary embolism.  He has been admitted to our service for further evaluation and treatment.    Review of Systems   Constitutional: Negative for chills and fever.   HENT: Negative for congestion and rhinorrhea.    Eyes: Negative for photophobia and visual disturbance.   Respiratory: Positive for cough and shortness of breath.    Cardiovascular: Negative for chest pain and palpitations.   Gastrointestinal: Negative for constipation,  diarrhea, nausea and vomiting.   Endocrine: Negative for cold intolerance and heat intolerance.   Genitourinary: Negative for difficulty urinating and dysuria.   Musculoskeletal: Negative for gait problem and joint swelling.   Skin: Negative for rash and wound.   Neurological: Negative for dizziness, light-headedness and headaches.   Psychiatric/Behavioral: Negative for sleep disturbance and suicidal ideas.        Personal History     Past Medical History:   Diagnosis Date   • Achilles tendinitis of right lower extremity 07/2017   • Anal fistula 2006   • BCC (basal cell carcinoma) 07/2021    LEFT INFERIOR EYELID, S/P MOHS   • BPH (benign prostatic hyperplasia)     FOLLOWED BY DR. BROOKS BOB   • CAD (coronary artery disease) 2011   • Cervical paraspinal muscle spasm 12/2018   • Colon polyps     FOLLOWED BY DR. DARVIN CHU   • COPD (chronic obstructive pulmonary disease) (HCC)    • COVID-19 virus infection 09/2020   • Erectile dysfunction    • Excessive drinking of alcohol 09/30/2020   • Gastroesophageal reflux disease 01/26/2017   • Hepatic steatosis 11/17/2020    Per abdominal CT scan September 2020   • Herpes simplex virus (HSV) infection 01/26/2017   • Hoarseness 09/2021    FOLLOWED BY DR. JUNIOR SCHAFFER   • Hyperlipidemia    • Hypertension    • Insomnia    • Microalbuminuria    • Neck injury    • Nodule of left lung    • Obstructive sleep apnea syndrome    • Gina-rectal abscess 06/27/2022   • Pneumonia due to COVID-19 virus 09/29/2020    BILATERAL LOWER LOBES, ADMITTED TO [facility]   • Right renal stone 09/30/2020   • Right-sided thoracic back pain    • Tibialis posterior tendinitis, right 07/2017   • Type 2 diabetes mellitus (HCC)     NIDDM   • Vocal cord polyp 08/13/2021    FOLLOWED BY DR. JUNIOR SCHAFFER     Past Surgical History:   Procedure Laterality Date   • ANAL FISTULOTOMY N/A 03/03/2006    WITH DRAINAGE OF ABSCESS AND ANAL PAPILLECTOMY, DR. LUX MORENO AT [facility]   • CARDIAC CATHETERIZATION Left 12/27/2011     MILD NONOBSTRUCTIVE ATHEROSCLEROTIC CAD, LEFT VENTRICULAR EF 60%, MILDLY ELEVATED LEFT VENTRICULAR END DIASTOLIC PRESSURE, DR. DORINDA BISHOP AT [facility]   • COLONOSCOPY N/A     DR. JOVAN MOSLEY   • COLONOSCOPY N/A 2017    SMALL EXTERNAL HEMORRHOIDS, OTHERWISE WNL, RESCOPE IN 5 YRS, DR. DARVIN CHU AT [facility]   • HEMORRHOIDECTOMY N/A    • INCISION AND DRAINAGE PERIRECTAL ABSCESS N/A 2013   • INCISION AND DRAINAGE PERIRECTAL ABSCESS N/A 2005    DR. ALFRED TURCIOS AT [facility]   • LUMBAR DISCECTOMY N/A     OSTEOPHYTE L5   • MOHS SURGERY Left 2021    LEFT INFERIOR EYELID, (+)BCC, DR. DENZEL PEDERSEN   • SKIN BIOPSY Left 2021    LEFT INFERIOR EYELID, (+)BCC, GAIL GONZALEZ PA-C   • TYMPANOSTOMY TUBE PLACEMENT Bilateral      Family History   Problem Relation Age of Onset   • Stroke Mother    • Diabetes Mother    • Stroke Father    • Aneurysm Father    • Rheum arthritis Sister    • Diabetes Sister    • Hypertension Sister    • Hyperlipidemia Sister    • Hypertension Brother    • Hypertension Brother    • Colon cancer Neg Hx    • Colon polyps Neg Hx      Social History     Tobacco Use   • Smoking status: Former Smoker     Packs/day: 1.50     Years: 40.00     Pack years: 60.00     Types: Cigarettes     Start date:      Quit date: 10/1/2020     Years since quittin.9   • Smokeless tobacco: Never Used   • Tobacco comment:  quit smoking 10/2020   Vaping Use   • Vaping Use: Never used   Substance Use Topics   • Alcohol use: Yes     Alcohol/week: 10.0 standard drinks     Types: 10 Shots of liquor per week     Comment: couple days a week (3-4 drinks)   • Drug use: No     No current facility-administered medications on file prior to encounter.     Current Outpatient Medications on File Prior to Encounter   Medication Sig Dispense Refill   • albuterol sulfate  (90 Base) MCG/ACT inhaler Inhale 1-2 puffs Every 4 (Four) Hours As Needed for Wheezing or Shortness of Air. 18 g 0   •  atorvastatin (LIPITOR) 40 MG tablet TAKE 1 TABLET BY MOUTH EVERY DAY 90 tablet 3   • gabapentin (NEURONTIN) 100 MG capsule Take 1 capsule by mouth 2 (Two) Times a Day. 60 capsule 1   • glucose blood test strip [facility]  Dx code E11.42 testing bs 2 x day 100 each 5   • hydroCHLOROthiazide (HYDRODIURIL) 25 MG tablet Take 1 tablet by mouth Daily. 90 tablet 0   • insulin NPH (humuLIN N,novoLIN N) 100 UNIT/ML injection 65 units at bedtime 1 each 12   • Insulin Pen Needle (PEN NEEDLES) 32G X 5 MM misc 1 each Daily. 100 each 5   • insulin regular (humuLIN R,novoLIN R) 100 UNIT/ML injection inject 20 units in the morning 15 units at lunch and 25 units at dinner  12   • Insulin Syringes, Disposable, U-100 0.5 ML misc Use daily 100 each 2   • losartan (COZAAR) 100 MG tablet TAKE 1 TABLET BY MOUTH EVERY DAY 90 tablet 3   • metFORMIN ER (GLUCOPHAGE-XR) 500 MG 24 hr tablet Take 4 tablets by mouth Daily With Breakfast for 90 days. Do not give at time of or within 48 hours of iodinated intravenous contrast. Confirm renal function is normal before continuing. 360 tablet 0   • nicotine (NICODERM CQ) 14 MG/24HR patch Place 1 patch on the skin as directed by provider Daily. 30 patch 1   • traZODone (DESYREL) 100 MG tablet TAKE 1 TABLET BY MOUTH EVERYDAY AT BEDTIME 90 tablet 1     Allergies   Allergen Reactions   • Rosuvastatin Calcium Myalgia       Objective    Objective     Vital Signs  Temp:  [97.8 °F (36.6 °C)] 97.8 °F (36.6 °C)  Heart Rate:  [] 87  Resp:  [19-24] 20  BP: (128-151)/(66-74) 151/66  SpO2:  [79 %-99 %] 91 %  on  Flow (L/min):  [2-4] 4;   Device (Oxygen Therapy): nasal cannula  Body mass index is 38.01 kg/m².    Physical Exam  Vitals and nursing note reviewed.   Constitutional:       General: He is not in acute distress.     Appearance: He is well-developed. He is obese. He is not toxic-appearing.   HENT:      Head: Normocephalic and atraumatic.   Eyes:      General: No scleral icterus.        Right eye: No  discharge.         Left eye: No discharge.      Conjunctiva/sclera: Conjunctivae normal.   Neck:      Vascular: No JVD.   Cardiovascular:      Rate and Rhythm: Normal rate and regular rhythm.      Heart sounds: Normal heart sounds. No murmur heard.    No friction rub. No gallop.   Pulmonary:      Effort: Pulmonary effort is normal. No respiratory distress.      Breath sounds: Decreased air movement present. Decreased breath sounds and rales present. No wheezing.      Comments: 4L supplemental O2  Abdominal:      General: Bowel sounds are normal. There is no distension.      Palpations: Abdomen is soft.      Tenderness: There is no abdominal tenderness. There is no guarding.   Musculoskeletal:         General: No tenderness or deformity. Normal range of motion.      Cervical back: Normal range of motion and neck supple.      Right lower leg: No edema.      Left lower leg: No edema.   Skin:     General: Skin is warm and dry.      Capillary Refill: Capillary refill takes less than 2 seconds.   Neurological:      Mental Status: He is alert and oriented to person, place, and time.   Psychiatric:         Behavior: Behavior normal.       Results Review:  I reviewed the patient's new clinical results.  I reviewed the patient's new imaging results and agree with the interpretation.  I reviewed the patient's other test results and agree with the interpretation  I personally viewed and interpreted the patient's EKG/Telemetry data  Discussed with ED provider.    Lab Results (last 24 hours)     Procedure Component Value Units Date/Time    CBC & Differential [[id number]]  (Abnormal) Collected: 09/01/22 1453    Specimen: Blood from Arm, Right Updated: 09/01/22 8008    Narrative:      The following orders were created for panel order CBC & Differential.  Procedure                               Abnormality         Status                     ---------                               -----------         ------                     CBC  Auto Differential[[id number]]        Abnormal            Final result               Scan Slide[[id number]]                   Normal              Final result                 Please view results for these tests on the individual orders.    BNP [[id number]]  (Normal) Collected: 09/01/22 1453    Specimen: Blood from Arm, Right Updated: 09/01/22 1527     proBNP 234.0 pg/mL     Narrative:      Among patients with dyspnea, NT-proBNP is highly sensitive for the detection of acute congestive heart failure. In addition NT-proBNP of <300 pg/ml effectively rules out acute congestive heart failure with 99% negative predictive value.    Results may be falsely decreased if patient taking Biotin.      CBC Auto Differential [[id number]]  (Abnormal) Collected: 09/01/22 1453    Specimen: Blood from Arm, Right Updated: 09/01/22 1536     WBC 13.87 10*3/mm3      RBC 6.25 10*6/mm3      Hemoglobin 18.3 g/dL      Hematocrit 56.3 %      MCV 90.1 fL      MCH 29.3 pg      MCHC 32.5 g/dL      RDW 13.8 %      RDW-SD 45.1 fl      MPV 10.6 fL      Platelets 217 10*3/mm3      nRBC 0.1 /100 WBC     Scan Slide [[id number]]  (Normal) Collected: 09/01/22 1453    Specimen: Blood from Arm, Right Updated: 09/01/22 1536     RBC Morphology Normal     WBC Morphology Normal     Platelet Morphology Normal    Protime-INR [[id number]]  (Abnormal) Collected: 09/01/22 1453    Specimen: Blood from Arm, Right Updated: 09/01/22 1554     Protime 14.6 Seconds      INR 1.15    aPTT [[id number]]  (Normal) Collected: 09/01/22 1453    Specimen: Blood from Arm, Right Updated: 09/01/22 1554     PTT 27.3 seconds     Procalcitonin [[id number]]  (Normal) Collected: 09/01/22 1453    Specimen: Blood from Arm, Right Updated: 09/01/22 1755     Procalcitonin 0.03 ng/mL     Narrative:      As a Marker for Sepsis (Non-Neonates):    1. <0.5 ng/mL represents a low risk of severe sepsis and/or septic shock.  2. >2 ng/mL represents a high risk of severe sepsis and/or septic shock.    As a Marker  "for Lower Respiratory Tract Infections that require antibiotic therapy:    PCT on Admission    Antibiotic Therapy       6-12 Hrs later    >0.5                Strongly Recommended  >0.25 - <0.5        Recommended   0.1 - 0.25          Discouraged              Remeasure/reassess PCT  <0.1                Strongly Discouraged     Remeasure/reassess PCT    As 28 day mortality risk marker: \"Change in Procalcitonin Result\" (>80% or <=80%) if Day 0 (or Day 1) and Day 4 values are available. Refer to [email or web address]://[email or web address]-pct-[email or web address]    Change in PCT <=80%  A decrease of PCT levels below or equal to 80% defines a positive change in PCT test result representing a higher risk for 28-day all-cause mortality of patients diagnosed with severe sepsis for septic shock.    Change in PCT >80%  A decrease of PCT levels of more than 80% defines a negative change in PCT result representing a lower risk for 28-day all-cause mortality of patients diagnosed with severe sepsis or septic shock.       Respiratory Panel PCR w/COVID-19(SARS-CoV-2) EDWARD/STEFFANIE/MIRTHA/PAD/COR/MAD/NIGEL In-House, NP Swab in UTM/VTM, 3-4 HR TAT - Swab, Nasopharynx [[id number]]  (Normal) Collected: 09/01/22 1556    Specimen: Swab from Nasopharynx Updated: 09/01/22 1655     ADENOVIRUS, PCR Not Detected     Coronavirus 229E Not Detected     Coronavirus HKU1 Not Detected     Coronavirus NL63 Not Detected     Coronavirus OC43 Not Detected     COVID19 Not Detected     Human Metapneumovirus Not Detected     Human Rhinovirus/Enterovirus Not Detected     Influenza A PCR Not Detected     Influenza B PCR Not Detected     Parainfluenza Virus 1 Not Detected     Parainfluenza Virus 2 Not Detected     Parainfluenza Virus 3 Not Detected     Parainfluenza Virus 4 Not Detected     RSV, PCR Not Detected     Bordetella pertussis pcr Not Detected     Bordetella parapertussis PCR Not Detected     Chlamydophila pneumoniae PCR Not Detected     Mycoplasma pneumo by PCR Not Detected    Narrative:  "     In the setting of a positive respiratory panel with a viral infection PLUS a negative procalcitonin without other underlying concern for bacterial infection, consider observing off antibiotics or discontinuation of antibiotics and continue supportive care. If the respiratory panel is positive for atypical bacterial infection (Bordetella pertussis, Chlamydophila pneumoniae, or Mycoplasma pneumoniae), consider antibiotic de-escalation to target atypical bacterial infection.    Comprehensive Metabolic Panel [[id number]]  (Abnormal) Collected: 09/01/22 1600    Specimen: Blood from Arm, Right Updated: 09/01/22 1659     Glucose 147 mg/dL      BUN 18 mg/dL      Creatinine 1.11 mg/dL      Sodium 143 mmol/L      Potassium 4.6 mmol/L      Chloride 99 mmol/L      CO2 33.5 mmol/L      Calcium 9.7 mg/dL      Total Protein 6.3 g/dL      Albumin 3.90 g/dL      ALT (SGPT) 14 U/L      AST (SGOT) 14 U/L      Alkaline Phosphatase 92 U/L      Total Bilirubin 0.3 mg/dL      Globulin 2.4 gm/dL      A/G Ratio 1.6 g/dL      BUN/Creatinine Ratio 16.2     Anion Gap 10.5 mmol/L      eGFR 75.5 mL/min/1.73      Comment: National Kidney Foundation and American Society of Nephrology (ASN) Task Force recommended calculation based on the Chronic Kidney Disease Epidemiology Collaboration (CKD-EPI) equation refit without adjustment for race.       Narrative:      GFR Normal >60  Chronic Kidney Disease <60  Kidney Failure <15      Troponin [[id number]]  (Normal) Collected: 09/01/22 1600    Specimen: Blood from Arm, Right Updated: 09/01/22 1659     Troponin T <0.010 ng/mL     Narrative:      Troponin T Reference Range:  <= 0.03 ng/mL-   Negative for AMI  >0.03 ng/mL-     Abnormal for myocardial necrosis.  Clinicians would have to utilize clinical acumen, EKG, Troponin and serial changes to determine if it is an Acute Myocardial Infarction or myocardial injury due to an underlying chronic condition.       Results may be falsely decreased if patient  taking Biotin.            Imaging Results (Last 24 Hours)     Procedure Component Value Units Date/Time    CT Soft Tissue Neck With Contrast [[id number]] Collected: 09/01/22 1904     Updated: 09/01/22 1904    Narrative:      CT SCAN OF THE SOFT TISSUES OF THE NECK WITH CONTRAST     CLINICAL HISTORY: Stridor and wheezing.     TECHNIQUE: CT scan of the soft tissues of the neck was obtained with 3  mm axial images following the administration of IV contrast. Sagittal  and coronal reconstructed images were obtained.     FINDINGS:     The visualized contents of the cranial vault are unremarkable. The  parotid glands, submandibular glands, and sublingual glands are  unremarkable. The nasopharynx, oropharynx, and oral cavity are  unremarkable. The epiglottis is mildly prominent in size but  demonstrates no convincing evidence for pathology. The true and false  vocal cords are unremarkable. The hypopharynx is unremarkable as well.  The subglottic airway and trachea are within normal limits. The thyroid  gland is essentially unremarkable with the exception of small foci of  coarse mineralization within the left lobe. The visualized lung apices  are clear. There is no pathologic lymphadenopathy within the soft  tissues of the neck.       Impression:         Essentially unremarkable CT scan of the soft tissues of the neck.           Radiation dose reduction techniques were utilized, including automated  exposure control and exposure modulation based on body size.          CT Angiogram Chest [[id number]] Collected: 09/01/22 1801     Updated: 09/01/22 1809    Narrative:      CT ANGIOGRAM CHEST     HISTORY: Shortness of breath evaluate for pulmonary embolism.     TECHNIQUE: CT angiogram of the chest was performed using IV contrast.  Multiplanar reformatted images were produced at a separate workstation.  CT neck performed today is reported separately.     Radiation dose reduction techniques were utilized, including  automated  exposure control and exposure modulation based on body size.     FINDINGS: The thoracic aorta is normal in caliber and enhances normally.  There is scattered atheromatous calcification, including along the  coronary arteries. No pleural or pericardial effusion or cardiac chamber  enlargement is present.     The pulmonary arteries are normal in caliber and enhance normally. There  is no pulmonary embolism.     No thoracic lymphadenopathy. The thoracic esophagus has a normal CT  appearance. Visualized portions of the upper abdominal structures appear  normal.     Mild density along the caudad edges of the lower lobes bilaterally is  symmetrical and favored to represent atelectasis rather than pneumonia.  The lungs are otherwise clear. Airways are patent. Degenerative change  is observed at the sternomanubrial joint. Mild degenerative disc change  in the thoracic spine.       Impression:      No evidence of pulmonary embolism. Minimal density along the  caudad edges of the lower lobes posteriorly is favored to represent  atelectasis. Otherwise negative chest CT.     This report was finalized on 9/1/2022 6:06 PM by Dr. Thee Mahmood M.D.       XR Chest 1 View [[id number]] Collected: 09/01/22 1527     Updated: 09/01/22 1531    Narrative:      XR CHEST 1 VW-clinical: CHF/COPD protocol     COMPARISON 1/24/2022     FINDINGS: Cardiac size stable, within normal limits. There is a lower  inspiratory effort on the current examination causing crowding of  central bronchovascular markings, mild vascular congestion not excluded.  No pleural effusion or lung consolidation seen. There are monitoring  leads superimposing the chest. The remainder is unremarkable.     This report was finalized on 9/1/2022 3:28 PM by Dr. Roger Flores M.D.                 ECG 12 Lead   Final Result   HEART RATE= 93  bpm   RR Interval= 645  ms   [place] Interval= 119  ms   P Horizontal Axis= -21  deg   P Front Axis= 83  deg   QRSD Interval= 97   ms   QT Interval= 334  ms   QRS Axis= 91  deg   T Wave Axis= 74  deg   - ABNORMAL ECG -   Sinus rhythm   Borderline short HI interval   Biatrial enlargement   Right axis deviation   Nonspecific T abnormalities, lateral leads   No change from previous tracing   Electronically Signed By: Yunire DegrootKATIE) ([facility]) 01-Sep-2022 15:54:11   Date and Time of Study: 2022-09-01 15:08:39           Assessment/Plan     Active Hospital Problems    Diagnosis  POA   • **Acute respiratory failure with hypoxia (HCC) [J96.01]  Yes   • Dyspnea [R06.00]  Yes   • Cigarette nicotine dependence with nicotine-induced disorder [F17.219]  Yes   • Obesity (BMI 30-39.9) [E66.9]  Yes   • Obstructive sleep apnea syndrome [G47.33]  Yes   • Gastroesophageal reflux disease [K21.9]  Yes   • Type 2 diabetes mellitus with microalbuminuria, without long-term current use of insulin (HCC) [E11.29, R80.9]  Yes   • Hyperlipidemia [E78.5]  Yes   • Essential hypertension [I10]  Yes      Resolved Hospital Problems   No resolved problems to display.       Mr. Biggs is a 61 y.o. smoker with a history of type 2 diabetes, HTN, HLD, JACEK, obesity, GERD who presents with acute respiratory failure with hypoxia.    Acute respiratory failure with hypoxia  -Positive for nocturnal orthopnea and dyspnea on exertion.  Crackles on exam.  No JVD or BLE edema.  No previous history of congestive heart failure.  Chest x-ray states mild vascular congestion is not excluded.   -Will check a 2D echo in the a.m.  -He has been given a dose of IV Lasix in the ED, will continue every 12  -Cardiology consultation  -Consider pulmonary consultation, will defer to rounding MD  -Daily weights and strict I's and O's    Type 2 diabetes mellitus  -Accu-Cheks 4 times a day with meals and at bedtime  -Moderate dose correctional factor with hypoglycemic protocol  -Check hemoglobin A1c  -Hold oral diabetic medication    Essential hypertension  -Blood pressure stable, will resume home regimen once  more has been completed by nursing    Hyperlipidemia  -Resume statin therapy    JACEK  -May use home CPAP if available    Tobacco abuse  -Encourage cessation  -Nicotine patch daily    GERD  -Continue PPI    I discussed the patient's findings and my recommendations with patient and ED provider.    VTE Prophylaxis - Lovenox 40 mg SC daily.  Code Status - Full code.       DARLINE Isbell  [facility]  09/01/22  19:57 EDT

## 2022-09-01 NOTE — ED PROVIDER NOTES
EMERGENCY DEPARTMENT ENCOUNTER    Room Number:  30/30  Date of encounter:  9/1/2022  PCP: Preet Louise MD  Historian: Patient  Full history not obtainable due to: None    HPI:  Chief Complaint: SOA    Context: Carrington Biggs is a 61 y.o. male with a PMH significant for hypertension, diabetes, hyperlipidemia, CAD, COPD who presents to the ED c/o shortness of air which has been persistent over the past couple of weeks.  States that last night his symptoms seemed more pronounced and his wife encouraged him to come in today for evaluation.  He was found to be 79% on room air at triage with tachypnea and was placed on 3 L of nasal cannula oxygen.  On my evaluation he continues with tachypnea but his oxygen is in the mid 90s.  He tells me that he has not had fever, chills, cough, dizziness, syncope.  He does have some anterior chest tightness as well as mild swelling to his feet, worse on the left.  There is been no nausea, vomiting, dizziness.  He states that his symptoms become much worse when he lies flat.      MEDICAL RECORD REVIEW:    Upon review of the medical record it appears the patient's last office visit was on July 11, 2022 for a perirectal abscess with his colorectal surgeon.      PAST MEDICAL HISTORY    Active Ambulatory Problems     Diagnosis Date Noted   • Colon polyp 12/19/2016   • Type 2 diabetes mellitus with microalbuminuria, without long-term current use of insulin (HCC) 12/19/2016   • Erectile dysfunction of nonorganic origin 12/19/2016   • Essential hypertension 12/19/2016   • Hyperlipidemia 12/19/2016   • Gastroesophageal reflux disease 01/26/2017   • Obstructive sleep apnea syndrome 01/26/2017   • Type 2 diabetes mellitus with peripheral neuropathy (HCC) 03/13/2019   • Obesity (BMI 30-39.9) 09/30/2020   • Excessive drinking of alcohol 09/30/2020   • Other insomnia 10/16/2020   • Hepatic steatosis 11/17/2020   • Vocal cord polyp 08/13/2021   • Skin cancer of face 08/13/2021   • Cigarette nicotine  dependence with nicotine-induced disorder 12/28/2021   • Gina-rectal abscess 06/27/2022     Resolved Ambulatory Problems     Diagnosis Date Noted   • Hypertension 12/19/2016   • Herpes simplex virus (HSV) infection 01/26/2017   • Tubular adenoma 01/26/2017   • Right renal stone 09/30/2020     Past Medical History:   Diagnosis Date   • Achilles tendinitis of right lower extremity 07/2017   • Anal fistula 2006   • BCC (basal cell carcinoma) 07/2021   • BPH (benign prostatic hyperplasia)    • CAD (coronary artery disease) 2011   • Cervical paraspinal muscle spasm 12/2018   • Colon polyps    • COPD (chronic obstructive pulmonary disease) (HCC)    • COVID-19 virus infection 09/2020   • Erectile dysfunction    • Hoarseness 09/2021   • Insomnia    • Microalbuminuria    • Neck injury    • Nodule of left lung    • Pneumonia due to COVID-19 virus 09/29/2020   • Right-sided thoracic back pain    • Tibialis posterior tendinitis, right 07/2017   • Type 2 diabetes mellitus (HCC)          PAST SURGICAL HISTORY  Past Surgical History:   Procedure Laterality Date   • ANAL FISTULOTOMY N/A 03/03/2006    WITH DRAINAGE OF ABSCESS AND ANAL PAPILLECTOMY, DR. LUX MORENO AT [facility]   • CARDIAC CATHETERIZATION Left 12/27/2011    MILD NONOBSTRUCTIVE ATHEROSCLEROTIC CAD, LEFT VENTRICULAR EF 60%, MILDLY ELEVATED LEFT VENTRICULAR END DIASTOLIC PRESSURE, DR. DORINDA BISHOP AT [facility]   • COLONOSCOPY N/A 2000    DR. JOVAN MOSLEY   • COLONOSCOPY N/A 08/07/2017    SMALL EXTERNAL HEMORRHOIDS, OTHERWISE WNL, RESCOPE IN 5 YRS, DR. DARVIN CHU AT [facility]   • HEMORRHOIDECTOMY N/A 1994   • INCISION AND DRAINAGE PERIRECTAL ABSCESS N/A 12/30/2013   • INCISION AND DRAINAGE PERIRECTAL ABSCESS N/A 06/04/2005    DR. ALFRED TURCIOS AT [facility]   • LUMBAR DISCECTOMY N/A 1995    OSTEOPHYTE L5   • MOHS SURGERY Left 08/09/2021    LEFT INFERIOR EYELID, (+)BCC, DR. DENZEL PEDERSEN   • SKIN BIOPSY Left 07/07/2021    LEFT INFERIOR EYELID, (+)BCC, GAIL GONZALEZ PA-C   •  TYMPANOSTOMY TUBE PLACEMENT Bilateral          FAMILY HISTORY  Family History   Problem Relation Age of Onset   • Stroke Mother    • Diabetes Mother    • Stroke Father    • Aneurysm Father    • Rheum arthritis Sister    • Diabetes Sister    • Hypertension Sister    • Hyperlipidemia Sister    • Hypertension Brother    • Hypertension Brother    • Colon cancer Neg Hx    • Colon polyps Neg Hx          SOCIAL HISTORY  Social History     Socioeconomic History   • Marital status:    • Number of children: 0   Tobacco Use   • Smoking status: Former Smoker     Packs/day: 1.50     Years: 40.00     Pack years: 60.00     Types: Cigarettes     Start date:      Quit date: 10/1/2020     Years since quittin.9   • Smokeless tobacco: Never Used   • Tobacco comment:  quit smoking 10/2020   Vaping Use   • Vaping Use: Never used   Substance and Sexual Activity   • Alcohol use: Yes     Alcohol/week: 10.0 standard drinks     Types: 10 Shots of liquor per week     Comment: couple days a week (3-4 drinks)   • Drug use: No   • Sexual activity: Yes     Partners: Female         ALLERGIES  Rosuvastatin calcium        REVIEW OF SYSTEMS    All systems reviewed and marked as negative except as listed in HPI     PHYSICAL EXAM    I have reviewed the triage vital signs and nursing notes.    ED Triage Vitals [22 1431]   Temp Heart Rate Resp BP SpO2   97.8 °F (36.6 °C) 106 24 -- (!) 79 %      Temp src Heart Rate Source Patient Position BP Location FiO2 (%)   Tympanic Monitor -- -- --       Physical Exam  Constitutional:       General: He is not in acute distress.     Appearance: He is well-developed.      Interventions: Nasal cannula in place.   HENT:      Head: Normocephalic and atraumatic.   Eyes:      General: No scleral icterus.     Conjunctiva/sclera: Conjunctivae normal.   Neck:      Trachea: No tracheal deviation.   Cardiovascular:      Rate and Rhythm: Regular rhythm.      Comments: Borderline tachycardia.  Mild edema noted  to both lower extremities around the ankle and feet.  Pulmonary:      Effort: Pulmonary effort is normal.      Breath sounds: Wheezing (Scattered mild diffuse) present.      Comments: Increased work of breathing, speaks in broken sentences.  There are mild diffuse wheezes but there is audible upper airway adventitious sounds.  Abdominal:      Palpations: Abdomen is soft.      Tenderness: There is no abdominal tenderness. There is no guarding.   Musculoskeletal:         General: No deformity.      Cervical back: Normal range of motion.   Lymphadenopathy:      Cervical: No cervical adenopathy.   Skin:     General: Skin is warm and dry.   Neurological:      Mental Status: He is alert and oriented to person, place, and time.   Psychiatric:         Behavior: Behavior normal.         Vital signs and nursing notes reviewed.            LAB RESULTS  Recent Results (from the past 24 hour(s))   BNP    Collection Time: 09/01/22  2:53 PM    Specimen: Arm, Right; Blood   Result Value Ref Range    proBNP 234.0 0.0 - 900.0 pg/mL   Green Top (Gel)    Collection Time: 09/01/22  2:53 PM   Result Value Ref Range    Extra Tube Hold for add-ons.    Lavender Top    Collection Time: 09/01/22  2:53 PM   Result Value Ref Range    Extra Tube hold for add-on    Gold Top - SST    Collection Time: 09/01/22  2:53 PM   Result Value Ref Range    Extra Tube Hold for add-ons.    Light Blue Top    Collection Time: 09/01/22  2:53 PM   Result Value Ref Range    Extra Tube Hold for add-ons.    CBC Auto Differential    Collection Time: 09/01/22  2:53 PM    Specimen: Arm, Right; Blood   Result Value Ref Range    WBC 13.87 (H) 3.40 - 10.80 10*3/mm3    RBC 6.25 (H) 4.14 - 5.80 10*6/mm3    Hemoglobin 18.3 (H) 13.0 - 17.7 g/dL    Hematocrit 56.3 (H) 37.5 - 51.0 %    MCV 90.1 79.0 - 97.0 fL    MCH 29.3 26.6 - 33.0 pg    MCHC 32.5 31.5 - 35.7 g/dL    RDW 13.8 12.3 - 15.4 %    RDW-SD 45.1 37.0 - 54.0 fl    MPV 10.6 6.0 - 12.0 fL    Platelets 217 140 - 450  10*3/mm3    nRBC 0.1 0.0 - 0.2 /100 WBC   Scan Slide    Collection Time: 09/01/22  2:53 PM    Specimen: Arm, Right; Blood   Result Value Ref Range    RBC Morphology Normal Normal    WBC Morphology Normal Normal    Platelet Morphology Normal Normal   Protime-INR    Collection Time: 09/01/22  2:53 PM    Specimen: Arm, Right; Blood   Result Value Ref Range    Protime 14.6 (H) 11.7 - 14.2 Seconds    INR 1.15 (H) 0.90 - 1.10   aPTT    Collection Time: 09/01/22  2:53 PM    Specimen: Arm, Right; Blood   Result Value Ref Range    PTT 27.3 22.7 - 35.4 seconds   Procalcitonin    Collection Time: 09/01/22  2:53 PM    Specimen: Arm, Right; Blood   Result Value Ref Range    Procalcitonin 0.03 0.00 - 0.25 ng/mL   ECG 12 Lead    Collection Time: 09/01/22  3:08 PM   Result Value Ref Range    QT Interval 334 ms   Respiratory Panel PCR w/COVID-19(SARS-CoV-2) EDWARD/STEFFANIE/MIRTHA/PAD/COR/MAD/NIGEL In-House, NP Swab in [facility], 3-4 HR TAT - Swab, Nasopharynx    Collection Time: 09/01/22  3:56 PM    Specimen: Nasopharynx; Swab   Result Value Ref Range    ADENOVIRUS, PCR Not Detected Not Detected    Coronavirus 229E Not Detected Not Detected    Coronavirus HKU1 Not Detected Not Detected    Coronavirus NL63 Not Detected Not Detected    Coronavirus OC43 Not Detected Not Detected    COVID19 Not Detected Not Detected - Ref. Range    Human Metapneumovirus Not Detected Not Detected    Human Rhinovirus/Enterovirus Not Detected Not Detected    Influenza A PCR Not Detected Not Detected    Influenza B PCR Not Detected Not Detected    Parainfluenza Virus 1 Not Detected Not Detected    Parainfluenza Virus 2 Not Detected Not Detected    Parainfluenza Virus 3 Not Detected Not Detected    Parainfluenza Virus 4 Not Detected Not Detected    RSV, PCR Not Detected Not Detected    Bordetella pertussis pcr Not Detected Not Detected    Bordetella parapertussis PCR Not Detected Not Detected    Chlamydophila pneumoniae PCR Not Detected Not Detected    Mycoplasma pneumo by  PCR Not Detected Not Detected   Comprehensive Metabolic Panel    Collection Time: 09/01/22  4:00 PM    Specimen: Arm, Right; Blood   Result Value Ref Range    Glucose 147 (H) 65 - 99 mg/dL    BUN 18 8 - 23 mg/dL    Creatinine 1.11 0.76 - 1.27 mg/dL    Sodium 143 136 - 145 mmol/L    Potassium 4.6 3.5 - 5.2 mmol/L    Chloride 99 98 - 107 mmol/L    CO2 33.5 (H) 22.0 - 29.0 mmol/L    Calcium 9.7 8.6 - 10.5 mg/dL    Total Protein 6.3 6.0 - 8.5 g/dL    Albumin 3.90 3.50 - 5.20 g/dL    ALT (SGPT) 14 1 - 41 U/L    AST (SGOT) 14 1 - 40 U/L    Alkaline Phosphatase 92 39 - 117 U/L    Total Bilirubin 0.3 0.0 - 1.2 mg/dL    Globulin 2.4 gm/dL    A/G Ratio 1.6 g/dL    BUN/Creatinine Ratio 16.2 7.0 - 25.0    Anion Gap 10.5 5.0 - 15.0 mmol/L    eGFR 75.5 >60.0 mL/min/1.73   Troponin    Collection Time: 09/01/22  4:00 PM    Specimen: Arm, Right; Blood   Result Value Ref Range    Troponin T <0.010 0.000 - 0.030 ng/mL   Duplex Venous Lower Extremity - Left    Collection Time: 09/01/22  5:02 PM   Result Value Ref Range    Target HR (85%) 135 bpm    Max. Pred. HR (100%) 159 bpm    Right Common Femoral Spont Y     Right Common Femoral Phasic Y     Right Common Femoral Augment Y     Right Common Femoral Competent Y     Right Common Femoral Compress C     Left Common Femoral Spont Y     Left Common Femoral Phasic Y     Left Common Femoral Augment Y     Left Common Femoral Competent Y     Left Common Femoral Compress C     Left Saphenofemoral Junction Compress C     Left Profunda Femoral Compress C     Left Proximal Femoral Compress C     Left Mid Femoral Spont Y     Left Mid Femoral Phasic Y     Left Mid Femoral Augment Y     Left Mid Femoral Competent Y     Left Mid Femoral Compress C     Left Distal Femoral Compress C     Left Popliteal Spont Y     Left Popliteal Phasic Y     Left Popliteal Augment Y     Left Popliteal Competent Y     Left Popliteal Compress C     Left Posterior Tibial Compress C     Left Peroneal Compress C     Left  Gastronemius Compress C     Left Greater Saph AK Compress C     Left Greater Saph BK Compress C     Left Lesser Saph Compress C        Ordered the above labs and independently reviewed the results.        RADIOLOGY  CT Soft Tissue Neck With Contrast    Result Date: 9/1/2022  CT SCAN OF THE SOFT TISSUES OF THE NECK WITH CONTRAST  CLINICAL HISTORY: Stridor and wheezing.  TECHNIQUE: CT scan of the soft tissues of the neck was obtained with 3 mm axial images following the administration of IV contrast. Sagittal and coronal reconstructed images were obtained.  FINDINGS:  The visualized contents of the cranial vault are unremarkable. The parotid glands, submandibular glands, and sublingual glands are unremarkable. The nasopharynx, oropharynx, and oral cavity are unremarkable. The epiglottis is mildly prominent in size but demonstrates no convincing evidence for pathology. The true and false vocal cords are unremarkable. The hypopharynx is unremarkable as well. The subglottic airway and trachea are within normal limits. The thyroid gland is essentially unremarkable with the exception of small foci of coarse mineralization within the left lobe. The visualized lung apices are clear. There is no pathologic lymphadenopathy within the soft tissues of the neck.       Essentially unremarkable CT scan of the soft tissues of the neck.    Radiation dose reduction techniques were utilized, including automated exposure control and exposure modulation based on body size.       XR Chest 1 View    Result Date: 9/1/2022  XR CHEST 1 VW-clinical: CHF/COPD protocol  COMPARISON 1/24/2022  FINDINGS: Cardiac size stable, within normal limits. There is a lower inspiratory effort on the current examination causing crowding of central bronchovascular markings, mild vascular congestion not excluded. No pleural effusion or lung consolidation seen. There are monitoring leads superimposing the chest. The remainder is unremarkable.  This report was  finalized on 9/1/2022 3:28 PM by Dr. Roger Flores M.D.      Duplex Venous Lower Extremity - Left    Result Date: 9/1/2022  · Normal left lower extremity venous duplex scan.      CT Angiogram Chest    Result Date: 9/1/2022  CT ANGIOGRAM CHEST  HISTORY: Shortness of breath evaluate for pulmonary embolism.  TECHNIQUE: CT angiogram of the chest was performed using IV contrast. Multiplanar reformatted images were produced at a separate workstation. CT neck performed today is reported separately.  Radiation dose reduction techniques were utilized, including automated exposure control and exposure modulation based on body size.  FINDINGS: The thoracic aorta is normal in caliber and enhances normally. There is scattered atheromatous calcification, including along the coronary arteries. No pleural or pericardial effusion or cardiac chamber enlargement is present.  The pulmonary arteries are normal in caliber and enhance normally. There is no pulmonary embolism.  No thoracic lymphadenopathy. The thoracic esophagus has a normal CT appearance. Visualized portions of the upper abdominal structures appear normal.  Mild density along the caudad edges of the lower lobes bilaterally is symmetrical and favored to represent atelectasis rather than pneumonia. The lungs are otherwise clear. Airways are patent. Degenerative change is observed at the sternomanubrial joint. Mild degenerative disc change in the thoracic spine.      No evidence of pulmonary embolism. Minimal density along the caudad edges of the lower lobes posteriorly is favored to represent atelectasis. Otherwise negative chest CT.  This report was finalized on 9/1/2022 6:06 PM by Dr. Thee Mahmood M.D.        I ordered the above noted radiological studies. Independently reviewed by me and discussed with radiologist.  See dictation above for official radiology interpretation.      PROCEDURES    Procedures        MEDICATIONS GIVEN IN ER    Medications   sodium chloride  0.9 % flush 10 mL (has no administration in time range)   ipratropium-albuterol (DUO-NEB) nebulizer solution 3 mL (3 mL Nebulization Given 9/1/22 1508)   albuterol (PROVENTIL) nebulizer solution 0.083% 2.5 mg/3mL (2.5 mg Nebulization Given 9/1/22 1604)   furosemide (LASIX) injection 40 mg (40 mg Intravenous Given 9/1/22 1610)   magnesium sulfate 2g/50 mL (PREMIX) infusion (0 g Intravenous Stopped 9/1/22 1904)   iopamidol (ISOVUE-370) 76 % injection 100 mL (95 mL Intravenous Given 9/1/22 1722)         PROGRESS, DATA ANALYSIS, CONSULTS, AND MEDICAL DECISION MAKING    All labs have been independently reviewed by me.  All radiology studies have been reviewed by me.   EKG's independently reviewed by me.  Discussion below represents my analysis of pertinent findings related to patient's condition, differential diagnosis, treatment plan and final disposition.    DIFFERENTIAL DIAGNOSIS INCLUDE BUT NOT LIMITED TO:   Differential diagnosis includes but is not limited to:  -COVID  -CHF  -acute coronary syndrome  -pulmonary embolism  -pneumothorax  -pneumonia  -asthma/COPD  -deconditioning  -anemia  -anxiety       ED Course as of 09/01/22 1947   Thu Sep 01, 2022   1525 I viewed CXR on PACS system.  My interpretation is no pneumothorax. [DC]   1610 WBC(!): 13.87 [DC]   1702 Preliminary result from the ultrasound technologist is negative for DVT in the left lower extremity at this time. [DC]   1705 SpO2(!): 79 % [DC]   1946 I spoke with SINDI Harrington with [facility] at this time regarding the patient.  I discussed work-up, results, concerns.  I discussed the consulting provider's desire for telemetry admission to Dr. Baird     [DC]      ED Course User Index  [DC] Dann Chung PA       AS OF 19:47 EDT VITALS:    BP - 151/66  HR - 87  TEMP - 97.8 °F (36.6 °C) (Tympanic)  02 SATS - 91%    1947 I rechecked the patient.  I discussed the patient's radiology findings (including all incidental findings), diagnosis, and plan for admission.   All questions answered.      DIAGNOSIS  Final diagnoses:   Dyspnea, unspecified type   Hypoxia   Hypervolemia, unspecified hypervolemia type         DISPOSITION  Admit    Pt masked in first look. I wore a surgical mask throughout my encounters with the pt. I performed hand hygiene on entry into the pt room and upon exit.     Dictated utilizing Dragon dictation     Note Disclaimer: At [facility], we believe that sharing information builds trust and better relationships. You are receiving this note because you recently visited [facility]. It is possible you will see health information before a provider has talked with you about it. This kind of information can be easy to misunderstand. To help you fully understand what it means for your health, we urge you to discuss this note with your provider.      Dann Chung PA  09/01/22 1947

## 2022-09-01 NOTE — ED TRIAGE NOTES
Pt complains of increased SOA for the last week or so. States that it is worse at night and that he cannot lay flat. Pt was 79% on RA. Placed on 3L NC.     Patient masked at arrival and triage staff wore all appropriate PPE during entire encounter with patient.

## 2022-09-01 NOTE — ED NOTES
Pt found with dressed and connecting himself back to monitor equipment. Pt reports going outside to start the car for his wife, went without letting staff know and without oxygen. Pt connected back to monitor and oxygen placed, pt informed that he needs to stay on oxygen at all times and he should not ambulate out of the dept again.

## 2022-09-01 NOTE — ED PROVIDER NOTES
MD ATTESTATION NOTE  I wore full protective equipment throughout this patient encounter including an N95 face mask, googles, gown and gloves. Hand hygiene was performed before donning protective equipment and after removal when leaving the room.    The SINDI and I have discussed this patient's history, physical exam, and treatment plan. I have reviewed the documentation and personally had a face to face interaction with the patient. I affirm the SINDI documentation and agree with their diagnostics, findings, treatment, plan, and disposition.    I provided a substantive portion of the care of this patient.  I personally performed the physical exam, in its entirety.  The attached note describes my personal findings.    Carrington Biggs is a 61 y.o. male who presents to the ED c/o Shortness of breath.  Patient reports that he was having shortness of breath for the last 2 weeks although is predominantly at nighttime and with laying down, shortness of breath has worsened and was constant for last 3 days.  Patient complains of dyspnea at rest, worse with exertion, endorses orthopnea and paroxysmal nocturnal dyspnea.  Patient reports he has had trace swelling in his left lower extremity, coming and going, denies any swelling in his right lower extremity.  Patient reports that he was having some chest pain over the weekend, denies any chest pain at present.  Patient has had wheezing, reports that he has been using his wife's albuterol inhaler with some relief.  Patient endorses cough, productive in nature.  No vomiting or diarrhea, no loss of sense of smell or taste, no fever shakes chills or night sweats.    On exam:  General: NAD.  Head: NCAT.  ENT: nares patent, no scleral icterus  Neck: Supple, trachea midline.  Cardiac: regular rate and rhythm.  Lungs: Tachypneic, mild accessory muscle use, requiring 4 L to maintain oxygen saturation of 94%, mildly stridulous, lung sounds are diminished bilaterally, trace crackles in right  lower lung  Abdomen: Soft, NTTP.   Extremities: Moves all extremities well, no peripheral edema  Neuro: alert, MAEW, follows commands  Psych: calm, cooperative  Skin: Warm, dry.  Left lower extremity: Trace nonpitting edema, no edema of right lower extremity, left lower extremity has no palpable cords, no redness or warmth, negative Homans.    Medical Decision Making:  After the initial H&P, I discussed pertinent information from history and physical exam with patient/family.  Discussed differential diagnosis.  Discussed plan for ED evaluation/work-up/treatment.  All questions answered.  Patient/family is agreeable with plan.    ED Course as of 09/01/22 2252   u Sep 01, 2022   1525 I viewed CXR on PACS system.  My interpretation is no pneumothorax. [DC]   1610 WBC(!): 13.87 [DC]   1702 Preliminary result from the ultrasound technologist is negative for DVT in the left lower extremity at this time. [DC]   1705 SpO2(!): 79 % [DC]   1946 I spoke with ISNDI Harrington with [facility] at this time regarding the patient.  I discussed work-up, results, concerns.  I discussed the consulting provider's desire for telemetry admission to Dr. Baird     [DC]      ED Course User Index  [DC] Dann Chung PA       Diagnosis  Final diagnoses:   Dyspnea, unspecified type   Hypoxia   Hypervolemia, unspecified hypervolemia type        Ramon Unger MD  09/01/22 2252

## 2022-09-02 ENCOUNTER — APPOINTMENT (OUTPATIENT)
Dept: CARDIOLOGY | Facility: HOSPITAL | Age: 62
End: 2022-09-02

## 2022-09-02 LAB
ANION GAP SERPL CALCULATED.3IONS-SCNC: 7.6 MMOL/L (ref 5–15)
AORTIC DIMENSIONLESS INDEX: 0.8 (DI)
ASCENDING AORTA: 2.7 CM
BH CV ECHO MEAS - ACS: 2.39 CM
BH CV ECHO MEAS - AO MAX PG: 15 MMHG
BH CV ECHO MEAS - AO MEAN PG: 6.7 MMHG
BH CV ECHO MEAS - AO ROOT DIAM: 3.2 CM
BH CV ECHO MEAS - AO V2 MAX: 193.7 CM/SEC
BH CV ECHO MEAS - AO V2 VTI: 32.1 CM
BH CV ECHO MEAS - AVA(I,D): 3.1 CM2
BH CV ECHO MEAS - CONTRAST EF 4CH: 73 CM2
BH CV ECHO MEAS - EDV(CUBED): 165 ML
BH CV ECHO MEAS - EDV(MOD-SP2): 132 ML
BH CV ECHO MEAS - EDV(MOD-SP4): 135 ML
BH CV ECHO MEAS - EF(MOD-BP): 73.7 %
BH CV ECHO MEAS - EF(MOD-SP2): 72 %
BH CV ECHO MEAS - EF(MOD-SP4): 74.1 %
BH CV ECHO MEAS - ESV(CUBED): 42.2 ML
BH CV ECHO MEAS - ESV(MOD-SP2): 37 ML
BH CV ECHO MEAS - ESV(MOD-SP4): 35 ML
BH CV ECHO MEAS - FS: 36.5 %
BH CV ECHO MEAS - IVS/LVPW: 1.19 CM
BH CV ECHO MEAS - IVSD: 1.14 CM
BH CV ECHO MEAS - LAT PEAK E' VEL: 12.1 CM/SEC
BH CV ECHO MEAS - LV MASS(C)D: 225.3 GRAMS
BH CV ECHO MEAS - LV MAX PG: 8.3 MMHG
BH CV ECHO MEAS - LV MEAN PG: 3.4 MMHG
BH CV ECHO MEAS - LV V1 MAX: 144.4 CM/SEC
BH CV ECHO MEAS - LV V1 VTI: 25.2 CM
BH CV ECHO MEAS - LVIDD: 5.5 CM
BH CV ECHO MEAS - LVIDS: 3.5 CM
BH CV ECHO MEAS - LVOT AREA: 3.9 CM2
BH CV ECHO MEAS - LVOT DIAM: 2.23 CM
BH CV ECHO MEAS - LVPWD: 0.95 CM
BH CV ECHO MEAS - MED PEAK E' VEL: 9 CM/SEC
BH CV ECHO MEAS - MV A DUR: 0.16 SEC
BH CV ECHO MEAS - MV A MAX VEL: 88.3 CM/SEC
BH CV ECHO MEAS - MV DEC SLOPE: 357.9 CM/SEC2
BH CV ECHO MEAS - MV DEC TIME: 0.24 MSEC
BH CV ECHO MEAS - MV E MAX VEL: 105 CM/SEC
BH CV ECHO MEAS - MV E/A: 1.19
BH CV ECHO MEAS - MV MAX PG: 4.8 MMHG
BH CV ECHO MEAS - MV MEAN PG: 2.6 MMHG
BH CV ECHO MEAS - MV P1/2T: 95.2 MSEC
BH CV ECHO MEAS - MV V2 VTI: 31.8 CM
BH CV ECHO MEAS - MVA(P1/2T): 2.31 CM2
BH CV ECHO MEAS - MVA(VTI): 3.1 CM2
BH CV ECHO MEAS - PA ACC TIME: 0.11 SEC
BH CV ECHO MEAS - PA PR(ACCEL): 29.1 MMHG
BH CV ECHO MEAS - PA V2 MAX: 111.2 CM/SEC
BH CV ECHO MEAS - PULM A REVS DUR: 0.12 SEC
BH CV ECHO MEAS - PULM A REVS VEL: 35.8 CM/SEC
BH CV ECHO MEAS - PULM DIAS VEL: 50.6 CM/SEC
BH CV ECHO MEAS - PULM S/D: 1.2
BH CV ECHO MEAS - PULM SYS VEL: 60.7 CM/SEC
BH CV ECHO MEAS - QP/QS: 0.34
BH CV ECHO MEAS - RAP SYSTOLE: 8 MMHG
BH CV ECHO MEAS - RV MAX PG: 1.79 MMHG
BH CV ECHO MEAS - RV V1 MAX: 66.8 CM/SEC
BH CV ECHO MEAS - RV V1 VTI: 9.3 CM
BH CV ECHO MEAS - RVOT DIAM: 2.13 CM
BH CV ECHO MEAS - RVSP: 66.2 MMHG
BH CV ECHO MEAS - SV(LVOT): 98.1 ML
BH CV ECHO MEAS - SV(MOD-SP2): 95 ML
BH CV ECHO MEAS - SV(MOD-SP4): 100 ML
BH CV ECHO MEAS - SV(RVOT): 33 ML
BH CV ECHO MEAS - TAPSE (>1.6): 2.4 CM
BH CV ECHO MEAS - TR MAX PG: 58.2 MMHG
BH CV ECHO MEAS - TR MAX VEL: 381.6 CM/SEC
BH CV ECHO MEASUREMENTS AVERAGE E/E' RATIO: 9.95
BH CV VAS BP LEFT ARM: NORMAL MMHG
BH CV XLRA - RV BASE: 3.4 CM
BH CV XLRA - RV LENGTH: 7.5 CM
BH CV XLRA - RV MID: 3.4 CM
BH CV XLRA - TDI S': 19 CM/SEC
BUN SERPL-MCNC: 19 MG/DL (ref 8–23)
BUN/CREAT SERPL: 21.3 (ref 7–25)
CALCIUM SPEC-SCNC: 9.5 MG/DL (ref 8.6–10.5)
CHLORIDE SERPL-SCNC: 94 MMOL/L (ref 98–107)
CO2 SERPL-SCNC: 39.4 MMOL/L (ref 22–29)
CREAT SERPL-MCNC: 0.89 MG/DL (ref 0.76–1.27)
DEPRECATED RDW RBC AUTO: 42.5 FL (ref 37–54)
EGFRCR SERPLBLD CKD-EPI 2021: 97.5 ML/MIN/1.73
ERYTHROCYTE [DISTWIDTH] IN BLOOD BY AUTOMATED COUNT: 14.2 % (ref 12.3–15.4)
GLUCOSE BLDC GLUCOMTR-MCNC: 264 MG/DL (ref 70–130)
GLUCOSE BLDC GLUCOMTR-MCNC: 292 MG/DL (ref 70–130)
GLUCOSE BLDC GLUCOMTR-MCNC: 329 MG/DL (ref 70–130)
GLUCOSE BLDC GLUCOMTR-MCNC: 485 MG/DL (ref 70–130)
GLUCOSE SERPL-MCNC: 229 MG/DL (ref 65–99)
HBA1C MFR BLD: 7.5 % (ref 4.8–5.6)
HCT VFR BLD AUTO: 54.5 % (ref 37.5–51)
HGB BLD-MCNC: 17.5 G/DL (ref 13–17.7)
LEFT ATRIUM VOLUME INDEX: 30 ML/M2
MAXIMAL PREDICTED HEART RATE: 159 BPM
MCH RBC QN AUTO: 28.2 PG (ref 26.6–33)
MCHC RBC AUTO-ENTMCNC: 32.1 G/DL (ref 31.5–35.7)
MCV RBC AUTO: 87.9 FL (ref 79–97)
PLATELET # BLD AUTO: 197 10*3/MM3 (ref 140–450)
PMV BLD AUTO: 10 FL (ref 6–12)
POTASSIUM SERPL-SCNC: 5.3 MMOL/L (ref 3.5–5.2)
RBC # BLD AUTO: 6.2 10*6/MM3 (ref 4.14–5.8)
SINUS: 2.9 CM
SODIUM SERPL-SCNC: 141 MMOL/L (ref 136–145)
STJ: 2.6 CM
STRESS TARGET HR: 135 BPM
TSH SERPL DL<=0.05 MIU/L-ACNC: 2.07 UIU/ML (ref 0.27–4.2)
WBC NRBC COR # BLD: 12.96 10*3/MM3 (ref 3.4–10.8)

## 2022-09-02 PROCEDURE — 63710000001 INSULIN LISPRO (HUMAN) PER 5 UNITS: Performed by: NURSE PRACTITIONER

## 2022-09-02 PROCEDURE — 25010000002 METHYLPREDNISOLONE PER 125 MG: Performed by: INTERNAL MEDICINE

## 2022-09-02 PROCEDURE — 94799 UNLISTED PULMONARY SVC/PX: CPT

## 2022-09-02 PROCEDURE — 93306 TTE W/DOPPLER COMPLETE: CPT | Performed by: INTERNAL MEDICINE

## 2022-09-02 PROCEDURE — 25010000002 PERFLUTREN (DEFINITY) 8.476 MG IN SODIUM CHLORIDE (PF) 0.9 % 10 ML INJECTION: Performed by: NURSE PRACTITIONER

## 2022-09-02 PROCEDURE — 93306 TTE W/DOPPLER COMPLETE: CPT

## 2022-09-02 PROCEDURE — 84443 ASSAY THYROID STIM HORMONE: CPT | Performed by: NURSE PRACTITIONER

## 2022-09-02 PROCEDURE — 94664 DEMO&/EVAL PT USE INHALER: CPT

## 2022-09-02 PROCEDURE — 99255 IP/OBS CONSLTJ NEW/EST HI 80: CPT | Performed by: INTERNAL MEDICINE

## 2022-09-02 PROCEDURE — 85027 COMPLETE CBC AUTOMATED: CPT | Performed by: NURSE PRACTITIONER

## 2022-09-02 PROCEDURE — 25010000002 ENOXAPARIN PER 10 MG: Performed by: INTERNAL MEDICINE

## 2022-09-02 PROCEDURE — 25010000002 FUROSEMIDE PER 20 MG: Performed by: NURSE PRACTITIONER

## 2022-09-02 PROCEDURE — 94761 N-INVAS EAR/PLS OXIMETRY MLT: CPT

## 2022-09-02 PROCEDURE — 82962 GLUCOSE BLOOD TEST: CPT

## 2022-09-02 PROCEDURE — 94760 N-INVAS EAR/PLS OXIMETRY 1: CPT

## 2022-09-02 PROCEDURE — 80048 BASIC METABOLIC PNL TOTAL CA: CPT | Performed by: NURSE PRACTITIONER

## 2022-09-02 PROCEDURE — 86769 SARS-COV-2 COVID-19 ANTIBODY: CPT | Performed by: INTERNAL MEDICINE

## 2022-09-02 PROCEDURE — 83036 HEMOGLOBIN GLYCOSYLATED A1C: CPT | Performed by: INTERNAL MEDICINE

## 2022-09-02 RX ORDER — INSULIN LISPRO 100 [IU]/ML
0-24 INJECTION, SOLUTION INTRAVENOUS; SUBCUTANEOUS
Status: DISCONTINUED | OUTPATIENT
Start: 2022-09-03 | End: 2022-09-03

## 2022-09-02 RX ORDER — ENOXAPARIN SODIUM 100 MG/ML
40 INJECTION SUBCUTANEOUS EVERY 12 HOURS
Status: DISCONTINUED | OUTPATIENT
Start: 2022-09-02 | End: 2022-09-05 | Stop reason: HOSPADM

## 2022-09-02 RX ORDER — INSULIN LISPRO 100 [IU]/ML
15 INJECTION, SOLUTION INTRAVENOUS; SUBCUTANEOUS
Status: DISCONTINUED | OUTPATIENT
Start: 2022-09-02 | End: 2022-09-03

## 2022-09-02 RX ORDER — METHYLPREDNISOLONE SODIUM SUCCINATE 125 MG/2ML
60 INJECTION, POWDER, LYOPHILIZED, FOR SOLUTION INTRAMUSCULAR; INTRAVENOUS ONCE
Status: COMPLETED | OUTPATIENT
Start: 2022-09-02 | End: 2022-09-02

## 2022-09-02 RX ORDER — IPRATROPIUM BROMIDE AND ALBUTEROL SULFATE 2.5; .5 MG/3ML; MG/3ML
3 SOLUTION RESPIRATORY (INHALATION)
Status: DISCONTINUED | OUTPATIENT
Start: 2022-09-02 | End: 2022-09-05 | Stop reason: HOSPADM

## 2022-09-02 RX ADMIN — LOSARTAN POTASSIUM 100 MG: 100 TABLET, FILM COATED ORAL at 08:30

## 2022-09-02 RX ADMIN — ATORVASTATIN CALCIUM 40 MG: 20 TABLET, FILM COATED ORAL at 08:30

## 2022-09-02 RX ADMIN — ALBUTEROL SULFATE 2.5 MG: 2.5 SOLUTION RESPIRATORY (INHALATION) at 10:46

## 2022-09-02 RX ADMIN — INSULIN LISPRO 6 UNITS: 100 INJECTION, SOLUTION INTRAVENOUS; SUBCUTANEOUS at 11:57

## 2022-09-02 RX ADMIN — INSULIN LISPRO 4 UNITS: 100 INJECTION, SOLUTION INTRAVENOUS; SUBCUTANEOUS at 08:29

## 2022-09-02 RX ADMIN — PERFLUTREN 1.5 ML: 6.52 INJECTION, SUSPENSION INTRAVENOUS at 12:50

## 2022-09-02 RX ADMIN — TRAZODONE HYDROCHLORIDE 50 MG: 50 TABLET ORAL at 20:09

## 2022-09-02 RX ADMIN — IPRATROPIUM BROMIDE AND ALBUTEROL SULFATE 3 ML: .5; 3 SOLUTION RESPIRATORY (INHALATION) at 21:18

## 2022-09-02 RX ADMIN — METHYLPREDNISOLONE SODIUM SUCCINATE 60 MG: 125 INJECTION, POWDER, FOR SOLUTION INTRAMUSCULAR; INTRAVENOUS at 11:56

## 2022-09-02 RX ADMIN — INSULIN GLARGINE-YFGN 40 UNITS: 100 INJECTION, SOLUTION SUBCUTANEOUS at 20:50

## 2022-09-02 RX ADMIN — ENOXAPARIN SODIUM 40 MG: 100 INJECTION SUBCUTANEOUS at 11:56

## 2022-09-02 RX ADMIN — IPRATROPIUM BROMIDE AND ALBUTEROL SULFATE 3 ML: .5; 3 SOLUTION RESPIRATORY (INHALATION) at 15:55

## 2022-09-02 RX ADMIN — FUROSEMIDE 40 MG: 10 INJECTION, SOLUTION INTRAMUSCULAR; INTRAVENOUS at 06:51

## 2022-09-02 RX ADMIN — INSULIN LISPRO 7 UNITS: 100 INJECTION, SOLUTION INTRAVENOUS; SUBCUTANEOUS at 17:18

## 2022-09-02 RX ADMIN — ENOXAPARIN SODIUM 40 MG: 100 INJECTION SUBCUTANEOUS at 22:26

## 2022-09-02 RX ADMIN — FUROSEMIDE 40 MG: 10 INJECTION, SOLUTION INTRAMUSCULAR; INTRAVENOUS at 17:18

## 2022-09-02 NOTE — CONSULTS
Pulmonary Consultation     Patient Name: Carrington Biggs  Age/Sex: 61 y.o. male  : 1960  MRN: [id number]    Date of Admission: 2022  Date of Encounter Visit: 22  Encounter Provider: Minesh Contreras MD  Referring Provider: Tucker Baird MD  Place of Service: [facility]  Patient Care Team:  Preet Louise MD as PCP - General (Internal Medicine)  Dann Alvarado MD as Consulting Physician (Orthopedic Surgery)  Deidra Cannon MD as Consulting Physician (Ophthalmology)  Bayron Anaya MD as Consulting Physician (Endocrinology)  Raj Perez MD as Consulting Physician (Urology)  Rocco Manning MD as Consulting Physician (Gastroenterology)  Lizbeth Aaron PA-C as Physician Assistant (Physician Assistant)  Fely Felton PA-C as Physician Assistant (Colon and Rectal Surgery)      Subjective:     Consulted for: Respiratory failure    Chief Complaint: Dyspnea    History of Present Illness:  Carrington Biggs is a 61 y.o. male with known history of diabetes mellitus, hypertension hyperlipidemia and obesity who presented to the hospital with progressive worsening cough and shortness of breath over the past 3 days triggered by exertion and by supine position.  Patient had some milder symptoms lingering over the last 2 weeks but progression was noticeable over the last 3 days.  There was no associated fever or chills.  There is some cough with no productive secretions  No nausea vomiting or diarrhea or other GI complaints  No  complaints.  Work-up in the ER including chest x-ray and CT of the chest showed no pulmonary embolism, the blood test showed evidence of some borderline leukocytosis, the respiratory panel was negative the procalcitonin was negative as well and the chemistry showed some minor derangement of the electrolyte with normal liver enzymes.  Patient was admitted under the care of the medical team and cardiology was consulted  Patient is requiring  oxygen at 4 L/min  Echocardiogram currently reported to show normal systolic function and diastolic relaxation with no significant valvular heart disease however the patient had the finding of severe pulmonary hypertension with RV pressure of 65 mmHg.  We were consulted for further recommendations.  The patient denies any history of asthma or COPD even though he smoked most of his adult life around 1 pack/day, he did use the inhalers of his wife at some point and he felt some better on it  He denies any wheezes or productive cough or chest tightness with the above symptoms  He does have obstructive sleep apnea but he is intolerant to the CPAP and currently on no treatment    Echo 9/2/2022:  · Left ventricular ejection fraction appears to be greater than 70%. Left ventricular systolic function is hyperdynamic (EF > 70%).  · Left ventricular diastolic function was normal.  · Severe pulmonary hypertension is present.Calculated right ventricular systolic pressure from tricuspid regurgitation is 66.2 mmHg.  · Saline test results are negative for right to left atrial level shunt.       Pulmonary Functions Testing Results:    No results found for: FEV1, FVC, ZZU5MBI, TLC, DLCO    Review of Systems:   Review of Systems   Constitutional: Negative for chills and fever.   HENT: Negative for congestion and rhinorrhea.    Eyes: Negative for photophobia and visual disturbance.   Respiratory: Positive for cough and shortness of breath.    Cardiovascular: Negative for chest pain and palpitations.   Gastrointestinal: Negative for constipation, diarrhea, nausea and vomiting.   Endocrine: Negative for cold intolerance and heat intolerance.   Genitourinary: Negative for difficulty urinating and dysuria.   Musculoskeletal: Negative for gait problem and joint swelling.   Skin: Negative for rash and wound.   Neurological: Negative for dizziness, light-headedness and headaches.   Psychiatric/Behavioral: Negative for sleep disturbance and  suicidal ideas.  Past Medical History:  Past Medical History:   Diagnosis Date   • Achilles tendinitis of right lower extremity 07/2017   • Anal fistula 2006   • BCC (basal cell carcinoma) 07/2021    LEFT INFERIOR EYELID, S/P MOHS   • BPH (benign prostatic hyperplasia)     FOLLOWED BY DR. BROOKS BOB   • CAD (coronary artery disease) 2011   • Cervical paraspinal muscle spasm 12/2018   • Colon polyps     FOLLOWED BY DR. DARVIN CHU   • COPD (chronic obstructive pulmonary disease) (HCC)    • COVID-19 virus infection 09/2020   • Erectile dysfunction    • Excessive drinking of alcohol 09/30/2020   • Gastroesophageal reflux disease 01/26/2017   • Hepatic steatosis 11/17/2020    Per abdominal CT scan September 2020   • Herpes simplex virus (HSV) infection 01/26/2017   • Hoarseness 09/2021    FOLLOWED BY DR. JUNIOR SCHAFFER   • Hyperlipidemia    • Hypertension    • Insomnia    • Microalbuminuria    • Neck injury    • Nodule of left lung    • Obstructive sleep apnea syndrome    • Gina-rectal abscess 06/27/2022   • Pneumonia due to COVID-19 virus 09/29/2020    BILATERAL LOWER LOBES, ADMITTED TO [facility]   • Right renal stone 09/30/2020   • Right-sided thoracic back pain    • Tibialis posterior tendinitis, right 07/2017   • Type 2 diabetes mellitus (HCC)     NIDDM   • Vocal cord polyp 08/13/2021    FOLLOWED BY DR. JUNIOR SCHAFFER       Past Surgical History:   Procedure Laterality Date   • ANAL FISTULOTOMY N/A 03/03/2006    WITH DRAINAGE OF ABSCESS AND ANAL PAPILLECTOMY, DR. LUX MORENO AT [facility]   • CARDIAC CATHETERIZATION Left 12/27/2011    MILD NONOBSTRUCTIVE ATHEROSCLEROTIC CAD, LEFT VENTRICULAR EF 60%, MILDLY ELEVATED LEFT VENTRICULAR END DIASTOLIC PRESSURE, DR. DORINDA BISHOP AT [facility]   • COLONOSCOPY N/A 2000    DR. JOVAN MOSLEY   • COLONOSCOPY N/A 08/07/2017    SMALL EXTERNAL HEMORRHOIDS, OTHERWISE WNL, RESCOPE IN 5 YRS, DR. DARVIN CHU AT [facility]   • HEMORRHOIDECTOMY N/A 1994   • INCISION AND DRAINAGE PERIRECTAL ABSCESS N/A  12/30/2013   • INCISION AND DRAINAGE PERIRECTAL ABSCESS N/A 06/04/2005    DR. ALFRED TURCIOS AT [facility]   • LUMBAR DISCECTOMY N/A 1995    OSTEOPHYTE L5   • MOHS SURGERY Left 08/09/2021    LEFT INFERIOR EYELID, (+)BCC, DR. DENZEL PEDERSEN   • SKIN BIOPSY Left 07/07/2021    LEFT INFERIOR EYELID, (+)BCCGAIL PA-C   • TYMPANOSTOMY TUBE PLACEMENT Bilateral        Home Medications:   Medications Prior to Admission   Medication Sig Dispense Refill Last Dose   • albuterol sulfate  (90 Base) MCG/ACT inhaler Inhale 1-2 puffs Every 4 (Four) Hours As Needed for Wheezing or Shortness of Air. 18 g 0 9/1/2022 at Unknown time   • atorvastatin (LIPITOR) 40 MG tablet TAKE 1 TABLET BY MOUTH EVERY DAY 90 tablet 3 9/1/2022 at Unknown time   • hydroCHLOROthiazide (HYDRODIURIL) 25 MG tablet Take 1 tablet by mouth Daily. 90 tablet 0 9/1/2022 at Unknown time   • insulin NPH (humuLIN N,novoLIN N) 100 UNIT/ML injection 65 units at bedtime 1 each 12 8/31/2022 at Unknown time   • losartan (COZAAR) 100 MG tablet TAKE 1 TABLET BY MOUTH EVERY DAY 90 tablet 3 9/1/2022 at Unknown time   • metFORMIN ER (GLUCOPHAGE-XR) 500 MG 24 hr tablet Take 4 tablets by mouth Daily With Breakfast for 90 days. Do not give at time of or within 48 hours of iodinated intravenous contrast. Confirm renal function is normal before continuing. 360 tablet 0 9/1/2022 at Unknown time   • traZODone (DESYREL) 100 MG tablet TAKE 1 TABLET BY MOUTH EVERYDAY AT BEDTIME 90 tablet 1 8/31/2022 at Unknown time   • gabapentin (NEURONTIN) 100 MG capsule Take 1 capsule by mouth 2 (Two) Times a Day. 60 capsule 1 Unknown at Unknown time   • glimepiride (AMARYL) 4 MG tablet Take 4 mg by mouth 2 (Two) Times a Day.      • glucose blood test strip Care Sen N  Dx code E11.42 testing bs 2 x day 100 each 5    • Insulin Pen Needle (PEN NEEDLES) 32G X 5 MM misc 1 each Daily. 100 each 5    • insulin regular (humuLIN R,novoLIN R) 100 UNIT/ML injection inject 20 units in the morning 15 units  at lunch and 25 units at dinner (Patient taking differently: inject 25 units in the morning 15 units at lunch and 25 units at dinner)  12    • Insulin Syringes, Disposable, U-100 0.5 ML misc Use daily 100 each 2    • nicotine (NICODERM CQ) 14 MG/24HR patch Place 1 patch on the skin as directed by provider Daily. 30 patch 1 More than a month at Unknown time       Inpatient Medications:  Scheduled Meds:atorvastatin, 40 mg, Oral, Daily  enoxaparin, 40 mg, Subcutaneous, Q12H  furosemide, 40 mg, Intravenous, Q12H  insulin lispro, 0-9 Units, Subcutaneous, TID AC  ipratropium-albuterol, 3 mL, Nebulization, 4x Daily - RT  losartan, 100 mg, Oral, Daily  traZODone, 50 mg, Oral, Nightly      Continuous Infusions:   PRN Meds:.•  acetaminophen  •  albuterol  •  aluminum-magnesium hydroxide-simethicone  •  dextrose  •  dextrose  •  glucagon (human recombinant)  •  nitroglycerin  •  ondansetron **OR** ondansetron  •  sodium chloride  •  sodium chloride    Allergies:  Allergies   Allergen Reactions   • Rosuvastatin Calcium Myalgia       Past Social History:  Social History     Socioeconomic History   • Marital status:    • Number of children: 0   Tobacco Use   • Smoking status: Current Every Day Smoker     Packs/day: 1.00     Years: 40.00     Pack years: 40.00     Types: Cigarettes     Start date: 1970   • Smokeless tobacco: Never Used   • Tobacco comment:  quit smoking 10/2020   Vaping Use   • Vaping Use: Never used   Substance and Sexual Activity   • Alcohol use: Yes     Alcohol/week: 10.0 standard drinks     Types: 10 Shots of liquor per week     Comment: couple days a week (3-4 drinks)   • Drug use: No   • Sexual activity: Yes     Partners: Female       Past Family History:  Family History   Problem Relation Age of Onset   • Stroke Mother    • Diabetes Mother    • Stroke Father    • Aneurysm Father    • Rheum arthritis Sister    • Diabetes Sister    • Hypertension Sister    • Hyperlipidemia Sister    • Hypertension  Brother    • Hypertension Brother    • Colon cancer Neg Hx    • Colon polyps Neg Hx            Objective:   Temp:  [97.8 °F (36.6 °C)-98.2 °F (36.8 °C)] 98.2 °F (36.8 °C)  Heart Rate:  [81-98] 81  Resp:  [20-24] 22  BP: (119-176)/(66-91) 119/79  SpO2:  [90 %-99 %] 90 %  on  Flow (L/min):  [3-4] 4 Device (Oxygen Therapy): nasal cannula     Intake/Output Summary (Last 24 hours) at 9/2/2022 1535  Last data filed at 9/2/2022 0700  Gross per 24 hour   Intake --   Output 975 ml   Net -975 ml     Body mass index is 40.6 kg/m².      09/01/22  1445 09/02/22  0541 09/02/22  1250   Weight: 113 kg (250 lb) 121 kg (267 lb) 121 kg (267 lb)     Weight change:     Physical Exam:   Physical Exam   General:    No acute distress, alert and oriented x4, pleasant                   Head:    Normocephalic, atraumatic. External ears and nose are normal   Eyes:          Conjunctivae and sclerae normal, no icterus, PERRLA, no  discharge   Throat:   No oral lesions, no thrush, oral mucosa moist.  Mallampati 4 airway   Neck:   Supple, trachea midline. No JVD, no cervical or supraclavicular lymphadenopathy    Lungs:     Normal chest on inspection, patient has some crackles over the bases bilaterally, no expiratory wheezes or prolongation of the expiratory phase. Respirations regular, even and unlabored.      Heart:    Regular rhythm and normal rate.  No murmurs, gallops, or rubs noted.   Abdomen:     Soft, nontender, nondistended, positive bowel sounds. No hepatosplenomegaly    Extremities:   No clubbing, cyanosis, or edema.     Pulses:   Pulses palpable and equal bilaterally.    Skin:   No bleeding or rash. No bumps, good turgor pressure    Neuro:   Nonfocal.  Moves all extremities well. Strength 5/5 and symmetrical, no sensory deficit    Psychiatric:   Normal mood and affect.     Lab Review:   Results from last 7 days   Lab Units 09/02/22  0543 09/01/22  1600   SODIUM mmol/L 141 143   POTASSIUM mmol/L 5.3* 4.6   CHLORIDE mmol/L 94* 99   CO2  mmol/L 39.4* 33.5*   BUN mg/dL 19 18   CREATININE mg/dL 0.89 1.11   GLUCOSE mg/dL 229* 147*   CALCIUM mg/dL 9.5 9.7   AST (SGOT) U/L  --  14   ALT (SGPT) U/L  --  14   ALBUMIN g/dL  --  3.90     Results from last 7 days   Lab Units 09/01/22  1600   TROPONIN T ng/mL <0.010     Results from last 7 days   Lab Units 09/02/22  0543 09/01/22  1453   WBC 10*3/mm3 12.96* 13.87*   HEMOGLOBIN g/dL 17.5 18.3*   HEMATOCRIT % 54.5* 56.3*   PLATELETS 10*3/mm3 197 217   MCV fL 87.9 90.1   MCH pg 28.2 29.3   MCHC g/dL 32.1 32.5   RDW % 14.2 13.8   RDW-SD fl 42.5 45.1   MPV fL 10.0 10.6   NRBC /100 WBC  --  0.1     Results from last 7 days   Lab Units 09/01/22  1453   INR  1.15*   APTT seconds 27.3               Invalid input(s): LDLCALC  Results from last 7 days   Lab Units 09/01/22  1453   PROBNP pg/mL 234.0     Results from last 7 days   Lab Units 09/02/22  0543   TSH uIU/mL 2.070         Results from last 7 days   Lab Units 09/01/22  1453   PROCALCITONIN ng/mL 0.03                     Results from last 7 days   Lab Units 09/01/22  1556   COVID19  Not Detected   ADENOVIRUS DETECTION BY PCR  Not Detected   CORONAVIRUS 229E  Not Detected   CORONAVIRUS HKU1  Not Detected   CORONAVIRUS NL63  Not Detected   CORONAVIRUS OC43  Not Detected   HUMAN METAPNEUMOVIRUS  Not Detected   HUMAN RHINOVIRUS/ENTEROVIRUS  Not Detected   INFLUENZA B PCR  Not Detected   PARAINFLUENZA 1  Not Detected   PARAINFLUENZA VIRUS 2  Not Detected   PARAINFLUENZA VIRUS 3  Not Detected   PARAINFLUENZA VIRUS 4  Not Detected   BORDETELLA PERTUSSIS PCR  Not Detected   BORDETELLA PARAPERTUSSIS PCR  Not Detected   CHLAMYDOPHILA PNEUMONIAE PCR  Not Detected   MYCOPLAMA PNEUMO PCR  Not Detected   RSV, PCR  Not Detected             Imaging:  Imaging Results (Most Recent)     Procedure Component Value Units Date/Time    CT Soft Tissue Neck With Contrast [[id number]] Collected: 09/01/22 1904     Updated: 09/02/22 0615    Narrative:      CT SCAN OF THE SOFT TISSUES OF THE  NECK WITH CONTRAST     CLINICAL HISTORY: Stridor and wheezing.     TECHNIQUE: CT scan of the soft tissues of the neck was obtained with 3  mm axial images following the administration of IV contrast. Sagittal  and coronal reconstructed images were obtained.     FINDINGS:     The visualized contents of the cranial vault are unremarkable. The  parotid glands, submandibular glands, and sublingual glands are  unremarkable. The nasopharynx, oropharynx, and oral cavity are  unremarkable. The epiglottis is mildly prominent in size but  demonstrates no convincing evidence for pathology. The true and false  vocal cords are unremarkable. The hypopharynx is unremarkable as well.  The subglottic airway and trachea are within normal limits. The thyroid  gland is essentially unremarkable with the exception of small foci of  coarse mineralization within the left lobe. The visualized lung apices  are clear. There is no pathologic lymphadenopathy within the soft  tissues of the neck.       Impression:         Essentially unremarkable CT scan of the soft tissues of the neck.           Radiation dose reduction techniques were utilized, including automated  exposure control and exposure modulation based on body size.     This report was finalized on 9/2/2022 6:12 AM by Dr. Stan Redman M.D.       CT Angiogram Chest [[id number]] Collected: 09/01/22 1801     Updated: 09/01/22 1809    Narrative:      CT ANGIOGRAM CHEST     HISTORY: Shortness of breath evaluate for pulmonary embolism.     TECHNIQUE: CT angiogram of the chest was performed using IV contrast.  Multiplanar reformatted images were produced at a separate workstation.  CT neck performed today is reported separately.     Radiation dose reduction techniques were utilized, including automated  exposure control and exposure modulation based on body size.     FINDINGS: The thoracic aorta is normal in caliber and enhances normally.  There is scattered atheromatous calcification,  including along the  coronary arteries. No pleural or pericardial effusion or cardiac chamber  enlargement is present.     The pulmonary arteries are normal in caliber and enhance normally. There  is no pulmonary embolism.     No thoracic lymphadenopathy. The thoracic esophagus has a normal CT  appearance. Visualized portions of the upper abdominal structures appear  normal.     Mild density along the caudad edges of the lower lobes bilaterally is  symmetrical and favored to represent atelectasis rather than pneumonia.  The lungs are otherwise clear. Airways are patent. Degenerative change  is observed at the sternomanubrial joint. Mild degenerative disc change  in the thoracic spine.       Impression:      No evidence of pulmonary embolism. Minimal density along the  caudad edges of the lower lobes posteriorly is favored to represent  atelectasis. Otherwise negative chest CT.     This report was finalized on 9/1/2022 6:06 PM by Dr. Thee Mahmood M.D.       XR Chest 1 View [[id number]] Collected: 09/01/22 1527     Updated: 09/01/22 1531    Narrative:      XR CHEST 1 VW-clinical: CHF/COPD protocol     COMPARISON 1/24/2022     FINDINGS: Cardiac size stable, within normal limits. There is a lower  inspiratory effort on the current examination causing crowding of  central bronchovascular markings, mild vascular congestion not excluded.  No pleural effusion or lung consolidation seen. There are monitoring  leads superimposing the chest. The remainder is unremarkable.     This report was finalized on 9/1/2022 3:28 PM by Dr. Roger Flores M.D.             I personally viewed and interpreted the patient's imaging studies.    Assessment:   Severe pulmonary hypertension  Acute hypoxemic respiratory failure  Obstructive sleep apnea on no treatment  Heavy tobacco abuse with no clear diagnosis of obstructive airway disease  Obesity with a BMI of 40.6  Essential hypertension  Type 2 diabetes mellitus      Plan:     Patient  does not have any significant finding on the CAT scan or by history or by lab to suspect bacterial infection and the only obvious abnormality so far is a severe pulmonary hypertension which can account for a lot of his complaints.  He does have some hyperkalemia and patient would benefit from some diuretics to reduce the pulmonary artery pressure while we work-up the underlying condition that resulted into that.  The echocardiogram did not show any valvular heart disease but the plan is to discuss with cardiology to consider proceeding with right heart catheterization for further evaluation and to help decide on the proper treatment  The sleep apnea needs to be treated down the road, I am not sure if this is the only cause of his pulmonary hypertension I would be surprised if it is but it is definitely a contributing factor that need to be addressed while trying to treat the underlying pulmonary hypertension  Weight loss is also strongly recommended  Titrate oxygen despond to maintain sats above 90%  Will defer the diabetes management to the primary team    Discussed with the patient      Thank you for allowing me to participate in the care of Carrington PHILIP Biggs. Feel free to contact me directly with any further questions or concerns.    Minesh Contreras MD  [facility]   09/02/22  15:35 EDT    Dictated utilizing Dragon dictation

## 2022-09-02 NOTE — PLAN OF CARE
Goal Outcome Evaluation:          Vital signs stable.  On 4L nasal cannula  throughout shift, still wheezy. No signs of ditress. NPO after midnight for possible heart cath. Reached out to hospitalist around 1745 for high glucose readings and received new insulin orders

## 2022-09-02 NOTE — CASE MANAGEMENT/SOCIAL WORK
Discharge Planning Assessment  [facility]     Patient Name: Carrington Biggs  MRN: [id number]  Today's Date: 9/2/2022    Admit Date: 9/1/2022     Discharge Needs Assessment     Row Name 09/02/22 1646       Living Environment    People in Home spouse    Current Living Arrangements home    Primary Care Provided by self    Provides Primary Care For no one    Family Caregiver if Needed spouse;sibling(s)    Quality of Family Relationships helpful;involved;supportive    Able to Return to Prior Arrangements yes       Resource/Environmental Concerns    Resource/Environmental Concerns none       Transition Planning    Patient/Family Anticipates Transition to home with family    Patient/Family Anticipated Services at Transition none    Transportation Anticipated car, drives self;family or friend will provide       Discharge Needs Assessment    Readmission Within the Last 30 Days no previous admission in last 30 days    Equipment Currently Used at Home none    Concerns to be Addressed discharge planning    Anticipated Changes Related to Illness none    Equipment Needed After Discharge pulse ox;oxygen               Discharge Plan     Row Name 09/02/22 1649       Plan    Plan Home with wife, follow for dc needs    Patient/Family in Agreement with Plan yes    Provided Post Acute Provider List? N/A    Plan Comments CCP spoke with pt at bedside, introduced self and explained CCP role. Verified facesheet and confirmed local pharmacy is [facility]. Pt denies problems with medication costs or management. Offered meds to beds and pt wishes to enroll. Pt has POA and his brother Fercho Biggs ([phone]). PCP is Dr. Preet Louise. Pt lives at home with his wife Kimberly ([phone]) in a home. Prior to admission pt was IADL with mobility, denies any DME. Pt had a cpap in the past but he does not use it. Pt is currently wearing oxygen, CCP will follow for need at dc. Pt denies any SNF or HH hx. CCP discussed dc planning and pt states plan is home  and denies any concerns at this time. CCP provided reassurance and will continue to follow hospital course for dc planning needs. Ruslan BAILEY/CCP              Continued Care and Services - Admitted Since 9/1/2022    Coordination has not been started for this encounter.       Expected Discharge Date and Time     Expected Discharge Date Expected Discharge Time    Sep 4, 2022          Demographic Summary     Row Name 09/02/22 1646       General Information    Admission Type inpatient    Referral Source admission list    Reason for Consult discharge planning    Preferred Language English       Contact Information    Permission Granted to Share Info With family/designee               Functional Status    No documentation.                Psychosocial    No documentation.                Abuse/Neglect    No documentation.                Legal    No documentation.                Substance Abuse    No documentation.                Patient Forms    No documentation.                   Linn Yap, RN

## 2022-09-02 NOTE — PROGRESS NOTES
Name: Carrington Biggs ADMIT: 2022   : 1960  PCP: Preet Louise MD    MRN: [id number] LOS: 1 days   AGE/SEX: 61 y.o. male  ROOM: [facility]   Subjective   Chief Complaint   Patient presents with   • Shortness of Breath     +dyspnea  Had increasing orthopnea over the past week  Denies previous history of CHF  Requiring 4L oxygen    ROS  No f/c  No n/v  No cp/palp  +soa/cough    Objective   Vital Signs  Temp:  [97.8 °F (36.6 °C)-98.1 °F (36.7 °C)] 97.8 °F (36.6 °C)  Heart Rate:  [] 98  Resp:  [19-24] 24  BP: (128-176)/(66-91) 176/91  SpO2:  [79 %-99 %] 91 %  on  Flow (L/min):  [2-4] 4;   Device (Oxygen Therapy): nasal cannula  Body mass index is 40.6 kg/m².    Physical Exam  Constitutional:       General: He is in acute distress.      Appearance: He is ill-appearing.   HENT:      Head: Normocephalic and atraumatic.   Eyes:      General: No scleral icterus.  Cardiovascular:      Rate and Rhythm: Normal rate and regular rhythm.      Heart sounds: Normal heart sounds.   Pulmonary:      Effort: Respiratory distress present.      Breath sounds: Rales present. No wheezing.   Abdominal:      General: There is no distension.      Palpations: Abdomen is soft.      Tenderness: There is no abdominal tenderness.   Musculoskeletal:      Cervical back: Neck supple.      Right lower leg: Edema (trace) present.      Left lower leg: Edema (trace) present.   Neurological:      Mental Status: He is alert.   Psychiatric:         Behavior: Behavior normal.         Results Review:       I reviewed the patient's new clinical results.  Results from last 7 days   Lab Units 22  0543 22  1453   WBC 10*3/mm3 12.96* 13.87*   HEMOGLOBIN g/dL 17.5 18.3*   PLATELETS 10*3/mm3 197 217     Results from last 7 days   Lab Units 22  0543 22  1600   SODIUM mmol/L 141 143   POTASSIUM mmol/L 5.3* 4.6   CHLORIDE mmol/L 94* 99   CO2 mmol/L 39.4* 33.5*   BUN mg/dL 19 18   CREATININE mg/dL 0.89 1.11   GLUCOSE mg/dL 229*  147*   Estimated Creatinine Clearance: 110.2 mL/min (by C-G formula based on SCr of 0.89 mg/dL).  Results from last 7 days   Lab Units 09/01/22  1600   ALBUMIN g/dL 3.90   BILIRUBIN mg/dL 0.3   ALK PHOS U/L 92   AST (SGOT) U/L 14   ALT (SGPT) U/L 14     Results from last 7 days   Lab Units 09/02/22  0543 09/01/22  1600   CALCIUM mg/dL 9.5 9.7   ALBUMIN g/dL  --  3.90     Results from last 7 days   Lab Units 09/01/22  1453   PROCALCITONIN ng/mL 0.03       Coag   Results from last 7 days   Lab Units 09/01/22  1453   INR  1.15*   APTT seconds 27.3     HbA1C   Lab Results   Component Value Date    HGBA1C 7.50 (H) 09/02/2022    HGBA1C 10.6 (H) 03/02/2022    HGBA1C 10.7 (H) 11/03/2021     Infection   Results from last 7 days   Lab Units 09/01/22  1453   PROCALCITONIN ng/mL 0.03     Radiology(recent) CT Soft Tissue Neck With Contrast    Result Date: 9/2/2022   Essentially unremarkable CT scan of the soft tissues of the neck.    Radiation dose reduction techniques were utilized, including automated exposure control and exposure modulation based on body size.  This report was finalized on 9/2/2022 6:12 AM by Dr. Stan Redman M.D.      CT Angiogram Chest    Result Date: 9/1/2022  No evidence of pulmonary embolism. Minimal density along the caudad edges of the lower lobes posteriorly is favored to represent atelectasis. Otherwise negative chest CT.  This report was finalized on 9/1/2022 6:06 PM by Dr. Thee Mahmood M.D.      Troponin T   Date Value Ref Range Status   09/01/2022 <0.010 0.000 - 0.030 ng/mL Final     No components found for: TSH;2    atorvastatin, 40 mg, Oral, Daily  enoxaparin, 40 mg, Subcutaneous, Q12H  furosemide, 40 mg, Intravenous, Q12H  insulin lispro, 0-9 Units, Subcutaneous, TID AC  losartan, 100 mg, Oral, Daily  traZODone, 50 mg, Oral, Nightly       Diet Regular; Consistent Carbohydrate      Assessment & Plan      Active Hospital Problems    Diagnosis  POA   • **Acute respiratory failure with hypoxia  (HCC) [J96.01]  Yes   • Dyspnea [R06.00]  Yes   • Cigarette nicotine dependence with nicotine-induced disorder [F17.219]  Yes   • Obesity (BMI 30-39.9) [E66.9]  Yes   • Obstructive sleep apnea syndrome [G47.33]  Yes   • Gastroesophageal reflux disease [K21.9]  Yes   • Type 2 diabetes mellitus with microalbuminuria, without long-term current use of insulin (HCC) [E11.29, R80.9]  Yes   • Hyperlipidemia [E78.5]  Yes   • Essential hypertension [I10]  Yes      Resolved Hospital Problems   No resolved problems to display.         Mr. Biggs is a 61 y.o. smoker with a history of type 2 diabetes, HTN, HLD, JACEK, obesity, GERD who presents with acute respiratory failure with hypoxia.     Acute respiratory failure with hypoxia    Etiology unclear. CTPE negative, no evidence for PNA. Some symptoms suggestive of CHF though BNP ok and not a lot of pulmonary edema on CT.  -2D echo with severe pulmonary hypertension   -on IV lasix  -Cardiology consultation  - h/o tobacco abuse, will give a dose of steroids and start scheduled bronchodilators in case COPD could be playing a role though no wheezing on exam.     RHONA Baltazar. Will have pulmonary consultation with the pulmonary hypertension on ECHO and O2 needs, continued diuretics. NPO after midnight for potential left and right heart cath tomorrow.        Type 2 diabetes mellitus  -Accu-Cheks 4 times a day with meals and at bedtime  -Moderate dose correctional factor with hypoglycemic protocol  -A1c 7.5  -Hold oral diabetic medication     Essential hypertension  -Monitor     Hyperlipidemia  -Resume statin therapy     JACEK  -May use home CPAP if available     Tobacco abuse  -Encourage cessation  -Nicotine patch daily     GERD  -Continue PPI          VTE Prophylaxis - Lovenox 40 mg     RHONA RN  RHONA Fall MD  [facility]  09/02/22  10:19 EDT

## 2022-09-02 NOTE — CONSULTS
[facility]  Consult Note                                                                              9/2/2022  Tucker Baird MD    Patient Identification:  Carrington Biggs:   61 y.o.  male  1960     Date of Admission:9/1/2022    CC: shortness of breath    History of Present Illness:   Mat Biggs is a 61 year old pt with a history of DM II, JACEK, HLD, GERD, hypertension and no prior cardiac disease. He presented to ER on 9/1/22 with complaints of shortness of breath for the past 2 weeks and is worse at night and while lying down also has been exertional.  He has also had bilateral lower extremity edema,and  nonproductive cough and notes that when he is coughing he would get chest tightness that resolves within a few minutes of cough resolution.  The chest tightness is not exertional but he has been not very active.  There is no radiation to the arm jaw or back.  He has had no fever chills cough no known exposure to COVID.  He denies palpitations.      In ER, sats 79 %, pro BNP WNL, WBC 13.87, HGB 18.3, INR 1.15, procal 0.03, respiratory panel negative, BMP unremarkable, troponin negative, CT of neck was unremarkable, CTA of chest showed no PE,  Minimal density along the caudad edges of the lower lobes posteriorly is favored to represent atelectasis, venous doppler negative,     He has been diuresed and dyspnea and edema have improved though still has modest dyspnea.  He is resting on 4 L of O2 with O2 saturations 88 to 89% echo shows normal systolic and diastolic function no significant valvular heart disease but pulmonary hypertension with RV systolic pressure 65 mmHg.      Past Medical History:  Past Medical History:   Diagnosis Date   • Achilles tendinitis of right lower extremity 07/2017   • Anal fistula 2006   • BCC (basal cell carcinoma) 07/2021    LEFT INFERIOR EYELID, S/P MOHS   • BPH (benign prostatic hyperplasia)     FOLLOWED BY DR. BROOKS BOB   • CAD (coronary artery disease)  2011   • Cervical paraspinal muscle spasm 12/2018   • Colon polyps     FOLLOWED BY DR. DARVIN CHU   • COPD (chronic obstructive pulmonary disease) (HCC)    • COVID-19 virus infection 09/2020   • Erectile dysfunction    • Excessive drinking of alcohol 09/30/2020   • Gastroesophageal reflux disease 01/26/2017   • Hepatic steatosis 11/17/2020    Per abdominal CT scan September 2020   • Herpes simplex virus (HSV) infection 01/26/2017   • Hoarseness 09/2021    FOLLOWED BY DR. JUNIOR SCHAFFER   • Hyperlipidemia    • Hypertension    • Insomnia    • Microalbuminuria    • Neck injury    • Nodule of left lung    • Obstructive sleep apnea syndrome    • Gina-rectal abscess 06/27/2022   • Pneumonia due to COVID-19 virus 09/29/2020    BILATERAL LOWER LOBES, ADMITTED TO [facility]   • Right renal stone 09/30/2020   • Right-sided thoracic back pain    • Tibialis posterior tendinitis, right 07/2017   • Type 2 diabetes mellitus (HCC)     NIDDM   • Vocal cord polyp 08/13/2021    FOLLOWED BY DR. JUNIOR SCHAFFER       Past Surgical History:  Past Surgical History:   Procedure Laterality Date   • ANAL FISTULOTOMY N/A 03/03/2006    WITH DRAINAGE OF ABSCESS AND ANAL PAPILLECTOMY, DR. LUX MORENO AT [facility]   • CARDIAC CATHETERIZATION Left 12/27/2011    MILD NONOBSTRUCTIVE ATHEROSCLEROTIC CAD, LEFT VENTRICULAR EF 60%, MILDLY ELEVATED LEFT VENTRICULAR END DIASTOLIC PRESSURE, DR. DORINDA BISHOP AT [facility]   • COLONOSCOPY N/A 2000    DR. JOVAN MOSLEY   • COLONOSCOPY N/A 08/07/2017    SMALL EXTERNAL HEMORRHOIDS, OTHERWISE WNL, RESCOPE IN 5 YRS, DR. DARVIN CHU AT [facility]   • HEMORRHOIDECTOMY N/A 1994   • INCISION AND DRAINAGE PERIRECTAL ABSCESS N/A 12/30/2013   • INCISION AND DRAINAGE PERIRECTAL ABSCESS N/A 06/04/2005    DR. ALFRED TURCIOS AT [facility]   • LUMBAR DISCECTOMY N/A 1995    OSTEOPHYTE L5   • MOHS SURGERY Left 08/09/2021    LEFT INFERIOR EYELID, (+)BCC, DR. DENZEL PEDERSEN   • SKIN BIOPSY Left 07/07/2021    LEFT INFERIOR EYELID, (+)BCC, GAIL GONZALEZ PA-C    • TYMPANOSTOMY TUBE PLACEMENT Bilateral        Allergies:  Allergies   Allergen Reactions   • Rosuvastatin Calcium Myalgia       Home Meds:  Medications Prior to Admission   Medication Sig Dispense Refill Last Dose   • albuterol sulfate  (90 Base) MCG/ACT inhaler Inhale 1-2 puffs Every 4 (Four) Hours As Needed for Wheezing or Shortness of Air. 18 g 0 9/1/2022 at Unknown time   • atorvastatin (LIPITOR) 40 MG tablet TAKE 1 TABLET BY MOUTH EVERY DAY 90 tablet 3 9/1/2022 at Unknown time   • hydroCHLOROthiazide (HYDRODIURIL) 25 MG tablet Take 1 tablet by mouth Daily. 90 tablet 0 9/1/2022 at Unknown time   • insulin NPH (humuLIN N,novoLIN N) 100 UNIT/ML injection 65 units at bedtime 1 each 12 8/31/2022 at Unknown time   • losartan (COZAAR) 100 MG tablet TAKE 1 TABLET BY MOUTH EVERY DAY 90 tablet 3 9/1/2022 at Unknown time   • metFORMIN ER (GLUCOPHAGE-XR) 500 MG 24 hr tablet Take 4 tablets by mouth Daily With Breakfast for 90 days. Do not give at time of or within 48 hours of iodinated intravenous contrast. Confirm renal function is normal before continuing. 360 tablet 0 9/1/2022 at Unknown time   • traZODone (DESYREL) 100 MG tablet TAKE 1 TABLET BY MOUTH EVERYDAY AT BEDTIME 90 tablet 1 8/31/2022 at Unknown time   • gabapentin (NEURONTIN) 100 MG capsule Take 1 capsule by mouth 2 (Two) Times a Day. 60 capsule 1 Unknown at Unknown time   • glimepiride (AMARYL) 4 MG tablet Take 4 mg by mouth 2 (Two) Times a Day.      • glucose blood test strip Care Sen N  Dx code E11.42 testing bs 2 x day 100 each 5    • Insulin Pen Needle (PEN NEEDLES) 32G X 5 MM misc 1 each Daily. 100 each 5    • insulin regular (humuLIN R,novoLIN R) 100 UNIT/ML injection inject 20 units in the morning 15 units at lunch and 25 units at dinner (Patient taking differently: inject 25 units in the morning 15 units at lunch and 25 units at dinner)  12    • Insulin Syringes, Disposable, U-100 0.5 ML misc Use daily 100 each 2    • nicotine (NICODERM CQ)  14 MG/24HR patch Place 1 patch on the skin as directed by provider Daily. 30 patch 1 More than a month at Unknown time       Current Meds  Scheduled Meds:atorvastatin, 40 mg, Oral, Daily  enoxaparin, 40 mg, Subcutaneous, Nightly  furosemide, 40 mg, Intravenous, Q12H  insulin lispro, 0-9 Units, Subcutaneous, TID AC  losartan, 100 mg, Oral, Daily  traZODone, 50 mg, Oral, Nightly      Continuous Infusions:   PRN Meds:.•  acetaminophen  •  albuterol  •  aluminum-magnesium hydroxide-simethicone  •  dextrose  •  dextrose  •  glucagon (human recombinant)  •  nitroglycerin  •  ondansetron **OR** ondansetron  •  sodium chloride  •  sodium chloride    Social History:   Social History     Socioeconomic History   • Marital status:    • Number of children: 0   Tobacco Use   • Smoking status: Current Every Day Smoker     Packs/day: 1.00     Years: 40.00     Pack years: 40.00     Types: Cigarettes     Start date: 1970   • Smokeless tobacco: Never Used   • Tobacco comment:  quit smoking 10/2020   Vaping Use   • Vaping Use: Never used   Substance and Sexual Activity   • Alcohol use: Yes     Alcohol/week: 10.0 standard drinks     Types: 10 Shots of liquor per week     Comment: couple days a week (3-4 drinks)   • Drug use: No   • Sexual activity: Yes     Partners: Female       Family History:  Family History   Problem Relation Age of Onset   • Stroke Mother    • Diabetes Mother    • Stroke Father    • Aneurysm Father    • Rheum arthritis Sister    • Diabetes Sister    • Hypertension Sister    • Hyperlipidemia Sister    • Hypertension Brother    • Hypertension Brother    • Colon cancer Neg Hx    • Colon polyps Neg Hx        REVIEW OF SYSTEMS:   CONSTITUTIONAL: No weight loss, fever, chills  HEENT: Eyes: No visual loss, blurred vision, double vision or yellow sclerae. Ears, Nose, Throat: No hearing loss, sneezing, congestion, runny nose or sore throat.   SKIN: No rash or itching.     RESPIRATORY: No hemoptysis, cough or sputum.  "  GASTROINTESTINAL: No anorexia, nausea, vomiting or diarrhea. No abdominal pain, bright red blood per rectum or melena.  GENITOURINARY: No burning on urination, hematuria or increased frequency.  NEUROLOGICAL: No headache, dizziness, syncope, paralysis, ataxia, numbness or tingling in the extremities. No change in bowel or bladder control.   MUSCULOSKELETAL: No muscle, back pain, joint pain or stiffness.   ENDOCRINOLOGIC: No reports of sweating, cold or heat intolerance. No polyuria or polydipsia.     Physical Exam    /91 (BP Location: Left arm, Patient Position: Lying)   Pulse 98   Temp 97.8 °F (36.6 °C) (Oral)   Resp 24   Ht 172.7 cm (68\")   Wt 121 kg (267 lb)   SpO2 91%   BMI 40.60 kg/m²     General Appearance Well developed, cooperative and well nourished and no acute distress   Head Normocephalic, without abnormality, atraumatic   Ears Ears appear intact with no abnormalities noted   Throat No oral lesions, no thrush, oral mucosa moist   Neck No adenopathy, supple, trachea midline, no thyromegaly, no carotid bruit,   Back No skin lesions, erythema or scars, no tenderness to percussion or palpation,range of motion is normal   Lungs  bibasilar rales are present 1:34 AM.,respirations regular, even and unlabored   Heart Regular rhythm and normal rate, normal S1 and S2, no murmur, no gallop, no rub, no click   Chest wall No abnormalities observed   Abdomen Normal bowel sounds, no masses, no hepatomegaly,    Extremities Moves all extremities well, no edema, no cyanosis, no redness   Pulses Pulses palpable and equal bilaterally. Normal radial, carotid, femoral pulses bilaterally the posterior tibial and dorsalis pedis diminished bilaterally right greater than left.  There is sparse hair growth.   Skin No bleeding, bruising or rash   Psychiatric Alert and oriented x 3, normal mood and affect     Results from last 7 days   Lab Units 09/02/22  0543 09/01/22  1600   SODIUM mmol/L 141 143   POTASSIUM mmol/L " 5.3* 4.6   CHLORIDE mmol/L 94* 99   CO2 mmol/L 39.4* 33.5*   BUN mg/dL 19 18   CREATININE mg/dL 0.89 1.11   CALCIUM mg/dL 9.5 9.7   BILIRUBIN mg/dL  --  0.3   ALK PHOS U/L  --  92   ALT (SGPT) U/L  --  14   AST (SGOT) U/L  --  14   GLUCOSE mg/dL 229* 147*     Results from last 7 days   Lab Units 09/01/22  1600   TROPONIN T ng/mL <0.010     Results from last 7 days   Lab Units 09/02/22  0543 09/01/22  1453   WBC 10*3/mm3 12.96* 13.87*   HEMOGLOBIN g/dL 17.5 18.3*   HEMATOCRIT % 54.5* 56.3*   PLATELETS 10*3/mm3 197 217     Results from last 7 days   Lab Units 09/01/22  1453   INR  1.15*   APTT seconds 27.3                     I personally viewed and interpreted the patient's EKG/Telemetry data  I have reviewed HPI and ROS above.    Assessment and Plan  1.  Dyspnea with orthopnea PND and edema.  Suspicious for heart failure.  Clinically improved with diuresis.  Significant pulmonary hypertension with normal left ventricular systolic diastolic function normal valve function noted on echo today.  Currently on Lasix 40 mg IV twice daily.  Would ask pulmonary to review and will consider right and left heart cath hopefully for tomorrow.  2.  Coronary artery calcification.  Scattered disease noted on CT scan this admission.  Needs further coronary evaluation.  Given chest tightness and multiple risk factors including family history of premature atherosclerotic disease, favor coronary angiography.  Risks and benefits of testing with stress test versus cardiac catheterization were discussed to the patient and he favors cardiac cath.  We will keep him n.p.o. after midnight pending pulmonary review.  3.  Severe pulmonary hypertension..  As above.  Consult to pulmonary placed.  Also placed to antibody titer for COVID-19  4.  Hyperlipidemia  5.  Obstructive sleep apnea, on CPAP therapy  6.  Nicotine use  7.  Hyperkalemia  8.  Diabetes  9.  Probable PAD he describes having mild claudication symptoms right leg greater than left  which coincides with his exam.  No rest anginal symptoms.  No skin or wound trauma.  Okay to pursue as an outpatient.    Mini Baltazar  9/2/2022  08:59 EDT    65min spent in reviewing records, discussion and examination of the patient and discussion with other members of the patient's medical team.     Dictated utilizing Dragon dictation

## 2022-09-03 DIAGNOSIS — I10 ESSENTIAL HYPERTENSION: ICD-10-CM

## 2022-09-03 LAB
ANION GAP SERPL CALCULATED.3IONS-SCNC: 7.1 MMOL/L (ref 5–15)
ARTERIAL PATENCY WRIST A: ABNORMAL
ATMOSPHERIC PRESS: 751.5 MMHG
BASE EXCESS BLDA CALC-SCNC: 12.2 MMOL/L (ref 0–2)
BDY SITE: ABNORMAL
BUN SERPL-MCNC: 26 MG/DL (ref 8–23)
BUN/CREAT SERPL: 28.6 (ref 7–25)
CALCIUM SPEC-SCNC: 9.8 MG/DL (ref 8.6–10.5)
CHLORIDE SERPL-SCNC: 91 MMOL/L (ref 98–107)
CO2 SERPL-SCNC: 39.9 MMOL/L (ref 22–29)
CREAT SERPL-MCNC: 0.91 MG/DL (ref 0.76–1.27)
EGFRCR SERPLBLD CKD-EPI 2021: 95.9 ML/MIN/1.73
GAS FLOW AIRWAY: 2 LPM
GLUCOSE BLDC GLUCOMTR-MCNC: 119 MG/DL (ref 70–130)
GLUCOSE BLDC GLUCOMTR-MCNC: 138 MG/DL (ref 70–130)
GLUCOSE BLDC GLUCOMTR-MCNC: 155 MG/DL (ref 70–130)
GLUCOSE BLDC GLUCOMTR-MCNC: 179 MG/DL (ref 70–130)
GLUCOSE SERPL-MCNC: 147 MG/DL (ref 65–99)
HCO3 BLDA-SCNC: 44.6 MMOL/L (ref 22–28)
HCT VFR BLDA CALC: 55 % (ref 38–51)
HCT VFR BLDA CALC: 55 % (ref 38–51)
HGB BLDA-MCNC: 18.7 G/DL (ref 12–17)
HGB BLDA-MCNC: 18.7 G/DL (ref 12–17)
MODALITY: ABNORMAL
PCO2 BLDA: 90.4 MM HG (ref 35–45)
PH BLDA: 7.3 PH UNITS (ref 7.35–7.45)
PO2 BLDA: 64.4 MM HG (ref 80–100)
POTASSIUM SERPL-SCNC: 4.6 MMOL/L (ref 3.5–5.2)
SAO2 % BLDA: 70 % (ref 95–98)
SAO2 % BLDA: 98 % (ref 95–98)
SAO2 % BLDCOA: 87.9 % (ref 92–99)
SARS-COV-2 AB SERPL QL IA: NEGATIVE
SODIUM SERPL-SCNC: 138 MMOL/L (ref 136–145)
TOTAL RATE: 16 BREATHS/MINUTE

## 2022-09-03 PROCEDURE — 99233 SBSQ HOSP IP/OBS HIGH 50: CPT | Performed by: INTERNAL MEDICINE

## 2022-09-03 PROCEDURE — 94761 N-INVAS EAR/PLS OXIMETRY MLT: CPT

## 2022-09-03 PROCEDURE — 85014 HEMATOCRIT: CPT

## 2022-09-03 PROCEDURE — 94799 UNLISTED PULMONARY SVC/PX: CPT

## 2022-09-03 PROCEDURE — 25010000002 ENOXAPARIN PER 10 MG: Performed by: INTERNAL MEDICINE

## 2022-09-03 PROCEDURE — 93460 R&L HRT ART/VENTRICLE ANGIO: CPT | Performed by: INTERNAL MEDICINE

## 2022-09-03 PROCEDURE — 25010000002 MIDAZOLAM PER 1 MG: Performed by: INTERNAL MEDICINE

## 2022-09-03 PROCEDURE — C1894 INTRO/SHEATH, NON-LASER: HCPCS | Performed by: INTERNAL MEDICINE

## 2022-09-03 PROCEDURE — 25010000002 FUROSEMIDE PER 20 MG: Performed by: NURSE PRACTITIONER

## 2022-09-03 PROCEDURE — B2111ZZ FLUOROSCOPY OF MULTIPLE CORONARY ARTERIES USING LOW OSMOLAR CONTRAST: ICD-10-PCS | Performed by: INTERNAL MEDICINE

## 2022-09-03 PROCEDURE — 36600 WITHDRAWAL OF ARTERIAL BLOOD: CPT

## 2022-09-03 PROCEDURE — 25010000002 FENTANYL CITRATE (PF) 50 MCG/ML SOLUTION: Performed by: INTERNAL MEDICINE

## 2022-09-03 PROCEDURE — 94760 N-INVAS EAR/PLS OXIMETRY 1: CPT

## 2022-09-03 PROCEDURE — 99152 MOD SED SAME PHYS/QHP 5/>YRS: CPT | Performed by: INTERNAL MEDICINE

## 2022-09-03 PROCEDURE — 63710000001 INSULIN LISPRO (HUMAN) PER 5 UNITS: Performed by: INTERNAL MEDICINE

## 2022-09-03 PROCEDURE — 80048 BASIC METABOLIC PNL TOTAL CA: CPT | Performed by: INTERNAL MEDICINE

## 2022-09-03 PROCEDURE — C1769 GUIDE WIRE: HCPCS | Performed by: INTERNAL MEDICINE

## 2022-09-03 PROCEDURE — 4A023N8 MEASUREMENT OF CARDIAC SAMPLING AND PRESSURE, BILATERAL, PERCUTANEOUS APPROACH: ICD-10-PCS | Performed by: INTERNAL MEDICINE

## 2022-09-03 PROCEDURE — 94664 DEMO&/EVAL PT USE INHALER: CPT

## 2022-09-03 PROCEDURE — 94660 CPAP INITIATION&MGMT: CPT

## 2022-09-03 PROCEDURE — 85018 HEMOGLOBIN: CPT

## 2022-09-03 PROCEDURE — 99153 MOD SED SAME PHYS/QHP EA: CPT | Performed by: INTERNAL MEDICINE

## 2022-09-03 PROCEDURE — 25010000002 HEPARIN (PORCINE) PER 1000 UNITS: Performed by: INTERNAL MEDICINE

## 2022-09-03 PROCEDURE — 82962 GLUCOSE BLOOD TEST: CPT

## 2022-09-03 PROCEDURE — 82810 BLOOD GASES O2 SAT ONLY: CPT

## 2022-09-03 PROCEDURE — 0 IOPAMIDOL PER 1 ML: Performed by: INTERNAL MEDICINE

## 2022-09-03 PROCEDURE — 82803 BLOOD GASES ANY COMBINATION: CPT

## 2022-09-03 RX ORDER — FUROSEMIDE 40 MG/1
40 TABLET ORAL
Status: DISCONTINUED | OUTPATIENT
Start: 2022-09-03 | End: 2022-09-05 | Stop reason: HOSPADM

## 2022-09-03 RX ORDER — INSULIN LISPRO 100 [IU]/ML
9 INJECTION, SOLUTION INTRAVENOUS; SUBCUTANEOUS
Status: DISCONTINUED | OUTPATIENT
Start: 2022-09-03 | End: 2022-09-04

## 2022-09-03 RX ORDER — LIDOCAINE HYDROCHLORIDE 20 MG/ML
INJECTION, SOLUTION INFILTRATION; PERINEURAL AS NEEDED
Status: DISCONTINUED | OUTPATIENT
Start: 2022-09-03 | End: 2022-09-03 | Stop reason: HOSPADM

## 2022-09-03 RX ORDER — INSULIN LISPRO 100 [IU]/ML
0-9 INJECTION, SOLUTION INTRAVENOUS; SUBCUTANEOUS
Status: DISCONTINUED | OUTPATIENT
Start: 2022-09-03 | End: 2022-09-05 | Stop reason: HOSPADM

## 2022-09-03 RX ORDER — MORPHINE SULFATE 2 MG/ML
1 INJECTION, SOLUTION INTRAMUSCULAR; INTRAVENOUS EVERY 4 HOURS PRN
Status: DISCONTINUED | OUTPATIENT
Start: 2022-09-03 | End: 2022-09-05 | Stop reason: HOSPADM

## 2022-09-03 RX ORDER — ONDANSETRON 2 MG/ML
4 INJECTION INTRAMUSCULAR; INTRAVENOUS EVERY 6 HOURS PRN
Status: DISCONTINUED | OUTPATIENT
Start: 2022-09-03 | End: 2022-09-05 | Stop reason: HOSPADM

## 2022-09-03 RX ORDER — FLUMAZENIL 0.1 MG/ML
INJECTION INTRAVENOUS AS NEEDED
Status: DISCONTINUED | OUTPATIENT
Start: 2022-09-03 | End: 2022-09-03 | Stop reason: HOSPADM

## 2022-09-03 RX ORDER — ACETAMINOPHEN 325 MG/1
650 TABLET ORAL EVERY 4 HOURS PRN
Status: DISCONTINUED | OUTPATIENT
Start: 2022-09-03 | End: 2022-09-05 | Stop reason: HOSPADM

## 2022-09-03 RX ORDER — HYDROCODONE BITARTRATE AND ACETAMINOPHEN 5; 325 MG/1; MG/1
1 TABLET ORAL EVERY 4 HOURS PRN
Status: DISCONTINUED | OUTPATIENT
Start: 2022-09-03 | End: 2022-09-05 | Stop reason: HOSPADM

## 2022-09-03 RX ORDER — MIDAZOLAM HYDROCHLORIDE 1 MG/ML
INJECTION INTRAMUSCULAR; INTRAVENOUS AS NEEDED
Status: DISCONTINUED | OUTPATIENT
Start: 2022-09-03 | End: 2022-09-03 | Stop reason: HOSPADM

## 2022-09-03 RX ORDER — NALOXONE HCL 0.4 MG/ML
0.4 VIAL (ML) INJECTION
Status: DISCONTINUED | OUTPATIENT
Start: 2022-09-03 | End: 2022-09-05 | Stop reason: HOSPADM

## 2022-09-03 RX ORDER — SODIUM CHLORIDE 9 MG/ML
INJECTION, SOLUTION INTRAVENOUS CONTINUOUS PRN
Status: COMPLETED | OUTPATIENT
Start: 2022-09-03 | End: 2022-09-03

## 2022-09-03 RX ORDER — SODIUM CHLORIDE 9 MG/ML
75 INJECTION, SOLUTION INTRAVENOUS CONTINUOUS
Status: ACTIVE | OUTPATIENT
Start: 2022-09-03 | End: 2022-09-03

## 2022-09-03 RX ORDER — FENTANYL CITRATE 50 UG/ML
INJECTION, SOLUTION INTRAMUSCULAR; INTRAVENOUS AS NEEDED
Status: DISCONTINUED | OUTPATIENT
Start: 2022-09-03 | End: 2022-09-03 | Stop reason: HOSPADM

## 2022-09-03 RX ORDER — ONDANSETRON 4 MG/1
4 TABLET, FILM COATED ORAL EVERY 6 HOURS PRN
Status: DISCONTINUED | OUTPATIENT
Start: 2022-09-03 | End: 2022-09-05 | Stop reason: HOSPADM

## 2022-09-03 RX ADMIN — LOSARTAN POTASSIUM 100 MG: 100 TABLET, FILM COATED ORAL at 09:37

## 2022-09-03 RX ADMIN — IPRATROPIUM BROMIDE AND ALBUTEROL SULFATE 3 ML: .5; 3 SOLUTION RESPIRATORY (INHALATION) at 20:00

## 2022-09-03 RX ADMIN — FUROSEMIDE 40 MG: 10 INJECTION, SOLUTION INTRAMUSCULAR; INTRAVENOUS at 06:19

## 2022-09-03 RX ADMIN — IPRATROPIUM BROMIDE AND ALBUTEROL SULFATE 3 ML: .5; 3 SOLUTION RESPIRATORY (INHALATION) at 08:05

## 2022-09-03 RX ADMIN — TRAZODONE HYDROCHLORIDE 50 MG: 50 TABLET ORAL at 22:01

## 2022-09-03 RX ADMIN — ENOXAPARIN SODIUM 40 MG: 100 INJECTION SUBCUTANEOUS at 21:40

## 2022-09-03 RX ADMIN — IPRATROPIUM BROMIDE AND ALBUTEROL SULFATE 3 ML: .5; 3 SOLUTION RESPIRATORY (INHALATION) at 11:20

## 2022-09-03 RX ADMIN — FUROSEMIDE 40 MG: 40 TABLET ORAL at 17:04

## 2022-09-03 RX ADMIN — INSULIN LISPRO 9 UNITS: 100 INJECTION, SOLUTION INTRAVENOUS; SUBCUTANEOUS at 16:14

## 2022-09-03 NOTE — NURSING NOTE
Patients family reports gave patient home dose of insulin while with patient. They report they gave him 25cc of Novolin R and 65cc of Novolin N. Family instructed to take home home medication and not to administer as hospital staff would provide medication while patient was here and family agreed. KIERSTEN kaur notified when patients blood sugar was 485 and she stated ok to give patient ordered does of 40 units of lantus but recheck blood sugar at midnight.

## 2022-09-03 NOTE — PROGRESS NOTES
[facility]  [phone]  Dr. Johnnie Aguero    Subjective:  LOS: 2    Chief Complaint: Dyspnea    Patient states he still short of breath improved with oxygen.  Even at 2 L oxygen he was desaturating and increased to 3 L at bedside with some improvement.  Current heavy smoker.  Has wheezing per the patient.  Uses his wife's inhalers at home with some benefit.  Occasionally gets some leg swelling.    Objective   Vital Signs past 24hrs    Temp range: Temp (24hrs), Av.9 °F (36.6 °C), Min:97.8 °F (36.6 °C), Max:98 °F (36.7 °C)    BP range: BP: (129-157)/(64-87) 129/69  Pulse range: Heart Rate:  [75-93] 85  Resp rate range: Resp:  [16-22] 18    Device (Oxygen Therapy): nasal cannulaFlow (L/min):  [1-4] 1  Oxygen range:SpO2:  [90 %-100 %] 94 %      110 kg (243 lb 1.6 oz); Body mass index is 36.96 kg/m².    Intake/Output Summary (Last 24 hours) at 9/3/2022 1238  Last data filed at 9/3/2022 1224  Gross per 24 hour   Intake --   Output 1440 ml   Net -1440 ml       Physical Exam  Constitutional:       Appearance: He is obese.   Eyes:      Pupils: Pupils are equal, round, and reactive to light.   Cardiovascular:      Rate and Rhythm: Normal rate and regular rhythm.      Heart sounds: No murmur heard.  Pulmonary:      Effort: Pulmonary effort is normal.      Breath sounds: Decreased breath sounds present. No wheezing.   Abdominal:      General: Bowel sounds are normal.      Palpations: Abdomen is soft. There is no mass.      Tenderness: There is no abdominal tenderness.   Musculoskeletal:         General: No swelling.   Neurological:      Mental Status: He is alert.       Results Review:    I have reviewed the laboratory and imaging data since the last note by [facility] physician.  My annotations are noted in assessment and plan.    Medication Review:  I have reviewed the current MAR.  My annotations are noted in assessment and plan.       Plan   PCCM Problems  COPD without exacerbation  Chronic respiratory  failure  Severe pulm hypertension  Current cigarette smoker  JACEK not on treatment  Obesity  Type 2 diabetes        THESE ARE NEW MEDICAL PROBLEMS TO ME.    Plan of Treatment    He is presently not wheezing though likely with significant COPD.  Requires outpatient PFTs for determination of severity.  In the meantime continue 4 times daily nebs inpatient.  Requires Symbicort on discharge.    I suspect he has chronic respiratory failure.  Get ABG and review.  He looks like he needs to continue oxygen for now and may need to do so outpatient as well.  He may be at the point where he needs to use a trilogy device or similar.    Severe pulmonary hypertension noted.  Currently on Lasix IV twice daily.  Note plans for RHC. Could be all diastolic.    Needs treatment for JACEK outpatient (if he doesn't end up on Trilogy).    Johnnie Aguero MD  09/03/22  12:38 EDT    While in the room and during my examination of the patient I wore gloves, gown, mask, eye protection and followed enhanced droplet/contact isolation protocol and precautions.  I washed my hands before and after this patient encounter.    Part of this note may be an electronic transcription/translation of spoken language to printed text using the Dragon Dictation System.

## 2022-09-03 NOTE — PROGRESS NOTES
[facility]  Progress note: 9/3/2022    Patient Identification:  Name:Carrington Biggs  Age:61 y.o.  Sex: male  :  1960  MRN: [id number]           CC:  Dyspnea on exertion, pulmonary hypertension.    Interval history:  Blood pressure slightly elevated.  Modest diuresis overnight.  Renal labs stable.  Remains on 3 L nasal cannula saturations 96%.  Dyspnea slightly improved though he took his oxygen off to go to the restroom and came back saturations were in the 70s and he was dyspneic.    Vital Signs:   Temp:  [97.8 °F (36.6 °C)-98.2 °F (36.8 °C)] 97.8 °F (36.6 °C)  Heart Rate:  [76-93] 77  Resp:  [16-22] 22  BP: (119-157)/(64-87) 155/87    Intake/Output Summary (Last 24 hours) at 9/3/2022 0804  Last data filed at 9/3/2022 0516  Gross per 24 hour   Intake --   Output 940 ml   Net -940 ml       Physical Examination:    General Appearance No acute distress   Neck No adenopathy, supple, trachea midline, no thyromegaly, no carotid bruit, no JVD   Lungs Clear to auscultation,respirations regular, even and unlabored   Heart Regular rhythm and normal rate, normal S1 and S2, no murmur, no gallop, no rub, no click   Chest wall No abnormalities observed   Abdomen Normal bowel sounds, no masses, no hepatomegaly, soft   Extremities Moves all extremities well, no edema, no cyanosis, no redness   Neurological Alert and oriented x 3     Lab Review:  Personally reviewed the labs, radiology imaging and other cardiac procedures.   Results from last 7 days   Lab Units 22  0549 22  0543 22  1600   SODIUM mmol/L 138   < > 143   POTASSIUM mmol/L 4.6   < > 4.6   CHLORIDE mmol/L 91*   < > 99   CO2 mmol/L 39.9*   < > 33.5*   BUN mg/dL 26*   < > 18   CREATININE mg/dL 0.91   < > 1.11   CALCIUM mg/dL 9.8   < > 9.7   BILIRUBIN mg/dL  --   --  0.3   ALK PHOS U/L  --   --  92   ALT (SGPT) U/L  --   --  14   AST (SGOT) U/L  --   --  14   GLUCOSE mg/dL 147*   < > 147*    < > = values in this interval not  displayed.     Results from last 7 days   Lab Units 09/01/22  1600   TROPONIN T ng/mL <0.010     Results from last 7 days   Lab Units 09/02/22  0543 09/01/22  1453   WBC 10*3/mm3 12.96* 13.87*   HEMOGLOBIN g/dL 17.5 18.3*   HEMATOCRIT % 54.5* 56.3*   PLATELETS 10*3/mm3 197 217     Results from last 7 days   Lab Units 09/01/22  1453   INR  1.15*   APTT seconds 27.3     Medication Review:   Meds reviewed  Scheduled Meds:atorvastatin, 40 mg, Oral, Daily  enoxaparin, 40 mg, Subcutaneous, Q12H  furosemide, 40 mg, Intravenous, Q12H  insulin glargine, 40 Units, Subcutaneous, Nightly  insulin lispro, 0-24 Units, Subcutaneous, TID AC  insulin lispro, 15 Units, Subcutaneous, TID With Meals  ipratropium-albuterol, 3 mL, Nebulization, 4x Daily - RT  losartan, 100 mg, Oral, Daily  traZODone, 50 mg, Oral, Nightly      I personally viewed and interpreted the patient's EKG/Telemetry data    Assessment and Plan  1.  Dyspnea with orthopnea PND and edema.  Proceed with a right and left heart cath today.  Depending on filling pressures can switch IV Lasix to oral diuretic.  Clinically has improved.  2.  Coronary artery calcification with chest tightness.  Scattered disease noted on CT scan this admission.  Needs further coronary evaluation.  Proceed with right and left heart cath.  3.  Severe pulmonary hypertension.  Appreciate pulmonary consult.  4.  Hyperlipidemia, on statin  5.  Obstructive sleep apnea, on CPAP therapy  6.  Elevated blood pressure, as long as no severe coronary disease and renal function stable, consider low-dose losartan.  7.  Hyperkalemia  8.  Diabetes  9.  Probable PAD he describes having mild claudication symptoms right leg greater than left which coincides with his exam.  No rest anginal symptoms.  No skin or wound trauma.  Okay to pursue as an outpatient.       Mia Baltazar  9/3/115103:04 EDT  5min spent in reviewing records, discussion and examination of the patient and discussion with other members of the  patient's medical team.     Dictated utilizing Dragon dictation

## 2022-09-03 NOTE — PLAN OF CARE
Goal Outcome Evaluation:  Plan of Care Reviewed With: patient        Progress: no change  Outcome Evaluation: Patients vitals stable. Patient denies pain. Patients blood sugar elevated and MD notified. Patient watched closely. Patient denies complaints. Patient asleep most of shift. Will continue to monitor.

## 2022-09-03 NOTE — PROGRESS NOTES
Name: Carrington Biggs ADMIT: 2022   : 1960  PCP: Preet Louise MD    MRN: [id number] LOS: 2 days   AGE/SEX: 61 y.o. male  ROOM: [facility]   Subjective   Chief Complaint   Patient presents with   • Shortness of Breath      Dyspnea is improved some today but still present. He was getting breathing treatment when I examined. No CP. No NVD. No dysuria.    Objective   Vital Signs  Temp:  [97.8 °F (36.6 °C)-98 °F (36.7 °C)] 97.9 °F (36.6 °C)  Heart Rate:  [75-93] 85  Resp:  [16-22] 18  BP: (129-157)/(64-87) 129/69  SpO2:  [90 %-100 %] 94 %  on  Flow (L/min):  [1-4] 1;   Device (Oxygen Therapy): nasal cannula  Body mass index is 36.96 kg/m².    Physical Exam  Vitals and nursing note reviewed.   Constitutional:       General: He is not in acute distress.     Appearance: He is not diaphoretic.   HENT:      Head: Normocephalic and atraumatic.   Eyes:      General:         Right eye: No discharge.         Left eye: No discharge.      Conjunctiva/sclera: Conjunctivae normal.   Cardiovascular:      Rate and Rhythm: Normal rate and regular rhythm.      Pulses: Normal pulses.   Pulmonary:      Effort: Pulmonary effort is normal.      Breath sounds: Rhonchi present. No wheezing.   Abdominal:      General: There is no distension.      Palpations: Abdomen is soft.      Tenderness: There is no abdominal tenderness. There is no guarding or rebound.   Musculoskeletal:         General: No tenderness.      Cervical back: Neck supple. No tenderness.      Right lower leg: No edema.      Left lower leg: No edema.   Skin:     General: Skin is warm and dry.   Neurological:      Mental Status: He is alert. Mental status is at baseline.   Psychiatric:         Mood and Affect: Mood normal.         Behavior: Behavior normal.         Results Review:       I reviewed the patient's new clinical results.     I reviewed imaging, agree with interpretation.     I reviewed telemetry/EKG results, sinus on telemetry     I reviewed other test  results, agree with interpretation.    Results from last 7 days   Lab Units 09/02/22  0543 09/01/22  1453   WBC 10*3/mm3 12.96* 13.87*   HEMOGLOBIN g/dL 17.5 18.3*   PLATELETS 10*3/mm3 197 217     Results from last 7 days   Lab Units 09/03/22  0549 09/02/22  0543 09/01/22  1600   SODIUM mmol/L 138 141 143   POTASSIUM mmol/L 4.6 5.3* 4.6   CHLORIDE mmol/L 91* 94* 99   CO2 mmol/L 39.9* 39.4* 33.5*   BUN mg/dL 26* 19 18   CREATININE mg/dL 0.91 0.89 1.11   GLUCOSE mg/dL 147* 229* 147*   Estimated Creatinine Clearance: 102.5 mL/min (by C-G formula based on SCr of 0.91 mg/dL).  Results from last 7 days   Lab Units 09/03/22  0549 09/02/22  0543 09/01/22  1600   CALCIUM mg/dL 9.8 9.5 9.7   ALBUMIN g/dL  --   --  3.90     Results from last 7 days   Lab Units 09/01/22  1453   INR  1.15*   APTT seconds 27.3        atorvastatin, 40 mg, Oral, Daily  enoxaparin, 40 mg, Subcutaneous, Q12H  furosemide, 40 mg, Intravenous, Q12H  insulin glargine, 40 Units, Subcutaneous, Nightly  insulin lispro, 0-24 Units, Subcutaneous, TID AC  insulin lispro, 15 Units, Subcutaneous, TID With Meals  ipratropium-albuterol, 3 mL, Nebulization, 4x Daily - RT  losartan, 100 mg, Oral, Daily  traZODone, 50 mg, Oral, Nightly       NPO Diet NPO Type: Ice Chips    Assessment & Plan      Active Hospital Problems    Diagnosis  POA   • **Acute respiratory failure with hypoxia (HCC) [J96.01]  Yes   • Dyspnea [R06.00]  Yes   • Cigarette nicotine dependence with nicotine-induced disorder [F17.219]  Yes   • Obesity (BMI 30-39.9) [E66.9]  Yes   • Obstructive sleep apnea syndrome [G47.33]  Yes   • Gastroesophageal reflux disease [K21.9]  Yes   • Type 2 diabetes mellitus with microalbuminuria, without long-term current use of insulin (HCC) [E11.29, R80.9]  Yes   • Hyperlipidemia [E78.5]  Yes   • Essential hypertension [I10]  Yes      Resolved Hospital Problems   No resolved problems to display.       · AHRF: On IV lasix and breathing treatments currently. Right and  left heart cath planned. Cardiology and Pulmonology following.  · Pulmonary HTN  · JACEK  · Tobacco Use  · DM2: A1c 7.5. NPH and regular insulin use at home. Orals held. Adjust to 30 units long acting qhs and 9 units +SSI premeal based on requirements yesterday. Monitor and continue to titrate.  · HLD: Statin  · GERD: No heart burn reported.  · HTN: Acceptable acutely. Continue current regimen.  · PPx: Lovenox  · Disposition: TBD    Sam Campoverde MD  [facility]  09/03/22  13:39 EDT    Dictated portions using Dragon dictation software.    During the entire encounter, I was wearing recommended PPE including face mask and eye protection. Hand sanitization was performed prior to entering room and upon exit.

## 2022-09-03 NOTE — PROGRESS NOTES
ABG done in cath lab called to me but not uploaded in Epic. Will start NIV. Likely due to COPD and OHV.    Electronically signed by Johnnie Aguero MD, 09/03/22, 4:10 PM EDT.

## 2022-09-03 NOTE — PLAN OF CARE
Goal Outcome Evaluation:  Plan of Care Reviewed With: patient        Progress: no change  Outcome Evaluation: VSS, AOx4 upon admission to unit from Cath LAB. No interventions performed. Right radial and right brachial sites are soft and bandages are clean, dry and intact. One hour bedrest observed. PT appetite is good, able to eat full meal. No new complaints and no reports of pain at this time. WCTM.

## 2022-09-04 LAB
ANION GAP SERPL CALCULATED.3IONS-SCNC: 6 MMOL/L (ref 5–15)
BUN SERPL-MCNC: 21 MG/DL (ref 8–23)
BUN/CREAT SERPL: 31.3 (ref 7–25)
CALCIUM SPEC-SCNC: 9.5 MG/DL (ref 8.6–10.5)
CHLORIDE SERPL-SCNC: 91 MMOL/L (ref 98–107)
CO2 SERPL-SCNC: 44 MMOL/L (ref 22–29)
CREAT SERPL-MCNC: 0.67 MG/DL (ref 0.76–1.27)
DEPRECATED RDW RBC AUTO: 44.8 FL (ref 37–54)
EGFRCR SERPLBLD CKD-EPI 2021: 106.2 ML/MIN/1.73
ERYTHROCYTE [DISTWIDTH] IN BLOOD BY AUTOMATED COUNT: 13.2 % (ref 12.3–15.4)
GLUCOSE BLDC GLUCOMTR-MCNC: 151 MG/DL (ref 70–130)
GLUCOSE BLDC GLUCOMTR-MCNC: 337 MG/DL (ref 70–130)
GLUCOSE BLDC GLUCOMTR-MCNC: 359 MG/DL (ref 70–130)
GLUCOSE BLDC GLUCOMTR-MCNC: 86 MG/DL (ref 70–130)
GLUCOSE SERPL-MCNC: 87 MG/DL (ref 65–99)
HCT VFR BLD AUTO: 53.7 % (ref 37.5–51)
HGB BLD-MCNC: 16.9 G/DL (ref 13–17.7)
MCH RBC QN AUTO: 28.6 PG (ref 26.6–33)
MCHC RBC AUTO-ENTMCNC: 31.5 G/DL (ref 31.5–35.7)
MCV RBC AUTO: 90.9 FL (ref 79–97)
PLATELET # BLD AUTO: 177 10*3/MM3 (ref 140–450)
PMV BLD AUTO: 10.3 FL (ref 6–12)
POTASSIUM SERPL-SCNC: 4 MMOL/L (ref 3.5–5.2)
RBC # BLD AUTO: 5.91 10*6/MM3 (ref 4.14–5.8)
SODIUM SERPL-SCNC: 141 MMOL/L (ref 136–145)
WBC NRBC COR # BLD: 13.28 10*3/MM3 (ref 3.4–10.8)

## 2022-09-04 PROCEDURE — 63710000001 INSULIN LISPRO (HUMAN) PER 5 UNITS: Performed by: INTERNAL MEDICINE

## 2022-09-04 PROCEDURE — 99232 SBSQ HOSP IP/OBS MODERATE 35: CPT | Performed by: INTERNAL MEDICINE

## 2022-09-04 PROCEDURE — 94799 UNLISTED PULMONARY SVC/PX: CPT

## 2022-09-04 PROCEDURE — 94761 N-INVAS EAR/PLS OXIMETRY MLT: CPT

## 2022-09-04 PROCEDURE — 80048 BASIC METABOLIC PNL TOTAL CA: CPT | Performed by: INTERNAL MEDICINE

## 2022-09-04 PROCEDURE — 25010000002 ENOXAPARIN PER 10 MG: Performed by: INTERNAL MEDICINE

## 2022-09-04 PROCEDURE — 85027 COMPLETE CBC AUTOMATED: CPT | Performed by: INTERNAL MEDICINE

## 2022-09-04 PROCEDURE — 94664 DEMO&/EVAL PT USE INHALER: CPT

## 2022-09-04 PROCEDURE — 82962 GLUCOSE BLOOD TEST: CPT

## 2022-09-04 RX ADMIN — LOSARTAN POTASSIUM 100 MG: 100 TABLET, FILM COATED ORAL at 08:48

## 2022-09-04 RX ADMIN — FUROSEMIDE 40 MG: 40 TABLET ORAL at 08:48

## 2022-09-04 RX ADMIN — ENOXAPARIN SODIUM 40 MG: 100 INJECTION SUBCUTANEOUS at 11:50

## 2022-09-04 RX ADMIN — INSULIN LISPRO 9 UNITS: 100 INJECTION, SOLUTION INTRAVENOUS; SUBCUTANEOUS at 07:19

## 2022-09-04 RX ADMIN — IPRATROPIUM BROMIDE AND ALBUTEROL SULFATE 3 ML: .5; 3 SOLUTION RESPIRATORY (INHALATION) at 07:57

## 2022-09-04 RX ADMIN — FUROSEMIDE 40 MG: 40 TABLET ORAL at 17:32

## 2022-09-04 RX ADMIN — TRAZODONE HYDROCHLORIDE 50 MG: 50 TABLET ORAL at 21:23

## 2022-09-04 RX ADMIN — IPRATROPIUM BROMIDE AND ALBUTEROL SULFATE 3 ML: .5; 3 SOLUTION RESPIRATORY (INHALATION) at 16:24

## 2022-09-04 RX ADMIN — IPRATROPIUM BROMIDE AND ALBUTEROL SULFATE 3 ML: .5; 3 SOLUTION RESPIRATORY (INHALATION) at 20:32

## 2022-09-04 RX ADMIN — IPRATROPIUM BROMIDE AND ALBUTEROL SULFATE 3 ML: .5; 3 SOLUTION RESPIRATORY (INHALATION) at 11:59

## 2022-09-04 RX ADMIN — ATORVASTATIN CALCIUM 40 MG: 20 TABLET, FILM COATED ORAL at 08:48

## 2022-09-04 RX ADMIN — ENOXAPARIN SODIUM 40 MG: 100 INJECTION SUBCUTANEOUS at 21:23

## 2022-09-04 RX ADMIN — INSULIN GLARGINE-YFGN 10 UNITS: 100 INJECTION, SOLUTION SUBCUTANEOUS at 21:23

## 2022-09-04 RX ADMIN — INSULIN LISPRO 7 UNITS: 100 INJECTION, SOLUTION INTRAVENOUS; SUBCUTANEOUS at 17:32

## 2022-09-04 NOTE — PLAN OF CARE
Goal Outcome Evaluation:              Outcome Evaluation: received pt from CVU. pt on 2L of O2. Pt educated on wearing BIPAP at night. Pt is A&O x4. VSS, Will continue to monitor throughout rest of shift.

## 2022-09-04 NOTE — PROGRESS NOTES
Name: Carrington Biggs ADMIT: 2022   : 1960  PCP: Preet Louise MD    MRN: [id number] LOS: 3 days   AGE/SEX: 61 y.o. male  ROOM: [facility]/   Subjective   Chief Complaint   Patient presents with   • Shortness of Breath      SOA present. Difficulty with nippv last night. No CP. No NVD or dysuria.    Objective   Vital Signs  Temp:  [97.9 °F (36.6 °C)-98.4 °F (36.9 °C)] 98 °F (36.7 °C)  Heart Rate:  [75-85] 79  Resp:  [10-22] 18  BP: (110-131)/(61-83) 131/70  SpO2:  [87 %-100 %] 95 %  on  Flow (L/min):  [1-4] 2;   Device (Oxygen Therapy): nasal cannula  Body mass index is 36.96 kg/m².    Physical Exam  Vitals and nursing note reviewed.   Constitutional:       General: He is not in acute distress.     Appearance: He is not diaphoretic.   HENT:      Head: Normocephalic and atraumatic.   Eyes:      General:         Right eye: No discharge.         Left eye: No discharge.      Conjunctiva/sclera: Conjunctivae normal.   Cardiovascular:      Rate and Rhythm: Normal rate and regular rhythm.      Pulses: Normal pulses.   Pulmonary:      Effort: Pulmonary effort is normal.      Breath sounds: Rhonchi present. No wheezing.   Abdominal:      General: There is no distension.      Palpations: Abdomen is soft.      Tenderness: There is no abdominal tenderness. There is no guarding or rebound.   Musculoskeletal:         General: No tenderness.      Cervical back: Neck supple. No tenderness.      Right lower leg: No edema.      Left lower leg: No edema.   Skin:     General: Skin is warm and dry.   Neurological:      Mental Status: He is alert. Mental status is at baseline.   Psychiatric:         Mood and Affect: Mood normal.         Behavior: Behavior normal.         Results Review:       I reviewed the patient's new clinical results.     I reviewed telemetry/EKG results, sinus on telemetry     I reviewed other test results, agree with interpretation.    Results from last 7 days   Lab Units 22  0321 22  2454  09/03/22  1505 09/02/22  0543 09/01/22  1453   WBC 10*3/mm3 13.28*  --   --  12.96* 13.87*   HEMOGLOBIN g/dL 16.9  --   --  17.5 18.3*   HEMOGLOBIN, POC g/dL  --  18.7* 18.7*  --   --    PLATELETS 10*3/mm3 177  --   --  197 217     Results from last 7 days   Lab Units 09/04/22  0321 09/03/22  0549 09/02/22  0543 09/01/22  1600   SODIUM mmol/L 141 138 141 143   POTASSIUM mmol/L 4.0 4.6 5.3* 4.6   CHLORIDE mmol/L 91* 91* 94* 99   CO2 mmol/L 44.0* 39.9* 39.4* 33.5*   BUN mg/dL 21 26* 19 18   CREATININE mg/dL 0.67* 0.91 0.89 1.11   GLUCOSE mg/dL 87 147* 229* 147*   Estimated Creatinine Clearance: 139.2 mL/min (A) (by C-G formula based on SCr of 0.67 mg/dL (L)).  Results from last 7 days   Lab Units 09/04/22  0321 09/03/22  0549 09/02/22  0543 09/01/22  1600   CALCIUM mg/dL 9.5 9.8 9.5 9.7   ALBUMIN g/dL  --   --   --  3.90     Results from last 7 days   Lab Units 09/01/22  1453   INR  1.15*   APTT seconds 27.3        atorvastatin, 40 mg, Oral, Daily  enoxaparin, 40 mg, Subcutaneous, Q12H  furosemide, 40 mg, Oral, BID  insulin glargine, 30 Units, Subcutaneous, Nightly  insulin lispro, 0-9 Units, Subcutaneous, TID AC  insulin lispro, 9 Units, Subcutaneous, TID With Meals  ipratropium-albuterol, 3 mL, Nebulization, 4x Daily - RT  losartan, 100 mg, Oral, Daily  traZODone, 50 mg, Oral, Nightly       Diet Regular; Consistent Carbohydrate, Cardiac    Assessment & Plan      Active Hospital Problems    Diagnosis  POA   • **Acute respiratory failure with hypoxia (HCC) [J96.01]  Yes   • Dyspnea [R06.00]  Yes   • Cigarette nicotine dependence with nicotine-induced disorder [F17.219]  Yes   • Obesity (BMI 30-39.9) [E66.9]  Yes   • Obstructive sleep apnea syndrome [G47.33]  Yes   • Gastroesophageal reflux disease [K21.9]  Yes   • Type 2 diabetes mellitus with microalbuminuria, without long-term current use of insulin (HCC) [E11.29, R80.9]  Yes   • Hyperlipidemia [E78.5]  Yes   • Essential hypertension [I10]  Yes      Resolved  Hospital Problems   No resolved problems to display.       · Acute hypoxic/Acute on chronic hypercapnic respiratory failure: Oral lasix. NIPPV trial. Cardiology and Pulmonology following.  · Severe Pulmonary HTN: Per pulmonology  · Nonobstructive CAD: On statin.  · JACEK  · Tobacco Use  · DM2: A1c 7.5. NPH and regular insulin use at home. Orals held. ADid not receive any long acting insulin yesterday and only had 9 units of total insulin. Much less than home dose. Will decrease lantus further to 10 units, stop scheduled premeal, and monitor with SSI.  · HLD: Statin  · GERD: No heart burn reported.  · HTN: Acceptable acutely. Continue current regimen.  · PPx: Lovenox  · Disposition: TBD    Sam Campoverde MD  [facility]  09/04/22  10:43 EDT    Dictated portions using Dragon dictation software.    During the entire encounter, I was wearing recommended PPE including face mask and eye protection. Hand sanitization was performed prior to entering room and upon exit.

## 2022-09-04 NOTE — PROGRESS NOTES
[facility]  [phone]  Dr. Johnnie Aguero    Subjective:  LOS: 3    Chief Complaint: Dyspnea    Remains on 2 L oxygen.  Depressed about his multiple medical issues.  Did not tolerate the noninvasive ventilation for more than 20 minutes last night.    Objective   Vital Signs past 24hrs    Temp range: Temp (24hrs), Av.1 °F (36.7 °C), Min:97.9 °F (36.6 °C), Max:98.4 °F (36.9 °C)    BP range: BP: (110-131)/(61-83) 131/70  Pulse range: Heart Rate:  [75-85] 79  Resp rate range: Resp:  [10-22] 18    Device (Oxygen Therapy): nasal cannulaFlow (L/min):  [1-4] 2  Oxygen range:SpO2:  [87 %-100 %] 95 %      110 kg (243 lb 1.6 oz); Body mass index is 36.96 kg/m².    Intake/Output Summary (Last 24 hours) at 2022 0943  Last data filed at 2022 0800  Gross per 24 hour   Intake 770 ml   Output 1625 ml   Net -855 ml       Physical Exam  Constitutional:       Appearance: He is obese.   Eyes:      Pupils: Pupils are equal, round, and reactive to light.   Cardiovascular:      Rate and Rhythm: Normal rate and regular rhythm.      Heart sounds: No murmur heard.  Pulmonary:      Effort: Pulmonary effort is normal.      Breath sounds: Decreased breath sounds present. No wheezing.   Abdominal:      General: Bowel sounds are normal.      Palpations: Abdomen is soft. There is no mass.      Tenderness: There is no abdominal tenderness.   Musculoskeletal:         General: No swelling.   Neurological:      Mental Status: He is alert.       Results Review:    I have reviewed the laboratory and imaging data since the last note by [facility] physician.  My annotations are noted in assessment and plan.    Medication Review:  I have reviewed the current MAR.  My annotations are noted in assessment and plan.       Plan   PCCM Problems  COPD without exacerbation  Chronic respiratory failure  Moderate pulm hypertension  Current cigarette smoker  JACEK not on treatment  Obesity  Type 2 diabetes          Plan of Treatment    He is  presently not wheezing though likely with significant COPD.  Requires outpatient PFTs for determination of severity.  In the meantime continue 4 times daily nebs inpatient.  Requires Symbicort on discharge.    ABG confirms chronic respiratory failure.  This might improve somewhat with cessation of smoking.  Discussed at length with the patient.  He must make better attempts to use the noninvasive ventilation at night.  He can trial during the day to get used to it.  We can make adjustments to the device.  Ultimately he needs set up with trilogy at home.    Moderate pulmonary hypertension noted.  Currently on Lasix IV twice daily.  PVR 8 Wood Units; mPAP 38; mPCWP 17  Combined pre- and post- capillary PH  Likely due to lung diseases (COPD) and diastolic dysfunction.  Very little benefit of specific pulmonary vasodilators.      Johnnie Aguero MD  09/04/22  09:43 EDT    While in the room and during my examination of the patient I wore gloves, gown, mask, eye protection and followed enhanced droplet/contact isolation protocol and precautions.  I washed my hands before and after this patient encounter.    Part of this note may be an electronic transcription/translation of spoken language to printed text using the Dragon Dictation System.

## 2022-09-04 NOTE — PROGRESS NOTES
[facility]  Progress note: 2022    Patient Identification:  Name:Carrington Biggs  Age:61 y.o.  Sex: male  :  1960  MRN: [id number]           CC:  Pulmonary hypertension, diastolic heart failure, mild coronary disease    Interval history:  Dyspnea has improved.  He is anxious to leave possibly tomorrow.  Still remains on 3 L O2.    Vital Signs:   Temp:  [97.8 °F (36.6 °C)-98.7 °F (37.1 °C)] 98.7 °F (37.1 °C)  Heart Rate:  [59-84] 59  Resp:  [18-20] 18  BP: (110-131)/(61-77) 123/63    Intake/Output Summary (Last 24 hours) at 2022 1629  Last data filed at 2022 0800  Gross per 24 hour   Intake 720 ml   Output 1125 ml   Net -405 ml       Physical Examination:    General Appearance No acute distress   Neck No adenopathy, supple, trachea midline, no thyromegaly, no carotid bruit, no JVD   Lungs Clear to auscultation,respirations regular, even and unlabored   Heart Regular rhythm and normal rate, normal S1 and S2, no murmur, no gallop, no rub, no click   Chest wall No abnormalities observed   Abdomen Normal bowel sounds, no masses, no hepatomegaly, soft   Extremities Moves all extremities well, no edema, no cyanosis, no redness   Neurological Alert and oriented x 3     Lab Review:  Personally reviewed the labs, radiology imaging and other cardiac procedures.   Results from last 7 days   Lab Units 22  0321 22  0543 22  1600   SODIUM mmol/L 141   < > 143   POTASSIUM mmol/L 4.0   < > 4.6   CHLORIDE mmol/L 91*   < > 99   CO2 mmol/L 44.0*   < > 33.5*   BUN mg/dL 21   < > 18   CREATININE mg/dL 0.67*   < > 1.11   CALCIUM mg/dL 9.5   < > 9.7   BILIRUBIN mg/dL  --   --  0.3   ALK PHOS U/L  --   --  92   ALT (SGPT) U/L  --   --  14   AST (SGOT) U/L  --   --  14   GLUCOSE mg/dL 87   < > 147*    < > = values in this interval not displayed.     Results from last 7 days   Lab Units 22  1600   TROPONIN T ng/mL <0.010     Results from last 7 days   Lab Units 22  3211  09/03/22  1508 09/03/22  1505 09/02/22  0543 09/01/22  1453   WBC 10*3/mm3 13.28*  --   --  12.96* 13.87*   HEMOGLOBIN g/dL 16.9  --   --  17.5 18.3*   HEMOGLOBIN, POC g/dL  --  18.7* 18.7*  --   --    HEMATOCRIT % 53.7*  --   --  54.5* 56.3*   HEMATOCRIT POC %  --  55* 55*  --   --    PLATELETS 10*3/mm3 177  --   --  197 217     Results from last 7 days   Lab Units 09/01/22  1453   INR  1.15*   APTT seconds 27.3     Medication Review:   Meds reviewed  Scheduled Meds:atorvastatin, 40 mg, Oral, Daily  enoxaparin, 40 mg, Subcutaneous, Q12H  furosemide, 40 mg, Oral, BID  insulin glargine, 10 Units, Subcutaneous, Nightly  insulin lispro, 0-9 Units, Subcutaneous, TID AC  ipratropium-albuterol, 3 mL, Nebulization, 4x Daily - RT  losartan, 100 mg, Oral, Daily  traZODone, 50 mg, Oral, Nightly      I personally viewed and interpreted the patient's EKG/Telemetry data    Assessment and Plan  1.  Moderate pulmonary hypertension with hypoxia despite  diuresis.  Predominantly pulmonary in etiology.  Likely has significant COPD and untreated sleep apnea has not been adequately treated.  Recommendations per pulmonary service.  He still has to work home-going plans for oxygen therapy and pulmonary regimen.    2.  Coronary artery calcification with chest tightness.  No significant obstructive coronary artery disease by cath yesterday.  Likely due to hypoxia.  We will add coated aspirin a day.  He is on statin.    3.  Hyperlipidemia, on statin  4.  Obstructive sleep apnea, as above.  5.  Elevated blood pressure,  normalized  6.  Hyperkalemia  7.  Diabetes  8.  Probable PAD he describes having mild claudication symptoms right leg greater than left which coincides with his exam.  No rest anginal symptoms.  No skin or wound trauma.  Okay to pursue as an outpatient.    Nothing more to add from a cardiovascular standpoint at this time.  We will arrange for follow-up with him in 4 weeks in office and also consider further evaluation of  CHAO Baltazar  9/4/202216:29 EDT  25min spent in reviewing records, discussion and examination of the patient and discussion with other members of the patient's medical team.     Dictated utilizing Dragon dictation

## 2022-09-05 ENCOUNTER — READMISSION MANAGEMENT (OUTPATIENT)
Dept: CALL CENTER | Facility: HOSPITAL | Age: 62
End: 2022-09-05

## 2022-09-05 VITALS
OXYGEN SATURATION: 96 % | BODY MASS INDEX: 35.94 KG/M2 | TEMPERATURE: 98.5 F | RESPIRATION RATE: 16 BRPM | HEART RATE: 81 BPM | WEIGHT: 237.1 LBS | SYSTOLIC BLOOD PRESSURE: 113 MMHG | HEIGHT: 68 IN | DIASTOLIC BLOOD PRESSURE: 87 MMHG

## 2022-09-05 LAB
ANION GAP SERPL CALCULATED.3IONS-SCNC: 4.8 MMOL/L (ref 5–15)
BUN SERPL-MCNC: 17 MG/DL (ref 8–23)
BUN/CREAT SERPL: 21.3 (ref 7–25)
CALCIUM SPEC-SCNC: 9.9 MG/DL (ref 8.6–10.5)
CHLORIDE SERPL-SCNC: 90 MMOL/L (ref 98–107)
CO2 SERPL-SCNC: 45.2 MMOL/L (ref 22–29)
CREAT SERPL-MCNC: 0.8 MG/DL (ref 0.76–1.27)
DEPRECATED RDW RBC AUTO: 42.1 FL (ref 37–54)
EGFRCR SERPLBLD CKD-EPI 2021: 100.7 ML/MIN/1.73
ERYTHROCYTE [DISTWIDTH] IN BLOOD BY AUTOMATED COUNT: 13 % (ref 12.3–15.4)
GLUCOSE BLDC GLUCOMTR-MCNC: 222 MG/DL (ref 70–130)
GLUCOSE BLDC GLUCOMTR-MCNC: 301 MG/DL (ref 70–130)
GLUCOSE SERPL-MCNC: 243 MG/DL (ref 65–99)
HCT VFR BLD AUTO: 52.8 % (ref 37.5–51)
HGB BLD-MCNC: 17 G/DL (ref 13–17.7)
MAGNESIUM SERPL-MCNC: 1.7 MG/DL (ref 1.6–2.4)
MCH RBC QN AUTO: 28.3 PG (ref 26.6–33)
MCHC RBC AUTO-ENTMCNC: 32.2 G/DL (ref 31.5–35.7)
MCV RBC AUTO: 88 FL (ref 79–97)
PLATELET # BLD AUTO: 167 10*3/MM3 (ref 140–450)
PMV BLD AUTO: 10.4 FL (ref 6–12)
POTASSIUM SERPL-SCNC: 4.8 MMOL/L (ref 3.5–5.2)
RBC # BLD AUTO: 6 10*6/MM3 (ref 4.14–5.8)
SODIUM SERPL-SCNC: 140 MMOL/L (ref 136–145)
WBC NRBC COR # BLD: 7.88 10*3/MM3 (ref 3.4–10.8)

## 2022-09-05 PROCEDURE — 85027 COMPLETE CBC AUTOMATED: CPT | Performed by: INTERNAL MEDICINE

## 2022-09-05 PROCEDURE — 63710000001 INSULIN LISPRO (HUMAN) PER 5 UNITS: Performed by: INTERNAL MEDICINE

## 2022-09-05 PROCEDURE — 80048 BASIC METABOLIC PNL TOTAL CA: CPT | Performed by: INTERNAL MEDICINE

## 2022-09-05 PROCEDURE — 82962 GLUCOSE BLOOD TEST: CPT

## 2022-09-05 PROCEDURE — 94664 DEMO&/EVAL PT USE INHALER: CPT

## 2022-09-05 PROCEDURE — 94799 UNLISTED PULMONARY SVC/PX: CPT

## 2022-09-05 PROCEDURE — 94760 N-INVAS EAR/PLS OXIMETRY 1: CPT

## 2022-09-05 PROCEDURE — 25010000002 ENOXAPARIN PER 10 MG: Performed by: INTERNAL MEDICINE

## 2022-09-05 PROCEDURE — 83735 ASSAY OF MAGNESIUM: CPT | Performed by: INTERNAL MEDICINE

## 2022-09-05 PROCEDURE — 94761 N-INVAS EAR/PLS OXIMETRY MLT: CPT

## 2022-09-05 RX ORDER — FUROSEMIDE 40 MG/1
40 TABLET ORAL DAILY
Qty: 30 TABLET | Refills: 0 | Status: SHIPPED | OUTPATIENT
Start: 2022-09-05

## 2022-09-05 RX ADMIN — ENOXAPARIN SODIUM 40 MG: 100 INJECTION SUBCUTANEOUS at 07:54

## 2022-09-05 RX ADMIN — INSULIN LISPRO 7 UNITS: 100 INJECTION, SOLUTION INTRAVENOUS; SUBCUTANEOUS at 11:56

## 2022-09-05 RX ADMIN — ATORVASTATIN CALCIUM 40 MG: 20 TABLET, FILM COATED ORAL at 07:53

## 2022-09-05 RX ADMIN — LOSARTAN POTASSIUM 100 MG: 100 TABLET, FILM COATED ORAL at 07:53

## 2022-09-05 RX ADMIN — FUROSEMIDE 40 MG: 40 TABLET ORAL at 07:53

## 2022-09-05 RX ADMIN — INSULIN LISPRO 4 UNITS: 100 INJECTION, SOLUTION INTRAVENOUS; SUBCUTANEOUS at 07:53

## 2022-09-05 RX ADMIN — IPRATROPIUM BROMIDE AND ALBUTEROL SULFATE 3 ML: .5; 3 SOLUTION RESPIRATORY (INHALATION) at 07:43

## 2022-09-05 RX ADMIN — IPRATROPIUM BROMIDE AND ALBUTEROL SULFATE 3 ML: .5; 3 SOLUTION RESPIRATORY (INHALATION) at 11:09

## 2022-09-05 NOTE — NURSING NOTE
Patient took bipap off around 230AM, states he was unable to tolerate it any longer. Switched him over to nasal cannula 2L. He is wanting to go home later this morning.

## 2022-09-05 NOTE — PROGRESS NOTES
[facility]  [phone]  Dr. Johnnie Aguero    Subjective:  LOS: 4    Chief Complaint: Dyspnea    Remains on 2 L oxygen.  Used NIV 3-4 hrs last night and is optimistic about use in future. No cough or wheezing.    Objective   Vital Signs past 24hrs    Temp range: Temp (24hrs), Av °F (36.7 °C), Min:97.5 °F (36.4 °C), Max:98.5 °F (36.9 °C)    BP range: BP: (109-126)/(60-87) 113/87  Pulse range: Heart Rate:  [70-89] 81  Resp rate range: Resp:  [16-24] 16    Device (Oxygen Therapy): nasal cannulaFlow (L/min):  [2] 2  Oxygen range:SpO2:  [87 %-98 %] 96 %      108 kg (237 lb 1.6 oz); Body mass index is 36.05 kg/m².    Intake/Output Summary (Last 24 hours) at 2022 1406  Last data filed at 2022 0509  Gross per 24 hour   Intake --   Output 1600 ml   Net -1600 ml       Physical Exam  Constitutional:       Appearance: He is obese.   Eyes:      Pupils: Pupils are equal, round, and reactive to light.   Cardiovascular:      Rate and Rhythm: Normal rate and regular rhythm.      Heart sounds: No murmur heard.  Pulmonary:      Effort: Pulmonary effort is normal.      Breath sounds: Decreased breath sounds present. No wheezing.   Abdominal:      General: Bowel sounds are normal.      Palpations: Abdomen is soft. There is no mass.      Tenderness: There is no abdominal tenderness.   Musculoskeletal:         General: No swelling.   Neurological:      Mental Status: He is alert.       Results Review:    I have reviewed the laboratory and imaging data since the last note by [facility] physician.  My annotations are noted in assessment and plan.    Medication Review:  I have reviewed the current MAR.  My annotations are noted in assessment and plan.       Plan   PCCM Problems  COPD without exacerbation  Chronic respiratory failure  Moderate pulm hypertension  Current cigarette smoker  JACEK not on treatment  Obesity  Type 2 diabetes          Plan of Treatment    He is presently not wheezing though likely with  significant COPD.  Requires outpatient PFTs for determination of severity. Requires Symbicort on discharge.    ABG confirms chronic respiratory failure.  This might improve somewhat with cessation of smoking.  Order Trilogy for home:    This patient has chronic respiratory failure and requires non-invasive ventilation. Other modalities such as CPAP and BIPAP were considered but ruled out due to severity of lung disease and their ineffectiveness in this condition. Non-invasive-ventilation is required to decrease work of breathing, improve pulmonary status and interruption of respiratory support which could lead to serious harm or death.    Settings on Trilogy:  Mode- AVAPS-AE; Rate - Auto; Goal Tidal Volume 500cc; IPAP max 20; EPAP max 16; EPAP min 4;  PS max 12; PS min 4; FiO2 - O2 bled in at 2L    Moderate pulmonary hypertension noted.  Currently on Lasix IV twice daily.  PVR 8 Wood Units; mPAP 38; mPCWP 17  Combined pre- and post- capillary PH  Likely due to lung diseases (COPD) and diastolic dysfunction.  Very little benefit of specific pulmonary vasodilators.    No objections to discharge. Will arrange fu in our office.    Johnnie Aguero MD  09/05/22  14:06 EDT    While in the room and during my examination of the patient I wore gloves, gown, mask, eye protection and followed enhanced droplet/contact isolation protocol and precautions.  I washed my hands before and after this patient encounter.    Part of this note may be an electronic transcription/translation of spoken language to printed text using the Dragon Dictation System.

## 2022-09-05 NOTE — DISCHARGE INSTRUCTIONS
Settings on Trilogy:  Mode- AVAPS-AE; Rate - Auto; Goal Tidal Volume 500cc; IPAP max 20; EPAP max 16; EPAP min 4;  PS max 12; PS min 4; FiO2 - O2 bled in at 2L

## 2022-09-05 NOTE — NURSING NOTE
This RN ambulated patient in the laughlin on room air, sats initially at 93%.  At one and a half minutes, o2 sats were noted to be at 86-87%.  2L oxygen applied to patient, patient recovered to 95%, ambulation continued for 2 more minutes, oxygen sats remaining at 94-95% with 2L oxygen.

## 2022-09-05 NOTE — CASE MANAGEMENT/SOCIAL WORK
Case Management Discharge Note      Final Note: Home with wife. Requires new home O2. O2 setup through [facility] and portable tank delivered to room. Dr Aguero placed order for Trilogy. Notified [facility] of need for Trilogy. Pt will need PA through [employer] for Trilogy. Per nurse, pt does not want to wait for Trilogy and Dr Aguero is OK with pt leaving without Trilogy.    Provided Post Acute Provider List?: N/A    Selected Continued Care - Admitted Since 9/1/2022     Destination    No services have been selected for the patient.              Durable Medical Equipment Coordination complete.    Service Provider Selected Services Address Phone Fax Patient Preferred    [facility]  Durable Medical Equipment [address], [place] [address] [phone] --          Dialysis/Infusion    No services have been selected for the patient.              Home Medical Care    No services have been selected for the patient.              Therapy    No services have been selected for the patient.              Community Resources    No services have been selected for the patient.              Community & DME    No services have been selected for the patient.                  Transportation Services  Private: Car    Final Discharge Disposition Code: 01 - home or self-care

## 2022-09-05 NOTE — DISCHARGE PLACEMENT REQUEST
"Harshil German (61 y.o. Male)             Date of Birth   1960    Social Security Number       Address   [address] [facility] [address]    Home Phone   [phone]    MRN   [id number]       Jainism   None    Marital Status                               Admission Date   9/1/22    Admission Type   Emergency    Admitting Provider   Tucker Baird MD    Attending Provider   Sam Campoverde MD    Department, Room/Bed   [address], [address]       Discharge Date       Discharge Disposition   Home or Self Care    Discharge Destination                               Attending Provider: Sam Campoverde MD    Allergies: Rosuvastatin Calcium    Isolation: None   Infection: None   Code Status: CPR   Advance Care Planning Activity    Ht: 172.7 cm (68\")   Wt: 108 kg (237 lb 1.6 oz)    Admission Cmt: None   Principal Problem: Acute respiratory failure with hypoxia (HCC) [J96.01]                 Active Insurance as of 9/1/2022     Primary Coverage     Payor Plan Insurance Group Employer/Plan Group    HUMANA HUMANA [id number]     Payor Plan Address Payor Plan Phone Number Payor Plan Fax Number Effective Dates    [address] [address] [phone]  10/1/2015 - None Entered    [facility] [id number]       Subscriber Name Subscriber Birth Date Member ID       HARSHIL GERMAN 1960 [id number]                 Emergency Contacts      (Rel.) Home Phone Work Phone Mobile Phone    Kimberly German (Spouse) [phone] -- [phone]    DianFercho (POA) (Brother) -- -- [phone]              "

## 2022-09-05 NOTE — OUTREACH NOTE
Prep Survey    Flowsheet Row Responses   [facility] patient discharged from? [facility]   Is LACE score < 7 ? No   Emergency Room discharge w/ pulse ox? No   Eligibility [facility]   Date of Admission 09/01/22   Date of Discharge 09/05/22   Discharge diagnosis Acute respiratory failure with hypoxia    Does the patient have one of the following disease processes/diagnoses(primary or secondary)? Other   Does the patient have Home health ordered? No   Is there a DME ordered? Yes   What DME was ordered? [facility] Home 02 and portable tank   Prep survey completed? Yes          SARA DENISE - Registered Nurse

## 2022-09-06 ENCOUNTER — TRANSITIONAL CARE MANAGEMENT TELEPHONE ENCOUNTER (OUTPATIENT)
Dept: CALL CENTER | Facility: HOSPITAL | Age: 62
End: 2022-09-06

## 2022-09-06 RX ORDER — LOSARTAN POTASSIUM 100 MG/1
TABLET ORAL
Qty: 90 TABLET | Refills: 3 | Status: SHIPPED | OUTPATIENT
Start: 2022-09-06

## 2022-09-06 NOTE — DISCHARGE SUMMARY
Date of Admission: 9/1/2022  Date of Discharge:  9/6/2022  Primary Care Physician: Preet Louise MD     Discharge Diagnosis:  Active Hospital Problems    Diagnosis  POA   • **Acute respiratory failure with hypoxia (HCC) [J96.01]  Yes   • Dyspnea [R06.00]  Yes   • Cigarette nicotine dependence with nicotine-induced disorder [F17.219]  Yes   • Obesity (BMI 30-39.9) [E66.9]  Yes   • Obstructive sleep apnea syndrome [G47.33]  Yes   • Gastroesophageal reflux disease [K21.9]  Yes   • Type 2 diabetes mellitus with microalbuminuria, without long-term current use of insulin (HCC) [E11.29, R80.9]  Yes   • Hyperlipidemia [E78.5]  Yes   • Essential hypertension [I10]  Yes      Resolved Hospital Problems   No resolved problems to display.       Presenting Problem/History of Present Illness:  Hypoxia [R09.02]  Hypervolemia, unspecified hypervolemia type [E87.70]  Dyspnea, unspecified type [R06.00]     Mr. Biggs is a 61 y.o. smoker with a history of noninsulin-dependent type 2 diabetes, JACEK, obesity, HLD, GERD, and HTN that presents to [facility] complaining of shortness of breath for the past 2 weeks. Patient reports that his symptoms are worse at nighttime while lying down.  His symptoms have been constant for the past 3 days and is worse with exertion.  He also endorses bilateral lower extremity swelling.  Denies any chest pain at the moment but reports chest pain a week ago.  He denies any fever, chills, n/v/d. He does endorse a non productive cough occasionally.  Mild vascular congestion not excluded on chest x-ray.  CT of chest performed in the emergency department shows no evidence of pulmonary embolism.  He has been admitted to our service for further evaluation and treatment.    Hospital Course:  The patient is a 61 y.o. male who presented with acute hypoxic respiratory failure and acute on chronic hypercapnic respiratory failure. He was admitted and underwent evaluation by cardiology and pulmonology  services. He underwent right and left heart catheterization which showed severe pulmonary hypertension and also was the point of discovery of the hypercapnia. He was transitioned to oral lasix for CHF and underwent trial of nippv. Pulmonology has written for trilogy at discharge and he did have hypoxia on walking O2 test so will go home with supplemental oxygen. He needs to follow up with pulmonology in clinic        Exam (day of discharge):  Gen aa nad  heent ncat eomi  cv rrr  Lung no wheezes or rales  Abd ndnt  Ext no edema or cyanosis  Neuro cn2-12 grossly intact  Psych normal mood and affect    Results:  CXR  Cardiac size stable, within normal limits. There is a lower  inspiratory effort on the current examination causing crowding of  central bronchovascular markings, mild vascular congestion not excluded.  No pleural effusion or lung consolidation seen. There are monitoring  leads superimposing the chest. The remainder is unremarkable.    CTA Chest  No evidence of pulmonary embolism. Minimal density along the  caudad edges of the lower lobes posteriorly is favored to represent  atelectasis. Otherwise negative chest CT.    CT Neck  Essentially unremarkable CT scan of the soft tissues of the neck.    TTE  · Left ventricular ejection fraction appears to be greater than 70%. Left ventricular systolic function is hyperdynamic (EF > 70%).  · Left ventricular diastolic function was normal.  · Severe pulmonary hypertension is present.Calculated right ventricular systolic pressure from tricuspid regurgitation is 66.2 mmHg.  · Saline test results are negative for right to left atrial level shunt.    Duplex  · Normal left lower extremity venous duplex scan.    Procedures Performed:  Procedure(s):  Right and Left Heart Cath  Coronary angiography     CONCLUSION:  1.  Mild nonobstructive coronary artery disease  2.  Severe pulmonary hypertension, mean PA pressure of 38 mmHg  3.  Mildly elevated left ventricular filling  pressures, 17 to 19 mmHg  4.  Mixed respiratory and metabolic acidosis     RECOMMENDATIONS:  Transition IV diuretics to oral.  We will discussed the finding of acidosis with pulmonary.  The degree of his pulmonary hypertension seems to be out of proportion to mildly elevated left ventricular filling pressures.  Defer further management of the pulmonary hypertension to pulmonary.  Medical management of mild nonobstructive coronary artery disease.    Consults:   Consults     Date and Time Order Name Status Description    9/2/2022  2:33 PM Inpatient Pulmonology Consult Completed     9/1/2022  9:18 PM Inpatient Cardiology Consult Completed            Discharge Disposition:  Home or Self Care    Discharge Medications:     Discharge Medications      New Medications      Instructions Start Date   furosemide 40 MG tablet  Commonly known as: LASIX   40 mg, Oral, Daily         Changes to Medications      Instructions Start Date   insulin regular 100 UNIT/ML injection  Commonly known as: humuLIN R,novoLIN R  What changed: additional instructions   inject 20 units in the morning 15 units at lunch and 25 units at dinner         Continue These Medications      Instructions Start Date   albuterol sulfate  (90 Base) MCG/ACT inhaler  Commonly known as: PROVENTIL HFA;VENTOLIN HFA;PROAIR HFA   1-2 puffs, Inhalation, Every 4 Hours PRN      atorvastatin 40 MG tablet  Commonly known as: LIPITOR   TAKE 1 TABLET BY MOUTH EVERY DAY      gabapentin 100 MG capsule  Commonly known as: NEURONTIN   100 mg, Oral, 2 Times Daily      glimepiride 4 MG tablet  Commonly known as: AMARYL   4 mg, Oral, 2 Times Daily      glucose blood test strip   Care Sen N  Dx code E11.42 testing bs 2 x day      insulin  UNIT/ML injection  Commonly known as: humuLIN N,novoLIN N   65 units at bedtime      Insulin Syringes (Disposable) U-100 0.5 ML misc   Use daily      losartan 100 MG tablet  Commonly known as: COZAAR   TAKE 1 TABLET BY MOUTH EVERY DAY       metFORMIN  MG 24 hr tablet  Commonly known as: GLUCOPHAGE-XR   2,000 mg, Oral, Daily With Breakfast, Do not give at time of or within 48 hours of iodinated intravenous contrast. Confirm renal function is normal before continuing.      nicotine 14 MG/24HR patch  Commonly known as: NICODERM CQ   1 patch, Transdermal, Every 24 Hours      Pen Needles 32G X 5 MM misc   1 each, Does not apply, Daily      traZODone 100 MG tablet  Commonly known as: DESYREL   TAKE 1 TABLET BY MOUTH EVERYDAY AT BEDTIME         Stop These Medications    hydroCHLOROthiazide 25 MG tablet  Commonly known as: HYDRODIURIL            Discharge Diet:   Diet Instructions     Diet: Consistent Carbohydrate, Cardiac      Discharge Diet:  Consistent Carbohydrate  Cardiac             Activity at Discharge:   Activity Instructions     Activity as Tolerated            Follow-up Appointments:   Contact information for follow-up providers     Johnnie Aguero MD Follow up.    Specialties: Pulmonary Disease, Sleep Medicine  Contact information:  [address]  [place] [address]  [phone]             Mai Baltazar MD Follow up.    Specialty: Cardiology  Contact information:  [address]  [place] [address]  [phone]             Preet Louise MD Follow up.    Specialty: Internal Medicine  Contact information:  [address]  [place] [address]  [phone]                   Contact information for after-discharge care     Durable Medical Equipment     [facility] .    Service: Durable Medical Equipment  Contact information:  [address]  [place] [address]  [phone]                             Test Results Pending at Discharge:       Sam Campoverde MD  09/06/22  14:59 EDT    Time Spent on Discharge Activities: >30 minutes    Dictated portions using Dragon dictation software.  During the entire encounter, I was wearing recommended PPE including face mask and eye  protection. Hand sanitization was performed prior to entering room and upon exit.

## 2022-09-06 NOTE — OUTREACH NOTE
"Call Center TCM Note    Flowsheet Row Responses   [facility] patient discharged from? [facility]   Does the patient have one of the following disease processes/diagnoses(primary or secondary)? Other   TCM attempt successful? Yes   Call start time 1229   Discharge diagnosis Acute respiratory failure with hypoxia    Meds reviewed with patient/caregiver? Yes   Is the patient having any side effects they believe may be caused by any medication additions or changes? No   Does the patient have all medications ordered at discharge? Yes   Is the patient taking all medications as directed (includes completed medication regime)? Yes   Comments Patient scheduled a hospital followup with Dr Louise on 9/8/2022   What DME was ordered? [facility] Home 02 and portable tank   Has all DME been delivered? Yes   DME comments Patient reports he was unable to obtain his 02 concentrator due to insurance not paying (he reports thie is what [facility] has told him. \"He would have to be on 02 30 days at home before they could provide\").  He has his portable tank, but needs to go back to work and needs to have something he can transport to work easily. Message sent to office.    Psychosocial issues? No   Did the patient receive a copy of their discharge instructions? Yes   Nursing interventions Reviewed instructions with patient   What is the patient's perception of their health status since discharge? Improving   Is the patient/caregiver able to teach back signs and symptoms related to disease process for when to call PCP? Yes   Is the patient/caregiver able to teach back signs and symptoms related to disease process for when to call 911? Yes   Is the patient/caregiver able to teach back the hierarchy of who to call/visit for symptoms/problems? PCP, Specialist, Home health nurse, Urgent Care, ED, 911 Yes   If the patient is a current smoker, are they able to teach back resources for cessation? Smoking cessation " medications   TCM call completed? Yes          Rico Chinchilla RN    9/6/2022, 12:57 EDT

## 2022-09-08 ENCOUNTER — OFFICE VISIT (OUTPATIENT)
Dept: INTERNAL MEDICINE | Age: 62
End: 2022-09-08

## 2022-09-08 VITALS
OXYGEN SATURATION: 95 % | BODY MASS INDEX: 36.37 KG/M2 | HEART RATE: 81 BPM | WEIGHT: 240 LBS | HEIGHT: 68 IN | TEMPERATURE: 97.1 F | DIASTOLIC BLOOD PRESSURE: 60 MMHG | SYSTOLIC BLOOD PRESSURE: 100 MMHG

## 2022-09-08 DIAGNOSIS — J96.21 ACUTE ON CHRONIC RESPIRATORY FAILURE WITH HYPOXIA AND HYPERCAPNIA: ICD-10-CM

## 2022-09-08 DIAGNOSIS — I27.20 SEVERE PULMONARY HYPERTENSION: Primary | ICD-10-CM

## 2022-09-08 DIAGNOSIS — F17.219 CIGARETTE NICOTINE DEPENDENCE WITH NICOTINE-INDUCED DISORDER: ICD-10-CM

## 2022-09-08 DIAGNOSIS — I10 ESSENTIAL HYPERTENSION: Chronic | ICD-10-CM

## 2022-09-08 DIAGNOSIS — J96.22 ACUTE ON CHRONIC RESPIRATORY FAILURE WITH HYPOXIA AND HYPERCAPNIA: ICD-10-CM

## 2022-09-08 PROCEDURE — 1111F DSCHRG MED/CURRENT MED MERGE: CPT | Performed by: INTERNAL MEDICINE

## 2022-09-08 PROCEDURE — 99495 TRANSJ CARE MGMT MOD F2F 14D: CPT | Performed by: INTERNAL MEDICINE

## 2022-09-08 RX ORDER — ALBUTEROL SULFATE 90 UG/1
1-2 AEROSOL, METERED RESPIRATORY (INHALATION) EVERY 4 HOURS PRN
Qty: 18 G | Refills: 5 | Status: SHIPPED | OUTPATIENT
Start: 2022-09-08

## 2022-09-08 NOTE — ASSESSMENT & PLAN NOTE
This is a new diagnosis.  Diagnosed on heart catheterization.  Continue O2 and trilogy at bedtime.  Follow-up with pulmonologist as instructed.

## 2022-09-08 NOTE — ASSESSMENT & PLAN NOTE
Continue losartan 100 mg and furosemide 40 mg.  Hydrochlorothiazide was discontinued in the hospital.    BP Readings from Last 3 Encounters:   09/08/22 100/60   09/05/22 113/87   07/11/22 136/72

## 2022-09-08 NOTE — PROGRESS NOTES
"    I N T E R N A L  M E D I C I N E  J U N O H  K I M,  M D      ENCOUNTER DATE:  09/08/2022    Carrington PALACIOS Shell / 61 y.o. / male        CC:   (Transitional Care Follow Up Visit)  TCM-Acute respiratory failure with hypoxia  (9/1-9/5)        Within 48 business hours after discharge our office contacted him via telephone to coordinate his care and needs.      I reviewed and discussed the details of that call along with the discharge summary, hospital problems, inpatient lab results, inpatient diagnostic studies, and consultation reports with the patient.     Date of TCM Phone Call 9/5/2022   [facility]   Date of Admission 9/1/2022   Date of Discharge 9/5/2022   Discharge Disposition -       Risk for Readmission (LACE) Score: 11 (9/5/2022  6:01 AM)            VITALS    Vitals:    09/08/22 1313   BP: 100/60   BP Location: Left arm   Pulse: 81   Temp: 97.1 °F (36.2 °C)   SpO2: 95%   Weight: 109 kg (240 lb)   Height: 172.7 cm (68\")       BP Readings from Last 3 Encounters:   09/08/22 100/60   09/05/22 113/87   07/11/22 136/72     Wt Readings from Last 3 Encounters:   09/08/22 109 kg (240 lb)   09/05/22 108 kg (237 lb 1.6 oz)   07/11/22 114 kg (251 lb 11.2 oz)      Body mass index is 36.49 kg/m².    HPI:     Date of admission/discharge: As noted above in CC  Hospital: [facility]   Principle Dx: Acute on chronic hypoxic/hypercapnic respiratory failure  Secondary Dx: Severe pulmonary hypertension, COPD, obstructive sleep apnea  History prior to hospitalization: Progressive shortness of breath  Evaluation/Treatment: Patient was evaluated by cardiologist and pulmonologist.  Heart catheterization was consistent with severe pulmonary hypertension with mild nonobstructive coronary artery disease.  Was treated with diuretic, NIPPV and oxygen.  Course: Patient feels much better being on trilogy for respiration.  He is on continuous oxygen at home.  He reports to having quit smoking since " hospitalization.  Diabetes remains stable on metformin.  Blood pressure also remains stable on losartan.  He is on furosemide 40 mg daily.    Patient Care Team:  Preet Louise MD as PCP - General (Internal Medicine)  Dann Alvarado MD as Consulting Physician (Orthopedic Surgery)  Deidra Cannon MD as Consulting Physician (Ophthalmology)  Bayron Anaya MD as Consulting Physician (Endocrinology)  Raj Perez MD as Consulting Physician (Urology)  Rocco Manning MD as Consulting Physician (Gastroenterology)  Lizbeth Aaron PA-C as Physician Assistant (Physician Assistant)  Fely Felton PA-C as Physician Assistant (Colon and Rectal Surgery)  Johnnie Aguero MD as Consulting Physician (Pulmonary Disease)  ____________________________________________________________________    ASSESSMENT & PLAN:    Problem List Items Addressed This Visit        High    Severe pulmonary hypertension (HCC) - Primary (Chronic)    Current Assessment & Plan     This is a new diagnosis.  Diagnosed on heart catheterization.  Continue O2 and trilogy at bedtime.  Follow-up with pulmonologist as instructed.         Acute on chronic respiratory failure with hypoxia and hypercapnia (HCC)    Current Assessment & Plan     Continue O2 supplementation.  Continue trilogy at night.  Follow-up with pulmonologist as instructed.  Continue to abstain from smoking.         Relevant Medications    albuterol sulfate  (90 Base) MCG/ACT inhaler       Medium    Essential hypertension (Chronic)    Current Assessment & Plan     Continue losartan 100 mg and furosemide 40 mg.  Hydrochlorothiazide was discontinued in the hospital.    BP Readings from Last 3 Encounters:   09/08/22 100/60   09/05/22 113/87   07/11/22 136/72             Relevant Medications    losartan (COZAAR) 100 MG tablet    furosemide (LASIX) 40 MG tablet    Cigarette nicotine dependence with nicotine-induced disorder (Chronic)    Current Assessment & Plan      Advised to continue to abstain from smoking since hospitalization.         Relevant Medications    nicotine (NICODERM CQ) 14 MG/24HR patch    traZODone (DESYREL) 100 MG tablet         No orders of the defined types were placed in this encounter.      Summary/Discussion:  •     Return in about 2 months (around 11/8/2022) for Reassess chronic medical problems.    ____________________________________________________________________    REVIEW OF SYSTEMS    Review of Systems  No fever  No chest pain   Denies worsening cough or shortness of breath   Denies palpitations       PHYSICAL EXAMINATION    Physical Exam  Alert with normal thought and judgment.   No respiratory distress  On O2 by NC  Cardiovascular: Normal rate, regular rhythm.  Pulm/Chest: Effort normal, breath sounds normal.     REVIEWED DATA:    Labs:   Lab Results   Component Value Date     09/05/2022    K 4.8 09/05/2022    CALCIUM 9.9 09/05/2022    AST 14 09/01/2022    ALT 14 09/01/2022    BUN 17 09/05/2022    CREATININE 0.80 09/05/2022    CREATININE 0.67 (L) 09/04/2022    CREATININE 0.91 09/03/2022    EGFRIFNONA 80 11/03/2021    EGFRIFAFRI 93 11/03/2021       Lab Results   Component Value Date    WBC 7.88 09/05/2022    HGB 17.0 09/05/2022    HGB 16.9 09/04/2022    HGB 18.7 (H) 09/03/2022     09/05/2022       Lab Results   Component Value Date    PROTEIN 1+ (A) 08/10/2015    GLUCOSEU >=1000 mg/dL (3+) (A) 09/30/2020    BLOODU Negative 09/30/2020    NITRITEU Negative 09/30/2020    LEUKOCYTESUR Negative 09/30/2020       Imaging:   Adult Transthoracic Echo Complete W/ Cont if Necessary Per Protocol    Result Date: 9/2/2022  Narrative: · Left ventricular ejection fraction appears to be greater than 70%. Left ventricular systolic function is hyperdynamic (EF > 70%). · Left ventricular diastolic function was normal. · Severe pulmonary hypertension is present.Calculated right ventricular systolic pressure from tricuspid regurgitation is 66.2 mmHg. ·  Saline test results are negative for right to left atrial level shunt.      CT Soft Tissue Neck With Contrast    Result Date: 9/2/2022  Narrative: CT SCAN OF THE SOFT TISSUES OF THE NECK WITH CONTRAST  CLINICAL HISTORY: Stridor and wheezing.  TECHNIQUE: CT scan of the soft tissues of the neck was obtained with 3 mm axial images following the administration of IV contrast. Sagittal and coronal reconstructed images were obtained.  FINDINGS:  The visualized contents of the cranial vault are unremarkable. The parotid glands, submandibular glands, and sublingual glands are unremarkable. The nasopharynx, oropharynx, and oral cavity are unremarkable. The epiglottis is mildly prominent in size but demonstrates no convincing evidence for pathology. The true and false vocal cords are unremarkable. The hypopharynx is unremarkable as well. The subglottic airway and trachea are within normal limits. The thyroid gland is essentially unremarkable with the exception of small foci of coarse mineralization within the left lobe. The visualized lung apices are clear. There is no pathologic lymphadenopathy within the soft tissues of the neck.      Impression:  Essentially unremarkable CT scan of the soft tissues of the neck.    Radiation dose reduction techniques were utilized, including automated exposure control and exposure modulation based on body size.  This report was finalized on 9/2/2022 6:12 AM by Dr. Stan Redman M.D.      Cardiac Catheterization/Vascular Study    Result Date: 9/3/2022  Narrative: CONCLUSION: 1.  Mild nonobstructive coronary artery disease 2.  Severe pulmonary hypertension, mean PA pressure of 38 mmHg 3.  Mildly elevated left ventricular filling pressures, 17 to 19 mmHg 4.  Mixed respiratory and metabolic acidosis RECOMMENDATIONS: Transition IV diuretics to oral.  We will discussed the finding of acidosis with pulmonary.  The degree of his pulmonary hypertension seems to be out of proportion to mildly  elevated left ventricular filling pressures.  Defer further management of the pulmonary hypertension to pulmonary.  Medical management of mild nonobstructive coronary artery disease. FINDINGS: RIGHT HEART HEMODYNAMICS: 1.  Pulmonary wedge pressure: 22/20, 17 2.  Pulmonary artery pressure: 63/23, 38 3.  Right ventricular pressure: 62/8/14 4.  Right atrial pressure: 18/14, 11 5.  Pulmonary artery saturation: 70% on 3 L nasal cannula 6.  Right radial artery saturation: 98% 7.  Kali calculated cardiac output 3.65 L/min, cardiac index 1.65 L/min/m² ARTERIAL BLOOD GAS: pH 7.24 PCO2 117.2 mmHg PO2 144 mmHg HCO3 50.2 mmol/L SO2 98% Hematocrit 55% Hemoglobin 18.7 SELECTIVE CORONARY ANGIOGRAMS: 1.  Left main artery: Large, short caliber vessel with 0% stenosis.  The vessel bifurcates into left anterior descending and left circumflex arteries. 2.  Left anterior descending artery: Large-caliber vessel with 0% proximal stenosis.  Proximal vessel gives rise to a small caliber first diagonal branch with no significant disease.  The mid LAD remains a large caliber vessel with 20 to 30% stenosis.  Then gives rise to a small caliber second diagonal branch with no significant disease.  Distal LAD tapers to a moderate caliber vessel that seen wrapping around the apex with no significant disease. 3.  Left circumflex artery: Large-caliber vessel with 0% proximal stenosis.  The proximal vessel gives rise to a large caliber first obtuse marginal branch with 30 to 40% proximal stenosis.  The distal continuation circumflex vessel remains a large caliber vessel with 0% stenosis.  The distal vessel gives rise to a moderate caliber second obtuse marginal branch with no significant disease and a small caliber posterior branch with 30 to 40% mid stenosis.  This is a left dominant system. 4.  Right coronary artery: This is a nondominant vessel.  Proximal to mid vessel is moderate caliber with 0% stenosis.  The mid vessel bifurcates into a small  caliber RV marginal and distal right coronary artery.  The distal right coronary artery and bifurcates into a small caliber posterior descending and posterolateral branch both with no significant disease. LEFT VENTRICULAR HEMODYNAMICS: 1.  Left ventricular pressure: 130/19 2.  Aortic pressure: 114/63 PROCEDURE: After informed consent was obtained the patient was brought to the cardiac catheterization laboratory where his right arm was prepped and draped in a sterile manner.  1 mL of 2% lidocaine was infused subcutaneously to the antecubital area.  Access to the right antecubital vein was obtained using a micropuncture needle and under ultrasound guidance followed by placement of a 6 [place] sheath. Right heart catheterization was performed using a 6 [place] balloon-tip [place] catheter.  Arterial samples drawn from the pulmonary artery for saturation measurement.  Following completion of the right heart catheterization the [place] catheter was removed. Proceeded next with right radial artery access.  1 mL of 2% lidocaine was infused obtained through the right wrist.  Access to the right radial artery is obtained using a micropuncture needle and under ultrasound guidance followed placement of a 5/6 [place] slender glide sheath.  Selective coronary angiograms were performed using a 5 [place] FL 3.5 and a 5 [place] FR4 diagnostic catheters.  A left heart catheterization was performed using a 5 [place] FR4 diagnostic catheter.  A left ventriculogram was deferred due to the patient's recent echocardiogram. Of note prior to the start of the procedure after the patient received sedation he appeared to be behaving differently.  He was awake but slow to respond to questions.  He seemed to have a tremor in his left arm.  However a limited neuro exam was nonfocal.  He was given 0.2 mg of Romazicon at this point.    Later on when the ABG results were noted his O2 via nasal cannula was decreased to 2 L in order to prevent any  worsening of his acidosis.  His mental status improved by the end of the procedure. Following completion of procedure all wires and catheters removed.  His venous and arterial sheaths were removed, manual pressure held, and her pressures in place.  After hemostasis was achieved the patient was transferred to recovery area in stable condition.    XR Chest 1 View    Result Date: 9/1/2022  Narrative: XR CHEST 1 VW-clinical: CHF/COPD protocol  COMPARISON 1/24/2022  FINDINGS: Cardiac size stable, within normal limits. There is a lower inspiratory effort on the current examination causing crowding of central bronchovascular markings, mild vascular congestion not excluded. No pleural effusion or lung consolidation seen. There are monitoring leads superimposing the chest. The remainder is unremarkable.  This report was finalized on 9/1/2022 3:28 PM by Dr. Roger Flores M.D.      Duplex Venous Lower Extremity - Left    Result Date: 9/1/2022  Narrative: · Normal left lower extremity venous duplex scan.      CT Angiogram Chest    Result Date: 9/1/2022  Narrative: CT ANGIOGRAM CHEST  HISTORY: Shortness of breath evaluate for pulmonary embolism.  TECHNIQUE: CT angiogram of the chest was performed using IV contrast. Multiplanar reformatted images were produced at a separate workstation. CT neck performed today is reported separately.  Radiation dose reduction techniques were utilized, including automated exposure control and exposure modulation based on body size.  FINDINGS: The thoracic aorta is normal in caliber and enhances normally. There is scattered atheromatous calcification, including along the coronary arteries. No pleural or pericardial effusion or cardiac chamber enlargement is present.  The pulmonary arteries are normal in caliber and enhance normally. There is no pulmonary embolism.  No thoracic lymphadenopathy. The thoracic esophagus has a normal CT appearance. Visualized portions of the upper abdominal structures  appear normal.  Mild density along the caudad edges of the lower lobes bilaterally is symmetrical and favored to represent atelectasis rather than pneumonia. The lungs are otherwise clear. Airways are patent. Degenerative change is observed at the sternomanubrial joint. Mild degenerative disc change in the thoracic spine.      Impression: No evidence of pulmonary embolism. Minimal density along the caudad edges of the lower lobes posteriorly is favored to represent atelectasis. Otherwise negative chest CT.  This report was finalized on 9/1/2022 6:06 PM by Dr. Thee Mahmood M.D.         Medical Tests:        Summary of old records / correspondence / consultant report:   DC summary re: issues addressed on HPI    Request outside records:         MEDICATIONS   Current Outpatient Medications   Medication Sig Dispense Refill   • albuterol sulfate  (90 Base) MCG/ACT inhaler Inhale 1-2 puffs Every 4 (Four) Hours As Needed for Wheezing or Shortness of Air. 18 g 5   • atorvastatin (LIPITOR) 40 MG tablet TAKE 1 TABLET BY MOUTH EVERY DAY 90 tablet 3   • furosemide (LASIX) 40 MG tablet Take 1 tablet by mouth Daily. 30 tablet 0   • glucose blood test strip Care Sen N  Dx code E11.42 testing bs 2 x day 100 each 5   • insulin NPH (humuLIN N,novoLIN N) 100 UNIT/ML injection 65 units at bedtime 1 each 12   • Insulin Pen Needle (PEN NEEDLES) 32G X 5 MM misc 1 each Daily. 100 each 5   • insulin regular (humuLIN R,novoLIN R) 100 UNIT/ML injection inject 20 units in the morning 15 units at lunch and 25 units at dinner (Patient taking differently: inject 25 units in the morning 15 units at lunch and 25 units at dinner)  12   • Insulin Syringes, Disposable, U-100 0.5 ML misc Use daily 100 each 2   • losartan (COZAAR) 100 MG tablet TAKE 1 TABLET BY MOUTH EVERY DAY 90 tablet 3   • metFORMIN ER (GLUCOPHAGE-XR) 500 MG 24 hr tablet Take 4 tablets by mouth Daily With Breakfast for 90 days. Do not give at time of or within 48 hours of  iodinated intravenous contrast. Confirm renal function is normal before continuing. 360 tablet 0   • nicotine (NICODERM CQ) 14 MG/24HR patch Place 1 patch on the skin as directed by provider Daily. 30 patch 1   • traZODone (DESYREL) 100 MG tablet TAKE 1 TABLET BY MOUTH EVERYDAY AT BEDTIME 90 tablet 1   • gabapentin (NEURONTIN) 100 MG capsule Take 1 capsule by mouth 2 (Two) Times a Day. 60 capsule 1     No current facility-administered medications for this visit.       Current outpatient and discharge medications have been reconciled for the patient.  Reviewed by: Preet Louise MD         Examiner was wearing KN95 mask and exam gloves during the entire duration of the visit. Patient was masked the entire time.   Minimum social distance of 6 ft maintained entire visit except if physical contact was necessary as documented.

## 2022-09-08 NOTE — ASSESSMENT & PLAN NOTE
Continue O2 supplementation.  Continue trilogy at night.  Follow-up with pulmonologist as instructed.  Continue to abstain from smoking.

## 2022-09-08 NOTE — ASSESSMENT & PLAN NOTE
Continue metformin and follow-up with endocrinology.    Lab Results   Component Value Date    HGBA1C 7.50 (H) 09/02/2022    HGBA1C 10.6 (H) 03/02/2022    HGBA1C 10.7 (H) 11/03/2021

## 2022-09-16 ENCOUNTER — READMISSION MANAGEMENT (OUTPATIENT)
Dept: CALL CENTER | Facility: HOSPITAL | Age: 62
End: 2022-09-16

## 2022-09-16 NOTE — OUTREACH NOTE
Medical Week 2 Survey    Flowsheet Row Responses   [facility] patient discharged from? [facility]   Does the patient have one of the following disease processes/diagnoses(primary or secondary)? Other   Week 2 attempt successful? Yes   Call start time 1405   Discharge diagnosis Acute respiratory failure with hypoxia    Call end time 1406   Is the patient taking all medications as directed (includes completed medication regime)? Yes   Does the patient have a primary care provider?  Yes   Comments regarding PCP seen PCP on 9/8   Has the patient kept scheduled appointments due by today? Yes   Has home health visited the patient within 72 hours of discharge? N/A   Psychosocial issues? No   What is the patient's perception of their health status since discharge? Improving   Is the patient/caregiver able to teach back the hierarchy of who to call/visit for symptoms/problems? PCP, Specialist, Home health nurse, Urgent Care, ED, 911 Yes   Week 2 Call Completed? Yes   Graduated Yes   Did the patient feel the follow up calls were helpful during their recovery period? Yes   Was the number of calls appropriate? Yes   Graduated/Revoked comments Doing well, no further calls needed.          NAKIA COWAN - Registered Nurse

## 2022-10-12 ENCOUNTER — TELEPHONE (OUTPATIENT)
Dept: INTERNAL MEDICINE | Age: 62
End: 2022-10-12

## 2022-10-12 NOTE — TELEPHONE ENCOUNTER
Patient has had an issue with his eye since the weekend. He had something in it and his brother got it out it was something black but his eye actually hurts and it is really red. Who do we recommend as an eye doctor. Please call him at [phone]

## 2022-10-13 NOTE — TELEPHONE ENCOUNTER
Called pt . Said he is not in severe pain . He contacted [facility] and waiting to call him back.

## 2022-10-20 DIAGNOSIS — E11.42 TYPE 2 DIABETES MELLITUS WITH PERIPHERAL NEUROPATHY: ICD-10-CM

## 2022-10-21 RX ORDER — GABAPENTIN 100 MG/1
100 CAPSULE ORAL 2 TIMES DAILY
Qty: 60 CAPSULE | Refills: 0 | Status: SHIPPED | OUTPATIENT
Start: 2022-10-21 | End: 2023-01-18

## 2022-10-21 RX ORDER — METFORMIN HYDROCHLORIDE 500 MG/1
2000 TABLET, EXTENDED RELEASE ORAL
Qty: 360 TABLET | Refills: 1 | Status: SHIPPED | OUTPATIENT
Start: 2022-10-21

## 2022-10-21 NOTE — TELEPHONE ENCOUNTER
[facility] 3/16/22  [place] NO UPCOMING APPT  LAST REFILL  NONE ON RECORD  BY [facility] 3/16/22

## 2022-11-30 DIAGNOSIS — E78.5 HYPERLIPIDEMIA, UNSPECIFIED HYPERLIPIDEMIA TYPE: ICD-10-CM

## 2022-11-30 RX ORDER — ATORVASTATIN CALCIUM 40 MG/1
TABLET, FILM COATED ORAL
Qty: 90 TABLET | Refills: 3 | Status: SHIPPED | OUTPATIENT
Start: 2022-11-30

## 2023-01-05 DIAGNOSIS — F17.219 CIGARETTE NICOTINE DEPENDENCE WITH NICOTINE-INDUCED DISORDER: ICD-10-CM

## 2023-01-06 RX ORDER — NICOTINE 21 MG/24HR
1 PATCH, TRANSDERMAL 24 HOURS TRANSDERMAL EVERY 24 HOURS
Qty: 30 PATCH | Refills: 1 | OUTPATIENT
Start: 2023-01-06

## 2023-01-10 NOTE — TELEPHONE ENCOUNTER
Spoke with pt. He will not come in to be seen before February . Pt will see pulmonary and want to see you after that. Tried to explain this visit for med refill  Said he do not want to pay 2 times for a visit     Scheduled to see you 2/14/23

## 2023-01-14 DIAGNOSIS — E11.42 TYPE 2 DIABETES MELLITUS WITH PERIPHERAL NEUROPATHY: ICD-10-CM

## 2023-01-18 RX ORDER — GABAPENTIN 100 MG/1
100 CAPSULE ORAL 2 TIMES DAILY
Qty: 60 CAPSULE | Refills: 0 | Status: SHIPPED | OUTPATIENT
Start: 2023-01-18

## 2023-01-18 NOTE — TELEPHONE ENCOUNTER
Rx Refill Note  Requested Prescriptions     Pending Prescriptions Disp Refills    gabapentin (NEURONTIN) 100 MG capsule [Pharmacy Med Name: GABAPENTIN 100 MG CAPSULE] 60 capsule 0     Sig: Take 1 capsule by mouth 2 (Two) Times a Day. Please schedule follow-up visit      Last office visit with prescribing clinician: 3/16/2022   Last telemedicine visit with prescribing clinician: Visit date not found   Next office visit with prescribing clinician: Visit date not found                         Would you like a call back once the refill request has been completed: [] Yes [] No    If the office needs to give you a call back, can they leave a voicemail: [] Yes [] No    Lexi Yap  01/18/23, 14:04 EST

## 2023-02-02 DIAGNOSIS — G47.09 OTHER INSOMNIA: ICD-10-CM

## 2023-02-03 RX ORDER — TRAZODONE HYDROCHLORIDE 100 MG/1
TABLET ORAL
Qty: 90 TABLET | Refills: 1 | Status: SHIPPED | OUTPATIENT
Start: 2023-02-03 | End: 2023-02-14 | Stop reason: SDUPTHER

## 2023-02-14 ENCOUNTER — OFFICE VISIT (OUTPATIENT)
Dept: INTERNAL MEDICINE | Age: 63
End: 2023-02-14
Payer: COMMERCIAL

## 2023-02-14 VITALS
SYSTOLIC BLOOD PRESSURE: 130 MMHG | OXYGEN SATURATION: 94 % | HEIGHT: 68 IN | BODY MASS INDEX: 38.19 KG/M2 | DIASTOLIC BLOOD PRESSURE: 70 MMHG | TEMPERATURE: 97.7 F | HEART RATE: 82 BPM | WEIGHT: 252 LBS

## 2023-02-14 DIAGNOSIS — J96.12 CHRONIC RESPIRATORY FAILURE WITH HYPOXIA AND HYPERCAPNIA: Chronic | ICD-10-CM

## 2023-02-14 DIAGNOSIS — I27.20 SEVERE PULMONARY HYPERTENSION: Chronic | ICD-10-CM

## 2023-02-14 DIAGNOSIS — G47.09 OTHER INSOMNIA: ICD-10-CM

## 2023-02-14 DIAGNOSIS — E78.5 HYPERLIPIDEMIA, UNSPECIFIED HYPERLIPIDEMIA TYPE: Chronic | ICD-10-CM

## 2023-02-14 DIAGNOSIS — I10 ESSENTIAL HYPERTENSION: Primary | Chronic | ICD-10-CM

## 2023-02-14 DIAGNOSIS — J96.11 CHRONIC RESPIRATORY FAILURE WITH HYPOXIA AND HYPERCAPNIA: Chronic | ICD-10-CM

## 2023-02-14 DIAGNOSIS — E11.29 TYPE 2 DIABETES MELLITUS WITH MICROALBUMINURIA, WITHOUT LONG-TERM CURRENT USE OF INSULIN: Chronic | ICD-10-CM

## 2023-02-14 DIAGNOSIS — R80.9 TYPE 2 DIABETES MELLITUS WITH MICROALBUMINURIA, WITHOUT LONG-TERM CURRENT USE OF INSULIN: Chronic | ICD-10-CM

## 2023-02-14 PROCEDURE — 99214 OFFICE O/P EST MOD 30 MIN: CPT | Performed by: INTERNAL MEDICINE

## 2023-02-14 RX ORDER — TRAZODONE HYDROCHLORIDE 100 MG/1
100 TABLET ORAL
Qty: 90 TABLET | Refills: 1 | Status: SHIPPED | OUTPATIENT
Start: 2023-02-14

## 2023-02-14 NOTE — PROGRESS NOTES
I N T E R N A L  M E D I C I N E    J U N O H  K I M,  M D      ENCOUNTER DATE:  02/14/2023    Carrington PALACIOS Shell / 62 y.o. / male    CHIEF COMPLAINT / REASON FOR OFFICE VISIT     Severe pulmonary hypertension  and Diabetes      ASSESSMENT & PLAN     Problem List Items Addressed This Visit        High    Essential hypertension - Primary (Chronic)    Current Assessment & Plan     Continue losartan 100 mg daily         Relevant Medications    losartan (COZAAR) 100 MG tablet    furosemide (LASIX) 40 MG tablet    Other Relevant Orders    Comprehensive Metabolic Panel (Completed)       Medium    Type 2 diabetes mellitus with microalbuminuria, without long-term current use of insulin (HCC) (Chronic)    Overview     *Yson         Current Assessment & Plan     Discuss with endocrinologist re: GLP-1 agonist or SGLT-2 inhibitor for diabetes and obesity     Lab Results   Component Value Date    HGBA1C 7.50 (H) 09/02/2022    HGBA1C 10.6 (H) 03/02/2022    HGBA1C 10.7 (H) 11/03/2021             Relevant Medications    insulin NPH (humuLIN N,novoLIN N) 100 UNIT/ML injection    insulin regular (humuLIN R,novoLIN R) 100 UNIT/ML injection    metFORMIN ER (GLUCOPHAGE-XR) 500 MG 24 hr tablet    Other Relevant Orders    Hemoglobin A1c (Completed)    Comprehensive Metabolic Panel (Completed)    Severe pulmonary hypertension (HCC) (Chronic)    Chronic respiratory failure with hypoxia and hypercapnia (HCC) (Chronic)    Overview     Continue Trilogy with O2 nightly          Relevant Medications    albuterol sulfate  (90 Base) MCG/ACT inhaler       Low    Hyperlipidemia (Chronic)    Overview     Continue atorvastatin 40 mg.          Relevant Medications    atorvastatin (LIPITOR) 40 MG tablet    Other Relevant Orders    Comprehensive Metabolic Panel (Completed)    Lipid Panel With / Chol / HDL Ratio (Completed)       Unprioritized    Other insomnia    Relevant Medications    traZODone (DESYREL) 100 MG tablet     Orders Placed This  "Encounter   Procedures   • Hemoglobin A1c   • Comprehensive Metabolic Panel   • Lipid Panel With / Chol / HDL Ratio     New Medications Ordered This Visit   Medications   • traZODone (DESYREL) 100 MG tablet     Sig: Take 1 tablet by mouth every night at bedtime.     Dispense:  90 tablet     Refill:  1     DX Code Needed  PATIENT NEEDS REFILLS.       SUMMARY/DISCUSSION  •       Next Appointment with me: Visit date not found    Return in about 6 months (around 8/14/2023) for Reassess chronic medical problems.        VITAL SIGNS     Vitals:    02/14/23 1437   BP: 130/70   Pulse: 82   Temp: 97.7 °F (36.5 °C)   SpO2: 94%   Weight: 114 kg (252 lb)   Height: 172.7 cm (68\")       BP Readings from Last 3 Encounters:   02/14/23 130/70   09/08/22 100/60   09/05/22 113/87     Wt Readings from Last 3 Encounters:   02/14/23 114 kg (252 lb)   09/08/22 109 kg (240 lb)   09/05/22 108 kg (237 lb 1.6 oz)     Body mass index is 38.32 kg/m².    Blood pressure readings recorded on patient flowsheet:  No flowsheet data found.     MEDICATIONS AT THE TIME OF OFFICE VISIT     Current Outpatient Medications on File Prior to Visit   Medication Sig   • albuterol sulfate  (90 Base) MCG/ACT inhaler Inhale 1-2 puffs Every 4 (Four) Hours As Needed for Wheezing or Shortness of Air.   • atorvastatin (LIPITOR) 40 MG tablet TAKE 1 TABLET BY MOUTH EVERY DAY   • furosemide (LASIX) 40 MG tablet Take 1 tablet by mouth Daily.   • gabapentin (NEURONTIN) 100 MG capsule Take 1 capsule by mouth 2 (Two) Times a Day. Last refill. Please schedule follow-up visit   • glucose blood test strip Care Sen N  Dx code E11.42 testing bs 2 x day   • insulin NPH (humuLIN N,novoLIN N) 100 UNIT/ML injection 65 units at bedtime   • Insulin Pen Needle (PEN NEEDLES) 32G X 5 MM misc 1 each Daily.   • insulin regular (humuLIN R,novoLIN R) 100 UNIT/ML injection inject 20 units in the morning 15 units at lunch and 25 units at dinner (Patient taking differently: inject 25 units " "in the morning 15 units at lunch and 25 units at dinner)   • Insulin Syringes, Disposable, U-100 0.5 ML misc Use daily   • losartan (COZAAR) 100 MG tablet TAKE 1 TABLET BY MOUTH EVERY DAY   • metFORMIN ER (GLUCOPHAGE-XR) 500 MG 24 hr tablet Take 4 tablets by mouth Daily With Breakfast. Do not give at time of or within 48 hours of iodinated intravenous contrast. Confirm renal function is normal before continuing.     No current facility-administered medications on file prior to visit.         HISTORY OF PRESENT ILLNESS   Carrington \"Fabien\" Dian presents today for medication refill. He takes trazodone every evening and he wishes to obtain refills. He does continue to smoke cigarettes, and he has decreased his smoking to approximately 0.5 pack per day. He has been utilizing nicotine patches for smoking cessation.    His oxygen saturation measures 94 percent in the office today. He recently followed up with pulmonology and noted improvement after utilizing his Trilogy ventilator at night. He confirms utilizing an oxygen machine at night and intermittently first thing in the morning when he experiences dyspnea. He was prescribed a new inhaler for severe pulmonary hypertension, which his insurance did not cover. He continues to work two positions full-time without any difficulties regarding dyspnea. He notes experiencing some chest tightness whenever he coughs.    His blood pressure is currently stable at 130/70 mmHg, and his weight increased to 252 pounds. He notes that his physical activity has decreased during the winter season. He follows up on diabetes mellitus with Dr. Bayron Anaya, who prescribed an increased dose of medication to decrease blood glucose. He reports that his A1c remains elevated. He previously tried Ozempic with improvement towards weight loss. He does have a history of fatty liver. The patient states he reduced his overall alcohol consumption.        Lab Results   Component Value Date    HGBA1C " 8.40 (H) 02/14/2023    HGBA1C 7.50 (H) 09/02/2022    HGBA1C 10.6 (H) 03/02/2022    CREATININE 0.81 02/14/2023    LDL 85 02/14/2023    MALBCRERATIO 307 (H) 03/02/2022     Blood sugar readings recorded on patient's flowsheet:  No flowsheet data found.    114 kg (252 lb)    REVIEW OF SYSTEMS           PHYSICAL EXAMINATION     Physical Exam  General: No acute distress; obese  Psych: Normal thought and judgment   Cardiovascular Rate: normal. Rhythm: regular. Heart sounds: normal   Pulm/Chest: Increased respiratory effort, no wheezing or rales  Abdomen: Protuberant       REVIEWED DATA     Labs:       Lab Results   Component Value Date     02/14/2023    K 4.4 02/14/2023    CALCIUM 9.9 02/14/2023    AST 11 02/14/2023    ALT 12 02/14/2023    BUN 10 02/14/2023    CREATININE 0.81 02/14/2023    CREATININE 0.80 09/05/2022    CREATININE 0.67 (L) 09/04/2022    EGFRIFNONA 80 11/03/2021    EGFRIFAFRI 93 11/03/2021       Lab Results   Component Value Date    HGBA1C 8.40 (H) 02/14/2023    HGBA1C 7.50 (H) 09/02/2022    HGBA1C 10.6 (H) 03/02/2022       Lab Results   Component Value Date    LDL 85 02/14/2023     (H) 03/02/2022    LDL 84 11/03/2021    HDL 46 02/14/2023    HDL 38 (L) 03/02/2022    TRIG 301 (H) 02/14/2023    TRIG 211 (H) 03/02/2022       Lab Results   Component Value Date    TSH 2.070 09/02/2022    TSH 2.340 02/27/2019    FREET4 1.18 02/27/2019       Lab Results   Component Value Date    WBC 7.88 09/05/2022    HGB 17.0 09/05/2022     09/05/2022       Lab Results   Component Value Date    MALBCRERATIO 307 (H) 03/02/2022          Imaging:           Medical Tests:           Summary of old records / correspondence / consultant report:           Request outside records:           *Examiner was wearing KN95 mask and eye protection during the entire duration of the visit. Patient was masked the entire time. Minimum social distance of 6 ft maintained entire visit except if physical contact was necessary as  documented.       Template created by Ovidio Louise MD     Transcribed from ambient dictation for Preet Louise MD by Celia Jasmine.  02/15/23   08:36 EST    Patient or patient representative verbalized consent to the visit recording.  I have personally performed the services described in this document as transcribed by the above individual, and it is both accurate and complete.  Preet Louise MD  2/15/2023  08:58 EST

## 2023-02-14 NOTE — ASSESSMENT & PLAN NOTE
Discuss with endocrinologist re: GLP-1 agonist or SGLT-2 inhibitor for diabetes and obesity     Lab Results   Component Value Date    HGBA1C 7.50 (H) 09/02/2022    HGBA1C 10.6 (H) 03/02/2022    HGBA1C 10.7 (H) 11/03/2021

## 2023-02-15 LAB
ALBUMIN SERPL-MCNC: 5 G/DL (ref 3.5–5.2)
ALBUMIN/GLOB SERPL: 2.4 G/DL
ALP SERPL-CCNC: 118 U/L (ref 39–117)
ALT SERPL-CCNC: 12 U/L (ref 1–41)
AST SERPL-CCNC: 11 U/L (ref 1–40)
BILIRUB SERPL-MCNC: 0.2 MG/DL (ref 0–1.2)
BUN SERPL-MCNC: 10 MG/DL (ref 8–23)
BUN/CREAT SERPL: 12.3 (ref 7–25)
CALCIUM SERPL-MCNC: 9.9 MG/DL (ref 8.6–10.5)
CHLORIDE SERPL-SCNC: 95 MMOL/L (ref 98–107)
CHOLEST SERPL-MCNC: 180 MG/DL (ref 0–200)
CHOLEST/HDLC SERPL: 3.91 {RATIO}
CO2 SERPL-SCNC: 34 MMOL/L (ref 22–29)
CREAT SERPL-MCNC: 0.81 MG/DL (ref 0.76–1.27)
EGFRCR SERPLBLD CKD-EPI 2021: 99.7 ML/MIN/1.73
GLOBULIN SER CALC-MCNC: 2.1 GM/DL
GLUCOSE SERPL-MCNC: 180 MG/DL (ref 65–99)
HBA1C MFR BLD: 8.4 % (ref 4.8–5.6)
HDLC SERPL-MCNC: 46 MG/DL (ref 40–60)
LDLC SERPL CALC-MCNC: 85 MG/DL (ref 0–100)
POTASSIUM SERPL-SCNC: 4.4 MMOL/L (ref 3.5–5.2)
PROT SERPL-MCNC: 7.1 G/DL (ref 6–8.5)
SODIUM SERPL-SCNC: 139 MMOL/L (ref 136–145)
TRIGL SERPL-MCNC: 301 MG/DL (ref 0–150)
VLDLC SERPL CALC-MCNC: 49 MG/DL (ref 5–40)

## 2023-02-19 NOTE — PROGRESS NOTES
MyChart:    Here are the result(s) of your test(s):     A1c for average glucose level is significantly higher.   Triglycerides are also high and is related to your uncontrolled diabetes.     Need to have your diabetes better controlled. Contact your endocrinologist to discuss your options. Consider GLP-1 agonist as we discussed. Maintain a low sugar/starch/carbohydrate diet.       Please do not hesitate to contact me if you have questions.

## 2023-04-17 ENCOUNTER — OFFICE VISIT (OUTPATIENT)
Dept: ENDOCRINOLOGY | Age: 63
End: 2023-04-17
Payer: COMMERCIAL

## 2023-04-17 VITALS
HEART RATE: 75 BPM | WEIGHT: 258.8 LBS | SYSTOLIC BLOOD PRESSURE: 126 MMHG | OXYGEN SATURATION: 95 % | DIASTOLIC BLOOD PRESSURE: 58 MMHG | TEMPERATURE: 97.7 F | BODY MASS INDEX: 39.22 KG/M2 | HEIGHT: 68 IN

## 2023-04-17 DIAGNOSIS — G47.33 OBSTRUCTIVE SLEEP APNEA SYNDROME: Chronic | ICD-10-CM

## 2023-04-17 DIAGNOSIS — E11.29 TYPE 2 DIABETES MELLITUS WITH MICROALBUMINURIA, WITHOUT LONG-TERM CURRENT USE OF INSULIN: Primary | Chronic | ICD-10-CM

## 2023-04-17 DIAGNOSIS — I10 ESSENTIAL HYPERTENSION: Chronic | ICD-10-CM

## 2023-04-17 DIAGNOSIS — J38.1 VOCAL CORD POLYP: ICD-10-CM

## 2023-04-17 DIAGNOSIS — R80.9 TYPE 2 DIABETES MELLITUS WITH MICROALBUMINURIA, WITHOUT LONG-TERM CURRENT USE OF INSULIN: Primary | Chronic | ICD-10-CM

## 2023-04-17 DIAGNOSIS — E11.42 TYPE 2 DIABETES MELLITUS WITH PERIPHERAL NEUROPATHY: ICD-10-CM

## 2023-04-17 DIAGNOSIS — E78.5 HYPERLIPIDEMIA, UNSPECIFIED HYPERLIPIDEMIA TYPE: Chronic | ICD-10-CM

## 2023-04-17 DIAGNOSIS — Z72.0 CURRENT TOBACCO USE: ICD-10-CM

## 2023-04-17 PROCEDURE — 99214 OFFICE O/P EST MOD 30 MIN: CPT | Performed by: INTERNAL MEDICINE

## 2023-04-17 RX ORDER — NICOTINE 21 MG/24HR
PATCH, TRANSDERMAL 24 HOURS TRANSDERMAL
COMMUNITY
Start: 2023-03-28

## 2023-04-17 RX ORDER — TIRZEPATIDE 5 MG/.5ML
5 INJECTION, SOLUTION SUBCUTANEOUS WEEKLY
Qty: 6 ML | Refills: 1 | Status: SHIPPED | OUTPATIENT
Start: 2023-04-17

## 2023-04-17 RX ORDER — FLUTICASONE FUROATE AND VILANTEROL 200; 25 UG/1; UG/1
POWDER RESPIRATORY (INHALATION)
COMMUNITY
Start: 2023-02-01

## 2023-04-17 RX ORDER — GABAPENTIN 100 MG/1
100 CAPSULE ORAL 2 TIMES DAILY
Qty: 60 CAPSULE | Refills: 0 | Status: SHIPPED | OUTPATIENT
Start: 2023-04-17

## 2023-04-17 RX ORDER — TIRZEPATIDE 2.5 MG/.5ML
2.5 INJECTION, SOLUTION SUBCUTANEOUS WEEKLY
Qty: 2 ML | Refills: 0 | Status: SHIPPED | OUTPATIENT
Start: 2023-04-17

## 2023-04-17 NOTE — LETTER
April 17, 2023     Johnnie Aguero MD  [address]  [place] [id number]    Patient: Carrington Biggs   YOB: 1960   Date of Visit: 4/17/2023       Dear Dr. Laci MD:    Thank you for referring Carrington Biggs to me for evaluation. Below are the relevant portions of my assessment and plan of care.    If you have questions, please do not hesitate to call me. I look forward to following Carrington along with you.         Sincerely,        Bayron Anaya MD        CC: MD Héctor Meyer, Bayron GARCIA MD  04/17/23 1101  Signed  Subjective   Carrington Biggs is a 62 y.o. male.     History of Present Illness        He has known diabetes mellitus since 2012 and was started on insulin in 2013.  He is on  Novolin N 65 units at bedtime, Novolin R 20 units at breakfast, 15 units at lunch, and 25 units at supper and Metformin  mg 4 tablets daily.   -280.  Suppertime glucose 120-160 .   He denies hypoglycemic episodes.  He is interested in using Mounjaro.  He has a high deductible insurance plan.  He has lost 11 pounds since November 2021.  Hemoglobin A1c done in February 2022 is 8.4%     He denies blurry vision.  His last eye examination was in 8/17.  He has microalbuminuria on urine sample taken 3/22.  He is on losartan.  He has numbness and burning in his feet improved with gabapentin 100 mg twice a day.     He has hyperlipidemia and has been on Lipitor 40 mg once a day.  He has cervical arthritis on x-ray.  He denies muscle pain.   Lipid panel done in March 2023 are as follows: Cholesterol 180.  HDL 46.  LDL 85.  Triglycerides 301.     He has hypertension and is on losartan 100 mg once a day and hydrochlorothiazide 25 mg/day..  He denies any history of heart attack or stroke.  He denies any chest pain or shortness of breath.     He has sleep apnea and COPD.  He is using Trilogy with oxygen.  He wakes up rested.  He smokes 8 to 7 cigarettes/day.  He follows with Dr. Aguero.     He had an  "incidental 4 mm nonobstructing stone in the upper pole of right kidney.     He has seen Dr. 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     He had 3 Moderna Covid vaccine without major side effects.       The following portions of the patient's history were reviewed and updated as appropriate: allergies, current medications, past family history, past medical history, past social history, past surgical history and problem list.    Review of Systems   Eyes: Negative for visual disturbance.   Respiratory: Negative for shortness of breath.    Cardiovascular: Negative for chest pain and palpitations.   Gastrointestinal: Negative.    Endocrine: Negative for cold intolerance and heat intolerance.   Genitourinary: Negative.    Musculoskeletal: Negative for myalgias.   Neurological: Negative for seizures.     Vitals:    04/17/23 1003   BP: 126/58   Pulse: 75   Temp: 97.7 °F (36.5 °C)   TempSrc: Temporal   SpO2: 95%   Weight: 117 kg (258 lb 12.8 oz)   Height: 172.7 cm (67.99\")      Objective   Physical Exam  Constitutional:       Appearance: Normal appearance. He is obese.   Eyes:      General: No scleral icterus.        Right eye: No discharge.         Left eye: No discharge.      Extraocular Movements: Extraocular movements intact.      Conjunctiva/sclera: Conjunctivae normal.   Neck:      Vascular: No carotid bruit.   Cardiovascular:      Rate and Rhythm: Normal rate and regular rhythm.      Pulses: Normal pulses.      Heart sounds: Normal heart sounds.   Pulmonary:      Breath sounds: Normal breath sounds.   Abdominal:      General: Bowel sounds are normal.      Palpations: Abdomen is soft.      Tenderness: There is no right CVA tenderness or left CVA tenderness.   Musculoskeletal:         General: Normal range of motion.      Right lower leg: No edema.      Left lower leg: No edema.   Lymphadenopathy:      Cervical: No cervical adenopathy.   Skin:     General: Skin is warm.   Neurological:      Mental Status: He is " alert and oriented to person, place, and time.      Comments: Decreased light touch in distal left leg.       Office Visit on 02/14/2023   Component Date Value Ref Range Status   • Hemoglobin A1C 02/14/2023 8.40 (H)  4.80 - 5.60 % Final    Comment: Hemoglobin A1C Ranges:  Increased Risk for Diabetes  5.7% to 6.4%  Diabetes                     >= 6.5%  Diabetic Goal                < 7.0%     • Glucose 02/14/2023 180 (H)  65 - 99 mg/dL Final   • BUN 02/14/2023 10  8 - 23 mg/dL Final   • Creatinine 02/14/2023 0.81  0.76 - 1.27 mg/dL Final   • EGFR Result 02/14/2023 99.7  >60.0 mL/min/1.73 Final    Comment: GFR Normal >60  Chronic Kidney Disease <60  Kidney Failure <15     • BUN/Creatinine Ratio 02/14/2023 12.3  7.0 - 25.0 Final   • Sodium 02/14/2023 139  136 - 145 mmol/L Final   • Potassium 02/14/2023 4.4  3.5 - 5.2 mmol/L Final   • Chloride 02/14/2023 95 (L)  98 - 107 mmol/L Final   • Total CO2 02/14/2023 34.0 (H)  22.0 - 29.0 mmol/L Final   • Calcium 02/14/2023 9.9  8.6 - 10.5 mg/dL Final   • Total Protein 02/14/2023 7.1  6.0 - 8.5 g/dL Final   • Albumin 02/14/2023 5.0  3.5 - 5.2 g/dL Final   • Globulin 02/14/2023 2.1  gm/dL Final   • A/G Ratio 02/14/2023 2.4  g/dL Final   • Total Bilirubin 02/14/2023 0.2  0.0 - 1.2 mg/dL Final   • Alkaline Phosphatase 02/14/2023 118 (H)  39 - 117 U/L Final   • AST (SGOT) 02/14/2023 11  1 - 40 U/L Final   • ALT (SGPT) 02/14/2023 12  1 - 41 U/L Final   • Total Cholesterol 02/14/2023 180  0 - 200 mg/dL Final    Comment: Cholesterol Reference Ranges  (U.S. Department of Health and Human Services ATP III  Classifications)  Desirable          <200 mg/dL  Borderline High    200-239 mg/dL  High Risk          >240 mg/dL  Triglyceride Reference Ranges  (U.S. Department of Health and Human Services ATP III  Classifications)  Normal           <150 mg/dL  Borderline High  150-199 mg/dL  High             200-499 mg/dL  Very High        >500 mg/dL  HDL Reference Ranges  (U.S. Department of Health  and Human Services ATP III  Classifications)  Low     <40 mg/dl (major risk factor for CHD)  High    >60 mg/dl ('negative' risk factor for CHD)  LDL Reference Ranges  (U.S. Department of Health and Human Services ATP III  Classifications)  Optimal          <100 mg/dL  Near Optimal     100-129 mg/dL  Borderline High  130-159 mg/dL  High             160-189 mg/dL  Very High        >189 mg/dL     • Triglycerides 02/14/2023 301 (H)  0 - 150 mg/dL Final   • HDL Cholesterol 02/14/2023 46  40 - 60 mg/dL Final   • VLDL Cholesterol Gerhard 02/14/2023 49 (H)  5 - 40 mg/dL Final   • LDL Chol Calc ([facility]) 02/14/2023 85  0 - 100 mg/dL Final   • Chol/HDL Ratio 02/14/2023 3.91   Final     Assessment & Plan   Diagnoses and all orders for this visit:    1. Type 2 diabetes mellitus with microalbuminuria, without long-term current use of insulin (HCC) (Primary)    2. Type 2 diabetes mellitus with peripheral neuropathy  -     gabapentin (NEURONTIN) 100 MG capsule; Take 1 capsule by mouth 2 (Two) Times a Day.  Dispense: 60 capsule; Refill: 0    3. Essential hypertension    4. Hyperlipidemia, unspecified hyperlipidemia type    5. Obstructive sleep apnea syndrome    6. Vocal cord polyp    Other orders  -     Tirzepatide (Mounjaro) 2.5 MG/0.5ML solution pen-injector; Inject 0.5 mL under the skin into the appropriate area as directed 1 (One) Time Per Week.  Dispense: 2 mL; Refill: 0  -     Tirzepatide (Mounjaro) 5 MG/0.5ML solution pen-injector; Inject 0.5 mL under the skin into the appropriate area as directed 1 (One) Time Per Week. Start after the fourth dose of Mounjaro 2.5 mg and if tolerating it well.  Dispense: 6 mL; Refill: 1      Start Mounjaro 2.5 mg weekly for 4 weeks then 5 mg weekly thereafter.  Continue Novolin N and Novolin R.  Continue metformin  mg 4 tablets daily.  Continue gabapentin 100 mg twice daily.  Continue Lipitor 40 mg/day.  Continue losartan and hydrochlorothiazide.  Continue Trilogy.  Follow-up with   Laci.  Advised to follow-up with Dr. Cui.    Copy of my note sent to Dr. Louise and Dr. Johnnie Aguero    RTC 3 mos with GAIL Santos NP and with me in 6 months.

## 2023-04-17 NOTE — PROGRESS NOTES
Subjective   Carrington Biggs is a 62 y.o. male.     History of Present Illness        He has known diabetes mellitus since 2012 and was started on insulin in 2013.  He is on  Novolin N 65 units at bedtime, Novolin R 20 units at breakfast, 15 units at lunch, and 25 units at supper and Metformin  mg 4 tablets daily.   -280.  Suppertime glucose 120-160 .   He denies hypoglycemic episodes.  He is interested in using Mounjaro.  He has a high deductible insurance plan.  He has lost 11 pounds since November 2021.  Hemoglobin A1c done in February 2022 is 8.4%     He denies blurry vision.  His last eye examination was in 8/17.  He has microalbuminuria on urine sample taken 3/22.  He is on losartan.  He has numbness and burning in his feet improved with gabapentin 100 mg twice a day.     He has hyperlipidemia and has been on Lipitor 40 mg once a day.  He has cervical arthritis on x-ray.  He denies muscle pain.   Lipid panel done in March 2023 are as follows: Cholesterol 180.  HDL 46.  LDL 85.  Triglycerides 301.     He has hypertension and is on losartan 100 mg once a day and hydrochlorothiazide 25 mg/day..  He denies any history of heart attack or stroke.  He denies any chest pain or shortness of breath.     He has sleep apnea and COPD.  He is using Trilogy with oxygen.  He wakes up rested.  He smokes 8 to 7 cigarettes/day.  He follows with Dr. Aguero.     He had an incidental 4 mm nonobstructing stone in the upper pole of right kidney.     He has seen Dr. 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     He had 3 Moderna Covid vaccine without major side effects.       The following portions of the patient's history were reviewed and updated as appropriate: allergies, current medications, past family history, past medical history, past social history, past surgical history and problem list.    Review of Systems   Eyes: Negative for visual disturbance.   Respiratory: Negative for shortness of breath.   "  Cardiovascular: Negative for chest pain and palpitations.   Gastrointestinal: Negative.    Endocrine: Negative for cold intolerance and heat intolerance.   Genitourinary: Negative.    Musculoskeletal: Negative for myalgias.   Neurological: Negative for seizures.     Vitals:    04/17/23 1003   BP: 126/58   Pulse: 75   Temp: 97.7 °F (36.5 °C)   TempSrc: Temporal   SpO2: 95%   Weight: 117 kg (258 lb 12.8 oz)   Height: 172.7 cm (67.99\")      Objective   Physical Exam  Constitutional:       Appearance: Normal appearance. He is obese.   Eyes:      General: No scleral icterus.        Right eye: No discharge.         Left eye: No discharge.      Extraocular Movements: Extraocular movements intact.      Conjunctiva/sclera: Conjunctivae normal.   Neck:      Vascular: No carotid bruit.   Cardiovascular:      Rate and Rhythm: Normal rate and regular rhythm.      Pulses: Normal pulses.      Heart sounds: Normal heart sounds.   Pulmonary:      Breath sounds: Normal breath sounds.   Abdominal:      General: Bowel sounds are normal.      Palpations: Abdomen is soft.      Tenderness: There is no right CVA tenderness or left CVA tenderness.   Musculoskeletal:         General: Normal range of motion.      Right lower leg: No edema.      Left lower leg: No edema.   Lymphadenopathy:      Cervical: No cervical adenopathy.   Skin:     General: Skin is warm.   Neurological:      Mental Status: He is alert and oriented to person, place, and time.      Comments: Decreased light touch in distal left leg.       Office Visit on 02/14/2023   Component Date Value Ref Range Status   • Hemoglobin A1C 02/14/2023 8.40 (H)  4.80 - 5.60 % Final    Comment: Hemoglobin A1C Ranges:  Increased Risk for Diabetes  5.7% to 6.4%  Diabetes                     >= 6.5%  Diabetic Goal                < 7.0%     • Glucose 02/14/2023 180 (H)  65 - 99 mg/dL Final   • BUN 02/14/2023 10  8 - 23 mg/dL Final   • Creatinine 02/14/2023 0.81  0.76 - 1.27 mg/dL Final   • " EGFR Result 02/14/2023 99.7  >60.0 mL/min/1.73 Final    Comment: GFR Normal >60  Chronic Kidney Disease <60  Kidney Failure <15     • BUN/Creatinine Ratio 02/14/2023 12.3  7.0 - 25.0 Final   • Sodium 02/14/2023 139  136 - 145 mmol/L Final   • Potassium 02/14/2023 4.4  3.5 - 5.2 mmol/L Final   • Chloride 02/14/2023 95 (L)  98 - 107 mmol/L Final   • Total CO2 02/14/2023 34.0 (H)  22.0 - 29.0 mmol/L Final   • Calcium 02/14/2023 9.9  8.6 - 10.5 mg/dL Final   • Total Protein 02/14/2023 7.1  6.0 - 8.5 g/dL Final   • Albumin 02/14/2023 5.0  3.5 - 5.2 g/dL Final   • Globulin 02/14/2023 2.1  gm/dL Final   • A/G Ratio 02/14/2023 2.4  g/dL Final   • Total Bilirubin 02/14/2023 0.2  0.0 - 1.2 mg/dL Final   • Alkaline Phosphatase 02/14/2023 118 (H)  39 - 117 U/L Final   • AST (SGOT) 02/14/2023 11  1 - 40 U/L Final   • ALT (SGPT) 02/14/2023 12  1 - 41 U/L Final   • Total Cholesterol 02/14/2023 180  0 - 200 mg/dL Final    Comment: Cholesterol Reference Ranges  (U.S. Department of Health and Human Services ATP III  Classifications)  Desirable          <200 mg/dL  Borderline High    200-239 mg/dL  High Risk          >240 mg/dL  Triglyceride Reference Ranges  (U.S. Department of Health and Human Services ATP III  Classifications)  Normal           <150 mg/dL  Borderline High  150-199 mg/dL  High             200-499 mg/dL  Very High        >500 mg/dL  HDL Reference Ranges  (U.S. Department of Health and Human Services ATP III  Classifications)  Low     <40 mg/dl (major risk factor for CHD)  High    >60 mg/dl ('negative' risk factor for CHD)  LDL Reference Ranges  (U.S. Department of Health and Human Services ATP III  Classifications)  Optimal          <100 mg/dL  Near Optimal     100-129 mg/dL  Borderline High  130-159 mg/dL  High             160-189 mg/dL  Very High        >189 mg/dL     • Triglycerides 02/14/2023 301 (H)  0 - 150 mg/dL Final   • HDL Cholesterol 02/14/2023 46  40 - 60 mg/dL Final   • VLDL Cholesterol Gerhard 02/14/2023 49  (H)  5 - 40 mg/dL Final   • LDL Chol Calc ([facility]) 02/14/2023 85  0 - 100 mg/dL Final   • Chol/HDL Ratio 02/14/2023 3.91   Final     Assessment & Plan   Diagnoses and all orders for this visit:    1. Type 2 diabetes mellitus with microalbuminuria, without long-term current use of insulin (HCC) (Primary)    2. Type 2 diabetes mellitus with peripheral neuropathy  -     gabapentin (NEURONTIN) 100 MG capsule; Take 1 capsule by mouth 2 (Two) Times a Day.  Dispense: 60 capsule; Refill: 0    3. Essential hypertension    4. Hyperlipidemia, unspecified hyperlipidemia type    5. Obstructive sleep apnea syndrome    6. Vocal cord polyp    Other orders  -     Tirzepatide (Mounjaro) 2.5 MG/0.5ML solution pen-injector; Inject 0.5 mL under the skin into the appropriate area as directed 1 (One) Time Per Week.  Dispense: 2 mL; Refill: 0  -     Tirzepatide (Mounjaro) 5 MG/0.5ML solution pen-injector; Inject 0.5 mL under the skin into the appropriate area as directed 1 (One) Time Per Week. Start after the fourth dose of Mounjaro 2.5 mg and if tolerating it well.  Dispense: 6 mL; Refill: 1      Start Mounjaro 2.5 mg weekly for 4 weeks then 5 mg weekly thereafter.  Continue Novolin N and Novolin R.  Continue metformin  mg 4 tablets daily.  Continue gabapentin 100 mg twice daily.  Continue Lipitor 40 mg/day.  Continue losartan and hydrochlorothiazide.  Continue Trilogy.  Follow-up with Dr. Aguero.  Advised to follow-up with Dr. Cui.    Copy of my note sent to Dr. Louise and Dr. Johnnie Aguero    RTC 3 mos with GAIL Santos NP and with me in 6 months.

## 2023-05-01 ENCOUNTER — TELEPHONE (OUTPATIENT)
Dept: ENDOCRINOLOGY | Age: 63
End: 2023-05-01
Payer: COMMERCIAL

## 2023-05-01 NOTE — TELEPHONE ENCOUNTER
Please ask patient to  samples of Mounjaro 2.5 mg weekly for 4 doses.  He can switch to Mounjaro 5 mg weekly if he tolerates the 2.5 mg dose.

## 2023-05-01 NOTE — TELEPHONE ENCOUNTER
Patient called in regarding monjuaro 2.5 was $945 and they gave him an option for monjuaro 5 mg for $145 with the coupon. If dr kuo is giving the [facility] pharmacy on [address]

## 2023-06-09 ENCOUNTER — TRANSCRIBE ORDERS (OUTPATIENT)
Dept: ADMINISTRATIVE | Facility: HOSPITAL | Age: 63
End: 2023-06-09
Payer: COMMERCIAL

## 2023-06-09 DIAGNOSIS — Z12.2 SCREENING FOR LUNG CANCER: Primary | ICD-10-CM

## 2023-08-16 ENCOUNTER — OFFICE VISIT (OUTPATIENT)
Dept: INTERNAL MEDICINE | Age: 63
End: 2023-08-16
Payer: COMMERCIAL

## 2023-08-16 VITALS
OXYGEN SATURATION: 94 % | TEMPERATURE: 97.1 F | BODY MASS INDEX: 38.19 KG/M2 | SYSTOLIC BLOOD PRESSURE: 160 MMHG | HEART RATE: 80 BPM | HEIGHT: 68 IN | WEIGHT: 252 LBS | DIASTOLIC BLOOD PRESSURE: 70 MMHG

## 2023-08-16 DIAGNOSIS — E11.29 TYPE 2 DIABETES MELLITUS WITH MICROALBUMINURIA, WITHOUT LONG-TERM CURRENT USE OF INSULIN: Chronic | ICD-10-CM

## 2023-08-16 DIAGNOSIS — R80.9 TYPE 2 DIABETES MELLITUS WITH MICROALBUMINURIA, WITHOUT LONG-TERM CURRENT USE OF INSULIN: Chronic | ICD-10-CM

## 2023-08-16 DIAGNOSIS — I10 ESSENTIAL HYPERTENSION: Primary | Chronic | ICD-10-CM

## 2023-08-16 DIAGNOSIS — E66.01 CLASS 2 SEVERE OBESITY DUE TO EXCESS CALORIES WITH SERIOUS COMORBIDITY AND BODY MASS INDEX (BMI) OF 38.0 TO 38.9 IN ADULT: Chronic | ICD-10-CM

## 2023-08-16 PROBLEM — E66.812 CLASS 2 SEVERE OBESITY DUE TO EXCESS CALORIES WITH SERIOUS COMORBIDITY AND BODY MASS INDEX (BMI) OF 38.0 TO 38.9 IN ADULT: Chronic | Status: ACTIVE | Noted: 2020-09-30

## 2023-08-16 NOTE — ASSESSMENT & PLAN NOTE
BP Readings from Last 3 Encounters:   08/16/23 160/70   07/17/23 140/90   04/17/23 126/58       Blood pressure is elevated today.  Advised him to send his blood pressure readings to me in 2 weeks.  Maintain low-sodium diet of less than 2.5 g/day.  Continue losartan 100 mg.  Continue furosemide 40 mg daily for edema.  Strongly consider adding SGLT2 for diabetes which should help with weight loss and hypertension.

## 2023-08-16 NOTE — ASSESSMENT & PLAN NOTE
Wt Readings from Last 3 Encounters:   08/16/23 114 kg (252 lb)   07/17/23 114 kg (251 lb)   04/17/23 117 kg (258 lb 12.8 oz)     Body mass index is 38.33 kg/mý.    Mild weight loss is noted since starting GLP-1 therapy.  I strongly encouraged him to discuss with his endocrinologist to increase the dose of Mounjaro or consider switching to higher dose of Ozempic.  There has been a supply issue with Mounjaro and his pharmacy.

## 2023-08-16 NOTE — ASSESSMENT & PLAN NOTE
Lab Results   Component Value Date    HGBA1C 7.30 (H) 07/17/2023    HGBA1C 8.40 (H) 02/14/2023    HGBA1C 7.50 (H) 09/02/2022       A1c level is significantly lower with addition of GLP-1.  I advised him to discuss with his endocrinology team to titrate the dose of Mounjaro or switch to a higher dose Ozempic.  His pharmacy has been experiencing supply issue.  Consider adding SGLT2 such as Farxiga or Jardiance.  I think he would be a good candidate for this as well.

## 2023-08-16 NOTE — PROGRESS NOTES
I N T E R N A L  M E D I C I N E    J U N O H  K I M,  M D      ENCOUNTER DATE:  08/16/2023    Carrington PALACIOS Shell / 62 y.o. / male    CHIEF COMPLAINT / REASON FOR OFFICE VISIT     Hypertension, Diabetes, and Hyperlipidemia      ASSESSMENT & PLAN     Problem List Items Addressed This Visit          High    Essential hypertension - Primary (Chronic)    Current Assessment & Plan     BP Readings from Last 3 Encounters:   08/16/23 160/70   07/17/23 140/90   04/17/23 126/58     Blood pressure is elevated today.  Advised him to send his blood pressure readings to me in 2 weeks.  Maintain low-sodium diet of less than 2.5 g/day.  Continue losartan 100 mg.  Continue furosemide 40 mg daily for edema.  Strongly consider adding SGLT2 for diabetes which should help with weight loss and hypertension.         Relevant Medications    losartan (COZAAR) 100 MG tablet    furosemide (LASIX) 40 MG tablet       Medium    Type 2 diabetes mellitus with microalbuminuria, without long-term current use of insulin (Chronic)    Current Assessment & Plan     Lab Results   Component Value Date    HGBA1C 7.30 (H) 07/17/2023    HGBA1C 8.40 (H) 02/14/2023    HGBA1C 7.50 (H) 09/02/2022     A1c level is significantly lower with addition of GLP-1.  I advised him to discuss with his endocrinology team to titrate the dose of Mounjaro or switch to a higher dose Ozempic.  His pharmacy has been experiencing supply issue.  Consider adding SGLT2 such as Farxiga or Jardiance.  I think he would be a good candidate for this as well.         Relevant Medications    insulin NPH (humuLIN N,novoLIN N) 100 UNIT/ML injection    insulin regular (humuLIN R,novoLIN R) 100 UNIT/ML injection    metFORMIN ER (GLUCOPHAGE-XR) 500 MG 24 hr tablet    Tirzepatide (Mounjaro) 5 MG/0.5ML solution pen-injector    Class 2 severe obesity due to excess calories with serious comorbidity and body mass index (BMI) of 38.0 to 38.9 in adult (Chronic)    Current Assessment & Plan     Wt  "Readings from Last 3 Encounters:   08/16/23 114 kg (252 lb)   07/17/23 114 kg (251 lb)   04/17/23 117 kg (258 lb 12.8 oz)   Body mass index is 38.33 kg/mý.    Mild weight loss is noted since starting GLP-1 therapy.  I strongly encouraged him to discuss with his endocrinologist to increase the dose of Mounjaro or consider switching to higher dose of Ozempic.  There has been a supply issue with Mounjaro and his pharmacy.          No orders of the defined types were placed in this encounter.    No orders of the defined types were placed in this encounter.      SUMMARY/DISCUSSION        Next Appointment with me: Visit date not found    Return in about 4 months (around 12/16/2023) for Reassess chronic medical problems.        VITAL SIGNS     Vitals:    08/16/23 1434   BP: 160/70   Pulse: 80   Temp: 97.1 øF (36.2 øC)   SpO2: 94%   Weight: 114 kg (252 lb)   Height: 172.7 cm (67.99\")       BP Readings from Last 3 Encounters:   08/16/23 160/70   07/17/23 140/90   04/17/23 126/58     Wt Readings from Last 3 Encounters:   08/16/23 114 kg (252 lb)   07/17/23 114 kg (251 lb)   04/17/23 117 kg (258 lb 12.8 oz)     Body mass index is 38.33 kg/mý.    Blood pressure readings recorded on patient flowsheet:       No data to display                MEDICATIONS AT THE TIME OF OFFICE VISIT     Current Outpatient Medications on File Prior to Visit   Medication Sig    albuterol sulfate  (90 Base) MCG/ACT inhaler Inhale 1-2 puffs Every 4 (Four) Hours As Needed for Wheezing or Shortness of Air.    atorvastatin (LIPITOR) 40 MG tablet TAKE 1 TABLET BY MOUTH EVERY DAY    Fluticasone Furoate-Vilanterol (BREO ELLIPTA) 200-25 MCG/ACT inhaler     furosemide (LASIX) 40 MG tablet Take 1 tablet by mouth Daily.    gabapentin (NEURONTIN) 100 MG capsule Take 1 capsule by mouth 2 (Two) Times a Day.    glucose blood test strip Care Sen N  Dx code E11.42 testing bs 2 x day    insulin NPH (humuLIN N,novoLIN N) 100 UNIT/ML injection 65 units at bedtime "    Insulin Pen Needle (PEN NEEDLES) 32G X 5 MM misc 1 each Daily.    insulin regular (humuLIN R,novoLIN R) 100 UNIT/ML injection inject 20 units in the morning 15 units at lunch and 25 units at dinner (Patient taking differently: inject 25 units in the morning 15 units at lunch and 25 units at dinner)    Insulin Syringes, Disposable, U-100 0.5 ML misc Use daily    losartan (COZAAR) 100 MG tablet TAKE 1 TABLET BY MOUTH EVERY DAY    metFORMIN ER (GLUCOPHAGE-XR) 500 MG 24 hr tablet Take 4 tablets by mouth Daily With Breakfast.    Tirzepatide (Mounjaro) 5 MG/0.5ML solution pen-injector Inject 0.5 mL under the skin into the appropriate area as directed 1 (One) Time Per Week.    traZODone (DESYREL) 100 MG tablet Take 1 tablet by mouth every night at bedtime.    nicotine (NICODERM CQ) 14 MG/24HR patch APPLY 1 PATCH HUMPHREY THE SKIN AS DIRECTED 24 HOURS ON SKIN DAILY (Patient not taking: Reported on 8/16/2023)     No current facility-administered medications on file prior to visit.         HISTORY OF PRESENT ILLNESS     Since his last visit with me his endocrinology team started him on Ozempic but switched him to Mounjaro.  He has experienced mild weight loss since starting GLP-1 therapy.  Denies significant side effects.  However there has been supply issues with Mounjaro and he has not been able to titrate the dose.  Most recent A1c was significantly lower at 7.3.  He does not check his blood pressure regularly at home but his blood pressure seems to be increasing.  He is on losartan 100 mg daily along with furosemide 40 mg daily.    Lab Results   Component Value Date    HGBA1C 7.30 (H) 07/17/2023    HGBA1C 8.40 (H) 02/14/2023    HGBA1C 7.50 (H) 09/02/2022    CREATININE 0.92 07/17/2023    LDL 83 07/17/2023    MALBCRERATIO 173 (H) 07/17/2023     Blood sugar readings recorded on patient's flowsheet:       No data to display               114 kg (252 lb)    REVIEW OF SYSTEMS           PHYSICAL EXAMINATION     Physical Exam  Mild  weight loss noted  Cardiovascular: Normal rate, regular rhythm.  Pulm/Chest: Effort normal, breath sounds normal.  Trace BLE edema.       REVIEWED DATA     Labs:       Lab Results   Component Value Date     (H) 07/17/2023    K 4.6 07/17/2023    CALCIUM 10.2 07/17/2023    AST 13 07/17/2023    ALT 18 07/17/2023    BUN 11 07/17/2023    CREATININE 0.92 07/17/2023    CREATININE 0.81 02/14/2023    CREATININE 0.80 09/05/2022    EGFRIFNONA 80 11/03/2021    EGFRIFAFRI 93 11/03/2021       Lab Results   Component Value Date    HGBA1C 7.30 (H) 07/17/2023    HGBA1C 8.40 (H) 02/14/2023    HGBA1C 7.50 (H) 09/02/2022       Lab Results   Component Value Date    LDL 83 07/17/2023    LDL 85 02/14/2023     (H) 03/02/2022    HDL 39 (L) 07/17/2023    HDL 46 02/14/2023    TRIG 206 (H) 07/17/2023    TRIG 301 (H) 02/14/2023       Lab Results   Component Value Date    TSH 2.070 09/02/2022    TSH 2.340 02/27/2019    FREET4 1.18 02/27/2019       Lab Results   Component Value Date    WBC 7.88 09/05/2022    HGB 17.0 09/05/2022     09/05/2022       Lab Results   Component Value Date    MALBCRERATIO 173 (H) 07/17/2023            Imaging:           Medical Tests:           Summary of old records / correspondence / consultant report:           Request outside records:

## 2023-09-01 ENCOUNTER — TELEPHONE (OUTPATIENT)
Dept: INTERNAL MEDICINE | Age: 63
End: 2023-09-01

## 2023-09-01 NOTE — TELEPHONE ENCOUNTER
"    Caller: Carrington Biggs \"AMANDA\"    Relationship to patient: Self    Best call back number: [phone]     Date of exposure:8/28/23    Date of positive COVID19 test: 8/31/23     COVID19 symptoms: RUNNY NOSE, COUGH,BODY ACHES FROM THE NECK UP     Additional information or concerns: PATIENT WOULD LIKE TO KNOW WHAT DR GUARDADO RECOMMENDS HIM TO DO OR TAKE     What is the patients preferred pharmacy: [facility]/pharmacy #[id number] [address] - [phone] [facility] [phone] [facility]       PLEASE CALL AND ADVISE   "

## 2023-10-11 ENCOUNTER — TELEPHONE (OUTPATIENT)
Dept: INTERNAL MEDICINE | Age: 63
End: 2023-10-11
Payer: COMMERCIAL

## 2023-10-11 NOTE — TELEPHONE ENCOUNTER
"  Caller: Carrington Biggs \"AMANDA\"    Relationship: Self    Best call back number: [phone]     What is the best time to reach you: ANY     Who are you requesting to speak with (clinical staff, provider,  specific staff member): CLINICAL STAFF     What was the call regarding: PATIENT CALLED TO ASK IF IT WAS SAFE TO GET NEW COVID BOOSTER AFTER HAVING COVID 5 WEEKS AGO. HE HAD 2 FRIENDS TELL HIM HE SHOULDN'T, BUT WANTED TO VERIFY WITH DR GUARDADO     Is it okay if the provider responds through MyChart: YES         "

## 2023-10-16 ENCOUNTER — HOSPITAL ENCOUNTER (OUTPATIENT)
Dept: CT IMAGING | Facility: HOSPITAL | Age: 63
Discharge: HOME | End: 2023-10-16
Admitting: NURSE PRACTITIONER
Payer: COMMERCIAL

## 2023-10-16 DIAGNOSIS — Z12.2 SCREENING FOR LUNG CANCER: ICD-10-CM

## 2023-10-16 PROCEDURE — 71271 CT THORAX LUNG CANCER SCR C-: CPT

## 2023-10-17 ENCOUNTER — OFFICE VISIT (OUTPATIENT)
Dept: ENDOCRINOLOGY | Age: 63
End: 2023-10-17
Payer: COMMERCIAL

## 2023-10-17 VITALS
HEIGHT: 68 IN | TEMPERATURE: 97.1 F | BODY MASS INDEX: 37.74 KG/M2 | HEART RATE: 84 BPM | OXYGEN SATURATION: 96 % | SYSTOLIC BLOOD PRESSURE: 128 MMHG | WEIGHT: 249 LBS | DIASTOLIC BLOOD PRESSURE: 70 MMHG

## 2023-10-17 DIAGNOSIS — N20.0 NEPHROLITHIASIS: ICD-10-CM

## 2023-10-17 DIAGNOSIS — E78.5 HYPERLIPIDEMIA, UNSPECIFIED HYPERLIPIDEMIA TYPE: Chronic | ICD-10-CM

## 2023-10-17 DIAGNOSIS — R80.9 TYPE 2 DIABETES MELLITUS WITH MICROALBUMINURIA, WITHOUT LONG-TERM CURRENT USE OF INSULIN: Primary | Chronic | ICD-10-CM

## 2023-10-17 DIAGNOSIS — Z72.0 CURRENT TOBACCO USE: ICD-10-CM

## 2023-10-17 DIAGNOSIS — E11.29 TYPE 2 DIABETES MELLITUS WITH MICROALBUMINURIA, WITHOUT LONG-TERM CURRENT USE OF INSULIN: Primary | Chronic | ICD-10-CM

## 2023-10-17 DIAGNOSIS — J44.9 CHRONIC OBSTRUCTIVE PULMONARY DISEASE, UNSPECIFIED COPD TYPE: ICD-10-CM

## 2023-10-17 DIAGNOSIS — G47.33 OBSTRUCTIVE SLEEP APNEA SYNDROME: ICD-10-CM

## 2023-10-17 DIAGNOSIS — E11.42 TYPE 2 DIABETES MELLITUS WITH PERIPHERAL NEUROPATHY: ICD-10-CM

## 2023-10-17 DIAGNOSIS — I10 ESSENTIAL HYPERTENSION: Chronic | ICD-10-CM

## 2023-10-17 PROCEDURE — 99214 OFFICE O/P EST MOD 30 MIN: CPT | Performed by: INTERNAL MEDICINE

## 2023-10-17 RX ORDER — TIRZEPATIDE 7.5 MG/.5ML
7.5 INJECTION, SOLUTION SUBCUTANEOUS WEEKLY
Qty: 2 ML | Refills: 0 | Status: SHIPPED | OUTPATIENT
Start: 2023-10-17

## 2023-10-17 RX ORDER — GABAPENTIN 100 MG/1
100 CAPSULE ORAL 2 TIMES DAILY
Qty: 60 CAPSULE | Refills: 0 | Status: SHIPPED | OUTPATIENT
Start: 2023-10-17

## 2023-10-17 NOTE — PROGRESS NOTES
Subjective   Carrington Biggs is a 62 y.o. male.     History of Present Illness     He has known diabetes mellitus since 2012 and was started on insulin in 2013.  He is on Mounjaro 5 mg weekly, Novolin N 65 units at bedtime, Novolin R 20 units at breakfast, 15 units at lunch, and 25 units at supper and Metformin  mg 4 tablets daily.  -180.  He denies hypoglycemic episodes.  He has a high deductible insurance plan.  He has lost 2 lbs since 7/23. His last meal was at 7:15 AM.     He denies blurry vision.  His last eye examination was in 8/17.  He has microalbuminuria on urine sample taken 7/23.  He is on losartan.  He has numbness and burning in his feet improved with gabapentin 100 mg twice a day.    He has hyperlipidemia and has been on Lipitor 40 mg once a day.  He has cervical arthritis on x-ray.  He denies muscle pain.   Lipid panel done in March 2023 are as follows: Cholesterol 180.  HDL 46.  LDL 85.  Triglycerides 301.     He has hypertension and is on losartan 100 mg once a day.  He denies any history of heart attack or stroke.  He denies any chest pain or shortness of breath.     He has sleep apnea and COPD.  He is using Trilogy with oxygen.  He wakes up rested.  He smokes 15 cigarettes/day.  He follows with Dr. Aguero.     He had an incidental 4 mm nonobstructing stone in the upper pole of right kidney.     He has seen Dr. 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     He had 3 Moderna Covid vaccine without major side effects.  He had Covid infection 5 weeks ago and did not require hospitalization.    The following portions of the patient's history were reviewed and updated as appropriate: allergies, current medications, past family history, past medical history, past social history, past surgical history, and problem list.    Review of Systems   HENT: Negative.     Eyes:  Negative for visual disturbance.   Respiratory:  Negative for shortness of breath and wheezing.    Cardiovascular:  Negative  "for chest pain and palpitations.   Gastrointestinal: Negative.    Genitourinary: Negative.    Musculoskeletal:  Negative for myalgias.   Neurological:  Positive for numbness.     Vitals:    10/17/23 1339   BP: 128/70   Pulse: 84   Temp: 97.1 °F (36.2 °C)   SpO2: 96%   Weight: 113 kg (249 lb)   Height: 172.7 cm (67.99\")      Objective   Physical Exam  Constitutional:       Appearance: Normal appearance. He is not toxic-appearing or diaphoretic.   HENT:      Mouth/Throat:      Pharynx: No oropharyngeal exudate or posterior oropharyngeal erythema.   Eyes:      General: No scleral icterus.        Right eye: No discharge.         Left eye: No discharge.      Extraocular Movements: Extraocular movements intact.      Conjunctiva/sclera: Conjunctivae normal.   Neck:      Vascular: No carotid bruit.   Cardiovascular:      Rate and Rhythm: Normal rate and regular rhythm.      Pulses: Normal pulses.      Heart sounds: Normal heart sounds. No murmur heard.     No friction rub.   Pulmonary:      Effort: No respiratory distress.      Breath sounds: Normal breath sounds. No stridor. No rales.   Chest:      Chest wall: No tenderness.   Abdominal:      General: Bowel sounds are normal.      Palpations: Abdomen is soft.      Tenderness: There is no right CVA tenderness or left CVA tenderness.   Musculoskeletal:      Cervical back: No rigidity or tenderness.      Right lower leg: No edema.      Left lower leg: No edema.   Lymphadenopathy:      Cervical: No cervical adenopathy.   Skin:     General: Skin is warm and dry.   Neurological:      Mental Status: He is alert and oriented to person, place, and time.       Office Visit on 07/17/2023   Component Date Value Ref Range Status    Hemoglobin A1C 07/17/2023 7.30 (H)  4.80 - 5.60 % Final    Comment: Hemoglobin A1C Ranges:  Increased Risk for Diabetes  5.7% to 6.4%  Diabetes                     >= 6.5%  Diabetic Goal                < 7.0%      Glucose 07/17/2023 65  65 - 99 mg/dL Final "    BUN 07/17/2023 11  8 - 23 mg/dL Final    Creatinine 07/17/2023 0.92  0.76 - 1.27 mg/dL Final    EGFR Result 07/17/2023 94.1  >60.0 mL/min/1.73 Final    Comment: GFR Normal >60  Chronic Kidney Disease <60  Kidney Failure <15      BUN/Creatinine Ratio 07/17/2023 12.0  7.0 - 25.0 Final    Sodium 07/17/2023 146 (H)  136 - 145 mmol/L Final    Potassium 07/17/2023 4.6  3.5 - 5.2 mmol/L Final    Chloride 07/17/2023 104  98 - 107 mmol/L Final    Total CO2 07/17/2023 31.1 (H)  22.0 - 29.0 mmol/L Final    Calcium 07/17/2023 10.2  8.6 - 10.5 mg/dL Final    Total Protein 07/17/2023 6.6  6.0 - 8.5 g/dL Final    Albumin 07/17/2023 4.7  3.5 - 5.2 g/dL Final    Globulin 07/17/2023 1.9  gm/dL Final    A/G Ratio 07/17/2023 2.5  g/dL Final    Total Bilirubin 07/17/2023 0.2  0.0 - 1.2 mg/dL Final    Alkaline Phosphatase 07/17/2023 88  39 - 117 U/L Final    AST (SGOT) 07/17/2023 13  1 - 40 U/L Final    ALT (SGPT) 07/17/2023 18  1 - 41 U/L Final    Creatinine, Urine 07/17/2023 182.7  Not Estab. mg/dL Final    Microalbumin, Urine 07/17/2023 316.9  Not Estab. ug/mL Final    Microalbumin/Creatinine Ratio 07/17/2023 173 (H)  0 - 29 mg/g creat Final    Comment:                        Normal:                0 -  29                         Moderately increased: 30 - 300                         Severely increased:       >300      Total Cholesterol 07/17/2023 157  0 - 200 mg/dL Final    Comment: Cholesterol Reference Ranges  (U.S. Department of Health and Human Services ATP III  Classifications)  Desirable          <200 mg/dL  Borderline High    200-239 mg/dL  High Risk          >240 mg/dL  Triglyceride Reference Ranges  (U.S. Department of Health and Human Services ATP III  Classifications)  Normal           <150 mg/dL  Borderline High  150-199 mg/dL  High             200-499 mg/dL  Very High        >500 mg/dL  HDL Reference Ranges  (U.S. Department of Health and Human Services ATP III  Classifications)  Low     <40 mg/dl (major risk factor  for CHD)  High    >60 mg/dl ('negative' risk factor for CHD)  LDL Reference Ranges  (U.S. Department of Health and Human Services ATP III  Classifications)  Optimal          <100 mg/dL  Near Optimal     100-129 mg/dL  Borderline High  130-159 mg/dL  High             160-189 mg/dL  Very High        >189 mg/dL      Triglycerides 07/17/2023 206 (H)  0 - 150 mg/dL Final    HDL Cholesterol 07/17/2023 39 (L)  40 - 60 mg/dL Final    VLDL Cholesterol Gerhard 07/17/2023 35  5 - 40 mg/dL Final    LDL Chol Calc (NIH) 07/17/2023 83  0 - 100 mg/dL Final    Interpretation 07/17/2023 Note   Final    Supplemental report is available.     Assessment & Plan   Diagnoses and all orders for this visit:    1. Type 2 diabetes mellitus with microalbuminuria, without long-term current use of insulin (Primary)  -     Comprehensive Metabolic Panel  -     Hemoglobin A1c  -     Lipid Panel  -     TSH    2. Type 2 diabetes mellitus with peripheral neuropathy  -     gabapentin (NEURONTIN) 100 MG capsule; Take 1 capsule by mouth 2 (Two) Times a Day.  Dispense: 60 capsule; Refill: 0  -     Comprehensive Metabolic Panel  -     Hemoglobin A1c  -     Lipid Panel  -     TSH    3. Hyperlipidemia, unspecified hyperlipidemia type  -     Lipid Panel  -     TSH    4. Essential hypertension  -     Comprehensive Metabolic Panel    5. Obstructive sleep apnea syndrome    6. Chronic obstructive pulmonary disease, unspecified COPD type    7. Nephrolithiasis  -     Comprehensive Metabolic Panel    8. Current tobacco use    Other orders  -     Tirzepatide (Mounjaro) 7.5 MG/0.5ML solution pen-injector; Inject 0.5 mL under the skin into the appropriate area as directed 1 (One) Time Per Week.  Dispense: 2 mL; Refill: 0      Increase Mounjaro to 7.5 mg weekly.  Continue Novolin N, Novolin R, and metformin ER.  Continue gabapentin 100 mg twice daily.  Advised to have a yearly eye exam.  Continue Lipitor 40 mg/day.  Continue losartan 100 mg/day.  Continue Trilogy with oxygen  supplement.  Follow-up with Dr. Aguero.  Discussed about smoking cessation.  Advised to follow-up with Dr. Cui.  Advised to discuss with Dr. Louise about RSV vaccine.  Flu vaccine this fall.    Copy of my note sent to Dr. Louise and Dr. Aguero.    RTC 3 mos with GAIL Santos NP and with me in 6 mos.

## 2023-10-18 LAB
ALBUMIN SERPL-MCNC: 4.9 G/DL (ref 3.5–5.2)
ALBUMIN/GLOB SERPL: 2.2 G/DL
ALP SERPL-CCNC: 85 U/L (ref 39–117)
ALT SERPL-CCNC: 11 U/L (ref 1–41)
AST SERPL-CCNC: 11 U/L (ref 1–40)
BILIRUB SERPL-MCNC: 0.3 MG/DL (ref 0–1.2)
BUN SERPL-MCNC: 13 MG/DL (ref 8–23)
BUN/CREAT SERPL: 15.9 (ref 7–25)
CALCIUM SERPL-MCNC: 10.2 MG/DL (ref 8.6–10.5)
CHLORIDE SERPL-SCNC: 99 MMOL/L (ref 98–107)
CHOLEST SERPL-MCNC: 150 MG/DL (ref 0–200)
CO2 SERPL-SCNC: 32.3 MMOL/L (ref 22–29)
CREAT SERPL-MCNC: 0.82 MG/DL (ref 0.76–1.27)
EGFRCR SERPLBLD CKD-EPI 2021: 99.3 ML/MIN/1.73
GLOBULIN SER CALC-MCNC: 2.2 GM/DL
GLUCOSE SERPL-MCNC: 69 MG/DL (ref 65–99)
HBA1C MFR BLD: 6.2 % (ref 4.8–5.6)
HDLC SERPL-MCNC: 36 MG/DL (ref 40–60)
IMP & REVIEW OF LAB RESULTS: NORMAL
LDLC SERPL CALC-MCNC: 79 MG/DL (ref 0–100)
POTASSIUM SERPL-SCNC: 4.8 MMOL/L (ref 3.5–5.2)
PROT SERPL-MCNC: 7.1 G/DL (ref 6–8.5)
SODIUM SERPL-SCNC: 142 MMOL/L (ref 136–145)
TRIGL SERPL-MCNC: 206 MG/DL (ref 0–150)
TSH SERPL DL<=0.005 MIU/L-ACNC: 2.91 UIU/ML (ref 0.27–4.2)
VLDLC SERPL CALC-MCNC: 35 MG/DL (ref 5–40)

## 2023-10-19 NOTE — PROGRESS NOTES
Hemoglobin A1c 6.2%.  Diabetes well controlled.  LDL 79.  HDL 36.  Triglycerides 206.  Continue Lipitor 40 mg/day.  Normal thyroid function tests.  Copy of labs sent to Dr. Louise, Dr. Johnnie Aguero, and to patient through [employer].

## 2023-10-21 DIAGNOSIS — I10 ESSENTIAL HYPERTENSION: ICD-10-CM

## 2023-10-23 RX ORDER — LOSARTAN POTASSIUM 100 MG/1
TABLET ORAL
Qty: 90 TABLET | Refills: 1 | Status: SHIPPED | OUTPATIENT
Start: 2023-10-23

## 2023-10-26 ENCOUNTER — TRANSCRIBE ORDERS (OUTPATIENT)
Dept: ADMINISTRATIVE | Facility: HOSPITAL | Age: 63
End: 2023-10-26
Payer: COMMERCIAL

## 2023-10-26 DIAGNOSIS — R91.8 PULMONARY INFILTRATE: Primary | ICD-10-CM

## 2023-11-04 DIAGNOSIS — G47.09 OTHER INSOMNIA: ICD-10-CM

## 2023-11-06 RX ORDER — TRAZODONE HYDROCHLORIDE 100 MG/1
100 TABLET ORAL
Qty: 90 TABLET | Refills: 1 | Status: SHIPPED | OUTPATIENT
Start: 2023-11-06

## 2023-11-27 NOTE — TELEPHONE ENCOUNTER
"  Caller: Carrington Biggs PHILIP \"AMANDA\"    Relationship: Self    Best call back number: [phone]    Requested Prescriptions:   Requested Prescriptions     Pending Prescriptions Disp Refills    Tirzepatide (Mounjaro) 7.5 MG/0.5ML solution pen-injector 2 mL 0     Sig: Inject 0.5 mL under the skin into the appropriate area as directed 1 (One) Time Per Week.        Pharmacy where request should be sent: [facility]/PHARMACY #[id number] [address] [phone] [facility] [phone] FX     Last office visit with prescribing clinician: 10/17/2023   Last telemedicine visit with prescribing clinician: Visit date not found   Next office visit with prescribing clinician: 4/22/2024     Additional details provided by patient: WOULD LIKE 90 DAY SUPPLY    Does the patient have less than a 3 day supply:  [x] Yes  [] No    Would you like a call back once the refill request has been completed: [x] Yes [] No    If the office needs to give you a call back, can they leave a voicemail: [x] Yes [] No    Yesenia Sheets Rep   11/27/23 13:35 EST       "

## 2023-11-27 NOTE — TELEPHONE ENCOUNTER
Rx Refill Note  Requested Prescriptions     Pending Prescriptions Disp Refills    Tirzepatide (Mounjaro) 7.5 MG/0.5ML solution pen-injector 2 mL 0     Sig: Inject 0.5 mL under the skin into the appropriate area as directed 1 (One) Time Per Week.      Last office visit with prescribing clinician: 10/17/2023   Last telemedicine visit with prescribing clinician: Visit date not found   Next office visit with prescribing clinician: 4/22/2024                         Would you like a call back once the refill request has been completed: [] Yes [] No    If the office needs to give you a call back, can they leave a voicemail: [] Yes [] No    Susannah Serrano  11/27/23, 13:37 EST

## 2023-11-30 RX ORDER — TIRZEPATIDE 7.5 MG/.5ML
7.5 INJECTION, SOLUTION SUBCUTANEOUS WEEKLY
Qty: 2 ML | Refills: 0 | OUTPATIENT
Start: 2023-11-30

## 2023-11-30 RX ORDER — TIRZEPATIDE 10 MG/.5ML
10 INJECTION, SOLUTION SUBCUTANEOUS WEEKLY
Qty: 2 ML | Refills: 1 | Status: SHIPPED | OUTPATIENT
Start: 2023-11-30

## 2023-11-30 NOTE — TELEPHONE ENCOUNTER
Increase Mounjaro to 10 mg weekly.  Prescription was sent to the pharmacy.  Watch blood sugar closely.  Please notify patient of dose change

## 2023-12-02 DIAGNOSIS — E78.5 HYPERLIPIDEMIA, UNSPECIFIED HYPERLIPIDEMIA TYPE: ICD-10-CM

## 2023-12-04 RX ORDER — ATORVASTATIN CALCIUM 40 MG/1
TABLET, FILM COATED ORAL
Qty: 90 TABLET | Refills: 1 | Status: SHIPPED | OUTPATIENT
Start: 2023-12-04

## 2023-12-26 ENCOUNTER — OFFICE VISIT (OUTPATIENT)
Dept: ENDOCRINOLOGY | Age: 63
End: 2023-12-26
Payer: COMMERCIAL

## 2023-12-26 ENCOUNTER — HOSPITAL ENCOUNTER (OUTPATIENT)
Dept: CT IMAGING | Facility: HOSPITAL | Age: 63
Discharge: HOME | End: 2023-12-26
Admitting: INTERNAL MEDICINE
Payer: COMMERCIAL

## 2023-12-26 VITALS
DIASTOLIC BLOOD PRESSURE: 74 MMHG | WEIGHT: 250.2 LBS | OXYGEN SATURATION: 95 % | TEMPERATURE: 97.3 F | HEART RATE: 83 BPM | BODY MASS INDEX: 37.92 KG/M2 | SYSTOLIC BLOOD PRESSURE: 122 MMHG | HEIGHT: 68 IN

## 2023-12-26 DIAGNOSIS — I10 ESSENTIAL HYPERTENSION: Chronic | ICD-10-CM

## 2023-12-26 DIAGNOSIS — E78.5 HYPERLIPIDEMIA, UNSPECIFIED HYPERLIPIDEMIA TYPE: Chronic | ICD-10-CM

## 2023-12-26 DIAGNOSIS — E11.29 TYPE 2 DIABETES MELLITUS WITH MICROALBUMINURIA, WITHOUT LONG-TERM CURRENT USE OF INSULIN: Primary | Chronic | ICD-10-CM

## 2023-12-26 DIAGNOSIS — R80.9 TYPE 2 DIABETES MELLITUS WITH MICROALBUMINURIA, WITHOUT LONG-TERM CURRENT USE OF INSULIN: Primary | Chronic | ICD-10-CM

## 2023-12-26 DIAGNOSIS — R91.8 PULMONARY INFILTRATE: ICD-10-CM

## 2023-12-26 PROCEDURE — 71250 CT THORAX DX C-: CPT

## 2023-12-26 NOTE — PROGRESS NOTES
"Chief Complaint  Chief Complaint   Patient presents with    Diabetes     Dm  2 testing bs 1 x day        Subjective          History of Present Illness    Carrington Biggs 62 y.o.  presents for a follow-up evaluation for type 2 DM     He has been diabetic since 2012 and started insulin in 2013     Pt is on the following medications for their DM: Novolin N 65 units at bedtime, Novolin R 20 units at breakfast, 15 units at lunch, and 25 units at supper, Metformin  mg 4 tablets daily and Mounjaro 10 mg weekly        Pt complains of shortness of breath due to COPD and numbness and tingling in feet    Denies diarrhea, constipation, chest pain and vision changes    Weight stable since last visit.    Last eye exam was 08/17     Pt does have a history of nephropathy.  Patient is currently taking ARB     Pt does have neuropathy.  Current takes gabapentin 100 mg twice daily for this condition.     Pt does not have a history of CAD or CVA.    Last A1C in 10/23 was 6.2    Last microalbumin in 07/23 was positive          Blood Sugars    Blood glucoses are checked 1/day.    Fasting blood glucoses: 130s - 170s    Pt has no episodes of hypoglycemia.            Hyperlipidemia     Pt is currently taking atorvastatin 40 mg HS     Last lipid panel in 10/23 showed Total 150, HDL 36, LDL 79 and Triglycerides 206            Hypertension    Pt denies any chest pain, palpitations or headache    Current regimen includes  losartan 100 mg daily and Lasix 40 mg daily.              I have reviewed the patient's allergies, medicines, past medical hx, family hx and social hx.    Objective   Vital Signs:   /74   Pulse 83   Temp 97.3 °F (36.3 °C) (Temporal)   Ht 172.7 cm (67.99\")   Wt 113 kg (250 lb 3.2 oz)   SpO2 95%   BMI 38.05 kg/m²       Physical Exam   Physical Exam  Constitutional:       General: He is not in acute distress.     Appearance: Normal appearance. He is not diaphoretic.   HENT:      Head: Normocephalic and " atraumatic.   Eyes:      General:         Right eye: No discharge.         Left eye: No discharge.   Skin:     General: Skin is warm and dry.   Neurological:      Mental Status: He is alert.   Psychiatric:         Mood and Affect: Mood normal.         Behavior: Behavior normal.                    Results Review:   Hemoglobin A1C   Date Value Ref Range Status   10/17/2023 6.20 (H) 4.80 - 5.60 % Final     Comment:     Hemoglobin A1C Ranges:  Increased Risk for Diabetes  5.7% to 6.4%  Diabetes                     >= 6.5%  Diabetic Goal                < 7.0%     09/02/2022 7.50 (H) 4.80 - 5.60 % Final     Triglycerides   Date Value Ref Range Status   10/17/2023 206 (H) 0 - 150 mg/dL Final     HDL Cholesterol   Date Value Ref Range Status   10/17/2023 36 (L) 40 - 60 mg/dL Final     LDL Chol Calc (NIH)   Date Value Ref Range Status   10/17/2023 79 0 - 100 mg/dL Final     VLDL Cholesterol Gerhard   Date Value Ref Range Status   10/17/2023 35 5 - 40 mg/dL Final         Assessment and Plan {CC Problem List  Visit Diagnosis  ROS  Review (Popup)  Health Maintenance  Quality  BestPractice  Medications  SmartSets  SnapShot Encounters  Media :23  Diagnoses and all orders for this visit:    1. Type 2 diabetes mellitus with microalbuminuria, without long-term current use of insulin (Primary)  -     Tirzepatide (Mounjaro) 10 MG/0.5ML solution pen-injector pen; Inject 0.5 mL under the skin into the appropriate area as directed 1 (One) Time Per Week.  Dispense: 6 mL; Refill: 1  -     Hemoglobin A1c  -     Comprehensive Metabolic Panel    Check labs today  Continue with Novolin N 65 units at bedtime  Continue with Novolin R 20 units at breakfast, 15 units at lunch, and 25 units at supper  Continue with Metformin  mg 4 tablets daily  Continue with Mounjaro 10 mg weekly  Pt didn't want to go up on Mounjaro at this time  Advised for annual eye exam      2. Hyperlipidemia, unspecified hyperlipidemia type  -     Comprehensive  Metabolic Panel  -     Lipid Panel    Check labs today  Continue with statin      3. Essential hypertension  -     Comprehensive Metabolic Panel    Stable  Continue with current medication regimen  Defer management to PCP           Refills sent to pharmacy        Labs today  RTC on 04/22/24 with Dr. Anaya      Follow Up     Patient was given instructions and counseling regarding her condition or for health maintenance advice. Please see specific information pulled into the AVS if appropriate.              Lakisha Santos, DARLINE  12/26/23

## 2023-12-27 ENCOUNTER — OFFICE VISIT (OUTPATIENT)
Dept: INTERNAL MEDICINE | Age: 63
End: 2023-12-27
Payer: COMMERCIAL

## 2023-12-27 ENCOUNTER — TELEPHONE (OUTPATIENT)
Dept: ENDOCRINOLOGY | Age: 63
End: 2023-12-27
Payer: COMMERCIAL

## 2023-12-27 VITALS
BODY MASS INDEX: 37.74 KG/M2 | TEMPERATURE: 97.3 F | WEIGHT: 249 LBS | HEART RATE: 79 BPM | SYSTOLIC BLOOD PRESSURE: 150 MMHG | DIASTOLIC BLOOD PRESSURE: 88 MMHG | OXYGEN SATURATION: 96 % | HEIGHT: 68 IN

## 2023-12-27 DIAGNOSIS — I10 ESSENTIAL HYPERTENSION: Primary | Chronic | ICD-10-CM

## 2023-12-27 DIAGNOSIS — E11.29 TYPE 2 DIABETES MELLITUS WITH MICROALBUMINURIA, WITHOUT LONG-TERM CURRENT USE OF INSULIN: Chronic | ICD-10-CM

## 2023-12-27 DIAGNOSIS — E66.01 CLASS 2 SEVERE OBESITY DUE TO EXCESS CALORIES WITH SERIOUS COMORBIDITY AND BODY MASS INDEX (BMI) OF 38.0 TO 38.9 IN ADULT: Chronic | ICD-10-CM

## 2023-12-27 DIAGNOSIS — E78.5 HYPERLIPIDEMIA, UNSPECIFIED HYPERLIPIDEMIA TYPE: Chronic | ICD-10-CM

## 2023-12-27 DIAGNOSIS — R80.9 TYPE 2 DIABETES MELLITUS WITH MICROALBUMINURIA, WITHOUT LONG-TERM CURRENT USE OF INSULIN: Chronic | ICD-10-CM

## 2023-12-27 DIAGNOSIS — J38.1 VOCAL CORD POLYP: ICD-10-CM

## 2023-12-27 LAB
ALBUMIN SERPL-MCNC: 4.7 G/DL (ref 3.5–5.2)
ALBUMIN/GLOB SERPL: 2 G/DL
ALP SERPL-CCNC: 95 U/L (ref 39–117)
ALT SERPL-CCNC: 16 U/L (ref 1–41)
AST SERPL-CCNC: 12 U/L (ref 1–40)
BILIRUB SERPL-MCNC: 0.3 MG/DL (ref 0–1.2)
BUN SERPL-MCNC: 14 MG/DL (ref 8–23)
BUN/CREAT SERPL: 18.7 (ref 7–25)
CALCIUM SERPL-MCNC: 10.4 MG/DL (ref 8.6–10.5)
CHLORIDE SERPL-SCNC: 95 MMOL/L (ref 98–107)
CHOLEST SERPL-MCNC: 226 MG/DL (ref 0–200)
CO2 SERPL-SCNC: 31.2 MMOL/L (ref 22–29)
CREAT SERPL-MCNC: 0.75 MG/DL (ref 0.76–1.27)
EGFRCR SERPLBLD CKD-EPI 2021: 102 ML/MIN/1.73
GLOBULIN SER CALC-MCNC: 2.4 GM/DL
GLUCOSE SERPL-MCNC: 149 MG/DL (ref 65–99)
HBA1C MFR BLD: 6.8 % (ref 4.8–5.6)
HDLC SERPL-MCNC: 46 MG/DL (ref 40–60)
IMP & REVIEW OF LAB RESULTS: NORMAL
LDLC SERPL CALC-MCNC: 129 MG/DL (ref 0–100)
POTASSIUM SERPL-SCNC: 4.8 MMOL/L (ref 3.5–5.2)
PROT SERPL-MCNC: 7.1 G/DL (ref 6–8.5)
SODIUM SERPL-SCNC: 140 MMOL/L (ref 136–145)
TRIGL SERPL-MCNC: 289 MG/DL (ref 0–150)
VLDLC SERPL CALC-MCNC: 51 MG/DL (ref 5–40)

## 2023-12-27 RX ORDER — ATORVASTATIN CALCIUM 80 MG/1
80 TABLET, FILM COATED ORAL NIGHTLY
Qty: 90 TABLET | Refills: 1 | Status: SHIPPED | OUTPATIENT
Start: 2023-12-27

## 2023-12-27 NOTE — TELEPHONE ENCOUNTER
Left pt a message to return call.   Okay for hub and  to read results.     A1c is great at 6.8%    Kidney function is good    Total cholesterol, triglycerides and LDL are all elevated.  Please check with patient that he has been taking atorvastatin 40 mg at bedtime consistently and not missing doses.  If not then will increase dose to help lower these numbers

## 2023-12-27 NOTE — ASSESSMENT & PLAN NOTE
No significant weight loss on higher dose Mounjaro.   Discuss with endocrinologist about titrating dose higher or adding SGLT-2 inhibitor.     Lab Results   Component Value Date    HGBA1C 6.80 (H) 12/26/2023    HGBA1C 6.20 (H) 10/17/2023    HGBA1C 7.30 (H) 07/17/2023    CREATININE 0.75 (L) 12/26/2023     (H) 12/26/2023    MALBCRERATIO 173 (H) 07/17/2023

## 2023-12-27 NOTE — ASSESSMENT & PLAN NOTE
Wt Readings from Last 3 Encounters:   12/27/23 113 kg (249 lb)   12/26/23 113 kg (250 lb 3.2 oz)   10/17/23 113 kg (249 lb)     Body mass index is 37.87 kg/m².     Discuss with endocrinologist about titrating Mounjaro further or adding SGLT-2 inhibitor.

## 2023-12-27 NOTE — PROGRESS NOTES
I N T E R N A L  M E D I C I N E    J U N O H  K I M,  M D      ENCOUNTER DATE:  12/27/2023    Carrington PALACIOS Shell / 62 y.o. / male    CHIEF COMPLAINT / REASON FOR OFFICE VISIT     Hypertension, Diabetes, and Obesity      ASSESSMENT & PLAN     Problem List Items Addressed This Visit          High    Essential hypertension - Primary (Chronic)    Overview     Continue losartan 100 mg qd.          Current Assessment & Plan     BP Readings from Last 3 Encounters:   12/27/23 150/88   12/26/23 122/74   10/17/23 128/70      Blood pressure is elevated today and at home.   Discuss with endocrinologist about adding SGLT-2 inhibitor (Farxiga/Jardiance) which would be helpful for further A1c lowering, weight loss, and hypertension).   Send BP readings in 1 month.          Relevant Medications    furosemide (LASIX) 40 MG tablet    losartan (COZAAR) 100 MG tablet    Hyperlipidemia (Chronic)    Current Assessment & Plan     Lab Results   Component Value Date     (H) 12/26/2023    LDL 79 10/17/2023    LDL 83 07/17/2023    TRIG 289 (H) 12/26/2023    CHOLHDLRATIO 3.91 02/14/2023      LDL cholesterol is notably higher. Reports compliance with medication.   Increase atorvastatin to 80 mg.   Recheck labs in 3-4 months with endocrinologist.          Relevant Medications    atorvastatin (LIPITOR) 80 MG tablet       Medium    Type 2 diabetes mellitus with microalbuminuria, without long-term current use of insulin (Chronic)    Current Assessment & Plan     No significant weight loss on higher dose Mounjaro.   Discuss with endocrinologist about titrating dose higher or adding SGLT-2 inhibitor.     Lab Results   Component Value Date    HGBA1C 6.80 (H) 12/26/2023    HGBA1C 6.20 (H) 10/17/2023    HGBA1C 7.30 (H) 07/17/2023    CREATININE 0.75 (L) 12/26/2023     (H) 12/26/2023    MALBCRERATIO 173 (H) 07/17/2023             Relevant Medications    insulin NPH (humuLIN N,novoLIN N) 100 UNIT/ML injection    insulin regular (humuLIN  "R,novoLIN R) 100 UNIT/ML injection    metFORMIN ER (GLUCOPHAGE-XR) 500 MG 24 hr tablet    Tirzepatide (Mounjaro) 10 MG/0.5ML solution pen-injector pen    Class 2 severe obesity due to excess calories with serious comorbidity and body mass index (BMI) of 38.0 to 38.9 in adult (Chronic)    Current Assessment & Plan     Wt Readings from Last 3 Encounters:   12/27/23 113 kg (249 lb)   12/26/23 113 kg (250 lb 3.2 oz)   10/17/23 113 kg (249 lb)   Body mass index is 37.87 kg/m².     Discuss with endocrinologist about titrating Mounjaro further or adding SGLT-2 inhibitor.             Low    Vocal cord polyp    Current Assessment & Plan     Ongoing hoarseness. Follow-up closely with ENT.           No orders of the defined types were placed in this encounter.    New Medications Ordered This Visit   Medications    atorvastatin (LIPITOR) 80 MG tablet     Sig: Take 1 tablet by mouth Every Night.     Dispense:  90 tablet     Refill:  1       SUMMARY/DISCUSSION        Next Appointment with me: Visit date not found    Return in about 6 months (around 6/27/2024) for Reassess chronic medical problems.        VITAL SIGNS     Vitals:    12/27/23 1114   BP: 150/88   Pulse: 79   Temp: 97.3 °F (36.3 °C)   SpO2: 96%   Weight: 113 kg (249 lb)   Height: 172.7 cm (67.99\")       BP Readings from Last 3 Encounters:   12/27/23 150/88   12/26/23 122/74   10/17/23 128/70     Wt Readings from Last 3 Encounters:   12/27/23 113 kg (249 lb)   12/26/23 113 kg (250 lb 3.2 oz)   10/17/23 113 kg (249 lb)     Body mass index is 37.87 kg/m².    Blood pressure readings recorded on patient flowsheet:       No data to display                MEDICATIONS AT THE TIME OF OFFICE VISIT     Current Outpatient Medications on File Prior to Visit   Medication Sig    albuterol sulfate  (90 Base) MCG/ACT inhaler Inhale 1-2 puffs Every 4 (Four) Hours As Needed for Wheezing or Shortness of Air.    Fluticasone Furoate-Vilanterol (BREO ELLIPTA) 200-25 MCG/ACT inhaler  "    furosemide (LASIX) 40 MG tablet Take 1 tablet by mouth Daily.    gabapentin (NEURONTIN) 100 MG capsule Take 1 capsule by mouth 2 (Two) Times a Day.    glucose blood test strip Care Sen N  Dx code E11.42 testing bs 2 x day    insulin NPH (humuLIN N,novoLIN N) 100 UNIT/ML injection 65 units at bedtime    Insulin Pen Needle (PEN NEEDLES) 32G X 5 MM misc 1 each Daily.    insulin regular (humuLIN R,novoLIN R) 100 UNIT/ML injection inject 20 units in the morning 15 units at lunch and 25 units at dinner (Patient taking differently: inject 25 units in the morning 15 units at lunch and 25 units at dinner)    Insulin Syringes, Disposable, U-100 0.5 ML misc Use daily    losartan (COZAAR) 100 MG tablet TAKE 1 TABLET BY MOUTH EVERY DAY    metFORMIN ER (GLUCOPHAGE-XR) 500 MG 24 hr tablet Take 4 tablets by mouth Daily With Breakfast.    nicotine (NICODERM CQ) 14 MG/24HR patch     Tirzepatide (Mounjaro) 10 MG/0.5ML solution pen-injector pen Inject 0.5 mL under the skin into the appropriate area as directed 1 (One) Time Per Week.    traZODone (DESYREL) 100 MG tablet TAKE 1 TABLET BY MOUTH EVERYDAY AT BEDTIME    [DISCONTINUED] atorvastatin (LIPITOR) 40 MG tablet TAKE 1 TABLET BY MOUTH EVERY DAY     No current facility-administered medications on file prior to visit.         HISTORY OF PRESENT ILLNESS     He completed blood work ordered by his endocrinologist on 12/26/2023 and was informed his cholesterol was elevated. His LDL increased significantly to 129 mg/dL from 79 mg/dL in 10/2023. Currently on atorvastatin 40 mg daily for hyperlipidemia. His recent A1c also increased from 6.2 in 10/2023 to 6.8 percent. On Novolin, Mounjaro, and metformin for management of diabetes. Mounjaro was increased to 10 mg weekly by endocrinology in 10/2023; however, he has not been able to lose weight and his A1c has increased. Admits to eating more during the holidays recently as well. Sees endocrinology again in 04/2024.    His blood pressure is  elevated today at 150/88 mmHg. Blood pressure logs brought from home revealed it is consistently running over 130/80 mmHg. He takes losartan 100 mg daily in addition to Lasix 40 mg daily.     He will be seeing ENT on 02/06/2024 ongoing hoarseness with previously note vocal polyp. He continues to smoke and plans to try quitting again in 01/2024. He has tried quitting in the past with nicotine patches and Chantix but was unsuccessful. Has not previously taken Wellbutrin to help with smoking cessation.     Lab Results   Component Value Date    HGBA1C 6.80 (H) 12/26/2023    HGBA1C 6.20 (H) 10/17/2023    HGBA1C 7.30 (H) 07/17/2023    CREATININE 0.75 (L) 12/26/2023     (H) 12/26/2023    MALBCRERATIO 173 (H) 07/17/2023     Blood sugar readings recorded on patient's flowsheet:       No data to display               113 kg (249 lb)    REVIEW OF SYSTEMS           PHYSICAL EXAMINATION     Physical Exam  Obese   Hoarseness   Cardiovascular: Normal rate, regular rhythm.  Pulm/Chest: Effort normal, breath sounds normal.         REVIEWED DATA     Labs:       Lab Results   Component Value Date     12/26/2023    K 4.8 12/26/2023    CALCIUM 10.4 12/26/2023    AST 12 12/26/2023    ALT 16 12/26/2023    BUN 14 12/26/2023    CREATININE 0.75 (L) 12/26/2023    CREATININE 0.82 10/17/2023    CREATININE 0.92 07/17/2023    EGFRIFNONA 80 11/03/2021    EGFRIFAFRI 93 11/03/2021       Lab Results   Component Value Date    HGBA1C 6.80 (H) 12/26/2023    HGBA1C 6.20 (H) 10/17/2023    HGBA1C 7.30 (H) 07/17/2023       Lab Results   Component Value Date     (H) 12/26/2023    LDL 79 10/17/2023    LDL 83 07/17/2023    HDL 46 12/26/2023    HDL 36 (L) 10/17/2023    TRIG 289 (H) 12/26/2023    TRIG 206 (H) 10/17/2023       Lab Results   Component Value Date    TSH 2.910 10/17/2023    TSH 2.070 09/02/2022    TSH 2.340 02/27/2019    FREET4 1.18 02/27/2019       Lab Results   Component Value Date    WBC 7.88 09/05/2022    HGB 17.0 09/05/2022      09/05/2022       Lab Results   Component Value Date    GAGANDEEP 173 (H) 07/17/2023            Imaging:     CT Chest Without Contrast Diagnostic (12/26/2023 09:39)   FINDINGS: The patchy infiltrates in the bases of the lower lobes have  resolved. Stable tiny groundglass opacity posterior right lower lobe on  image 57. There is no lymphadenopathy or pleural effusion. Coronary  artery calcifications. Visualized upper abdominal structures are  unremarkable. There is no acute bony abnormality     IMPRESSION:  Patchy infiltrates in the lung bases have resolved.    Medical Tests:           Summary of old records / correspondence / consultant report:           Request outside records:       Transcribed from ambient dictation for Preet Louise MD by Shivani Roman.  12/27/23   11:52 EST    Patient or patient representative verbalized consent to the visit recording.  I have personally performed the services described in this document as transcribed by the above individual, and it is both accurate and complete.  Preet Louise MD  12/27/2023  13:02 EST

## 2023-12-27 NOTE — TELEPHONE ENCOUNTER
PATIENT CALLED RETURNING JUANA'S PHONE CALL. I READ HIM ELLEN'S NOTE. HE SAID HE IS TAKING HIS ATORVASTATIN DAILY AND IS NOT MISSING ANY DOSES. HE IS ON HIS WAY TO SEE HIS PCP

## 2023-12-27 NOTE — ASSESSMENT & PLAN NOTE
Lab Results   Component Value Date     (H) 12/26/2023    LDL 79 10/17/2023    LDL 83 07/17/2023    TRIG 289 (H) 12/26/2023    CHOLHDLRATIO 3.91 02/14/2023      LDL cholesterol is notably higher. Reports compliance with medication.   Increase atorvastatin to 80 mg.   Recheck labs in 3-4 months with endocrinologist.

## 2023-12-27 NOTE — ASSESSMENT & PLAN NOTE
BP Readings from Last 3 Encounters:   12/27/23 150/88   12/26/23 122/74   10/17/23 128/70      Blood pressure is elevated today and at home.   Discuss with endocrinologist about adding SGLT-2 inhibitor (Farxiga/Jardiance) which would be helpful for further A1c lowering, weight loss, and hypertension).   Send BP readings in 1 month.

## 2023-12-28 DIAGNOSIS — E11.42 TYPE 2 DIABETES MELLITUS WITH PERIPHERAL NEUROPATHY: ICD-10-CM

## 2023-12-28 RX ORDER — GABAPENTIN 100 MG/1
100 CAPSULE ORAL 2 TIMES DAILY
Qty: 60 CAPSULE | Refills: 0 | OUTPATIENT
Start: 2023-12-28

## 2023-12-28 RX ORDER — GABAPENTIN 100 MG/1
CAPSULE ORAL
Qty: 90 CAPSULE | Refills: 0 | Status: SHIPPED | OUTPATIENT
Start: 2023-12-28

## 2023-12-28 NOTE — TELEPHONE ENCOUNTER
Rx Refill Note  Requested Prescriptions     Pending Prescriptions Disp Refills    gabapentin (NEURONTIN) 100 MG capsule 60 capsule 0     Sig: Take 1 capsule by mouth 2 (Two) Times a Day.      Last office visit with prescribing clinician: 10/17/2023   Last telemedicine visit with prescribing clinician: Visit date not found   Next office visit with prescribing clinician: 4/22/2024                         Would you like a call back once the refill request has been completed: [] Yes [] No    If the office needs to give you a call back, can they leave a voicemail: [] Yes [] No    Susannah Serrano  12/28/23, 15:41 EST

## 2023-12-28 NOTE — TELEPHONE ENCOUNTER
May increase gabapentin 100 mg to 1 capsule every morning and 2 capsules every evening.  Prescription sent to [facility] pharmacy.

## 2023-12-28 NOTE — TELEPHONE ENCOUNTER
Rx Refill Note  Requested Prescriptions     Pending Prescriptions Disp Refills    gabapentin (NEURONTIN) 100 MG capsule [Pharmacy Med Name: GABAPENTIN 100 MG CAPSULE] 60 capsule 0     Sig: TAKE 1 CAPSULE BY MOUTH TWICE A DAY      Last office visit with prescribing clinician: 10/17/2023   Last telemedicine visit with prescribing clinician: Visit date not found   Next office visit with prescribing clinician: 4/22/2024                         Would you like a call back once the refill request has been completed: [] Yes [] No    If the office needs to give you a call back, can they leave a voicemail: [] Yes [] No    Susannah Serrano  12/28/23, 16:00 EST

## 2024-02-16 DIAGNOSIS — E11.42 TYPE 2 DIABETES MELLITUS WITH PERIPHERAL NEUROPATHY: ICD-10-CM

## 2024-02-16 RX ORDER — METFORMIN HYDROCHLORIDE 500 MG/1
2000 TABLET, EXTENDED RELEASE ORAL
Qty: 360 TABLET | Refills: 1 | Status: SHIPPED | OUTPATIENT
Start: 2024-02-16

## 2024-02-26 NOTE — H&P
[facility]   HISTORY AND PHYSICAL    Patient Name:Carrington Biggs  : 1960  MRN: [id number]  Primary Care Physician: Preet Louise MD  Date of admission: (Not on file)    Subjective   Subjective     Chief Complaint: Dysphonia    History of Present Illness   Carrington Biggs is a 63 y.o. male with chronic dysphonia but this has worsened recently.  He failed to improve significantly on Diflucan treatment.  Examination continues to show dysphonia.    Review of Systems      Personal History     Past Medical History:   Diagnosis Date    Achilles tendinitis of right lower extremity 2017    Anal fistula 2006    BCC (basal cell carcinoma) 2021    LEFT INFERIOR EYELID, S/P MOHS    BPH (benign prostatic hyperplasia)     FOLLOWED BY DR. BROOKS BOB    CAD (coronary artery disease)     Cervical paraspinal muscle spasm 2018    Colon polyps     FOLLOWED BY DR. DARVIN CHU    COPD (chronic obstructive pulmonary disease)     COVID-19 virus infection 2020    Erectile dysfunction     Excessive drinking of alcohol 2020    Gastroesophageal reflux disease 2017    Hepatic steatosis 2020    Per abdominal CT scan 2020    Herpes simplex virus (HSV) infection 2017    Hoarseness 2021    FOLLOWED BY DR. JUNIOR SCHAFFER    Hyperlipidemia     Hypertension     Insomnia     Microalbuminuria     Neck injury     Nodule of left lung     Obstructive sleep apnea syndrome     Gina-rectal abscess 2022    Pneumonia due to COVID-19 virus 2020    BILATERAL LOWER LOBES, ADMITTED TO [facility]    Right renal stone 2020    Right-sided thoracic back pain     Tibialis posterior tendinitis, right 2017    Type 2 diabetes mellitus     NIDDM    Vocal cord polyp 2021    FOLLOWED BY DR. JUNIOR SCHAFFER       Past Surgical History:   Procedure Laterality Date    ANAL FISTULOTOMY N/A 2006    WITH DRAINAGE OF ABSCESS AND ANAL PAPILLECTOMY, DR. LUX MORENO AT [facility]    CARDIAC  CATHETERIZATION Left 12/27/2011    MILD NONOBSTRUCTIVE ATHEROSCLEROTIC CAD, LEFT VENTRICULAR EF 60%, MILDLY ELEVATED LEFT VENTRICULAR END DIASTOLIC PRESSURE, DR. DORINDA BISHOP AT [facility]    CARDIAC CATHETERIZATION Right 9/3/2022    Procedure: Right and Left Heart Cath;  Surgeon: Kristy Almonte MD;  Location:  [facility];  Service: Cardiology;  Laterality: Right;    CARDIAC CATHETERIZATION N/A 9/3/2022    Procedure: Coronary angiography;  Surgeon: Kristy Almonte MD;  Location:  [facility];  Service: Cardiology;  Laterality: N/A;    COLONOSCOPY N/A 2000    DR. JOVAN MOSLEY    COLONOSCOPY N/A 08/07/2017    SMALL EXTERNAL HEMORRHOIDS, OTHERWISE WNL, RESCOPE IN 5 YRS, DR. DARVIN CHU AT [facility]    HEMORRHOIDECTOMY N/A 1994    INCISION AND DRAINAGE PERIRECTAL ABSCESS N/A 12/30/2013    INCISION AND DRAINAGE PERIRECTAL ABSCESS N/A 06/04/2005    DR. ALFRED TURCIOS AT [facility]    LUMBAR DISCECTOMY N/A 1995    OSTEOPHYTE L5    MOHS SURGERY Left 08/09/2021    LEFT INFERIOR EYELID, (+)BCC, DR. DENZEL PEDERSEN    SKIN BIOPSY Left 07/07/2021    LEFT INFERIOR EYELID, (+)BCC, GAIL GONZALEZ PA-C    TYMPANOSTOMY TUBE PLACEMENT Bilateral        Family History: His family history includes Aneurysm in his father; Diabetes in his mother and sister; Hyperlipidemia in his sister; Hypertension in his brother, brother, and sister; Rheum arthritis in his sister; Stroke in his father and mother.     Social History: He  reports that he has been smoking cigarettes. He started smoking about 54 years ago. He has a 40.00 pack-year smoking history. He has never used smokeless tobacco. He reports current alcohol use of about 10.0 standard drinks of alcohol per week. He reports that he does not use drugs.    Home Medications:  Fluticasone Furoate-Vilanterol, Insulin Syringes (Disposable), Pen Needles, Tirzepatide, albuterol sulfate HFA, atorvastatin, furosemide, gabapentin, glucose blood, insulin NPH, insulin regular,  losartan, metFORMIN ER, nicotine, and traZODone    Allergies:  He is allergic to rosuvastatin calcium.    Objective    Objective     Vitals:    BP: ()/()   Arterial Line BP: ()/()     Physical Exam     Result Review    Result Review:  I have personally reviewed the results from the time of this admission to 2/26/2024 16:18 EST and agree with these findings:  []  Laboratory list / accordion  []  Microbiology  []  Radiology  []  EKG/Telemetry   []  Cardiology/Vascular   []  Pathology  []  Old records  []  Other:  Most notable findings include: Flexible laryngoscopy shows some ulcerative changes with a blackish look to the surface of the larynx.      Assessment & Plan   Assessment / Plan     Brief Patient Summary:  Carrington Biggs is a 63 y.o. male with dysphonia and findings concerning for malignancy on examination.    Active Hospital Problems:  There are no active hospital problems to display for this patient.    Plan:   [facility] laryngoscopy with biopsy.  Risks and benefits discussed.    DVT prophylaxis:  No DVT prophylaxis order currently exists.        Júnior Cui MD

## 2024-02-27 ENCOUNTER — PRE-ADMISSION TESTING (OUTPATIENT)
Dept: PREADMISSION TESTING | Facility: HOSPITAL | Age: 64
End: 2024-02-27
Payer: COMMERCIAL

## 2024-02-27 VITALS
SYSTOLIC BLOOD PRESSURE: 143 MMHG | HEIGHT: 68 IN | HEART RATE: 78 BPM | DIASTOLIC BLOOD PRESSURE: 82 MMHG | RESPIRATION RATE: 18 BRPM | WEIGHT: 249.4 LBS | BODY MASS INDEX: 37.8 KG/M2 | OXYGEN SATURATION: 94 % | TEMPERATURE: 98.4 F

## 2024-02-27 LAB
ANION GAP SERPL CALCULATED.3IONS-SCNC: 13.5 MMOL/L (ref 5–15)
BUN SERPL-MCNC: 14 MG/DL (ref 8–23)
BUN/CREAT SERPL: 18.7 (ref 7–25)
CALCIUM SPEC-SCNC: 9.5 MG/DL (ref 8.6–10.5)
CHLORIDE SERPL-SCNC: 102 MMOL/L (ref 98–107)
CO2 SERPL-SCNC: 26.5 MMOL/L (ref 22–29)
CREAT SERPL-MCNC: 0.75 MG/DL (ref 0.76–1.27)
DEPRECATED RDW RBC AUTO: 41.9 FL (ref 37–54)
EGFRCR SERPLBLD CKD-EPI 2021: 101.4 ML/MIN/1.73
ERYTHROCYTE [DISTWIDTH] IN BLOOD BY AUTOMATED COUNT: 12.9 % (ref 12.3–15.4)
GLUCOSE SERPL-MCNC: 105 MG/DL (ref 65–99)
HCT VFR BLD AUTO: 46.4 % (ref 37.5–51)
HGB BLD-MCNC: 15 G/DL (ref 13–17.7)
MCH RBC QN AUTO: 28.6 PG (ref 26.6–33)
MCHC RBC AUTO-ENTMCNC: 32.3 G/DL (ref 31.5–35.7)
MCV RBC AUTO: 88.5 FL (ref 79–97)
PLATELET # BLD AUTO: 273 10*3/MM3 (ref 140–450)
PMV BLD AUTO: 9.3 FL (ref 6–12)
POTASSIUM SERPL-SCNC: 4.2 MMOL/L (ref 3.5–5.2)
RBC # BLD AUTO: 5.24 10*6/MM3 (ref 4.14–5.8)
SODIUM SERPL-SCNC: 142 MMOL/L (ref 136–145)
WBC NRBC COR # BLD AUTO: 12.83 10*3/MM3 (ref 3.4–10.8)

## 2024-02-27 PROCEDURE — 93010 ELECTROCARDIOGRAM REPORT: CPT | Performed by: INTERNAL MEDICINE

## 2024-02-27 PROCEDURE — 80048 BASIC METABOLIC PNL TOTAL CA: CPT

## 2024-02-27 PROCEDURE — 85027 COMPLETE CBC AUTOMATED: CPT

## 2024-02-27 PROCEDURE — 36415 COLL VENOUS BLD VENIPUNCTURE: CPT

## 2024-02-27 PROCEDURE — 93005 ELECTROCARDIOGRAM TRACING: CPT

## 2024-02-27 NOTE — DISCHARGE INSTRUCTIONS
Take the following medications the morning of surgery with a small sip of water:  BRING INHALER  GABAPENTIN    YOU WILL BE CALLED WITH ARRIVAL TIME AND NEW SURGERY DATE    FOLLOW DR INSTRUCTIONS REGARDING DOSING INSULIN BEFORE SURGERY      HOLD MOUNJARO FOR 1 WEEK BEFORE SURGERY      If you are on prescription narcotic pain medication to control your pain you may also take that medication the morning of surgery.    General Instructions:  Do not eat or drink anything after midnight the night before surgery. (OR AS DIRECTED PER DR SCHAFFER)  Infants may have breast milk up to four hours before surgery.  Infants drinking formula may drink formula up to six hours before surgery.   Patients who avoid smoking, chewing tobacco and alcohol for 4 weeks prior to surgery have a reduced risk of post-operative complications.  Quit smoking as many days before surgery as you can.  Do not smoke, use chewing tobacco or drink alcohol the day of surgery.   If applicable bring your C-PAP/ BI-PAP machine in with you to preop day of surgery.  Bring any papers given to you in the doctor’s office.  Wear clean comfortable clothes.  Do not wear contact lenses, false eyelashes or make-up.  Bring a case for your glasses.   Bring crutches or walker if applicable.  Remove all piercings.  Leave jewelry and any other valuables at home.  Hair extensions with metal clips must be removed prior to surgery.  The Pre-Admission Testing nurse will instruct you to bring medications if unable to obtain an accurate list in Pre-Admission Testing.        If you were given a blood bank ID arm band remember to bring it with you the day of surgery.    Preventing a Surgical Site Infection:  For 2 to 3 days before surgery, avoid shaving with a razor because the razor can irritate skin and make it easier to develop an infection.    Any areas of open skin can increase the risk of a post-operative wound infection by allowing bacteria to enter and travel throughout the  body.  Notify your surgeon if you have any skin wounds / rashes even if it is not near the expected surgical site.  The area will need assessed to determine if surgery should be delayed until it is healed.  The night prior to surgery shower using a fresh bar of anti-bacterial soap (such as Dial) and clean washcloth.  Sleep in a clean bed with clean clothing.  Do not allow pets to sleep with you.  Shower on the morning of surgery using a fresh bar of anti-bacterial soap (such as Dial) and clean washcloth.  Dry with a clean towel and dress in clean clothing.  Ask your surgeon if you will be receiving antibiotics prior to surgery.  Make sure you, your family, and all healthcare providers clean their hands with soap and water or an alcohol based hand  before caring for you or your wound.    Day of surgery:  Your arrival time is approximately two hours before your scheduled surgery time.  Upon arrival, a Pre-op nurse and Anesthesiologist will review your health history, obtain vital signs, and answer questions you may have.  The only belongings needed at this time will be your home medications and if applicable your C-PAP/BI-PAP machine.  A Pre-op nurse will start an IV and you may receive medication in preparation for surgery, including something to help you relax.      Please be aware that surgery does come with discomfort.  We want to make every effort to control your discomfort so please discuss any uncontrolled symptoms with your nurse.   Your doctor will most likely have prescribed pain medications.      If you are going home after surgery you will receive individualized written care instructions before being discharged.  A responsible adult must drive you to and from the hospital on the day of your surgery and ideally stay with you through the night.  Discharge prescriptions can be filled by the hospital pharmacy during regular pharmacy hours.  If you are having surgery late in the day/evening your  prescription may be e-prescribed to your pharmacy.  Please verify your pharmacy hours or chose a 24 hour pharmacy to avoid not having access to your prescription because your pharmacy has closed for the day.    If you are staying overnight following surgery, you will be transported to your hospital room following the recovery period.  [facility] has all private rooms.    If you have any questions please call Pre-Admission Testing at [phone].  Deductibles and co-payments are collected on the day of service. Please be prepared to pay the required co-pay, deductible or deposit on the day of service as defined by your plan.    Call your surgeon immediately if you experience any of the following symptoms:  Sore Throat  Shortness of Breath or difficulty breathing  Cough  Chills  Body soreness or muscle pain  Headache  Fever  New loss of taste or smell  Do not arrive for your surgery ill.  Your procedure will need to be rescheduled to another time.  You will need to call your physician before the day of surgery to avoid any unnecessary exposure to hospital staff as well as other patients.

## 2024-02-28 LAB
QT INTERVAL: 372 MS
QTC INTERVAL: 419 MS

## 2024-02-29 ENCOUNTER — HOSPITAL ENCOUNTER (OUTPATIENT)
Facility: HOSPITAL | Age: 64
Setting detail: HOSPITAL OUTPATIENT SURGERY
Discharge: HOME | End: 2024-02-29
Attending: OTOLARYNGOLOGY | Admitting: OTOLARYNGOLOGY
Payer: COMMERCIAL

## 2024-02-29 ENCOUNTER — ANESTHESIA EVENT (OUTPATIENT)
Dept: PERIOP | Facility: HOSPITAL | Age: 64
End: 2024-02-29
Payer: COMMERCIAL

## 2024-02-29 ENCOUNTER — ANESTHESIA (OUTPATIENT)
Dept: PERIOP | Facility: HOSPITAL | Age: 64
End: 2024-02-29
Payer: COMMERCIAL

## 2024-02-29 VITALS
OXYGEN SATURATION: 92 % | DIASTOLIC BLOOD PRESSURE: 73 MMHG | HEART RATE: 76 BPM | RESPIRATION RATE: 16 BRPM | TEMPERATURE: 97.7 F | SYSTOLIC BLOOD PRESSURE: 151 MMHG

## 2024-02-29 DIAGNOSIS — G47.09 OTHER INSOMNIA: ICD-10-CM

## 2024-02-29 DIAGNOSIS — J38.7 LARYNGEAL NODULE: Primary | ICD-10-CM

## 2024-02-29 LAB
GLUCOSE BLDC GLUCOMTR-MCNC: 66 MG/DL (ref 70–130)
GLUCOSE BLDC GLUCOMTR-MCNC: 73 MG/DL (ref 70–130)
GLUCOSE BLDC GLUCOMTR-MCNC: 78 MG/DL (ref 70–130)

## 2024-02-29 PROCEDURE — 94760 N-INVAS EAR/PLS OXIMETRY 1: CPT

## 2024-02-29 PROCEDURE — 82948 REAGENT STRIP/BLOOD GLUCOSE: CPT

## 2024-02-29 PROCEDURE — 25010000002 DEXAMETHASONE SODIUM PHOSPHATE 20 MG/5ML SOLUTION: Performed by: NURSE ANESTHETIST, CERTIFIED REGISTERED

## 2024-02-29 PROCEDURE — 94640 AIRWAY INHALATION TREATMENT: CPT

## 2024-02-29 PROCEDURE — 25010000002 SUGAMMADEX 200 MG/2ML SOLUTION: Performed by: NURSE ANESTHETIST, CERTIFIED REGISTERED

## 2024-02-29 PROCEDURE — 94799 UNLISTED PULMONARY SVC/PX: CPT

## 2024-02-29 PROCEDURE — 25010000002 FENTANYL CITRATE (PF) 50 MCG/ML SOLUTION: Performed by: STUDENT IN AN ORGANIZED HEALTH CARE EDUCATION/TRAINING PROGRAM

## 2024-02-29 PROCEDURE — 88305 TISSUE EXAM BY PATHOLOGIST: CPT | Performed by: OTOLARYNGOLOGY

## 2024-02-29 PROCEDURE — 88312 SPECIAL STAINS GROUP 1: CPT | Performed by: OTOLARYNGOLOGY

## 2024-02-29 PROCEDURE — 94664 DEMO&/EVAL PT USE INHALER: CPT

## 2024-02-29 PROCEDURE — 25010000002 PROPOFOL 200 MG/20ML EMULSION: Performed by: NURSE ANESTHETIST, CERTIFIED REGISTERED

## 2024-02-29 PROCEDURE — 25010000002 HYDROMORPHONE PER 4 MG: Performed by: NURSE ANESTHETIST, CERTIFIED REGISTERED

## 2024-02-29 PROCEDURE — 25810000003 LACTATED RINGERS PER 1000 ML: Performed by: STUDENT IN AN ORGANIZED HEALTH CARE EDUCATION/TRAINING PROGRAM

## 2024-02-29 PROCEDURE — 25010000002 ONDANSETRON PER 1 MG: Performed by: NURSE ANESTHETIST, CERTIFIED REGISTERED

## 2024-02-29 PROCEDURE — 25010000002 EPINEPHRINE 1 MG/ML SOLUTION: Performed by: OTOLARYNGOLOGY

## 2024-02-29 RX ORDER — MIDAZOLAM HYDROCHLORIDE 1 MG/ML
1 INJECTION INTRAMUSCULAR; INTRAVENOUS
Status: DISCONTINUED | OUTPATIENT
Start: 2024-02-29 | End: 2024-02-29 | Stop reason: HOSPADM

## 2024-02-29 RX ORDER — PROPOFOL 10 MG/ML
INJECTION, EMULSION INTRAVENOUS AS NEEDED
Status: DISCONTINUED | OUTPATIENT
Start: 2024-02-29 | End: 2024-02-29 | Stop reason: SURG

## 2024-02-29 RX ORDER — PROMETHAZINE HYDROCHLORIDE 25 MG/1
25 TABLET ORAL EVERY 6 HOURS PRN
Qty: 15 TABLET | Refills: 0 | Status: SHIPPED | OUTPATIENT
Start: 2024-02-29

## 2024-02-29 RX ORDER — HYDRALAZINE HYDROCHLORIDE 20 MG/ML
5 INJECTION INTRAMUSCULAR; INTRAVENOUS
Status: DISCONTINUED | OUTPATIENT
Start: 2024-02-29 | End: 2024-02-29 | Stop reason: HOSPADM

## 2024-02-29 RX ORDER — DROPERIDOL 2.5 MG/ML
0.62 INJECTION, SOLUTION INTRAMUSCULAR; INTRAVENOUS
Status: DISCONTINUED | OUTPATIENT
Start: 2024-02-29 | End: 2024-02-29 | Stop reason: HOSPADM

## 2024-02-29 RX ORDER — FENTANYL CITRATE 50 UG/ML
25 INJECTION, SOLUTION INTRAMUSCULAR; INTRAVENOUS
Status: DISCONTINUED | OUTPATIENT
Start: 2024-02-29 | End: 2024-02-29 | Stop reason: HOSPADM

## 2024-02-29 RX ORDER — SODIUM CHLORIDE 0.9 % (FLUSH) 0.9 %
3 SYRINGE (ML) INJECTION EVERY 12 HOURS SCHEDULED
Status: DISCONTINUED | OUTPATIENT
Start: 2024-02-29 | End: 2024-02-29 | Stop reason: HOSPADM

## 2024-02-29 RX ORDER — HYDROCODONE BITARTRATE AND ACETAMINOPHEN 5; 325 MG/1; MG/1
1 TABLET ORAL ONCE AS NEEDED
Status: COMPLETED | OUTPATIENT
Start: 2024-02-29 | End: 2024-02-29

## 2024-02-29 RX ORDER — LIDOCAINE HYDROCHLORIDE 10 MG/ML
0.5 INJECTION, SOLUTION INFILTRATION; PERINEURAL ONCE AS NEEDED
Status: DISCONTINUED | OUTPATIENT
Start: 2024-02-29 | End: 2024-02-29 | Stop reason: HOSPADM

## 2024-02-29 RX ORDER — DEXAMETHASONE SODIUM PHOSPHATE 4 MG/ML
INJECTION, SOLUTION INTRA-ARTICULAR; INTRALESIONAL; INTRAMUSCULAR; INTRAVENOUS; SOFT TISSUE AS NEEDED
Status: DISCONTINUED | OUTPATIENT
Start: 2024-02-29 | End: 2024-02-29 | Stop reason: SURG

## 2024-02-29 RX ORDER — LABETALOL HYDROCHLORIDE 5 MG/ML
5 INJECTION, SOLUTION INTRAVENOUS
Status: DISCONTINUED | OUTPATIENT
Start: 2024-02-29 | End: 2024-02-29 | Stop reason: HOSPADM

## 2024-02-29 RX ORDER — FENTANYL CITRATE 50 UG/ML
50 INJECTION, SOLUTION INTRAMUSCULAR; INTRAVENOUS ONCE AS NEEDED
Status: COMPLETED | OUTPATIENT
Start: 2024-02-29 | End: 2024-02-29

## 2024-02-29 RX ORDER — ONDANSETRON 2 MG/ML
4 INJECTION INTRAMUSCULAR; INTRAVENOUS ONCE AS NEEDED
Status: COMPLETED | OUTPATIENT
Start: 2024-02-29 | End: 2024-02-29

## 2024-02-29 RX ORDER — NALOXONE HCL 0.4 MG/ML
0.2 VIAL (ML) INJECTION AS NEEDED
Status: DISCONTINUED | OUTPATIENT
Start: 2024-02-29 | End: 2024-02-29 | Stop reason: HOSPADM

## 2024-02-29 RX ORDER — PROMETHAZINE HYDROCHLORIDE 25 MG/1
25 TABLET ORAL ONCE AS NEEDED
Status: DISCONTINUED | OUTPATIENT
Start: 2024-02-29 | End: 2024-02-29 | Stop reason: HOSPADM

## 2024-02-29 RX ORDER — IPRATROPIUM BROMIDE AND ALBUTEROL SULFATE 2.5; .5 MG/3ML; MG/3ML
3 SOLUTION RESPIRATORY (INHALATION) ONCE AS NEEDED
Status: DISCONTINUED | OUTPATIENT
Start: 2024-02-29 | End: 2024-02-29 | Stop reason: HOSPADM

## 2024-02-29 RX ORDER — PROMETHAZINE HYDROCHLORIDE 25 MG/1
25 SUPPOSITORY RECTAL ONCE AS NEEDED
Status: DISCONTINUED | OUTPATIENT
Start: 2024-02-29 | End: 2024-02-29 | Stop reason: HOSPADM

## 2024-02-29 RX ORDER — MAGNESIUM HYDROXIDE 1200 MG/15ML
LIQUID ORAL AS NEEDED
Status: DISCONTINUED | OUTPATIENT
Start: 2024-02-29 | End: 2024-02-29 | Stop reason: HOSPADM

## 2024-02-29 RX ORDER — SODIUM CHLORIDE, SODIUM LACTATE, POTASSIUM CHLORIDE, CALCIUM CHLORIDE 600; 310; 30; 20 MG/100ML; MG/100ML; MG/100ML; MG/100ML
9 INJECTION, SOLUTION INTRAVENOUS CONTINUOUS
Status: DISCONTINUED | OUTPATIENT
Start: 2024-02-29 | End: 2024-02-29 | Stop reason: HOSPADM

## 2024-02-29 RX ORDER — EPHEDRINE SULFATE 50 MG/ML
5 INJECTION, SOLUTION INTRAVENOUS ONCE AS NEEDED
Status: DISCONTINUED | OUTPATIENT
Start: 2024-02-29 | End: 2024-02-29 | Stop reason: HOSPADM

## 2024-02-29 RX ORDER — LIDOCAINE HYDROCHLORIDE 20 MG/ML
INJECTION, SOLUTION INFILTRATION; PERINEURAL AS NEEDED
Status: DISCONTINUED | OUTPATIENT
Start: 2024-02-29 | End: 2024-02-29 | Stop reason: SURG

## 2024-02-29 RX ORDER — FLUMAZENIL 0.1 MG/ML
0.2 INJECTION INTRAVENOUS AS NEEDED
Status: DISCONTINUED | OUTPATIENT
Start: 2024-02-29 | End: 2024-02-29 | Stop reason: HOSPADM

## 2024-02-29 RX ORDER — IPRATROPIUM BROMIDE AND ALBUTEROL SULFATE 2.5; .5 MG/3ML; MG/3ML
3 SOLUTION RESPIRATORY (INHALATION) ONCE
Status: COMPLETED | OUTPATIENT
Start: 2024-02-29 | End: 2024-02-29

## 2024-02-29 RX ORDER — DIPHENHYDRAMINE HYDROCHLORIDE 50 MG/ML
12.5 INJECTION INTRAMUSCULAR; INTRAVENOUS
Status: DISCONTINUED | OUTPATIENT
Start: 2024-02-29 | End: 2024-02-29 | Stop reason: HOSPADM

## 2024-02-29 RX ORDER — HYDROMORPHONE HYDROCHLORIDE 1 MG/ML
0.25 INJECTION, SOLUTION INTRAMUSCULAR; INTRAVENOUS; SUBCUTANEOUS
Status: DISCONTINUED | OUTPATIENT
Start: 2024-02-29 | End: 2024-02-29 | Stop reason: HOSPADM

## 2024-02-29 RX ORDER — SODIUM CHLORIDE 0.9 % (FLUSH) 0.9 %
3-10 SYRINGE (ML) INJECTION AS NEEDED
Status: DISCONTINUED | OUTPATIENT
Start: 2024-02-29 | End: 2024-02-29 | Stop reason: HOSPADM

## 2024-02-29 RX ORDER — ROCURONIUM BROMIDE 10 MG/ML
INJECTION, SOLUTION INTRAVENOUS AS NEEDED
Status: DISCONTINUED | OUTPATIENT
Start: 2024-02-29 | End: 2024-02-29 | Stop reason: SURG

## 2024-02-29 RX ORDER — HYDROCODONE BITARTRATE AND ACETAMINOPHEN 7.5; 325 MG/1; MG/1
1 TABLET ORAL EVERY 4 HOURS PRN
Status: DISCONTINUED | OUTPATIENT
Start: 2024-02-29 | End: 2024-02-29 | Stop reason: HOSPADM

## 2024-02-29 RX ORDER — HYDROCODONE BITARTRATE AND ACETAMINOPHEN 5; 325 MG/1; MG/1
1 TABLET ORAL EVERY 4 HOURS PRN
Qty: 12 TABLET | Refills: 0 | Status: SHIPPED | OUTPATIENT
Start: 2024-02-29

## 2024-02-29 RX ORDER — EPINEPHRINE 1 MG/ML
INJECTION INTRAMUSCULAR; INTRAVENOUS; SUBCUTANEOUS AS NEEDED
Status: DISCONTINUED | OUTPATIENT
Start: 2024-02-29 | End: 2024-02-29 | Stop reason: HOSPADM

## 2024-02-29 RX ORDER — FAMOTIDINE 10 MG/ML
20 INJECTION, SOLUTION INTRAVENOUS ONCE
Status: COMPLETED | OUTPATIENT
Start: 2024-02-29 | End: 2024-02-29

## 2024-02-29 RX ORDER — ONDANSETRON 2 MG/ML
INJECTION INTRAMUSCULAR; INTRAVENOUS AS NEEDED
Status: DISCONTINUED | OUTPATIENT
Start: 2024-02-29 | End: 2024-02-29 | Stop reason: SURG

## 2024-02-29 RX ORDER — SUCCINYLCHOLINE/SOD CL,ISO/PF 200MG/10ML
SYRINGE (ML) INTRAVENOUS AS NEEDED
Status: DISCONTINUED | OUTPATIENT
Start: 2024-02-29 | End: 2024-02-29 | Stop reason: SURG

## 2024-02-29 RX ADMIN — ONDANSETRON 4 MG: 2 INJECTION INTRAMUSCULAR; INTRAVENOUS at 15:08

## 2024-02-29 RX ADMIN — SODIUM CHLORIDE, POTASSIUM CHLORIDE, SODIUM LACTATE AND CALCIUM CHLORIDE 9 ML/HR: 600; 310; 30; 20 INJECTION, SOLUTION INTRAVENOUS at 13:00

## 2024-02-29 RX ADMIN — Medication 100 MG: at 15:03

## 2024-02-29 RX ADMIN — PROPOFOL 150 MG: 10 INJECTION, EMULSION INTRAVENOUS at 15:03

## 2024-02-29 RX ADMIN — HYDROMORPHONE HYDROCHLORIDE 0.25 MG: 1 INJECTION, SOLUTION INTRAMUSCULAR; INTRAVENOUS; SUBCUTANEOUS at 16:06

## 2024-02-29 RX ADMIN — FENTANYL CITRATE 50 MCG: 50 INJECTION, SOLUTION INTRAMUSCULAR; INTRAVENOUS at 15:03

## 2024-02-29 RX ADMIN — HYDROMORPHONE HYDROCHLORIDE 0.25 MG: 1 INJECTION, SOLUTION INTRAMUSCULAR; INTRAVENOUS; SUBCUTANEOUS at 16:11

## 2024-02-29 RX ADMIN — HYDROCODONE BITARTRATE AND ACETAMINOPHEN 1 TABLET: 5; 325 TABLET ORAL at 16:28

## 2024-02-29 RX ADMIN — DEXAMETHASONE SODIUM PHOSPHATE 6 MG: 4 INJECTION, SOLUTION INTRAMUSCULAR; INTRAVENOUS at 15:28

## 2024-02-29 RX ADMIN — SUGAMMADEX 200 MG: 100 INJECTION, SOLUTION INTRAVENOUS at 15:43

## 2024-02-29 RX ADMIN — ROCURONIUM BROMIDE 20 MG: 10 INJECTION, SOLUTION INTRAVENOUS at 15:10

## 2024-02-29 RX ADMIN — DEXAMETHASONE SODIUM PHOSPHATE 4 MG: 4 INJECTION, SOLUTION INTRAMUSCULAR; INTRAVENOUS at 15:08

## 2024-02-29 RX ADMIN — LIDOCAINE HYDROCHLORIDE 100 MG: 20 INJECTION, SOLUTION INFILTRATION; PERINEURAL at 15:03

## 2024-02-29 RX ADMIN — FAMOTIDINE 20 MG: 10 INJECTION INTRAVENOUS at 13:02

## 2024-02-29 RX ADMIN — ROCURONIUM BROMIDE 10 MG: 10 INJECTION, SOLUTION INTRAVENOUS at 15:37

## 2024-02-29 RX ADMIN — ONDANSETRON 4 MG: 2 INJECTION INTRAMUSCULAR; INTRAVENOUS at 16:28

## 2024-02-29 RX ADMIN — IPRATROPIUM BROMIDE AND ALBUTEROL SULFATE 3 ML: .5; 3 SOLUTION RESPIRATORY (INHALATION) at 13:13

## 2024-02-29 NOTE — OP NOTE
LARYNGOSCOPY WITH MICROSCOPE  Progress Note    Carrington Biggs  2/29/2024    Pre-op Diagnosis:   Dysphonia, laryngeal lesion       Post-Op Diagnosis Codes:   same    Procedure/CPT® Codes:        Procedure(s):  MICROSCOPIC LARYNGOSCOPY AND BIOPSY              Surgeon(s):  Júnior Cui MD    Anesthesia: General    Staff:   Circulator: Zulma Lezama RN  Scrub Person: Conrado Gomez         Estimated Blood Loss: minimal    Urine Voided: * No values recorded between 2/29/2024  2:54 PM and 2/29/2024  3:43 PM *    Specimens:                Specimens       ID Source Type Tests Collected By Collected At Frozen?    A Larynx Tissue TISSUE PATHOLOGY EXAM   Júnior Cui MD 2/29/24 1525     Description: anterior right laryngo biopsy                  Drains: * No LDAs found *    Findings: Primarily right-sided laryngeal irritation and crusting with whitish change in the immediate subglottis anteriorly.  Some debris was noted posteriorly in the larynx that was evacuated but no underlying mass was noted posteriorly.    OPERATIVE REPORT: The patient was taken to the operating room placed in the supine position and placed under general endotracheal anesthesia.  The bed was rotated 90 degrees and patient was prepped and draped in routine fashion for microlaryngoscopy.  The anterior commissure laryngoscope was placed into position to view the laryngeal structures.  He had a very small larynx to begin with.  The scope was placed to view anteriorly and the larynx and changes to the right vocal fold were noted.  There was some brownish crusting on top of this as well primarily superiorly.  In the anterior right aspect of the larynx and immediate subglottis, there was whitish somewhat adherent debris.  This was biopsied and then the laryngeal  blade was used to address this more thoroughly.  The scope was then repositioned posteriorly to look at the posterior aspect of the glottis.  He did have some brownish debris  that was suctioned away.  I did not see any underlying lesion posteriorly in the glottis.  The scope was then repositioned anteriorly to the endotracheal tube.  No significant bleeding was encountered at this point and the procedure was complete.  Photodocumentation was performed at points during the case.  He was allowed to awaken from anesthesia and taken to the recovery room in satisfactory condition.    Complications: none          Júnior Cui MD     Date: 2/29/2024  Time: 15:48 EST

## 2024-02-29 NOTE — ANESTHESIA PREPROCEDURE EVALUATION
Anesthesia Evaluation     Patient summary reviewed and Nursing notes reviewed   no history of anesthetic complications:   NPO Solid Status: > 8 hours  NPO Liquid Status: > 8 hours           Airway   Mallampati: III  TM distance: >3 FB  Neck ROM: full  Large neck circumference  Dental    (+) partials    Pulmonary    (+) a smoker Current, Abstained day of surgery, COPD,home oxygen, sleep apnea on CPAP  Cardiovascular     ECG reviewed    (+) hypertension, hyperlipidemia    ROS comment: Severe pHTN r/t COPD, JACEK    Neuro/Psych  (+) psychiatric history  GI/Hepatic/Renal/Endo    (+) obesity, GERD, diabetes mellitus using insulin    Musculoskeletal     Abdominal    Substance History      OB/GYN          Other   arthritis,   history of cancer                Anesthesia Plan    ASA 3     general   Rapid sequence  (I have reviewed the patient's history with the patient and the chart, including all pertinent laboratory results and imaging. I have explained the risks of anesthesia including but not limited to dental damage, corneal abrasion, nerve injury, MI, stroke, and death. Questions asked and answered. Anesthetic plan discussed with patient and team as indicated. Patient expressed understanding of the above.    Plan CMAC with RSI, OGT after intubation to empty stomach  Severe pHTN)  intravenous induction     Anesthetic plan, risks, benefits, and alternatives have been provided, discussed and informed consent has been obtained with: patient.    CODE STATUS:

## 2024-02-29 NOTE — ANESTHESIA POSTPROCEDURE EVALUATION
Patient: Carrington Biggs    Procedure Summary       Date: 02/29/24 Room / Location: [facility] OR 09 / [facility] MAIN OR    Anesthesia Start: 1455 Anesthesia Stop: 1603    Procedure: MICROSCOPIC LARYNGOSCOPY AND BIOPSY Diagnosis:     Surgeons: Júnior Cui MD Provider: Freddy Roger MD    Anesthesia Type: general ASA Status: 3            Anesthesia Type: general    Vitals  Vitals Value Taken Time   /73 02/29/24 1645   Temp 36.5 °C (97.7 °F) 02/29/24 1600   Pulse 72 02/29/24 1654   Resp 16 02/29/24 1645   SpO2 95 % 02/29/24 1654   Vitals shown include unfiled device data.        Post Anesthesia Care and Evaluation    Patient location during evaluation: PACU  Patient participation: complete - patient participated  Level of consciousness: awake and alert  Pain management: adequate    Airway patency: patent  Anesthetic complications: No anesthetic complications  PONV Status: controlled  Cardiovascular status: acceptable  Respiratory status: acceptable  Hydration status: acceptable    Comments: /73   Pulse 72   Temp 36.5 °C (97.7 °F) (Oral)   Resp 16   SpO2 96%

## 2024-02-29 NOTE — ANESTHESIA PROCEDURE NOTES
Airway  Urgency: elective    Date/Time: 2/29/2024 3:05 PM  Airway not difficult    General Information and Staff    Patient location during procedure: OR    Indications and Patient Condition  Indications for airway management: airway protection    Preoxygenated: yes  MILS maintained throughout  Mask difficulty assessment: 0 - not attempted    Final Airway Details  Final airway type: endotracheal airway      Successful airway: ETT  Cuffed: yes   Successful intubation technique: video laryngoscopy  Endotracheal tube insertion site: oral  Blade: CMAC  Blade size: D  ETT size (mm): 7.0  Cormack-Lehane Classification: grade I - full view of glottis  Placement verified by: chest auscultation   Measured from: lips  ETT/EBT  to lips (cm): 21  Number of attempts at approach: 1  Assessment: lips, teeth, and gum same as pre-op and atraumatic intubation             Home

## 2024-03-01 RX ORDER — TRAZODONE HYDROCHLORIDE 100 MG/1
100 TABLET ORAL
Qty: 90 TABLET | Refills: 0 | Status: SHIPPED | OUTPATIENT
Start: 2024-03-01

## 2024-03-04 LAB
LAB AP CASE REPORT: NORMAL
PATH REPORT.FINAL DX SPEC: NORMAL
PATH REPORT.GROSS SPEC: NORMAL

## 2024-03-06 DIAGNOSIS — E11.42 TYPE 2 DIABETES MELLITUS WITH PERIPHERAL NEUROPATHY: ICD-10-CM

## 2024-03-07 RX ORDER — GABAPENTIN 100 MG/1
CAPSULE ORAL
Qty: 90 CAPSULE | Refills: 0 | Status: SHIPPED | OUTPATIENT
Start: 2024-03-07

## 2024-03-07 NOTE — TELEPHONE ENCOUNTER
Rx Refill Note  Requested Prescriptions     Pending Prescriptions Disp Refills    gabapentin (NEURONTIN) 100 MG capsule [Pharmacy Med Name: GABAPENTIN 100 MG CAPSULE] 90 capsule 0     Si CAPSULE EVERY MORNING AND 2 CAPSULES EVERY EVENING      Last office visit with prescribing clinician: 10/17/2023   Last telemedicine visit with prescribing clinician: Visit date not found   Next office visit with prescribing clinician: 2024                         Would you like a call back once the refill request has been completed: [] Yes [] No    If the office needs to give you a call back, can they leave a voicemail: [] Yes [] No    Laurel Serrano MA  24, 07:44 EST

## 2024-04-04 ENCOUNTER — TRANSCRIBE ORDERS (OUTPATIENT)
Dept: ADMINISTRATIVE | Facility: HOSPITAL | Age: 64
End: 2024-04-04
Payer: COMMERCIAL

## 2024-04-04 DIAGNOSIS — J37.0 CHRONIC LARYNGITIS: Primary | ICD-10-CM

## 2024-04-22 ENCOUNTER — OFFICE VISIT (OUTPATIENT)
Dept: ENDOCRINOLOGY | Age: 64
End: 2024-04-22
Payer: COMMERCIAL

## 2024-04-22 VITALS
TEMPERATURE: 98.1 F | WEIGHT: 254.2 LBS | SYSTOLIC BLOOD PRESSURE: 146 MMHG | DIASTOLIC BLOOD PRESSURE: 82 MMHG | HEART RATE: 76 BPM | OXYGEN SATURATION: 94 % | HEIGHT: 68 IN | BODY MASS INDEX: 38.52 KG/M2

## 2024-04-22 DIAGNOSIS — Z72.0 CURRENT TOBACCO USE: ICD-10-CM

## 2024-04-22 DIAGNOSIS — I10 ESSENTIAL HYPERTENSION: Chronic | ICD-10-CM

## 2024-04-22 DIAGNOSIS — R80.9 TYPE 2 DIABETES MELLITUS WITH MICROALBUMINURIA, WITHOUT LONG-TERM CURRENT USE OF INSULIN: Primary | Chronic | ICD-10-CM

## 2024-04-22 DIAGNOSIS — E11.29 TYPE 2 DIABETES MELLITUS WITH MICROALBUMINURIA, WITHOUT LONG-TERM CURRENT USE OF INSULIN: Primary | Chronic | ICD-10-CM

## 2024-04-22 DIAGNOSIS — E11.42 TYPE 2 DIABETES MELLITUS WITH PERIPHERAL NEUROPATHY: ICD-10-CM

## 2024-04-22 DIAGNOSIS — E78.5 HYPERLIPIDEMIA, UNSPECIFIED HYPERLIPIDEMIA TYPE: Chronic | ICD-10-CM

## 2024-04-22 DIAGNOSIS — G47.33 OBSTRUCTIVE SLEEP APNEA SYNDROME: Chronic | ICD-10-CM

## 2024-04-22 PROCEDURE — 99214 OFFICE O/P EST MOD 30 MIN: CPT | Performed by: INTERNAL MEDICINE

## 2024-04-22 RX ORDER — GABAPENTIN 100 MG/1
CAPSULE ORAL
Qty: 60 CAPSULE | Refills: 1 | Status: SHIPPED | OUTPATIENT
Start: 2024-04-22

## 2024-04-22 RX ORDER — BLOOD-GLUCOSE SENSOR
1 EACH MISCELLANEOUS
Qty: 6 EACH | Refills: 3 | Status: SHIPPED | OUTPATIENT
Start: 2024-04-22

## 2024-04-22 NOTE — PROGRESS NOTES
Subjective   Carrington Biggs is a 63 y.o. male.     History of Present Illness     He has known diabetes mellitus since 2012 and was started on insulin in 2013.  He is on ReliOn N 65 units at bedtime, Relion R 20 units at breakfast, 15 units at lunch, and 25 units at supper and Metformin  mg 4 tablets daily.  He stopped Mounjaro 10 mg in March 2024 because it was expensive and it did not make him lose weight.  He was on Ozempic 0.5 mg weekly in the past and wants to try to use it again.  He denies hypoglycemic episodes.  He has a high deductible insurance plan.  His last meal was at 7 AM.     He denies blurry vision.  His last eye examination was in 8/17.  He has microalbuminuria on urine sample taken 7/23.  He is on losartan.  He has numbness and burning in his feet.  He has been off gabapentin 100 mg twice a day.     He has hyperlipidemia and has been on Lipitor 40 mg once a day.  He denies muscle pain.        He has hypertension and is on losartan 100 mg once a day.  He denies any history of heart attack or stroke.  He denies any chest pain or shortness of breath.     He has sleep apnea and COPD.  He is using Trilogy with oxygen.  He wakes up rested.  He smokes 10 cigarettes/day.  He follows with Dr. Aguero.     He has seen Dr. Cui and had surgery for vocal cord polyps in 2/24.  He is scheduled for CT of the neck.      The following portions of the patient's history were reviewed and updated as appropriate: allergies, current medications, past family history, past medical history, past social history, past surgical history, and problem list.    Review of Systems   Eyes:  Negative for visual disturbance.   Respiratory:  Negative for shortness of breath.    Cardiovascular:  Negative for chest pain and palpitations.   Gastrointestinal: Negative.    Genitourinary: Negative.    Musculoskeletal:  Negative for myalgias.   Neurological:  Positive for numbness.     Vitals:    04/22/24 1515   BP: 146/82   Pulse: 76  "  Temp: 98.1 °F (36.7 °C)   TempSrc: Temporal   SpO2: 94%   Weight: 115 kg (254 lb 3.2 oz)   Height: 172.7 cm (67.99\")      Objective   Physical Exam  Constitutional:       Appearance: Normal appearance.   Eyes:      General: No scleral icterus.        Right eye: No discharge.         Left eye: No discharge.      Extraocular Movements: Extraocular movements intact.   Neck:      Vascular: No carotid bruit.   Cardiovascular:      Rate and Rhythm: Normal rate and regular rhythm.      Heart sounds: Normal heart sounds.   Pulmonary:      Effort: Pulmonary effort is normal.      Breath sounds: Normal breath sounds.   Abdominal:      Palpations: Abdomen is soft.      Tenderness: There is no right CVA tenderness or left CVA tenderness.   Musculoskeletal:      Right lower leg: No edema.      Left lower leg: No edema.      Comments: Feet warm.  No cyanosis or pedal edema.  No plantar ulcers.   Lymphadenopathy:      Cervical: No cervical adenopathy.   Neurological:      General: No focal deficit present.      Mental Status: He is alert and oriented to person, place, and time.      Comments: Intact light touch in lower extremities       Admission on 02/29/2024, Discharged on 02/29/2024   Component Date Value Ref Range Status    Glucose 02/29/2024 78  70 - 130 mg/dL Final    Case Report 02/29/2024    Final                    Value:Surgical Pathology Report                         Case: [id number]                                  Authorizing Provider:  Júnior Cui MD     Collected:           02/29/2024 03:25 PM          Ordering Location:     [facility]  Received:            02/29/2024 09:43 PM                                 MAIN OR                                                                      Pathologist:           Meaghan Natarajan MD                                                          Specimen:    Larynx, anterior right laryngo biopsy                                                      Final " Diagnosis 02/29/2024    Final                    Value:This result contains rich text formatting which cannot be displayed here.    Gross Description 02/29/2024    Final                    Value:This result contains rich text formatting which cannot be displayed here.    Glucose 02/29/2024 66 (L)  70 - 130 mg/dL Final    Glucose 02/29/2024 73  70 - 130 mg/dL Final     Assessment & Plan   Diagnoses and all orders for this visit:    1. Type 2 diabetes mellitus with microalbuminuria, without long-term current use of insulin (Primary)    2. Type 2 diabetes mellitus with peripheral neuropathy  -     gabapentin (NEURONTIN) 100 MG capsule; 1 capsule twice daily  Dispense: 60 capsule; Refill: 1    3. Hyperlipidemia, unspecified hyperlipidemia type    4. Essential hypertension    5. Current tobacco use    6. Obstructive sleep apnea syndrome    Other orders  -     Continuous Blood Gluc Sensor (FreeStyle Sergio 3 Sensor) misc; Use 1 each Every 14 (Fourteen) Days.  Dispense: 6 each; Refill: 3  -     Semaglutide,0.25 or 0.5MG/DOS, (OZEMPIC) 2 MG/3ML solution pen-injector; 0.25 mg weekly for 4 weeks then 0.5 mg weekly thereafter  Dispense: 3 mL; Refill: 1      Start Ozempic 0.25 mg weekly for 4 weeks then 0.5 mg weekly.  Avoid meals within 4 hours of bedtime to reduce acid reflux symptoms.  Continue Relion N and Relion R  Continue metformin  mg 4 tablets daily.  Prescription for freestyle sergio 3 sensor was sent to the pharmacy.  Sensor download 2 to 3 weeks after starting freestyle sergio 3  Restart gabapentin 100 mg twice daily.    Continue Lipitor 40 mg/day.    Continue losartan 100 mg/day.    Follow-up with Dr. Aguero and Dr. Cui.    Copy of my note sent to Dr. Louise, Dr. Johnnie Aguero, and Dr. Júnior Cui.    RTC 3 mos with GAIL Santos NP

## 2024-04-24 ENCOUNTER — HOSPITAL ENCOUNTER (OUTPATIENT)
Dept: CT IMAGING | Facility: HOSPITAL | Age: 64
Discharge: HOME | End: 2024-04-24
Admitting: OTOLARYNGOLOGY
Payer: COMMERCIAL

## 2024-04-24 DIAGNOSIS — J37.0 CHRONIC LARYNGITIS: ICD-10-CM

## 2024-04-24 LAB — CREAT BLDA-MCNC: 1 MG/DL (ref 0.6–1.3)

## 2024-04-24 PROCEDURE — 25510000001 IOPAMIDOL 61 % SOLUTION: Performed by: OTOLARYNGOLOGY

## 2024-04-24 PROCEDURE — 82565 ASSAY OF CREATININE: CPT

## 2024-04-24 PROCEDURE — 70491 CT SOFT TISSUE NECK W/DYE: CPT

## 2024-04-24 RX ADMIN — IOPAMIDOL 75 ML: 612 INJECTION, SOLUTION INTRAVENOUS at 14:32

## 2024-05-25 DIAGNOSIS — I10 ESSENTIAL HYPERTENSION: ICD-10-CM

## 2024-05-28 RX ORDER — LOSARTAN POTASSIUM 100 MG/1
TABLET ORAL
Qty: 90 TABLET | Refills: 1 | OUTPATIENT
Start: 2024-05-28

## 2024-06-25 ENCOUNTER — PRIOR AUTHORIZATION (OUTPATIENT)
Dept: ENDOCRINOLOGY | Age: 64
End: 2024-06-25
Payer: COMMERCIAL

## 2024-06-25 RX ORDER — SEMAGLUTIDE 0.68 MG/ML
INJECTION, SOLUTION SUBCUTANEOUS
Qty: 3 ML | Refills: 1 | Status: SHIPPED | OUTPATIENT
Start: 2024-06-25

## 2024-06-25 NOTE — TELEPHONE ENCOUNTER
A PA for Ozempic (0.25 or 0.5 MG/DOSE) 2MG/3ML pen-injectors has been started.    (Key: [id number])  Rx #: [id number]

## 2024-06-25 NOTE — TELEPHONE ENCOUNTER
Rx Refill Note  Requested Prescriptions     Pending Prescriptions Disp Refills    Ozempic, 0.25 or 0.5 MG/DOSE, 2 MG/3ML solution pen-injector [Pharmacy Med Name: OZEMPIC 0.25-0.5 MG/DOSE PEN] 3 mL 1     Si.25 MG WEEKLY FOR 4 WEEKS THEN 0.5 MG WEEKLY THEREAFTER      Last office visit with prescribing clinician: 2024   Last telemedicine visit with prescribing clinician: Visit date not found   Next office visit with prescribing clinician: Visit date not found                         Would you like a call back once the refill request has been completed: [] Yes [] No    If the office needs to give you a call back, can they leave a voicemail: [] Yes [] No    Susannah Serrano  24, 07:30 EDT

## 2024-06-28 NOTE — TELEPHONE ENCOUNTER
PA Case: [id number], Status: Approved,     Coverage Starts on: 6/27/2024 12:00:00 AM, Coverage Ends on: 6/27/2025 12:00:00 AM..     Authorization Expiration Date: June 27, 2025.

## 2024-07-18 ENCOUNTER — TRANSCRIBE ORDERS (OUTPATIENT)
Dept: ADMINISTRATIVE | Facility: HOSPITAL | Age: 64
End: 2024-07-18
Payer: COMMERCIAL

## 2024-07-18 DIAGNOSIS — G47.09 OTHER INSOMNIA: ICD-10-CM

## 2024-07-18 DIAGNOSIS — Z12.2 SCREENING FOR LUNG CANCER: Primary | ICD-10-CM

## 2024-07-21 DIAGNOSIS — E11.42 TYPE 2 DIABETES MELLITUS WITH PERIPHERAL NEUROPATHY: ICD-10-CM

## 2024-07-21 RX ORDER — TRAZODONE HYDROCHLORIDE 100 MG/1
100 TABLET ORAL
Qty: 90 TABLET | Refills: 0 | Status: SHIPPED | OUTPATIENT
Start: 2024-07-21

## 2024-07-22 RX ORDER — METFORMIN HYDROCHLORIDE 500 MG/1
2000 TABLET, EXTENDED RELEASE ORAL
Qty: 360 TABLET | Refills: 1 | Status: SHIPPED | OUTPATIENT
Start: 2024-07-22

## 2024-07-22 NOTE — TELEPHONE ENCOUNTER
Rx Refill Note  Requested Prescriptions     Pending Prescriptions Disp Refills    metFORMIN ER (GLUCOPHAGE-XR) 500 MG 24 hr tablet [Pharmacy Med Name: METFORMIN HCL  MG TABLET] 360 tablet 1     Sig: TAKE 4 TABLETS BY MOUTH DAILY WITH BREAKFAST.      Last office visit with prescribing clinician: 12/26/2023   Last telemedicine visit with prescribing clinician: Visit date not found   Next office visit with prescribing clinician: Visit date not found                         Would you like a call back once the refill request has been completed: [] Yes [] No    If the office needs to give you a call back, can they leave a voicemail: [] Yes [] No    Susannah Serrano  07/22/24, 07:47 EDT

## 2024-08-16 DIAGNOSIS — I10 ESSENTIAL HYPERTENSION: ICD-10-CM

## 2024-08-19 RX ORDER — LOSARTAN POTASSIUM 100 MG/1
TABLET ORAL
Qty: 90 TABLET | Refills: 1 | OUTPATIENT
Start: 2024-08-19

## 2024-08-23 ENCOUNTER — TELEPHONE (OUTPATIENT)
Dept: INTERNAL MEDICINE | Age: 64
End: 2024-08-23
Payer: COMMERCIAL

## 2024-09-04 ENCOUNTER — HOSPITAL ENCOUNTER (OUTPATIENT)
Facility: HOSPITAL | Age: 64
Discharge: HOME | End: 2024-09-04
Admitting: PHYSICIAN ASSISTANT
Payer: COMMERCIAL

## 2024-09-04 ENCOUNTER — OFFICE VISIT (OUTPATIENT)
Dept: INTERNAL MEDICINE | Age: 64
End: 2024-09-04
Payer: COMMERCIAL

## 2024-09-04 VITALS
RESPIRATION RATE: 18 BRPM | OXYGEN SATURATION: 95 % | DIASTOLIC BLOOD PRESSURE: 82 MMHG | SYSTOLIC BLOOD PRESSURE: 132 MMHG | HEART RATE: 77 BPM | HEIGHT: 68 IN | BODY MASS INDEX: 38.34 KG/M2 | TEMPERATURE: 96.8 F | WEIGHT: 253 LBS

## 2024-09-04 DIAGNOSIS — E78.5 HYPERLIPIDEMIA, UNSPECIFIED HYPERLIPIDEMIA TYPE: Chronic | ICD-10-CM

## 2024-09-04 DIAGNOSIS — M25.512 LEFT SHOULDER PAIN, UNSPECIFIED CHRONICITY: ICD-10-CM

## 2024-09-04 DIAGNOSIS — M25.512 LEFT SHOULDER PAIN, UNSPECIFIED CHRONICITY: Primary | ICD-10-CM

## 2024-09-04 PROCEDURE — 99213 OFFICE O/P EST LOW 20 MIN: CPT | Performed by: PHYSICIAN ASSISTANT

## 2024-09-04 PROCEDURE — 73030 X-RAY EXAM OF SHOULDER: CPT

## 2024-09-04 RX ORDER — ATORVASTATIN CALCIUM 40 MG/1
40 TABLET, FILM COATED ORAL DAILY
Qty: 90 TABLET | Refills: 1 | Status: SHIPPED | OUTPATIENT
Start: 2024-09-04

## 2024-09-04 NOTE — PROGRESS NOTES
"    I N T E R N A L  M E D I C I N E    Niraj Rivas PA-C      ENCOUNTER DATE:  09/04/2024    Carrington PALACIOS Shell / 63 y.o. / male    CHIEF COMPLAINT / REASON FOR OFFICE VISIT     Essential hypertension and Med Refill (Losartan and atorvastatin 40 mg )      ASSESSMENT & PLAN   Diagnoses and all orders for this visit:    1. Left shoulder pain, unspecified chronicity (Primary)  Assessment & Plan:  X-Rays of LT Shoulder ordered.   MRI to be considered depending on results of X-rays.     Orders:  -     XR Shoulder 2+ View Left; Future    2. Hyperlipidemia, unspecified hyperlipidemia type  -     atorvastatin (Lipitor) 40 MG tablet; Take 1 tablet by mouth Daily.  Dispense: 90 tablet; Refill: 1         Next Appointment with me:   Return for Follow-up with Dr. Louise in December.        VITAL SIGNS     Vitals:    09/04/24 1506   BP: 132/82   BP Location: Left arm   Patient Position: Sitting   Cuff Size: Large Adult   Pulse: 77   Resp: 18   Temp: 96.8 °F (36 °C)   TempSrc: Temporal   SpO2: 95%   Weight: 115 kg (253 lb)   Height: 172.7 cm (67.99\")       @BP@  Wt Readings from Last 3 Encounters:   09/04/24 115 kg (253 lb)   04/22/24 115 kg (254 lb 3.2 oz)   02/27/24 113 kg (249 lb 6.4 oz)     Body mass index is 38.48 kg/m².      MEDICATIONS AT THE TIME OF OFFICE VISIT     Current Outpatient Medications on File Prior to Visit   Medication Sig    albuterol sulfate  (90 Base) MCG/ACT inhaler Inhale 1-2 puffs Every 4 (Four) Hours As Needed for Wheezing or Shortness of Air.    Continuous Blood Gluc Sensor (FreeStyle Sergio 3 Sensor) misc Use 1 each Every 14 (Fourteen) Days.    gabapentin (NEURONTIN) 100 MG capsule 1 capsule twice daily    glucose blood test strip Care Sen N  Dx code E11.42 testing bs 2 x day    insulin NPH (humuLIN N,novoLIN N) 100 UNIT/ML injection 65 units at bedtime    Insulin Pen Needle (PEN NEEDLES) 32G X 5 MM misc 1 each Daily.    insulin regular (humuLIN R,novoLIN R) 100 UNIT/ML injection inject 20 " units in the morning 15 units at lunch and 25 units at dinner (Patient taking differently: inject 25 units in the morning 15 units at lunch and 25 units at dinner)    Insulin Syringes, Disposable, U-100 0.5 ML misc Use daily    losartan (COZAAR) 100 MG tablet Take 1 tablet by mouth Daily.    metFORMIN ER (GLUCOPHAGE-XR) 500 MG 24 hr tablet TAKE 4 TABLETS BY MOUTH DAILY WITH BREAKFAST.    O2 (OXYGEN) Inhale 2 L/min 1 (One) Time. WITH HOME VENT    Ozempic, 0.25 or 0.5 MG/DOSE, 2 MG/3ML solution pen-injector 0.25 MG WEEKLY FOR 4 WEEKS THEN 0.5 MG WEEKLY THEREAFTER    traZODone (DESYREL) 100 MG tablet TAKE 1 TABLET BY MOUTH EVERYDAY AT BEDTIME     No current facility-administered medications on file prior to visit.          HISTORY OF PRESENT ILLNESS     Pt is a 64y/o wm with hypertension, hyperlipidemia, DM2, and COPD who presents for medication management.     Hypertension: He states that he has been without Losartan 100mg for one month, and states that his blood pressures have been controlled without it. Blood pressure 132/82 on today. He is agreeable to restart his prescribed Losartan and work to document his home readings so that his medicated readings can be compared with non-medicated readings.     Hyperlipidemia: Atorvastatin 80mg daily, but he only takes 1/2 tablet (40mg) nightly. He is wanting to update his prescription within Epic to reflect this dose change at this visit.     DM2:  Taking Ozempic 0.5mg weekly. Followed by Endocrinology.     Left shoulder cuff injury: He reports a history of left shoulder pain that began with a 4-roque injury on several years ago. He admits worsening pain with movement and reduced left arm strength, in comparison with his right shoulder. Left shoulder pain is worsened at night, particularly when sleeping on his left side. He further admits some intermittent numbness to the distal left arm. Denies trauma or overuses. He is seeking to have necessary imaging completed now that  his medical insurance premiums have been met for the year.       REVIEW OF SYSTEMS     Review of Systems   Constitutional: Negative.    HENT: Negative.     Eyes: Negative.    Respiratory:  Positive for shortness of breath and wheezing.    Gastrointestinal: Negative.    Musculoskeletal:  Positive for arthralgias (Left shoulder pain with movement.).   All other systems reviewed and are negative.     As per HPI.       PHYSICAL EXAMINATION     Physical Exam  Vitals and nursing note reviewed.   Constitutional:       General: He is not in acute distress.     Appearance: Normal appearance. He is obese. He is not ill-appearing or toxic-appearing.   Cardiovascular:      Rate and Rhythm: Normal rate and regular rhythm.      Pulses: Normal pulses.      Heart sounds: Normal heart sounds.   Pulmonary:      Effort: Pulmonary effort is normal.      Breath sounds: Wheezing present.   Musculoskeletal:      Right shoulder: Normal.      Left shoulder: Tenderness present. No swelling, deformity, effusion or laceration. Decreased range of motion (with abduction). Decreased strength.   Neurological:      Mental Status: He is alert.             REVIEWED DATA     Labs:           Imaging:           Medical Tests:           Summary of old records / correspondence / consultant report:           Request outside records:

## 2024-09-08 PROBLEM — M25.512 LEFT SHOULDER PAIN: Status: ACTIVE | Noted: 2024-09-08

## 2024-09-09 DIAGNOSIS — M25.512 LEFT SHOULDER PAIN, UNSPECIFIED CHRONICITY: Primary | ICD-10-CM

## 2024-09-16 ENCOUNTER — TELEPHONE (OUTPATIENT)
Dept: INTERNAL MEDICINE | Age: 64
End: 2024-09-16

## 2024-09-16 RX ORDER — SEMAGLUTIDE 0.68 MG/ML
0.5 INJECTION, SOLUTION SUBCUTANEOUS WEEKLY
Qty: 3 ML | Refills: 0 | Status: SHIPPED | OUTPATIENT
Start: 2024-09-16

## 2024-09-16 NOTE — TELEPHONE ENCOUNTER
"  Caller: Carrington Biggs \"AMANDA\"    Relationship: Self    Best call back number:     What is the best time to reach you:     Who are you requesting to speak with (clinical staff, provider,  specific staff member): DR GUARDADO OR STAFF    Do you know the name of the person who called:     What was the call regarding: PATIENT SAYS THAT HE WILL HAVE A MRI ON HIS LEFT SHOULDER AND HE WANTS HIS NECK TO BE ADDED TO THIS ORDER AS HE WILL HAVE TO PAY FOR THIS OUT OF POCKET ANYWAY.  HE WANTS TO BE CONTACTED ONCE THIS HAS BEEN COMPLETED. HE SAYS HE WILL HAVE THE MRI COMPLETED AT A [facility] GROUP CLOSE TO [facility].     Is it okay if the provider responds through [employer]:         "

## 2024-10-08 DIAGNOSIS — I10 ESSENTIAL HYPERTENSION: ICD-10-CM

## 2024-10-08 RX ORDER — LOSARTAN POTASSIUM 100 MG/1
100 TABLET ORAL DAILY
Qty: 90 TABLET | Refills: 0 | Status: SHIPPED | OUTPATIENT
Start: 2024-10-08

## 2024-10-08 NOTE — TELEPHONE ENCOUNTER
Caller: HARSHIL GERMAN     Relationship: OTHER     Requested Prescriptions:   Requested Prescriptions     Pending Prescriptions Disp Refills    losartan (COZAAR) 100 MG tablet 30 tablet 0     Sig: Take 1 tablet by mouth Daily.      Pharmacy where request should be sent: [facility]/PHARMACY #[id number] [place], [place] - [address] [place] [address] AT [facility] - [phone]  - [phone] FX     Last office visit with prescribing clinician: 12/27/2023   Last telemedicine visit with prescribing clinician: Visit date not found   Next office visit with prescribing clinician: 12/16/2024     Additional details provided by patient: PATIENT STATES AT HIS LAST VISIT, THIS MEDICATION WAS ONLY REFILLED FOR 30 DAYS AND HE IS NEEDING IT TO HOLD HIM OVER UNTIL MID DECEMBER WHEN HE COMES IN TO SEE DR. GUARDADO.       Does the patient have less than a 3 day supply:  [] Yes  [x] No    Would you like a call back once the refill request has been completed: [] Yes [x] No    If the office needs to give you a call back, can they leave a voicemail: [] Yes [x] No    Yesenia Arzate Rep   10/08/24 14:20 EDT

## 2024-10-17 DIAGNOSIS — G47.09 OTHER INSOMNIA: ICD-10-CM

## 2024-10-18 RX ORDER — TRAZODONE HYDROCHLORIDE 100 MG/1
100 TABLET ORAL
Qty: 90 TABLET | Refills: 1 | Status: SHIPPED | OUTPATIENT
Start: 2024-10-18

## 2024-10-18 RX ORDER — SEMAGLUTIDE 0.68 MG/ML
0.5 INJECTION, SOLUTION SUBCUTANEOUS WEEKLY
Qty: 3 ML | Refills: 0 | OUTPATIENT
Start: 2024-10-18

## 2024-10-21 RX ORDER — SEMAGLUTIDE 0.68 MG/ML
0.5 INJECTION, SOLUTION SUBCUTANEOUS WEEKLY
Qty: 3 ML | Refills: 0 | Status: SHIPPED | OUTPATIENT
Start: 2024-10-21

## 2024-10-21 NOTE — TELEPHONE ENCOUNTER
Caller: HARSHIL GERMAN    Relationship: SELF    Best call back number: [phone]     Requested Prescriptions:   Requested Prescriptions     Pending Prescriptions Disp Refills    Semaglutide,0.25 or 0.5MG/DOS, (Ozempic, 0.25 or 0.5 MG/DOSE,) 2 MG/3ML solution pen-injector 3 mL 0     Sig: Inject 0.5 mg under the skin into the appropriate area as directed 1 (One) Time Per Week. Please schedule follow-up appointment.        Pharmacy where request should be sent:  [facility]/pharmacy #[id number] [address] - [phone] [facility] [phone] FX     Last office visit with prescribing clinician: 12/26/2023   Last telemedicine visit with prescribing clinician: Visit date not found   Next office visit with prescribing clinician: 10/31/2024     Additional details provided by patient: LAST INJECTION TODAY    Does the patient have less than a 3 day supply:  [x] Yes  [] No    Would you like a call back once the refill request has been completed: [] Yes [x] No    If the office needs to give you a call back, can they leave a voicemail: [] Yes [x] No    Yesenia Leigh Rep   10/21/24 14:32 EDT

## 2024-10-21 NOTE — TELEPHONE ENCOUNTER
Rx Refill Note  Requested Prescriptions     Pending Prescriptions Disp Refills    Semaglutide,0.25 or 0.5MG/DOS, (Ozempic, 0.25 or 0.5 MG/DOSE,) 2 MG/3ML solution pen-injector 3 mL 0     Sig: Inject 0.5 mg under the skin into the appropriate area as directed 1 (One) Time Per Week. Please schedule follow-up appointment.      Last office visit with prescribing clinician: 12/26/2023   Last telemedicine visit with prescribing clinician: Visit date not found   Next office visit with prescribing clinician: 10/31/2024                         Would you like a call back once the refill request has been completed: [] Yes [] No    If the office needs to give you a call back, can they leave a voicemail: [] Yes [] No    Susannah Serrano  10/21/24, 14:44 EDT

## 2024-10-23 ENCOUNTER — HOSPITAL ENCOUNTER (OUTPATIENT)
Dept: MRI IMAGING | Facility: HOSPITAL | Age: 64
Discharge: HOME | End: 2024-10-23
Admitting: PHYSICIAN ASSISTANT
Payer: COMMERCIAL

## 2024-10-23 DIAGNOSIS — M25.512 LEFT SHOULDER PAIN, UNSPECIFIED CHRONICITY: ICD-10-CM

## 2024-10-23 PROCEDURE — 73221 MRI JOINT UPR EXTREM W/O DYE: CPT

## 2024-10-27 DIAGNOSIS — M75.102 TEAR OF LEFT SUPRASPINATUS TENDON: Primary | ICD-10-CM

## 2024-10-28 ENCOUNTER — OFFICE VISIT (OUTPATIENT)
Dept: ORTHOPEDIC SURGERY | Facility: CLINIC | Age: 64
End: 2024-10-28
Payer: COMMERCIAL

## 2024-10-28 VITALS — TEMPERATURE: 98.7 F | WEIGHT: 253 LBS | HEIGHT: 68 IN | BODY MASS INDEX: 38.34 KG/M2

## 2024-10-28 DIAGNOSIS — G89.29 CHRONIC LEFT SHOULDER PAIN: ICD-10-CM

## 2024-10-28 DIAGNOSIS — M75.112 INCOMPLETE TEAR OF LEFT ROTATOR CUFF, UNSPECIFIED WHETHER TRAUMATIC: Primary | ICD-10-CM

## 2024-10-28 DIAGNOSIS — M25.512 CHRONIC LEFT SHOULDER PAIN: ICD-10-CM

## 2024-10-28 PROCEDURE — 20610 DRAIN/INJ JOINT/BURSA W/O US: CPT | Performed by: NURSE PRACTITIONER

## 2024-10-28 PROCEDURE — 99214 OFFICE O/P EST MOD 30 MIN: CPT | Performed by: NURSE PRACTITIONER

## 2024-10-28 RX ORDER — LIDOCAINE HYDROCHLORIDE 10 MG/ML
2 INJECTION, SOLUTION EPIDURAL; INFILTRATION; INTRACAUDAL; PERINEURAL
Status: COMPLETED | OUTPATIENT
Start: 2024-10-28 | End: 2024-10-28

## 2024-10-28 RX ORDER — METHYLPREDNISOLONE ACETATE 80 MG/ML
80 INJECTION, SUSPENSION INTRA-ARTICULAR; INTRALESIONAL; INTRAMUSCULAR; SOFT TISSUE
Status: COMPLETED | OUTPATIENT
Start: 2024-10-28 | End: 2024-10-28

## 2024-10-28 RX ADMIN — METHYLPREDNISOLONE ACETATE 80 MG: 80 INJECTION, SUSPENSION INTRA-ARTICULAR; INTRALESIONAL; INTRAMUSCULAR; SOFT TISSUE at 14:32

## 2024-10-28 RX ADMIN — LIDOCAINE HYDROCHLORIDE 2 ML: 10 INJECTION, SOLUTION EPIDURAL; INFILTRATION; INTRACAUDAL; PERINEURAL at 14:32

## 2024-10-28 NOTE — PROGRESS NOTES
[facility]              New Problem      Patient Name: Carrington Biggs  : 1960  Primary Care Physician: Preet Louise MD        Chief Complaint:  Left shoulder pain    HPI:   Carrington Biggs is a 63 y.o. year old who presents today for evaluation.  History of Present Illness  The patient presents for evaluation of shoulder pain.    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 The incident was initially mistaken for a heart attack, leading to an emergency room visit. Despite the passage of time, his arm has not fully recovered. Approximately a month ago, he experienced a significant increase in pain, prompting him to seek medical attention. His physician recommended an x-ray and MRI, which were conducted last week. He suspects that his symptoms may be related to arthritis. He is right-hand dominant and experiences pain when sleeping on his left side. He expresses a lack of interest in physical therapy and has never received cortisone injections.    He also mentions using a respirator at night.    He is diabetic. His blood sugar was 101 this morning.    ALLERGIES  He is allergic to ROSUVASTATIN.    Past Medical/Surgical, Social and Family History:  I have reviewed and/or updated pertinent history as noted in the medical record including:  Past Medical History:   Diagnosis Date    Achilles tendinitis of right lower extremity 2017    Anal fistula 2006    Arthritis     BCC (basal cell carcinoma) 2021    LEFT INFERIOR EYELID, S/P MOHS    BPH (benign prostatic hyperplasia)     FOLLOWED BY DR. BROOKS BOB    CAD (coronary artery disease)     Cervical paraspinal muscle spasm 2018    Colon polyps     FOLLOWED BY DR. DARVIN CHU    COPD (chronic obstructive pulmonary disease)     HOME VENT WITH OXYGEN 2 LITERS    COVID-19 virus infection 2020    Erectile dysfunction     Excessive drinking of alcohol 2020    Gastroesophageal reflux  disease 01/26/2017    Hepatic steatosis 11/17/2020    Per abdominal CT scan September 2020    Herpes simplex virus (HSV) infection 01/26/2017    Hoarseness 09/2021    FOLLOWED BY DR. JÚNIOR SCHAFFER    Hyperlipidemia     Hypertension     Insomnia     Microalbuminuria     Neck injury     Neuropathy     Nodule of left lung     Obstructive sleep apnea syndrome     Gina-rectal abscess 06/27/2022    Pneumonia due to COVID-19 virus 09/29/2020    BILATERAL LOWER LOBES, ADMITTED TO [facility]    Right renal stone 09/30/2020    Right-sided thoracic back pain     Tibialis posterior tendinitis, right 07/2017    Type 2 diabetes mellitus     NIDDM    Vocal cord polyp 08/13/2021    FOLLOWED BY DR. JÚNIOR SCHAFFER     Past Surgical History:   Procedure Laterality Date    ANAL FISTULOTOMY N/A 03/03/2006    WITH DRAINAGE OF ABSCESS AND ANAL PAPILLECTOMY, DR. LUX MORENO AT [facility]    CARDIAC CATHETERIZATION Left 12/27/2011    MILD NONOBSTRUCTIVE ATHEROSCLEROTIC CAD, LEFT VENTRICULAR EF 60%, MILDLY ELEVATED LEFT VENTRICULAR END DIASTOLIC PRESSURE, DR. DORINDA BISHOP AT [facility]    CARDIAC CATHETERIZATION Right 9/3/2022    Procedure: Right and Left Heart Cath;  Surgeon: Kristy Almonte MD;  Location: [facility] CATH INVASIVE LOCATION;  Service: Cardiology;  Laterality: Right;    CARDIAC CATHETERIZATION N/A 9/3/2022    Procedure: Coronary angiography;  Surgeon: Kristy Almonte MD;  Location: [facility] CATH INVASIVE LOCATION;  Service: Cardiology;  Laterality: N/A;    COLONOSCOPY N/A 2000    DR. JOVAN MOSLEY    COLONOSCOPY N/A 08/07/2017    SMALL EXTERNAL HEMORRHOIDS, OTHERWISE WNL, RESCOPE IN 5 YRS, DR. DARVIN CHU AT [facility]    HEMORRHOIDECTOMY N/A 1994    INCISION AND DRAINAGE PERIRECTAL ABSCESS N/A 12/30/2013    INCISION AND DRAINAGE PERIRECTAL ABSCESS N/A 06/04/2005    DR. ALFRED TURCIOS AT [facility]    LARYNGOSCOPY N/A 2/29/2024    Procedure: MICROSCOPIC LARYNGOSCOPY AND BIOPSY;  Surgeon: Júnior Schaffer MD;  Location: [facility] OR;  Service: ENT;   Laterality: N/A;    LUMBAR DISCECTOMY N/A 1995    OSTEOPHYTE L5    MOHS SURGERY Left 08/09/2021    LEFT INFERIOR EYELID, (+)BCC, DR. DENZEL PDEERSEN    SKIN BIOPSY Left 07/07/2021    LEFT INFERIOR EYELID, (+)GAIL MALONE PA-RAGHAV    TYMPANOSTOMY TUBE PLACEMENT Bilateral      Social History     Occupational History    Occupation: [employer]   Tobacco Use    Smoking status: Every Day     Current packs/day: 1.00     Average packs/day: 1 pack/day for 54.8 years (54.8 ttl pk-yrs)     Types: Cigarettes     Start date: 1970    Smokeless tobacco: Never    Tobacco comments:     Smoking 1/2 pack per day currently   Vaping Use    Vaping status: Never Used   Substance and Sexual Activity    Alcohol use: Yes     Alcohol/week: 10.0 standard drinks of alcohol     Types: 10 Shots of liquor per week     Comment: significantly reduced etoh    Drug use: No    Sexual activity: Yes     Partners: Female          Allergies:   Allergies   Allergen Reactions    Rosuvastatin Calcium Myalgia       Medications:   Home Medications:  Current Outpatient Medications on File Prior to Visit   Medication Sig    albuterol sulfate  (90 Base) MCG/ACT inhaler Inhale 1-2 puffs Every 4 (Four) Hours As Needed for Wheezing or Shortness of Air.    atorvastatin (Lipitor) 40 MG tablet Take 1 tablet by mouth Daily.    Continuous Blood Gluc Sensor (FreeStyle Sergio 3 Sensor) misc Use 1 each Every 14 (Fourteen) Days.    gabapentin (NEURONTIN) 100 MG capsule 1 capsule twice daily    glucose blood test strip [facility] N  Dx code E11.42 testing bs 2 x day    insulin NPH (humuLIN N,novoLIN N) 100 UNIT/ML injection 65 units at bedtime    Insulin Pen Needle (PEN NEEDLES) 32G X 5 MM misc 1 each Daily.    insulin regular (humuLIN R,novoLIN R) 100 UNIT/ML injection inject 20 units in the morning 15 units at lunch and 25 units at dinner    Insulin Syringes, Disposable, U-100 0.5 ML misc Use daily    losartan (COZAAR) 100 MG tablet Take 1 tablet by mouth Daily.    metFORMIN  ER (GLUCOPHAGE-XR) 500 MG 24 hr tablet TAKE 4 TABLETS BY MOUTH DAILY WITH BREAKFAST.    O2 (OXYGEN) Inhale 2 L/min 1 (One) Time. WITH HOME VENT    Semaglutide,0.25 or 0.5MG/DOS, (Ozempic, 0.25 or 0.5 MG/DOSE,) 2 MG/3ML solution pen-injector Inject 0.5 mg under the skin into the appropriate area as directed 1 (One) Time Per Week. Please schedule follow-up appointment.    traZODone (DESYREL) 100 MG tablet TAKE 1 TABLET BY MOUTH EVERYDAY AT BEDTIME     No current facility-administered medications on file prior to visit.         ROS:  ROS negative except as listed in the HPI.    Physical Exam:   63 y.o. male  Body mass index is 38.47 kg/m²., 115 kg (253 lb)  Vitals:    10/28/24 1344   Temp: 98.7 °F (37.1 °C)     General: Alert, cooperative, appears well and in no observable distress. Appears stated age and BMI as listed above.  HEENT: Normocephalic, atraumatic on external visual inspection.  CV: No significant peripheral edema.  Respiratory: Normal respiratory effort.  Skin: Warm & well perfused; appropriate skin turgor.  Psych: Appropriate mood & affect.  Neuro: Gross sensation and motor intact in affected extremity/extremities.  Vascular: Peripheral pulses palpable in affected extremity/extremities.   Physical Exam      MSK Exam:  Left Shoulder  No obvious deformities or wounds.   No redness or significant swelling.  Tenderness  none  Definitive painful arc.  +empty can  -Neg Yergason  Equivocal Speed/Essex's sign  Negative drop arm test  AROM  Flexion 180   ER 60   IR lumbar spine     Rotator Cuff Strength:  Supraspinatus: 4  ER: 4   IR: 4+  Isometric testing of the rotator cuff is painful.    Right Shoulder  No deformity or wounds.  No redness or swelling.  No tenderness to palpation.  Full, painless AROM.  Cuff Strength 4+ to 5/5 with isometric testing of the rotator cuff and painless.  Negative provocative testing.    Brief examination of the cervical spine did not reproduce any specific radicular pattern or  symptoms.   Strength is reasonable and symmetric in the upper extremities.       Radiology:    Results    XR SHOULDER 2+ VW LEFT-9/4/2024     HISTORY: Left shoulder pain.     No acute bone, joint or soft tissue abnormalities are seen.        This report was finalized on 9/9/2024 8:41 AM by Dr. Meir Lux M.D on Workstation: [id number]    Upon my review of his plain films I do think he has some degenerative changes at the acromioclavicular joint and a little sclerosis at the greater tuberosity of the humerus otherwise I agree with the findings noted above by the radiologist.    I also reviewed the MRI as noted below and agree with the findings noted by the radiologist.     MRI Shoulder Left Without Contrast  Order date: 10/23/2024  Authorizing: Niraj Rivas PA-C  Ordered by Niraj Rivas PA-C on 10/23/2024.     Narrative & Impression    MRI SHOULDER LEFT WO CONTRAST-     Date of Exam: 10/23/2024 12:55 PM     Indication: Worsening left shoulder pain and weakness, history of  shoulder trauma. Decreased range of motion.     Comparison: Left shoulder radiographs 9/4/2024. CT neck 4/24/2024. CT  chest 12/26/2023.     Technique: Multiplanar, multisequence MR imaging of the shoulder was  performed without contrast.     FINDINGS:     Rotator cuff: Moderate to high-grade partial-thickness, full width or  near full width articular-sided tear of the supraspinatus tendon at the  footprint with up to 1 cm medial retraction of the articular-sided  tendon fibers and mild partial-thickness bursal sided fraying proximal  to the footprint. Mild anterior infraspinatus tendinopathy with mild  partial-thickness articular sided fraying anteriorly at the footprint.  Moderate to high-grade partial-thickness, partial width interstitial  tear of the superior 1 cm of the subscapularis tendon from the  footprint. The teres minor tendon is intact with mild (1/4) diffuse  muscular fatty atrophy. No myositis.     Biceps  tendon: Moderate to severe intra-articular tendinopathy with the  tendon dislocated medially from the bicipital groove into the substance  of the subscapularis interstitial tear.     Acromioclavicular Arch: Moderate hypertrophic degenerative changes at  the acromioclavicular joint with associated mild mass effect on the  supraspinatus tendon. Type II acromion, without lateral downsloping. No  abnormal fluid in the subacromial/subdeltoid bursa.     Glenohumeral joint: The humeral head and glenoid are congruent. Trace  joint fluid. No discrete intra-articular body. Mild diffuse  chondromalacia. The joint capsule is within normal limits.     Labrum: No evidence of acute labral tear. No paralabral cyst.     Bone: Nonspecific tiny subcortical cystic changes in the greater and  lesser tuberosities and posterior humeral head. Tiny degenerative  chondral cystic changes in the distal clavicle and acromion. No  fracture. No concerning bone marrow lesion or marrow replacing process.     Soft tissues: No soft tissue mass.     IMPRESSION:     1. Moderate to high-grade partial-thickness, full width or near full  width articular-sided tear of the supraspinatus tendon at the footprint  with medial retraction of the articular-sided tendon fibers and mild  partial-thickness bursal sided fraying proximal to the footprint.  2. Mild anterior infraspinatus tendinopathy with mild partial-thickness  articular sided fraying anteriorly at the footprint.  3. Moderate to high-grade partial-thickness, partial width interstitial  tear of the subscapularis tendon at the footprint.  4. Moderate to severe intra-articular long head biceps tendinopathy with  the tendon dislocated medially in the bicipital groove into the  substance of the subscapularis tendon tear.  5. Mild glenohumeral joint chondromalacia.  6. Moderate DJD at the AC joint.              This report was finalized on 10/23/2024 5:20 PM by Jalen Tate MD on  Workstation:  [id number]          No new images were needed at the visit today.     Procedure:   See Procedure Note: The potential risks and benefits of performing a diagnostic and therapeutic injection were discussed with the patient prior to procedure. Risks include, but are not limited to infection, swelling, transient increase in pain, bleeding, bruising. Patient was advised that injections are a diagnostic and therapeutic tool meaning they may not alleviate symptoms at all, or may only provide partial or temporary relief. Injection precautions and aftercare discussed. and Patient has a history of diabetes and therefore may be at increased risk of hyperglycemia following an injection of corticosteroid. These increases in BG are usually transient and resolve within a few days. Patient was advised on the potential for hyperglycemia and encouraged to self monitor for s/sx hyperglycemia and to self monitor BG. Patient encouraged to call the office for any significant hyperglycemia and/or hyperglycemia that does not resolve within 3-5 days. Injection precautions and aftercare discussed.  Large Joint Arthrocentesis: L subacromial bursa  Date/Time: 10/28/2024 2:32 PM  Consent given by: patient  Site marked: site marked  Timeout: Immediately prior to procedure a time out was called to verify the correct patient, procedure, equipment, support staff and site/side marked as required   Supporting Documentation  Indications: pain   Procedure Details  Location: shoulder - L subacromial bursa  Preparation: Patient was prepped and draped in the usual sterile fashion  Needle gauge: 21G.  Approach: posterior  Medications administered: 80 mg methylPREDNISolone acetate 80 MG/ML; 2 mL lidocaine PF 1% 1 %  Patient tolerance: patient tolerated the procedure well with no immediate complications            Misc. Data/Labs: N/A    Assessment & Plan:      ICD-10-CM ICD-9-CM   1. Incomplete tear of left rotator cuff, unspecified whether traumatic   M75.112 840.4   2. Chronic left shoulder pain  M25.512 719.41    G89.29 338.29     No orders of the defined types were placed in this encounter.    Orders Placed This Encounter   Procedures    Large Joint Arthrocentesis: L subacromial bursa       Assessment & Plan  1. Shoulder pain.  I suspect his shoulder is painful due to his rotator cuff pathology.  His symptoms and exam align with a symptomatic rotator cuff. Changes in the biceps tendon do not seem to be causing significant discomfort currently. A subacromial cortisone injection was administered today.  If the injection does not significantly improve the condition, further discussions regarding surgical options or alternative injection sites within the joint may be necessary.  In discussion with the patient I do think he would prefer to manage this conservatively if possible, he also has some underlying pulmonary issues that could potentially make him higher surgical risk but nonetheless we will consider that if he fails nonoperative treatment.    2. Diabetes Mellitus.  Blood sugar levels are currently well-controlled, with a reading of 101 this morning. The patient was informed that the cortisone injection might temporarily increase blood sugar levels. Monitoring blood sugar levels closely after the injection is advised.    Follow-up  Return in 2 to 4 weeks for follow-up.    Continue with activity modifications as needed/discussed.  Continue ICE and/or HEAT PRN.  Recommend to continue activity as tolerated, focus on stretching and strengthening of the joint.    Focus on posture and body mechanics to improve/prevent symptoms.   Patient encouraged to call with questions or concerns prior to follow up.  Will discuss with attending as needed.  Consider additional referrals, work up and/or advanced imaging as indicated or if patient fails to respond to conservative care.    Return in about 2 weeks (around 11/11/2024) for Recheck. If symptoms change call for sooner  appt..    Patient or patient representative verbalized consent for the use of Ambient Listening during the visit with  DARLINE Joel for chart documentation. 10/28/2024  16:38 EDT     DARLINE Hernadez    Dictation software was used to complete a portion or all of this note.

## 2024-10-30 ENCOUNTER — PATIENT ROUNDING (BHMG ONLY) (OUTPATIENT)
Dept: ORTHOPEDIC SURGERY | Facility: CLINIC | Age: 64
End: 2024-10-30
Payer: COMMERCIAL

## 2024-10-30 NOTE — PROGRESS NOTES
A PolyPid Message has been sent to the patient for PATIENT ROUNDING with [facility]

## 2024-10-31 ENCOUNTER — OFFICE VISIT (OUTPATIENT)
Dept: ENDOCRINOLOGY | Age: 64
End: 2024-10-31
Payer: COMMERCIAL

## 2024-10-31 VITALS
SYSTOLIC BLOOD PRESSURE: 136 MMHG | HEIGHT: 68 IN | HEART RATE: 74 BPM | BODY MASS INDEX: 37.95 KG/M2 | WEIGHT: 250.4 LBS | DIASTOLIC BLOOD PRESSURE: 80 MMHG | TEMPERATURE: 98.5 F | OXYGEN SATURATION: 95 %

## 2024-10-31 DIAGNOSIS — I10 ESSENTIAL HYPERTENSION: Chronic | ICD-10-CM

## 2024-10-31 DIAGNOSIS — E78.5 HYPERLIPIDEMIA, UNSPECIFIED HYPERLIPIDEMIA TYPE: Chronic | ICD-10-CM

## 2024-10-31 DIAGNOSIS — R80.9 TYPE 2 DIABETES MELLITUS WITH MICROALBUMINURIA, WITHOUT LONG-TERM CURRENT USE OF INSULIN: Primary | Chronic | ICD-10-CM

## 2024-10-31 DIAGNOSIS — E11.29 TYPE 2 DIABETES MELLITUS WITH MICROALBUMINURIA, WITHOUT LONG-TERM CURRENT USE OF INSULIN: Primary | Chronic | ICD-10-CM

## 2024-10-31 RX ORDER — SEMAGLUTIDE 0.68 MG/ML
0.5 INJECTION, SOLUTION SUBCUTANEOUS WEEKLY
Qty: 3 ML | Refills: 0 | Status: SHIPPED | OUTPATIENT
Start: 2024-10-31

## 2024-10-31 RX ORDER — BLOOD-GLUCOSE SENSOR
1 EACH MISCELLANEOUS TAKE AS DIRECTED
Qty: 6 EACH | Refills: 1 | Status: SHIPPED | OUTPATIENT
Start: 2024-10-31

## 2024-10-31 RX ORDER — SEMAGLUTIDE 1.34 MG/ML
1 INJECTION, SOLUTION SUBCUTANEOUS WEEKLY
Qty: 9 ML | Refills: 1 | Status: SHIPPED | OUTPATIENT
Start: 2024-10-31

## 2024-10-31 NOTE — PROGRESS NOTES
"Chief Complaint  Chief Complaint   Patient presents with    Diabetes     Sergio 3 / no recent eye exam        Subjective          History of Present Illness    Carrington Biggs 63 y.o. presents for a follow-up for Type 2 Diabetes    He was diagnosed with diabetes in 2012 and started insulin in 2013    Pt is on the following medications for their diabetes: Novolin N 65 units at bedtime, Novolin R 20 units at breakfast, 15 units at lunch, and 25 units at supper, Metformin  mg 4 tablets daily and Ozempic 0.25 mg weekly        Mounjaro stopped due to cost      Pt complains of numbness and tingling in feet    Denies vision changes.    Last eye exam was 08/17     Pt does have a history of nephropathy.  Patient is currently taking ARB     Pt does have neuropathy.  Current takes gabapentin 100 mg twice daily for this condition.     Pt does not have a history of CAD or CVA.    Last A1C in 12/23 was 6.8    Last microalbumin in 07/23 was negative              Blood Sugars    Blood glucoses are checked 1-3/day.    Fasting blood glucoses: 101 - 181    Pre-meal blood glucoses: 178 - 257    Pt denies/has episodes of hypoglycemia.            Hyperlipidemia     Pt is currently taking atorvastatin 40 mg HS     Last lipid panel in 12/23 showed Total 226, HDL 46,  and Triglycerides 289            Hypertension    Pt denies any chest pain, palpitations, shortness of breath or headache    Current regimen includes losartan 100 mg daily and Lasix 40 mg daily.              I have reviewed the patient's allergies, medicines, past medical hx, family hx and social hx.    Objective   Vital Signs:   /80   Pulse 74   Temp 98.5 °F (36.9 °C) (Oral)   Ht 172.7 cm (67.99\")   Wt 114 kg (250 lb 6.4 oz)   SpO2 95%   BMI 38.08 kg/m²       Physical Exam   Physical Exam  Constitutional:       General: He is not in acute distress.     Appearance: Normal appearance. He is not diaphoretic.   HENT:      Head: Normocephalic and atraumatic. "   Eyes:      General:         Right eye: No discharge.         Left eye: No discharge.   Skin:     General: Skin is warm and dry.   Neurological:      Mental Status: He is alert.   Psychiatric:         Mood and Affect: Mood normal.         Behavior: Behavior normal.                    Results Review:   Hemoglobin A1C   Date Value Ref Range Status   12/26/2023 6.80 (H) 4.80 - 5.60 % Final     Comment:     Hemoglobin A1C Ranges:  Increased Risk for Diabetes  5.7% to 6.4%  Diabetes                     >= 6.5%  Diabetic Goal                < 7.0%     09/02/2022 7.50 (H) 4.80 - 5.60 % Final     Triglycerides   Date Value Ref Range Status   12/26/2023 289 (H) 0 - 150 mg/dL Final     HDL Cholesterol   Date Value Ref Range Status   12/26/2023 46 40 - 60 mg/dL Final     LDL Chol Calc (NIH)   Date Value Ref Range Status   12/26/2023 129 (H) 0 - 100 mg/dL Final     VLDL Cholesterol Gerhard   Date Value Ref Range Status   12/26/2023 51 (H) 5 - 40 mg/dL Final         Assessment and Plan {CC Problem List  Visit Diagnosis  ROS  Review (Popup)  [facility] Maintenance  Quality  BestPractice  Medications  SmartSets  SnapShot Encounters  Media :23  Diagnoses and all orders for this visit:    1. Type 2 diabetes mellitus with microalbuminuria, without long-term current use of insulin (Primary)  -     Semaglutide,0.25 or 0.5MG/DOS, (Ozempic, 0.25 or 0.5 MG/DOSE,) 2 MG/3ML solution pen-injector; Inject 0.5 mg under the skin into the appropriate area as directed 1 (One) Time Per Week. Inject 0.5 mg weekly for four weeks, then increase to 1 mg weekly  Dispense: 3 mL; Refill: 0  -     Semaglutide, 1 MG/DOSE, (Ozempic, 1 MG/DOSE,) 4 MG/3ML solution pen-injector; Inject 1 mg under the skin into the appropriate area as directed 1 (One) Time Per Week. After being on 0.5 mg weekly for at least 4 weeks  Dispense: 9 mL; Refill: 1  -     Continuous Glucose Sensor (FreeStyle Sergio 3 Plus Sensor); Use 1 each Take As Directed. Use 1 every 15 days   Dispense: 6 each; Refill: 1  -     Hemoglobin A1c  -     Comprehensive Metabolic Panel  -     Microalbumin / Creatinine Urine Ratio - Urine, Clean Catch    Continue with Novolin N 65 units at bedtime  Continue with Novolin R 20 units at breakfast, 15 units at lunch and 25 units at supper  Continue with Metformin  mg 4 tablets daily   Increase Ozempic to 0.5 mg weekly for 4 weeks and then increase to 1 mg weekly  Pt stats that he is going to get diabetic eye exam  Continue with BG checks - pt would like to try Sergio since he has meet his deductible    Check labs today      2. Hyperlipidemia, unspecified hyperlipidemia type  -     Comprehensive Metabolic Panel  -     Lipid Panel    Check labs today  Continue with statin      3. Essential hypertension  -     Comprehensive Metabolic Panel    Stable  Continue with current medication regimen  Defer management to PCP            Refills sent to pharmacy        Labs today  RTC in Dec with Dr. Anaya/me        Follow Up     Patient was given instructions and counseling regarding her condition or for health maintenance advice. Please see specific information pulled into the AVS if appropriate.                Lakisha Santos, APRN  10/31/24

## 2024-10-31 NOTE — PATIENT INSTRUCTIONS
Continue with Novolin N 65 units at bedtime  Continue with Novolin R 20 units at breakfast, 15 units at lunch and 25 units at supper  Continue with Metformin  mg 4 tablets daily   Increase Ozempic to 0.5 mg weekly for 4 weeks and then increase to 1 mg weekly

## 2024-11-01 ENCOUNTER — TELEPHONE (OUTPATIENT)
Dept: ENDOCRINOLOGY | Age: 64
End: 2024-11-01
Payer: COMMERCIAL

## 2024-11-01 LAB
ALBUMIN SERPL-MCNC: 4.5 G/DL (ref 3.5–5.2)
ALBUMIN/CREAT UR: 504 MG/G CREAT (ref 0–29)
ALBUMIN/GLOB SERPL: 1.6 G/DL
ALP SERPL-CCNC: 111 U/L (ref 39–117)
ALT SERPL-CCNC: 18 U/L (ref 1–41)
AST SERPL-CCNC: 14 U/L (ref 1–40)
BILIRUB SERPL-MCNC: 0.4 MG/DL (ref 0–1.2)
BUN SERPL-MCNC: 14 MG/DL (ref 8–23)
BUN/CREAT SERPL: 12.5 (ref 7–25)
CALCIUM SERPL-MCNC: 10.3 MG/DL (ref 8.6–10.5)
CHLORIDE SERPL-SCNC: 98 MMOL/L (ref 98–107)
CHOLEST SERPL-MCNC: 182 MG/DL (ref 0–200)
CO2 SERPL-SCNC: 33.6 MMOL/L (ref 22–29)
CREAT SERPL-MCNC: 1.12 MG/DL (ref 0.76–1.27)
CREAT UR-MCNC: 162.5 MG/DL
EGFRCR SERPLBLD CKD-EPI 2021: 73.8 ML/MIN/1.73
GLOBULIN SER CALC-MCNC: 2.8 GM/DL
GLUCOSE SERPL-MCNC: 113 MG/DL (ref 65–99)
HBA1C MFR BLD: 7 % (ref 4.8–5.6)
HDLC SERPL-MCNC: 46 MG/DL (ref 40–60)
IMP & REVIEW OF LAB RESULTS: NORMAL
LDLC SERPL CALC-MCNC: 103 MG/DL (ref 0–100)
MICROALBUMIN UR-MCNC: 819.2 UG/ML
POTASSIUM SERPL-SCNC: 5.2 MMOL/L (ref 3.5–5.2)
PROT SERPL-MCNC: 7.3 G/DL (ref 6–8.5)
SODIUM SERPL-SCNC: 142 MMOL/L (ref 136–145)
TRIGL SERPL-MCNC: 190 MG/DL (ref 0–150)
VLDLC SERPL CALC-MCNC: 33 MG/DL (ref 5–40)

## 2024-11-01 NOTE — TELEPHONE ENCOUNTER
Lakisha Santos, APRN  11/1/2024  9:46 AM EDT Back to Top      A1c is 7% - continue with changes made at visit     Kidney function is good; protein remains noted noted in urine.     LDL is slightly elevated continue with current cholesterol medications and work on dietary modification to help get it below 100.  If still elevated next time will need to increase statin dose

## 2024-11-01 NOTE — TELEPHONE ENCOUNTER
Pt called back in and stated he seen the lab results on his my chart and he will address the results with dr uko

## 2024-11-01 NOTE — TELEPHONE ENCOUNTER
Called pt with NA. Left VM to call the office.    Okay for hub to read to pt. Please schedule labs or follow up if needed.  Okay for  to read to pt. Please schedule labs or follow up if needed.

## 2024-11-18 ENCOUNTER — OFFICE VISIT (OUTPATIENT)
Dept: ORTHOPEDIC SURGERY | Facility: CLINIC | Age: 64
End: 2024-11-18
Payer: COMMERCIAL

## 2024-11-18 VITALS — BODY MASS INDEX: 38.43 KG/M2 | WEIGHT: 253.6 LBS | TEMPERATURE: 98.2 F | HEIGHT: 68 IN

## 2024-11-18 DIAGNOSIS — M75.112 INCOMPLETE TEAR OF LEFT ROTATOR CUFF, UNSPECIFIED WHETHER TRAUMATIC: Primary | ICD-10-CM

## 2024-11-18 DIAGNOSIS — M25.512 CHRONIC LEFT SHOULDER PAIN: ICD-10-CM

## 2024-11-18 DIAGNOSIS — G89.29 CHRONIC LEFT SHOULDER PAIN: ICD-10-CM

## 2024-11-18 PROCEDURE — 99212 OFFICE O/P EST SF 10 MIN: CPT | Performed by: NURSE PRACTITIONER

## 2024-11-18 NOTE — PROGRESS NOTES
[facility]              Follow Up      Patient Name: Carrington Biggs  : 1960  Primary Care Physician: Preet Louise MD        Chief Complaint: Left shoulder pain, feeling much better after the injection    HPI:   Carrington Biggs is a 63 y.o. year old who presents today for evaluation.  History of Present Illness  The patient presents for evaluation of shoulder pain.    He reports an improvement in his shoulder condition, attributing the relief to a recent injection. He has been diagnosed with a rotator cuff tear and biceps tendon issues, but currently, he is not experiencing any discomfort.    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 The incident was initially mistaken for a heart attack, leading to an emergency room visit. Despite the passage of time, his arm has not fully recovered. Approximately a month ago, he experienced a significant increase in pain, prompting him to seek medical attention.        Past Medical/Surgical, Social and Family History:  I have reviewed and/or updated pertinent history as noted in the medical record including:  Past Medical History:   Diagnosis Date    Achilles tendinitis of right lower extremity 2017    Anal fistula     Arthritis     Arthritis of neck no sir assuming    BCC (basal cell carcinoma) 2021    LEFT INFERIOR EYELID, S/P MOHS    BPH (benign prostatic hyperplasia)     FOLLOWED BY DR. BROOKS BOB    CAD (coronary artery disease)     Cervical paraspinal muscle spasm 2018    Colon polyps     FOLLOWED BY DR. DARVIN CHU    COPD (chronic obstructive pulmonary disease)     HOME VENT WITH OXYGEN 2 LITERS    COVID-19 virus infection 2020    Erectile dysfunction     Excessive drinking of alcohol 2020    Gastroesophageal reflux disease 2017    Hepatic steatosis 2020    Per abdominal CT scan 2020    Herpes simplex virus (HSV) infection 2017    Hoarseness  09/2021    FOLLOWED BY DR. JÚNIOR SCHAFFER    Hyperlipidemia     Hypertension     Insomnia     Microalbuminuria     Neck injury     Neuropathy     Nodule of left lung     Obstructive sleep apnea syndrome     Gina-rectal abscess 06/27/2022    Periarthritis of shoulder     mri    Pneumonia due to COVID-19 virus 09/29/2020    BILATERAL LOWER LOBES, ADMITTED TO [facility]    Right renal stone 09/30/2020    Right-sided thoracic back pain     Tibialis posterior tendinitis, right 07/2017    Type 2 diabetes mellitus     NIDDM    Vocal cord polyp 08/13/2021    FOLLOWED BY DR. JÚNIOR SCHAFFER     Past Surgical History:   Procedure Laterality Date    ANAL FISTULOTOMY N/A 03/03/2006    WITH DRAINAGE OF ABSCESS AND ANAL PAPILLECTOMY, DR. LUX MORENO AT [facility]    BACK SURGERY  1996    l-5 disk    CARDIAC CATHETERIZATION Left 12/27/2011    MILD NONOBSTRUCTIVE ATHEROSCLEROTIC CAD, LEFT VENTRICULAR EF 60%, MILDLY ELEVATED LEFT VENTRICULAR END DIASTOLIC PRESSURE, DR. DORINDA BISHOP AT [facility]    CARDIAC CATHETERIZATION Right 09/03/2022    Procedure: Right and Left Heart Cath;  Surgeon: Kristy Almonte MD;  Location:  [facility];  Service: Cardiology;  Laterality: Right;    CARDIAC CATHETERIZATION N/A 09/03/2022    Procedure: Coronary angiography;  Surgeon: Kristy Almonte MD;  Location:  [facility];  Service: Cardiology;  Laterality: N/A;    COLONOSCOPY N/A 2000    DR. JOVAN MOSLEY    COLONOSCOPY N/A 08/07/2017    SMALL EXTERNAL HEMORRHOIDS, OTHERWISE WNL, RESCOPE IN 5 YRS, DR. DARVIN CHU AT [facility]    HEMORRHOIDECTOMY N/A 1994    INCISION AND DRAINAGE PERIRECTAL ABSCESS N/A 12/30/2013    INCISION AND DRAINAGE PERIRECTAL ABSCESS N/A 06/04/2005    DR. ALFRED TURCIOS AT [facility]    LARYNGOSCOPY N/A 02/29/2024    Procedure: MICROSCOPIC LARYNGOSCOPY AND BIOPSY;  Surgeon: Júnior Schaffer MD;  Location: [facility] MAIN OR;  Service: ENT;  Laterality: N/A;    LUMBAR DISCECTOMY N/A 1995    OSTEOPHYTE L5    MOHS  SURGERY Left 08/09/2021    LEFT INFERIOR EYELID, (+)BCC, DR. DENZEL PEDERSEN    SKIN BIOPSY Left 07/07/2021    LEFT INFERIOR EYELID, (+)BCC, GAIL GONZALEZ PA-C    TYMPANOSTOMY TUBE PLACEMENT Bilateral      Social History     Occupational History    Occupation: salesman   Tobacco Use    Smoking status: Every Day     Current packs/day: 1.00     Average packs/day: 1 pack/day for 54.9 years (54.9 ttl pk-yrs)     Types: Cigarettes     Start date: 1/1/1970     Passive exposure: Current    Smokeless tobacco: Never    Tobacco comments:     Smoking 1/2 pack per day currently   Vaping Use    Vaping status: Never Used   Substance and Sexual Activity    Alcohol use: Yes     Alcohol/week: 5.0 standard drinks of alcohol     Types: 5 Shots of liquor per week     Comment: significantly reduced etoh    Drug use: No    Sexual activity: Yes     Partners: Female          Allergies:   Allergies   Allergen Reactions    Rosuvastatin Calcium Myalgia       Medications:   Home Medications:  Current Outpatient Medications on File Prior to Visit   Medication Sig    albuterol sulfate  (90 Base) MCG/ACT inhaler Inhale 1-2 puffs Every 4 (Four) Hours As Needed for Wheezing or Shortness of Air.    atorvastatin (Lipitor) 40 MG tablet Take 1 tablet by mouth Daily.    Continuous Glucose Sensor (FreeStyle Sergio 3 Plus Sensor) Use 1 each Take As Directed. Use 1 every 15 days    gabapentin (NEURONTIN) 100 MG capsule 1 capsule twice daily    glucose blood test strip Care [facility] N  Dx code E11.42 testing bs 2 x day    insulin NPH (humuLIN N,novoLIN N) 100 UNIT/ML injection 65 units at bedtime    Insulin Pen Needle (PEN NEEDLES) 32G X 5 MM misc 1 each Daily.    insulin regular (humuLIN R,novoLIN R) 100 UNIT/ML injection inject 20 units in the morning 15 units at lunch and 25 units at dinner    Insulin Syringes, Disposable, U-100 0.5 ML misc Use daily    losartan (COZAAR) 100 MG tablet Take 1 tablet by mouth Daily.    metFORMIN ER (GLUCOPHAGE-XR) 500 MG 24 hr  tablet TAKE 4 TABLETS BY MOUTH DAILY WITH BREAKFAST.    O2 (OXYGEN) Inhale 2 L/min 1 (One) Time. WITH HOME VENT    Semaglutide, 1 MG/DOSE, (Ozempic, 1 MG/DOSE,) 4 MG/3ML solution pen-injector Inject 1 mg under the skin into the appropriate area as directed 1 (One) Time Per Week. After being on 0.5 mg weekly for at least 4 weeks    traZODone (DESYREL) 100 MG tablet TAKE 1 TABLET BY MOUTH EVERYDAY AT BEDTIME    Semaglutide,0.25 or 0.5MG/DOS, (Ozempic, 0.25 or 0.5 MG/DOSE,) 2 MG/3ML solution pen-injector Inject 0.5 mg under the skin into the appropriate area as directed 1 (One) Time Per Week. Inject 0.5 mg weekly for four weeks, then increase to 1 mg weekly (Patient not taking: Reported on 11/18/2024)     No current facility-administered medications on file prior to visit.         ROS:  ROS negative except as listed in the HPI.    Physical Exam:   63 y.o. male  Body mass index is 38.56 kg/m²., 115 kg (253 lb 9.6 oz)  Vitals:    11/18/24 1349   Temp: 98.2 °F (36.8 °C)     General: Alert, cooperative, appears well and in no observable distress. Appears stated age and BMI as listed above.  HEENT: Normocephalic, atraumatic on external visual inspection.  CV: No significant peripheral edema.  Respiratory: Normal respiratory effort.  Skin: Warm & well perfused; appropriate skin turgor.  Psych: Appropriate mood & affect.  Neuro: Gross sensation and motor intact in affected extremity/extremities.  Vascular: Peripheral pulses palpable in affected extremity/extremities.   Physical Exam      MSK Exam:  Left Shoulder  No obvious deformities or wounds.   No redness or significant swelling.  Tenderness  none  Negative drop arm test  AROM  Flexion 180   ER 60   IR lumbar spine      Rotator Cuff Strength:  Supraspinatus: 4+  ER: 4+   IR: 4+  Isometric testing of the rotator cuff is not painful.     Right Shoulder  No deformity or wounds.  No redness or swelling.  No tenderness to palpation.  Full, painless AROM.  Cuff Strength 4+ to  5/5 with isometric testing of the rotator cuff and painless.  Negative provocative testing.     Brief examination of the cervical spine did not reproduce any specific radicular pattern or symptoms.   Strength is reasonable and symmetric in the upper extremities.     Radiology:    No new images were needed at the visit today.   XR SHOULDER 2+ VW LEFT-9/4/2024     HISTORY: Left shoulder pain.     No acute bone, joint or soft tissue abnormalities are seen.        This report was finalized on 9/9/2024 8:41 AM by Dr. Meir Lux M.D on Workstation: [id number]     Upon my review of his plain films I do think he has some degenerative changes at the acromioclavicular joint and a little sclerosis at the greater tuberosity of the humerus otherwise I agree with the findings noted above by the radiologist.     I also reviewed the MRI as noted below and agree with the findings noted by the radiologist.     MRI Shoulder Left Without Contrast  Order date: 10/23/2024  Authorizing: Niraj Rivas PA-C  Ordered by Niraj Rivas PA-C on 10/23/2024.      Narrative & Impression     MRI SHOULDER LEFT WO CONTRAST-     Date of Exam: 10/23/2024 12:55 PM     Indication: Worsening left shoulder pain and weakness, history of  shoulder trauma. Decreased range of motion.     Comparison: Left shoulder radiographs 9/4/2024. CT neck 4/24/2024. CT  chest 12/26/2023.     Technique: Multiplanar, multisequence MR imaging of the shoulder was  performed without contrast.     FINDINGS:     Rotator cuff: Moderate to high-grade partial-thickness, full width or  near full width articular-sided tear of the supraspinatus tendon at the  footprint with up to 1 cm medial retraction of the articular-sided  tendon fibers and mild partial-thickness bursal sided fraying proximal  to the footprint. Mild anterior infraspinatus tendinopathy with mild  partial-thickness articular sided fraying anteriorly at the footprint.  Moderate to high-grade  partial-thickness, partial width interstitial  tear of the superior 1 cm of the subscapularis tendon from the  footprint. The teres minor tendon is intact with mild (1/4) diffuse  muscular fatty atrophy. No myositis.     Biceps tendon: Moderate to severe intra-articular tendinopathy with the  tendon dislocated medially from the bicipital groove into the substance  of the subscapularis interstitial tear.     Acromioclavicular Arch: Moderate hypertrophic degenerative changes at  the acromioclavicular joint with associated mild mass effect on the  supraspinatus tendon. Type II acromion, without lateral downsloping. No  abnormal fluid in the subacromial/subdeltoid bursa.     Glenohumeral joint: The humeral head and glenoid are congruent. Trace  joint fluid. No discrete intra-articular body. Mild diffuse  chondromalacia. The joint capsule is within normal limits.     Labrum: No evidence of acute labral tear. No paralabral cyst.     Bone: Nonspecific tiny subcortical cystic changes in the greater and  lesser tuberosities and posterior humeral head. Tiny degenerative  chondral cystic changes in the distal clavicle and acromion. No  fracture. No concerning bone marrow lesion or marrow replacing process.     Soft tissues: No soft tissue mass.     IMPRESSION:     1. Moderate to high-grade partial-thickness, full width or near full  width articular-sided tear of the supraspinatus tendon at the footprint  with medial retraction of the articular-sided tendon fibers and mild  partial-thickness bursal sided fraying proximal to the footprint.  2. Mild anterior infraspinatus tendinopathy with mild partial-thickness  articular sided fraying anteriorly at the footprint.  3. Moderate to high-grade partial-thickness, partial width interstitial  tear of the subscapularis tendon at the footprint.  4. Moderate to severe intra-articular long head biceps tendinopathy with  the tendon dislocated medially in the bicipital groove into  the  substance of the subscapularis tendon tear.  5. Mild glenohumeral joint chondromalacia.  6. Moderate DJD at the AC joint.              This report was finalized on 10/23/2024 5:20 PM by Jalen Tate MD on  Workstation: [id number]         Results      Procedure:   N/A      Misc. Data/Labs: N/A    Assessment & Plan:        ICD-10-CM ICD-9-CM   1. Incomplete tear of left rotator cuff, unspecified whether traumatic  M75.112 840.4   2. Chronic left shoulder pain  M25.512 719.41    G89.29 338.29     No orders of the defined types were placed in this encounter.    No orders of the defined types were placed in this encounter.      Assessment & Plan  1. Rotator cuff tear.  Patient got good relief with an injection and is pleased with his improvement thus far.  He really wishes to avoid surgical intervention if possible due to some underlying pulmonary pathology.  For now, we will give this the test of time we talked about lifting and activity modifications/restrictions.  If his pain returns I would definitely consider referring him onto one of our surgeons I do think it will eventually return but we could consider another injection in the future if needed.  For now he will continue to monitor this closely and will reach out with any changes or worsening pain.      Continue with activity modifications as needed/discussed.  Continue ICE and/or HEAT PRN.  Recommend to continue activity as tolerated, focus on stretching and strengthening of the joint.    Focus on posture and body mechanics to improve/prevent symptoms.   Patient encouraged to call with questions or concerns prior to follow up.  Will discuss with attending as needed.  Consider additional referrals, work up and/or advanced imaging as indicated or if patient fails to respond to conservative care.    Return if symptoms worsen or fail to improve.  Patient or patient representative verbalized consent for the use of Ambient Listening during the visit with   DARLINE Joel for chart documentation. 11/18/2024  15:31 EST    DARLINE Hernadez    Dictation software was used to complete a portion or all of this note.

## 2024-12-16 ENCOUNTER — OFFICE VISIT (OUTPATIENT)
Dept: INTERNAL MEDICINE | Age: 64
End: 2024-12-16
Payer: COMMERCIAL

## 2024-12-16 VITALS
HEIGHT: 68 IN | WEIGHT: 259 LBS | DIASTOLIC BLOOD PRESSURE: 96 MMHG | HEART RATE: 75 BPM | BODY MASS INDEX: 39.25 KG/M2 | OXYGEN SATURATION: 93 % | TEMPERATURE: 97.3 F | SYSTOLIC BLOOD PRESSURE: 160 MMHG

## 2024-12-16 DIAGNOSIS — E66.812 CLASS 2 SEVERE OBESITY DUE TO EXCESS CALORIES WITH SERIOUS COMORBIDITY AND BODY MASS INDEX (BMI) OF 38.0 TO 38.9 IN ADULT: Chronic | ICD-10-CM

## 2024-12-16 DIAGNOSIS — I10 ESSENTIAL HYPERTENSION: Primary | Chronic | ICD-10-CM

## 2024-12-16 DIAGNOSIS — E78.5 HYPERLIPIDEMIA, UNSPECIFIED HYPERLIPIDEMIA TYPE: Chronic | ICD-10-CM

## 2024-12-16 DIAGNOSIS — R80.9 TYPE 2 DIABETES MELLITUS WITH MICROALBUMINURIA, WITHOUT LONG-TERM CURRENT USE OF INSULIN: Chronic | ICD-10-CM

## 2024-12-16 DIAGNOSIS — E11.29 TYPE 2 DIABETES MELLITUS WITH MICROALBUMINURIA, WITHOUT LONG-TERM CURRENT USE OF INSULIN: Chronic | ICD-10-CM

## 2024-12-16 DIAGNOSIS — E66.01 CLASS 2 SEVERE OBESITY DUE TO EXCESS CALORIES WITH SERIOUS COMORBIDITY AND BODY MASS INDEX (BMI) OF 38.0 TO 38.9 IN ADULT: Chronic | ICD-10-CM

## 2024-12-16 PROCEDURE — 99214 OFFICE O/P EST MOD 30 MIN: CPT | Performed by: INTERNAL MEDICINE

## 2024-12-16 NOTE — ASSESSMENT & PLAN NOTE
He has reduced atorvastatin to 40 mg daily.   Consider increasing it back to 80 mg.     Lab Results   Component Value Date     (H) 10/31/2024     (H) 12/26/2023    LDL 79 10/17/2023    TRIG 190 (H) 10/31/2024    CHOLHDLRATIO 3.91 02/14/2023

## 2024-12-16 NOTE — PROGRESS NOTES
J  U  N  O  H    K  I  M ,   M  D                  I  N  T  E  R  N  A  L    M  E  D  I  C  I  N  E         ENCOUNTER DATE:  12/16/2024    Carrington PALACIOS Shell / 63 y.o. / male    OFFICE VISIT ENCOUNTER       CHIEF COMPLAINT / REASON FOR OFFICE VISIT     Hypertension, Hyperlipidemia, and Diabetes      ASSESSMENT & PLAN     Problem List Items Addressed This Visit          High    Essential hypertension - Primary (Chronic)    Current Assessment & Plan     BP Readings from Last 3 Encounters:   12/16/24 160/96   10/31/24 136/80   09/04/24 132/82      Blood pressure is high here.   Advised to discuss with his endocrinologist this Friday about adding SGLT-2 inhibitor which would help with his blood pressure as well as his DM 2, obesity and hypertension/edema.          Relevant Medications    losartan (COZAAR) 100 MG tablet    Hyperlipidemia (Chronic)    Current Assessment & Plan     He has reduced atorvastatin to 40 mg daily.   Consider increasing it back to 80 mg.     Lab Results   Component Value Date     (H) 10/31/2024     (H) 12/26/2023    LDL 79 10/17/2023    TRIG 190 (H) 10/31/2024    CHOLHDLRATIO 3.91 02/14/2023             Relevant Medications    atorvastatin (Lipitor) 40 MG tablet       Medium    Type 2 diabetes mellitus with microalbuminuria, without long-term current use of insulin (Chronic)    Current Assessment & Plan     Lab Results   Component Value Date    HGBA1C 7.00 (H) 10/31/2024    HGBA1C 6.80 (H) 12/26/2023    HGBA1C 6.20 (H) 10/17/2023    CREATININE 1.12 10/31/2024     (H) 10/31/2024    MALBCRERATIO 504 (H) 10/31/2024      Discuss with endocrinologist about adding an SGLT-2 inhibitor.          Relevant Medications    insulin NPH (humuLIN N,novoLIN N) 100 UNIT/ML injection    insulin regular (humuLIN R,novoLIN R) 100 UNIT/ML injection    metFORMIN ER (GLUCOPHAGE-XR) 500 MG 24 hr tablet    Semaglutide,0.25 or 0.5MG/DOS, (Ozempic, 0.25 or 0.5 MG/DOSE,) 2  "MG/3ML solution pen-injector    Semaglutide, 1 MG/DOSE, (Ozempic, 1 MG/DOSE,) 4 MG/3ML solution pen-injector    Continuous Glucose Sensor (FreeStyle Sergio 3 Plus Sensor)    Class 2 severe obesity due to excess calories with serious comorbidity and body mass index (BMI) of 38.0 to 38.9 in adult (Chronic)    Current Assessment & Plan     Consider increasing Ozempic or adding SGLT-2 inhibitor.   Discuss with endocrinologist.     Wt Readings from Last 3 Encounters:   12/16/24 117 kg (259 lb)   11/18/24 115 kg (253 lb 9.6 oz)   10/31/24 114 kg (250 lb 6.4 oz)     Body mass index is 39.38 kg/m².             No orders of the defined types were placed in this encounter.    No orders of the defined types were placed in this encounter.      SUMMARY/DISCUSSION  Patient has been erroneously marked as having Ischemic Vascular Disease (IVD). Based on the available clinical information, he does not have IVD and should be excluded from any quality measure requirements related to that condition for the remainder of the reporting period.       TOTAL TIME OF ENCOUNTER:        Next Appointment with me: Visit date not found    Return in about 3 months (around 3/16/2025) for Reassess chronic medical problems.      VITAL SIGNS     Vitals:    12/16/24 1332   BP: 160/96   Pulse: 75   Temp: 97.3 °F (36.3 °C)   SpO2: 93%   Weight: 117 kg (259 lb)   Height: 172.7 cm (68\")       BP Readings from Last 3 Encounters:   12/16/24 160/96   10/31/24 136/80   09/04/24 132/82     Wt Readings from Last 3 Encounters:   12/16/24 117 kg (259 lb)   11/18/24 115 kg (253 lb 9.6 oz)   10/31/24 114 kg (250 lb 6.4 oz)     Body mass index is 39.38 kg/m².    Blood pressure readings recorded on patient flowsheet:       No data to display                  MEDICATIONS AT THE TIME OF OFFICE VISIT     Current Outpatient Medications on File Prior to Visit   Medication Sig    albuterol sulfate  (90 Base) MCG/ACT inhaler Inhale 1-2 puffs Every 4 (Four) Hours As " Needed for Wheezing or Shortness of Air.    atorvastatin (Lipitor) 40 MG tablet Take 1 tablet by mouth Daily.    Continuous Glucose Sensor (FreeStyle Sergio 3 Plus Sensor) Use 1 each Take As Directed. Use 1 every 15 days    glucose blood test strip Care [facility] N  Dx code E11.42 testing bs 2 x day    insulin NPH (humuLIN N,novoLIN N) 100 UNIT/ML injection 65 units at bedtime    Insulin Pen Needle (PEN NEEDLES) 32G X 5 MM misc 1 each Daily.    insulin regular (humuLIN R,novoLIN R) 100 UNIT/ML injection inject 20 units in the morning 15 units at lunch and 25 units at dinner    Insulin Syringes, Disposable, U-100 0.5 ML misc Use daily    losartan (COZAAR) 100 MG tablet Take 1 tablet by mouth Daily.    metFORMIN ER (GLUCOPHAGE-XR) 500 MG 24 hr tablet TAKE 4 TABLETS BY MOUTH DAILY WITH BREAKFAST.    O2 (OXYGEN) Inhale 2 L/min 1 (One) Time. WITH HOME VENT    Semaglutide, 1 MG/DOSE, (Ozempic, 1 MG/DOSE,) 4 MG/3ML solution pen-injector Inject 1 mg under the skin into the appropriate area as directed 1 (One) Time Per Week. After being on 0.5 mg weekly for at least 4 weeks    Semaglutide,0.25 or 0.5MG/DOS, (Ozempic, 0.25 or 0.5 MG/DOSE,) 2 MG/3ML solution pen-injector Inject 0.5 mg under the skin into the appropriate area as directed 1 (One) Time Per Week. Inject 0.5 mg weekly for four weeks, then increase to 1 mg weekly    traZODone (DESYREL) 100 MG tablet TAKE 1 TABLET BY MOUTH EVERYDAY AT BEDTIME    gabapentin (NEURONTIN) 100 MG capsule 1 capsule twice daily (Patient not taking: Reported on 12/16/2024)     No current facility-administered medications on file prior to visit.          HISTORY OF PRESENT ILLNESS     Patient is concerned about his blood pressure running high.  Blood pressure at home is marginally controlled.  Weight gain is noted.  A1c was higher at 7.0 in October.  Previously we had increased atorvastatin to 80 mg but he has reduced it to 40 mg but is not sure why.  He is on losartan 100 mg daily for hypertension.   For diabetes he is on semaglutide 0.5 mg weekly.  Patient states that he is to increase to 1 mg come January.  He is on metformin 4 tablets daily along with NPH and regular insulin regimen.  He denies significant difficulties with hypoglycemia.  He complains of persistent hoarseness and is followed by pulmonary and ENT.    Patient Care Team:  Preet Louise MD as PCP - General (Internal Medicine)  Dann Alvarado MD as Consulting Physician (Orthopedic Surgery)  Deidra Cannon MD as Consulting Physician (Ophthalmology)  Bayron Aanya MD as Consulting Physician (Endocrinology)  Raj Perez MD as Consulting Physician (Urology)  Rocco Manning MD as Consulting Physician (Gastroenterology)  Lizbeth Aaron PA-C as Physician Assistant (Physician Assistant)  Fely Felton PA-C as Physician Assistant (Colon and Rectal Surgery)  Johnnie Aguero MD as Consulting Physician (Pulmonary Disease)    REVIEW OF SYSTEMS     Review of Systems       PHYSICAL EXAMINATION     Physical Exam  Alert with normal thought and judgment.   Obesity with weight gain   Cardiovascular: Normal rate, regular rhythm.   Pulm/Chest: Effort normal, breath sounds normal.   1+ bilateral lower extremities edema   Protuberant abdomen       REVIEWED DATA     Labs:     Lab Results   Component Value Date     10/31/2024    K 5.2 10/31/2024    CALCIUM 10.3 10/31/2024    AST 14 10/31/2024    ALT 18 10/31/2024    BUN 14 10/31/2024    CREATININE 1.12 10/31/2024    CREATININE 1.00 04/24/2024    CREATININE 0.75 (L) 02/27/2024    EGFRRESULT 73.8 10/31/2024     Lab Results   Component Value Date    HGBA1C 7.00 (H) 10/31/2024    HGBA1C 6.80 (H) 12/26/2023    HGBA1C 6.20 (H) 10/17/2023     Lab Results   Component Value Date     (H) 10/31/2024     (H) 12/26/2023    LDL 79 10/17/2023    HDL 46 10/31/2024    HDL 46 12/26/2023    TRIG 190 (H) 10/31/2024    TRIG 289 (H) 12/26/2023     Lab Results   Component Value Date    TSH  2.910 10/17/2023    TSH 2.070 09/02/2022    TSH 2.340 02/27/2019    FREET4 1.18 02/27/2019     Lab Results   Component Value Date    WBC 12.83 (H) 02/27/2024    HGB 15.0 02/27/2024     02/27/2024     Lab Results   Component Value Date    MALBCRERATIO 504 (H) 10/31/2024           Imaging:               Medical Tests:               Summary of old records / correspondence / consultant report:             Request outside records:

## 2024-12-16 NOTE — ASSESSMENT & PLAN NOTE
BP Readings from Last 3 Encounters:   12/16/24 160/96   10/31/24 136/80   09/04/24 132/82      Blood pressure is high here.   Advised to discuss with his endocrinologist this Friday about adding SGLT-2 inhibitor which would help with his blood pressure as well as his DM 2, obesity and hypertension/edema.

## 2024-12-16 NOTE — ASSESSMENT & PLAN NOTE
Consider increasing Ozempic or adding SGLT-2 inhibitor.   Discuss with endocrinologist.     Wt Readings from Last 3 Encounters:   12/16/24 117 kg (259 lb)   11/18/24 115 kg (253 lb 9.6 oz)   10/31/24 114 kg (250 lb 6.4 oz)     Body mass index is 39.38 kg/m².

## 2024-12-16 NOTE — ASSESSMENT & PLAN NOTE
Lab Results   Component Value Date    HGBA1C 7.00 (H) 10/31/2024    HGBA1C 6.80 (H) 12/26/2023    HGBA1C 6.20 (H) 10/17/2023    CREATININE 1.12 10/31/2024     (H) 10/31/2024    MALBCRERATIO 504 (H) 10/31/2024      Discuss with endocrinologist about adding an SGLT-2 inhibitor.

## 2024-12-20 ENCOUNTER — OFFICE VISIT (OUTPATIENT)
Dept: ENDOCRINOLOGY | Age: 64
End: 2024-12-20
Payer: COMMERCIAL

## 2024-12-20 VITALS
DIASTOLIC BLOOD PRESSURE: 82 MMHG | WEIGHT: 260.8 LBS | OXYGEN SATURATION: 90 % | HEART RATE: 79 BPM | SYSTOLIC BLOOD PRESSURE: 128 MMHG | HEIGHT: 68 IN | BODY MASS INDEX: 39.53 KG/M2

## 2024-12-20 DIAGNOSIS — J38.1 VOCAL CORD POLYP: ICD-10-CM

## 2024-12-20 DIAGNOSIS — G47.33 OBSTRUCTIVE SLEEP APNEA SYNDROME: Chronic | ICD-10-CM

## 2024-12-20 DIAGNOSIS — Z72.0 CURRENT TOBACCO USE: ICD-10-CM

## 2024-12-20 DIAGNOSIS — E11.42 TYPE 2 DIABETES MELLITUS WITH PERIPHERAL NEUROPATHY: Primary | ICD-10-CM

## 2024-12-20 DIAGNOSIS — R80.9 TYPE 2 DIABETES MELLITUS WITH MICROALBUMINURIA, WITHOUT LONG-TERM CURRENT USE OF INSULIN: Chronic | ICD-10-CM

## 2024-12-20 DIAGNOSIS — I10 ESSENTIAL HYPERTENSION: Chronic | ICD-10-CM

## 2024-12-20 DIAGNOSIS — E11.29 TYPE 2 DIABETES MELLITUS WITH MICROALBUMINURIA, WITHOUT LONG-TERM CURRENT USE OF INSULIN: Chronic | ICD-10-CM

## 2024-12-20 DIAGNOSIS — E78.5 HYPERLIPIDEMIA, UNSPECIFIED HYPERLIPIDEMIA TYPE: Chronic | ICD-10-CM

## 2024-12-20 PROCEDURE — 99214 OFFICE O/P EST MOD 30 MIN: CPT | Performed by: INTERNAL MEDICINE

## 2024-12-20 RX ORDER — HYDROCHLOROTHIAZIDE 12.5 MG/1
1 CAPSULE ORAL TAKE AS DIRECTED
Qty: 6 EACH | Refills: 1 | Status: SHIPPED | OUTPATIENT
Start: 2024-12-20

## 2024-12-20 RX ORDER — GABAPENTIN 100 MG/1
CAPSULE ORAL
Qty: 60 CAPSULE | Refills: 1 | Status: SHIPPED | OUTPATIENT
Start: 2024-12-20

## 2024-12-20 NOTE — PROGRESS NOTES
Subjective   Carrington Biggs is a 63 y.o. male.     History of Present Illness       He has known diabetes mellitus since 2012 and was started on insulin in 2013.  He is on ReliOn N 65 units at bedtime, Relion R 20 units at breakfast, 15 units at lunch, and 25 units at supper, Metformin  mg 4 tablets daily and Ozempic 0.5 mg weekly.  He will increase Ozempic to 1 mg weekly in January 2025.  He stopped Mounjaro 10 mg in March 2024 because it was expensive and it did not make him lose weight.  He has intermittent hypoglycemia at 2 to 4 AM.  He has gained 10 pounds since October 2024.  He is using a freestyle jailyn 3+ sensor.  His last meal was last night.    He has a high deductible insurance plan.      Sensor data from December 5, 2024 to December 18, 2024 reviewed.  Average glucose 156 mg per DL.  GMI 7.0%.  Time in target 70%.  Time above target 28%.  Time below target 2%  He has intermittent hypoglycemia between midnight and 4 AM     He denies blurry vision.  His last eye examination was in 12.24.  He has cataracts but no retinopathy.  He has microalbuminuria on urine sample taken 7/23.  He is on losartan.  He has numbness and burning in his feet.  He has been off gabapentin 100 mg twice a day and wants to start back on it.     He has hyperlipidemia and has been on Lipitor 40 mg once a day.  He denies muscle pain.  Lipid panel done in October 2024 are as follows: Cholesterol 182.  HDL 46.  .  Triglycerides 190.     He has hypertension and is on losartan 100 mg once a day.  He denies any history of heart attack or stroke.  He denies any chest pain or shortness of breath.     He has sleep apnea and COPD.  He is using Trilogy with oxygen.  He wakes up rested.  He smokes <1 ppd.  He follows with Dr. Aguero.     He has seen Dr. Cui for hoarseness and is due for follow-up       The following portions of the patient's history were reviewed and updated as appropriate: allergies, current medications, past  "family history, past medical history, past social history, past surgical history, and problem list.    Review of Systems   Eyes:  Negative for visual disturbance.   Respiratory:  Negative for shortness of breath and wheezing.    Cardiovascular:  Negative for chest pain and palpitations.   Gastrointestinal: Negative.    Genitourinary: Negative.    Musculoskeletal:  Negative for myalgias.   Neurological:  Positive for numbness.     Vitals:    12/20/24 0757   BP: 128/82   BP Location: Left arm   Patient Position: Sitting   Pulse: 79   SpO2: 90%   Weight: 118 kg (260 lb 12.8 oz)   Height: 172.7 cm (67.99\")      Objective   Physical Exam  Constitutional:       General: He is not in acute distress.     Appearance: Normal appearance. He is not ill-appearing, toxic-appearing or diaphoretic.   Eyes:      General: No scleral icterus.        Right eye: No discharge.         Left eye: No discharge.      Extraocular Movements: Extraocular movements intact.      Conjunctiva/sclera: Conjunctivae normal.   Cardiovascular:      Rate and Rhythm: Normal rate and regular rhythm.      Heart sounds: Normal heart sounds. No murmur heard.     No friction rub. No gallop.   Pulmonary:      Breath sounds: Normal breath sounds. No rales.   Chest:      Chest wall: No tenderness.   Abdominal:      General: Bowel sounds are normal.      Palpations: Abdomen is soft.      Tenderness: There is no right CVA tenderness or left CVA tenderness.   Musculoskeletal:      Right lower leg: No edema.      Left lower leg: No edema.   Neurological:      Mental Status: He is alert and oriented to person, place, and time.       Office Visit on 10/31/2024   Component Date Value Ref Range Status    Hemoglobin A1C 10/31/2024 7.00 (H)  4.80 - 5.60 % Final    Comment: Hemoglobin A1C Ranges:  Increased Risk for Diabetes  5.7% to 6.4%  Diabetes                     >= 6.5%  Diabetic Goal                < 7.0%      Glucose 10/31/2024 113 (H)  65 - 99 mg/dL Final    BUN " 10/31/2024 14  8 - 23 mg/dL Final    Creatinine 10/31/2024 1.12  0.76 - 1.27 mg/dL Final    EGFR Result 10/31/2024 73.8  >60.0 mL/min/1.73 Final    Comment: GFR Normal >60  Chronic Kidney Disease <60  Kidney Failure <15      BUN/Creatinine Ratio 10/31/2024 12.5  7.0 - 25.0 Final    Sodium 10/31/2024 142  136 - 145 mmol/L Final    Potassium 10/31/2024 5.2  3.5 - 5.2 mmol/L Final    Chloride 10/31/2024 98  98 - 107 mmol/L Final    Total CO2 10/31/2024 33.6 (H)  22.0 - 29.0 mmol/L Final    Calcium 10/31/2024 10.3  8.6 - 10.5 mg/dL Final    Total Protein 10/31/2024 7.3  6.0 - 8.5 g/dL Final    Albumin 10/31/2024 4.5  3.5 - 5.2 g/dL Final    Globulin 10/31/2024 2.8  gm/dL Final    A/G Ratio 10/31/2024 1.6  g/dL Final    Total Bilirubin 10/31/2024 0.4  0.0 - 1.2 mg/dL Final    Alkaline Phosphatase 10/31/2024 111  39 - 117 U/L Final    AST (SGOT) 10/31/2024 14  1 - 40 U/L Final    ALT (SGPT) 10/31/2024 18  1 - 41 U/L Final    Creatinine, Urine 10/31/2024 162.5  Not Estab. mg/dL Final    Microalbumin, Urine 10/31/2024 819.2  Not Estab. ug/mL Final    Comment: Results confirmed on  dilution.      Microalbumin/Creatinine Ratio 10/31/2024 504 (H)  0 - 29 mg/g creat Final    Comment:                        Normal:                0 -  29                         Moderately increased: 30 - 300                         Severely increased:       >300      Total Cholesterol 10/31/2024 182  0 - 200 mg/dL Final    Comment: Cholesterol Reference Ranges  (U.S. Department of Health and Human Services ATP III  Classifications)  Desirable          <200 mg/dL  Borderline High    200-239 mg/dL  High Risk          >240 mg/dL  Triglyceride Reference Ranges  (U.S. Department of Health and Human Services ATP III  Classifications)  Normal           <150 mg/dL  Borderline High  150-199 mg/dL  High             200-499 mg/dL  Very High        >500 mg/dL  HDL Reference Ranges  (U.S. Department of Health and Human Services ATP III  Classifications)  Low      <40 mg/dl (major risk factor for CHD)  High    >60 mg/dl ('negative' risk factor for CHD)  LDL Reference Ranges  (U.S. Department of Health and Human Services ATP III  Classifications)  Optimal          <100 mg/dL  Near Optimal     100-129 mg/dL  Borderline High  130-159 mg/dL  High             160-189 mg/dL  Very High        >189 mg/dL      Triglycerides 10/31/2024 190 (H)  0 - 150 mg/dL Final    HDL Cholesterol 10/31/2024 46  40 - 60 mg/dL Final    VLDL Cholesterol Gerhard 10/31/2024 33  5 - 40 mg/dL Final    LDL Chol Calc (NIH) 10/31/2024 103 (H)  0 - 100 mg/dL Final    Interpretation 10/31/2024 Note   Final    Supplemental report is available.     Assessment & Plan   Diagnoses and all orders for this visit:    1. Type 2 diabetes mellitus with peripheral neuropathy (Primary)  -     gabapentin (NEURONTIN) 100 MG capsule; 1 capsule twice daily  Dispense: 60 capsule; Refill: 1    2. Type 2 diabetes mellitus with microalbuminuria, without long-term current use of insulin  -     Continuous Glucose Sensor (FreeStyle Sergio 3 Plus Sensor); Use 1 each Take As Directed. Use 1 every 15 days  Dispense: 6 each; Refill: 1    3. Hyperlipidemia, unspecified hyperlipidemia type    4. Essential hypertension    5. Obstructive sleep apnea syndrome    6. Current tobacco use    7. Vocal cord polyp    Other orders  -     insulin NPH (humuLIN N,novoLIN N) 100 UNIT/ML injection; 60 units at bedtime      Decrease Novolin N to 60 units at bedtime.  Continue Novolin R with each meal.  May reduce Novolin R 2 to 4 units at a time when Ozempic dose is increased to 1 mg weekly.  Continue metformin  mg 4 tablets daily.    Restart gabapentin 100 mg twice daily.    Continue Lipitor for 40 mg/day.    Continue losartan 100 mg/day.    Discussed about smoking cessation.  Follow-up with Dr. Aguero as scheduled.    Patient advised to schedule a follow-up with Dr. Cui.    Copy of my note was sent to Dr. Louise, Dr. Júnior Cui and Dr. Blair  Laci.    RTC 3 mos

## 2024-12-20 NOTE — LETTER
December 20, 2024     Preet Louise MD  [address]  [facility] [address]  [facility] [address]    Patient: Carrington Biggs   YOB: 1960   Date of Visit: 12/20/2024     Dear Preet Louise MD:       Thank you for referring Carrington Biggs to me for evaluation. Below are the relevant portions of my assessment and plan of care.    If you have questions, please do not hesitate to call me. I look forward to following Carrington along with you.         Sincerely,        Bayron Anaya MD        CC: MD Júnior Wilkins MD Yson, Bayron GARCIA MD  12/20/24 0847  Signed  Subjective  Carrington Biggs is a 63 y.o. male.     History of Present Illness       He has known diabetes mellitus since 2012 and was started on insulin in 2013.  He is on ReliOn N 65 units at bedtime, Relion R 20 units at breakfast, 15 units at lunch, and 25 units at supper, Metformin  mg 4 tablets daily and Ozempic 0.5 mg weekly.  He will increase Ozempic to 1 mg weekly in January 2025.  He stopped Mounjaro 10 mg in March 2024 because it was expensive and it did not make him lose weight.  He has intermittent hypoglycemia at 2 to 4 AM.  He has gained 10 pounds since October 2024.  He is using a freestyle jailyn 3+ sensor.  His last meal was last night.    He has a high deductible insurance plan.      Sensor data from December 5, 2024 to December 18, 2024 reviewed.  Average glucose 156 mg per DL.  GMI 7.0%.  Time in target 70%.  Time above target 28%.  Time below target 2%  He has intermittent hypoglycemia between midnight and 4 AM     He denies blurry vision.  His last eye examination was in 12.24.  He has cataracts but no retinopathy.  He has microalbuminuria on urine sample taken 7/23.  He is on losartan.  He has numbness and burning in his feet.  He has been off gabapentin 100 mg twice a day and wants to start back on it.     He has hyperlipidemia and has been on Lipitor 40 mg once a day.  He denies muscle pain.  Lipid panel done in  "October 2024 are as follows: Cholesterol 182.  HDL 46.  .  Triglycerides 190.     He has hypertension and is on losartan 100 mg once a day.  He denies any history of heart attack or stroke.  He denies any chest pain or shortness of breath.     He has sleep apnea and COPD.  He is using Trilogy with oxygen.  He wakes up rested.  He smokes <1 ppd.  He follows with Dr. Aguero.     He has seen Dr. Cui for hoarseness and is due for follow-up       The following portions of the patient's history were reviewed and updated as appropriate: allergies, current medications, past family history, past medical history, past social history, past surgical history, and problem list.    Review of Systems   Eyes:  Negative for visual disturbance.   Respiratory:  Negative for shortness of breath and wheezing.    Cardiovascular:  Negative for chest pain and palpitations.   Gastrointestinal: Negative.    Genitourinary: Negative.    Musculoskeletal:  Negative for myalgias.   Neurological:  Positive for numbness.     Vitals:    12/20/24 0757   BP: 128/82   BP Location: Left arm   Patient Position: Sitting   Pulse: 79   SpO2: 90%   Weight: 118 kg (260 lb 12.8 oz)   Height: 172.7 cm (67.99\")      Objective  Physical Exam  Constitutional:       General: He is not in acute distress.     Appearance: Normal appearance. He is not ill-appearing, toxic-appearing or diaphoretic.   Eyes:      General: No scleral icterus.        Right eye: No discharge.         Left eye: No discharge.      Extraocular Movements: Extraocular movements intact.      Conjunctiva/sclera: Conjunctivae normal.   Cardiovascular:      Rate and Rhythm: Normal rate and regular rhythm.      Heart sounds: Normal heart sounds. No murmur heard.     No friction rub. No gallop.   Pulmonary:      Breath sounds: Normal breath sounds. No rales.   Chest:      Chest wall: No tenderness.   Abdominal:      General: Bowel sounds are normal.      Palpations: Abdomen is soft.      " Tenderness: There is no right CVA tenderness or left CVA tenderness.   Musculoskeletal:      Right lower leg: No edema.      Left lower leg: No edema.   Neurological:      Mental Status: He is alert and oriented to person, place, and time.       Office Visit on 10/31/2024   Component Date Value Ref Range Status   • Hemoglobin A1C 10/31/2024 7.00 (H)  4.80 - 5.60 % Final    Comment: Hemoglobin A1C Ranges:  Increased Risk for Diabetes  5.7% to 6.4%  Diabetes                     >= 6.5%  Diabetic Goal                < 7.0%     • Glucose 10/31/2024 113 (H)  65 - 99 mg/dL Final   • BUN 10/31/2024 14  8 - 23 mg/dL Final   • Creatinine 10/31/2024 1.12  0.76 - 1.27 mg/dL Final   • EGFR Result 10/31/2024 73.8  >60.0 mL/min/1.73 Final    Comment: GFR Normal >60  Chronic Kidney Disease <60  Kidney Failure <15     • BUN/Creatinine Ratio 10/31/2024 12.5  7.0 - 25.0 Final   • Sodium 10/31/2024 142  136 - 145 mmol/L Final   • Potassium 10/31/2024 5.2  3.5 - 5.2 mmol/L Final   • Chloride 10/31/2024 98  98 - 107 mmol/L Final   • Total CO2 10/31/2024 33.6 (H)  22.0 - 29.0 mmol/L Final   • Calcium 10/31/2024 10.3  8.6 - 10.5 mg/dL Final   • Total Protein 10/31/2024 7.3  6.0 - 8.5 g/dL Final   • Albumin 10/31/2024 4.5  3.5 - 5.2 g/dL Final   • Globulin 10/31/2024 2.8  gm/dL Final   • A/G Ratio 10/31/2024 1.6  g/dL Final   • Total Bilirubin 10/31/2024 0.4  0.0 - 1.2 mg/dL Final   • Alkaline Phosphatase 10/31/2024 111  39 - 117 U/L Final   • AST (SGOT) 10/31/2024 14  1 - 40 U/L Final   • ALT (SGPT) 10/31/2024 18  1 - 41 U/L Final   • Creatinine, Urine 10/31/2024 162.5  Not Estab. mg/dL Final   • Microalbumin, Urine 10/31/2024 819.2  Not Estab. ug/mL Final    Comment: Results confirmed on  dilution.     • Microalbumin/Creatinine Ratio 10/31/2024 504 (H)  0 - 29 mg/g creat Final    Comment:                        Normal:                0 -  29                         Moderately increased: 30 - 300                         Severely  increased:       >300     • Total Cholesterol 10/31/2024 182  0 - 200 mg/dL Final    Comment: Cholesterol Reference Ranges  (U.S. Department of Health and Human Services ATP III  Classifications)  Desirable          <200 mg/dL  Borderline High    200-239 mg/dL  High Risk          >240 mg/dL  Triglyceride Reference Ranges  (U.S. Department of Health and Human Services ATP III  Classifications)  Normal           <150 mg/dL  Borderline High  150-199 mg/dL  High             200-499 mg/dL  Very High        >500 mg/dL  HDL Reference Ranges  (U.S. Department of Health and Human Services ATP III  Classifications)  Low     <40 mg/dl (major risk factor for CHD)  High    >60 mg/dl ('negative' risk factor for CHD)  LDL Reference Ranges  (U.S. Department of Health and Human Services ATP III  Classifications)  Optimal          <100 mg/dL  Near Optimal     100-129 mg/dL  Borderline High  130-159 mg/dL  High             160-189 mg/dL  Very High        >189 mg/dL     • Triglycerides 10/31/2024 190 (H)  0 - 150 mg/dL Final   • HDL Cholesterol 10/31/2024 46  40 - 60 mg/dL Final   • VLDL Cholesterol Gerhard 10/31/2024 33  5 - 40 mg/dL Final   • LDL Chol Calc (NIH) 10/31/2024 103 (H)  0 - 100 mg/dL Final   • Interpretation 10/31/2024 Note   Final    Supplemental report is available.     Assessment & Plan  Diagnoses and all orders for this visit:    1. Type 2 diabetes mellitus with peripheral neuropathy (Primary)  -     gabapentin (NEURONTIN) 100 MG capsule; 1 capsule twice daily  Dispense: 60 capsule; Refill: 1    2. Type 2 diabetes mellitus with microalbuminuria, without long-term current use of insulin  -     Continuous Glucose Sensor (FreeStyle Sergio 3 Plus Sensor); Use 1 each Take As Directed. Use 1 every 15 days  Dispense: 6 each; Refill: 1    3. Hyperlipidemia, unspecified hyperlipidemia type    4. Essential hypertension    5. Obstructive sleep apnea syndrome    6. Current tobacco use    7. Vocal cord polyp    Other orders  -      insulin NPH (humuLIN N,novoLIN N) 100 UNIT/ML injection; 60 units at bedtime      Decrease Novolin N to 60 units at bedtime.  Continue Novolin R with each meal.  May reduce Novolin R 2 to 4 units at a time when Ozempic dose is increased to 1 mg weekly.  Continue metformin  mg 4 tablets daily.    Restart gabapentin 100 mg twice daily.    Continue Lipitor for 40 mg/day.    Continue losartan 100 mg/day.    Discussed about smoking cessation.  Follow-up with Dr. Aguero as scheduled.    Patient advised to schedule a follow-up with Dr. Cui.    Copy of my note was sent to Dr. Louise, Dr. Júnior Ciu and Dr. Johnnie Aguero.    RTC 3 mos

## 2024-12-30 ENCOUNTER — HOSPITAL ENCOUNTER (OUTPATIENT)
Dept: CT IMAGING | Facility: HOSPITAL | Age: 64
Discharge: HOME | End: 2024-12-30
Admitting: NURSE PRACTITIONER
Payer: COMMERCIAL

## 2024-12-30 DIAGNOSIS — Z12.2 SCREENING FOR LUNG CANCER: ICD-10-CM

## 2024-12-30 PROCEDURE — 71271 CT THORAX LUNG CANCER SCR C-: CPT

## 2025-01-06 DIAGNOSIS — I10 ESSENTIAL HYPERTENSION: ICD-10-CM

## 2025-01-09 RX ORDER — LOSARTAN POTASSIUM 100 MG/1
100 TABLET ORAL DAILY
Qty: 90 TABLET | Refills: 1 | Status: SHIPPED | OUTPATIENT
Start: 2025-01-09

## 2025-01-16 DIAGNOSIS — E11.42 TYPE 2 DIABETES MELLITUS WITH PERIPHERAL NEUROPATHY: ICD-10-CM

## 2025-01-16 RX ORDER — METFORMIN HYDROCHLORIDE 500 MG/1
2000 TABLET, EXTENDED RELEASE ORAL
Qty: 360 TABLET | Refills: 2 | Status: SHIPPED | OUTPATIENT
Start: 2025-01-16

## 2025-01-16 NOTE — TELEPHONE ENCOUNTER
Rx Refill Note  Requested Prescriptions     Pending Prescriptions Disp Refills    metFORMIN ER (GLUCOPHAGE-XR) 500 MG 24 hr tablet [Pharmacy Med Name: METFORMIN HCL  MG TABLET] 360 tablet 1     Sig: TAKE 4 TABLETS BY MOUTH DAILY WITH BREAKFAST.      Last office visit with prescribing clinician: 12/20/2024   Last telemedicine visit with prescribing clinician: Visit date not found   Next office visit with prescribing clinician: 4/10/2025                         Would you like a call back once the refill request has been completed: [] Yes [] No    If the office needs to give you a call back, can they leave a voicemail: [] Yes [] No    Laurel Serrano MA  01/16/25, 07:38 EST

## 2025-01-20 DIAGNOSIS — G47.09 OTHER INSOMNIA: ICD-10-CM

## 2025-01-21 RX ORDER — TRAZODONE HYDROCHLORIDE 100 MG/1
100 TABLET ORAL
Qty: 90 TABLET | Refills: 1 | Status: SHIPPED | OUTPATIENT
Start: 2025-01-21

## 2025-03-08 DIAGNOSIS — E78.5 HYPERLIPIDEMIA, UNSPECIFIED HYPERLIPIDEMIA TYPE: Chronic | ICD-10-CM

## 2025-03-12 RX ORDER — ATORVASTATIN CALCIUM 40 MG/1
40 TABLET, FILM COATED ORAL DAILY
Qty: 90 TABLET | Refills: 1 | Status: SHIPPED | OUTPATIENT
Start: 2025-03-12

## 2025-03-17 ENCOUNTER — OFFICE VISIT (OUTPATIENT)
Dept: INTERNAL MEDICINE | Age: 65
End: 2025-03-17
Payer: COMMERCIAL

## 2025-03-17 VITALS
HEIGHT: 68 IN | SYSTOLIC BLOOD PRESSURE: 143 MMHG | OXYGEN SATURATION: 96 % | DIASTOLIC BLOOD PRESSURE: 86 MMHG | WEIGHT: 257 LBS | TEMPERATURE: 97.1 F | HEART RATE: 73 BPM | BODY MASS INDEX: 38.95 KG/M2

## 2025-03-17 DIAGNOSIS — E11.29 TYPE 2 DIABETES MELLITUS WITH MICROALBUMINURIA, WITHOUT LONG-TERM CURRENT USE OF INSULIN: Chronic | ICD-10-CM

## 2025-03-17 DIAGNOSIS — I10 ESSENTIAL HYPERTENSION: Primary | Chronic | ICD-10-CM

## 2025-03-17 DIAGNOSIS — R80.9 TYPE 2 DIABETES MELLITUS WITH MICROALBUMINURIA, WITHOUT LONG-TERM CURRENT USE OF INSULIN: Chronic | ICD-10-CM

## 2025-03-17 DIAGNOSIS — E78.5 HYPERLIPIDEMIA, UNSPECIFIED HYPERLIPIDEMIA TYPE: Chronic | ICD-10-CM

## 2025-03-17 PROCEDURE — 99214 OFFICE O/P EST MOD 30 MIN: CPT | Performed by: INTERNAL MEDICINE

## 2025-03-17 NOTE — ASSESSMENT & PLAN NOTE
BP Readings from Last 3 Encounters:   03/17/25 143/86   12/20/24 128/82   12/16/24 160/96      Blood pressure remains above target.   Discuss with endocrinologist about adding SGLT-2 inhibitor for diabetes and kidney protection which would also help with blood pressure. If no addition is planned on follow-up, Will need to augment blood pressure medication or consider adding SGLT-2 inhibitor.

## 2025-03-17 NOTE — PROGRESS NOTES
J  U  N  O  H    K  I  M ,   M  D       I  N  T  E  R  N  A  L    M  E  D  I  C  I  N  E         ENCOUNTER DATE:  03/17/2025    Carrington PALACIOS Shell / 64 y.o. / male    OFFICE VISIT ENCOUNTER       CHIEF COMPLAINT / REASON FOR OFFICE VISIT     Hypertension, Diabetes, and Hyperlipidemia      ASSESSMENT & PLAN     Problem List Items Addressed This Visit          High    Essential hypertension - Primary (Chronic)    Overview   Continue losartan 100 mg daily          Current Assessment & Plan   BP Readings from Last 3 Encounters:   03/17/25 143/86   12/20/24 128/82   12/16/24 160/96      Blood pressure remains above target.   Discuss with endocrinologist about adding SGLT-2 inhibitor for diabetes and kidney protection which would also help with blood pressure. If no addition is planned on follow-up, Will need to augment blood pressure medication or consider adding SGLT-2 inhibitor.          Relevant Medications    losartan (COZAAR) 100 MG tablet    Hyperlipidemia (Chronic)    Overview   Continue atorvastatin 40 mg          Relevant Medications    atorvastatin (LIPITOR) 40 MG tablet       Medium    Type 2 diabetes mellitus with microalbuminuria, without long-term current use of insulin (Chronic)    Current Assessment & Plan   Has upcoming follow-up with endocrine.     Previously Ozempic increased to 1 mg without significant GI side effects. Considering titrating dose further.   Discuss with endocrinologist about adding SGLT-2 inhibitor for renal protection along with diabetes which would also help his weight control and blood pressure.          Relevant Medications    insulin regular (humuLIN R,novoLIN R) 100 UNIT/ML injection    Semaglutide, 1 MG/DOSE, (Ozempic, 1 MG/DOSE,) 4 MG/3ML solution pen-injector    Continuous Glucose Sensor (FreeStyle Sergio 3 Plus Sensor)    insulin NPH (humuLIN N,novoLIN N) 100 UNIT/ML injection    metFORMIN ER (GLUCOPHAGE-XR) 500 MG 24 hr tablet     No orders of the defined types were  "placed in this encounter.    No orders of the defined types were placed in this encounter.      SUMMARY/DISCUSSION        TOTAL TIME OF ENCOUNTER:        Next Appointment with me: Visit date not found     Return in about 3 months (around 6/17/2025) for Reassess chronic medical problems.      VITAL SIGNS     Vitals:    03/17/25 1542   BP: 143/86   Pulse: 73   Temp: 97.1 °F (36.2 °C)   SpO2: 96%   Weight: 117 kg (257 lb)   Height: 172.7 cm (67.99\")       BP Readings from Last 3 Encounters:   03/17/25 143/86   12/20/24 128/82   12/16/24 160/96     Wt Readings from Last 3 Encounters:   03/17/25 117 kg (257 lb)   12/20/24 118 kg (260 lb 12.8 oz)   12/16/24 117 kg (259 lb)     Body mass index is 39.09 kg/m².    Blood pressure readings recorded on patient flowsheet:       No data to display                  MEDICATIONS AT THE TIME OF OFFICE VISIT     Current Outpatient Medications on File Prior to Visit   Medication Sig    albuterol sulfate  (90 Base) MCG/ACT inhaler Inhale 1-2 puffs Every 4 (Four) Hours As Needed for Wheezing or Shortness of Air.    atorvastatin (LIPITOR) 40 MG tablet TAKE 1 TABLET BY MOUTH EVERY DAY    Continuous Glucose Sensor (FreeStyle Sergio 3 Plus Sensor) Use 1 each Take As Directed. Use 1 every 15 days    gabapentin (NEURONTIN) 100 MG capsule 1 capsule twice daily    glucose blood test strip Care Sen N  Dx code E11.42 testing bs 2 x day    insulin NPH (humuLIN N,novoLIN N) 100 UNIT/ML injection 60 units at bedtime    insulin regular (humuLIN R,novoLIN R) 100 UNIT/ML injection inject 20 units in the morning 15 units at lunch and 25 units at dinner    Insulin Syringes, Disposable, U-100 0.5 ML misc Use daily    losartan (COZAAR) 100 MG tablet TAKE 1 TABLET BY MOUTH EVERY DAY    metFORMIN ER (GLUCOPHAGE-XR) 500 MG 24 hr tablet TAKE 4 TABLETS BY MOUTH DAILY WITH BREAKFAST.    O2 (OXYGEN) Inhale 2 L/min 1 (One) Time. WITH HOME VENT    Semaglutide, 1 MG/DOSE, (Ozempic, 1 MG/DOSE,) 4 MG/3ML solution " pen-injector Inject 1 mg under the skin into the appropriate area as directed 1 (One) Time Per Week. After being on 0.5 mg weekly for at least 4 weeks    traZODone (DESYREL) 100 MG tablet TAKE 1 TABLET BY MOUTH EVERYDAY AT BEDTIME    Insulin Pen Needle (PEN NEEDLES) 32G X 5 MM misc 1 each Daily.    [DISCONTINUED] Semaglutide,0.25 or 0.5MG/DOS, (Ozempic, 0.25 or 0.5 MG/DOSE,) 2 MG/3ML solution pen-injector Inject 0.5 mg under the skin into the appropriate area as directed 1 (One) Time Per Week. Inject 0.5 mg weekly for four weeks, then increase to 1 mg weekly (Patient not taking: Reported on 3/17/2025)     No current facility-administered medications on file prior to visit.          HISTORY OF PRESENT ILLNESS     Previously his endocrinologist increased Ozempic to 1 mg weekly without significant GI side effects.  Most recent A1c level was marginal at 7.0 which was higher than the time before.  He remains on metformin  mg 4 tablets daily along with NPH insulin 6 units at bedtime and regular insulin 20 units / 15 units/25 units with meals.  He has noted to have increasing microalbuminuria.  He is on losartan 100 mg for hypertension but his blood pressure remains less than optimal.  He is on atorvastatin 40 mg for cholesterol.    Patient Care Team:  Preet Louise MD as PCP - General (Internal Medicine)  Dann Alvarado MD as Consulting Physician (Orthopedic Surgery)  Deidra Cannon MD as Consulting Physician (Ophthalmology)  Bayron Anaya MD as Consulting Physician (Endocrinology)  Raj Perez MD as Consulting Physician (Urology)  Rocco Manning MD as Consulting Physician (Gastroenterology)  Lizbeth Aaron PA-C as Physician Assistant (Physician Assistant)  Fely Felton PA-C as Physician Assistant (Colon and Rectal Surgery)  Johnnie Aguero MD as Consulting Physician (Pulmonary Disease)    REVIEW OF SYSTEMS     Review of Systems       PHYSICAL EXAMINATION     Physical  Exam  Obesity   Cardiovascular: Normal rate, regular rhythm.   1+ bilateral lower extremities       REVIEWED DATA     Labs:     Lab Results   Component Value Date     10/31/2024    K 5.2 10/31/2024    CALCIUM 10.3 10/31/2024    AST 14 10/31/2024    ALT 18 10/31/2024    BUN 14 10/31/2024    CREATININE 1.12 10/31/2024    CREATININE 1.00 04/24/2024    CREATININE 0.75 (L) 02/27/2024     Lab Results   Component Value Date    HGBA1C 7.00 (H) 10/31/2024    HGBA1C 6.80 (H) 12/26/2023    HGBA1C 6.20 (H) 10/17/2023     Lab Results   Component Value Date    MALBCRERATIO 504 (H) 10/31/2024     Lab Results   Component Value Date     (H) 10/31/2024     (H) 12/26/2023    LDL 79 10/17/2023    HDL 46 10/31/2024    HDL 46 12/26/2023    TRIG 190 (H) 10/31/2024    TRIG 289 (H) 12/26/2023     Lab Results   Component Value Date    TSH 2.910 10/17/2023    TSH 2.070 09/02/2022    TSH 2.340 02/27/2019    FREET4 1.18 02/27/2019     Lab Results   Component Value Date    WBC 12.83 (H) 02/27/2024    HGB 15.0 02/27/2024     02/27/2024           Imaging:               Medical Tests:               Summary of old records / correspondence / consultant report:             Request outside records:

## 2025-03-17 NOTE — ASSESSMENT & PLAN NOTE
Has upcoming follow-up with endocrine.     Previously Ozempic increased to 1 mg without significant GI side effects. Considering titrating dose further.   Discuss with endocrinologist about adding SGLT-2 inhibitor for renal protection along with diabetes which would also help his weight control and blood pressure.

## 2025-03-18 DIAGNOSIS — E11.29 TYPE 2 DIABETES MELLITUS WITH MICROALBUMINURIA, WITHOUT LONG-TERM CURRENT USE OF INSULIN: Chronic | ICD-10-CM

## 2025-03-18 DIAGNOSIS — R80.9 TYPE 2 DIABETES MELLITUS WITH MICROALBUMINURIA, WITHOUT LONG-TERM CURRENT USE OF INSULIN: Chronic | ICD-10-CM

## 2025-03-18 NOTE — TELEPHONE ENCOUNTER
Rx Refill Note  Requested Prescriptions     Pending Prescriptions Disp Refills    Semaglutide, 1 MG/DOSE, (Ozempic, 1 MG/DOSE,) 4 MG/3ML solution pen-injector 9 mL 1     Sig: Inject 1 mg under the skin into the appropriate area as directed 1 (One) Time Per Week.      Last office visit with prescribing clinician: 12/20/2024   Last telemedicine visit with prescribing clinician: Visit date not found   Next office visit with prescribing clinician: 4/10/2025                         Would you like a call back once the refill request has been completed: [] Yes [] No    If the office needs to give you a call back, can they leave a voicemail: [] Yes [] No    Ly Rodríguez MA  03/18/25, 17:11 EDT

## 2025-03-26 DIAGNOSIS — E11.29 TYPE 2 DIABETES MELLITUS WITH MICROALBUMINURIA, WITHOUT LONG-TERM CURRENT USE OF INSULIN: Chronic | ICD-10-CM

## 2025-03-26 DIAGNOSIS — R80.9 TYPE 2 DIABETES MELLITUS WITH MICROALBUMINURIA, WITHOUT LONG-TERM CURRENT USE OF INSULIN: Chronic | ICD-10-CM

## 2025-03-26 RX ORDER — SEMAGLUTIDE 1.34 MG/ML
1 INJECTION, SOLUTION SUBCUTANEOUS WEEKLY
Qty: 9 ML | Refills: 1 | Status: SHIPPED | OUTPATIENT
Start: 2025-03-26

## 2025-03-26 RX ORDER — SEMAGLUTIDE 1.34 MG/ML
1 INJECTION, SOLUTION SUBCUTANEOUS WEEKLY
Qty: 9 ML | Refills: 1 | Status: CANCELLED | OUTPATIENT
Start: 2025-03-26

## 2025-03-26 NOTE — TELEPHONE ENCOUNTER
Rx Refill Note  Requested Prescriptions     Pending Prescriptions Disp Refills    Semaglutide, 1 MG/DOSE, (Ozempic, 1 MG/DOSE,) 4 MG/3ML solution pen-injector 9 mL 1     Sig: Inject 1 mg under the skin into the appropriate area as directed 1 (One) Time Per Week. After being on 0.5 mg weekly for at least 4 weeks      Last office visit with prescribing clinician: 12/20/2024   Last telemedicine visit with prescribing clinician: Visit date not found   Next office visit with prescribing clinician: 4/10/2025                         Would you like a call back once the refill request has been completed: [] Yes [] No    If the office needs to give you a call back, can they leave a voicemail: [] Yes [] No    Ly Rodríguez MA  03/26/25, 15:32 EDT

## 2025-04-10 ENCOUNTER — OFFICE VISIT (OUTPATIENT)
Dept: ENDOCRINOLOGY | Age: 65
End: 2025-04-10
Payer: COMMERCIAL

## 2025-04-10 VITALS
OXYGEN SATURATION: 97 % | BODY MASS INDEX: 38.07 KG/M2 | DIASTOLIC BLOOD PRESSURE: 84 MMHG | HEIGHT: 68 IN | WEIGHT: 251.2 LBS | SYSTOLIC BLOOD PRESSURE: 140 MMHG | HEART RATE: 81 BPM

## 2025-04-10 DIAGNOSIS — J44.9 CHRONIC OBSTRUCTIVE PULMONARY DISEASE, UNSPECIFIED COPD TYPE: ICD-10-CM

## 2025-04-10 DIAGNOSIS — R80.9 TYPE 2 DIABETES MELLITUS WITH MICROALBUMINURIA, WITHOUT LONG-TERM CURRENT USE OF INSULIN: Chronic | ICD-10-CM

## 2025-04-10 DIAGNOSIS — G47.33 OBSTRUCTIVE SLEEP APNEA SYNDROME: ICD-10-CM

## 2025-04-10 DIAGNOSIS — E11.29 TYPE 2 DIABETES MELLITUS WITH MICROALBUMINURIA, WITHOUT LONG-TERM CURRENT USE OF INSULIN: Chronic | ICD-10-CM

## 2025-04-10 DIAGNOSIS — I10 ESSENTIAL HYPERTENSION: ICD-10-CM

## 2025-04-10 DIAGNOSIS — Z72.0 CURRENT TOBACCO USE: ICD-10-CM

## 2025-04-10 DIAGNOSIS — E11.42 TYPE 2 DIABETES MELLITUS WITH PERIPHERAL NEUROPATHY: Primary | ICD-10-CM

## 2025-04-10 DIAGNOSIS — E78.5 HYPERLIPIDEMIA, UNSPECIFIED HYPERLIPIDEMIA TYPE: ICD-10-CM

## 2025-04-10 PROCEDURE — 99214 OFFICE O/P EST MOD 30 MIN: CPT | Performed by: INTERNAL MEDICINE

## 2025-04-10 RX ORDER — SEMAGLUTIDE 2.68 MG/ML
2 INJECTION, SOLUTION SUBCUTANEOUS WEEKLY
Qty: 3 ML | Refills: 5 | Status: SHIPPED | OUTPATIENT
Start: 2025-04-10

## 2025-04-10 RX ORDER — HYDROCHLOROTHIAZIDE 12.5 MG/1
1 CAPSULE ORAL TAKE AS DIRECTED
Qty: 6 EACH | Refills: 3 | Status: SHIPPED | OUTPATIENT
Start: 2025-04-10

## 2025-04-10 RX ORDER — GABAPENTIN 100 MG/1
CAPSULE ORAL
Qty: 90 CAPSULE | Refills: 1 | Status: SHIPPED | OUTPATIENT
Start: 2025-04-10

## 2025-04-10 NOTE — PROGRESS NOTES
"Subjective   Carrington Biggs is a 64 y.o. male.     History of Present Illness     He has known diabetes mellitus since 2012 and was started on insulin in 2013.  He is on Novolin N 65 units at bedtime, Novolin R 20 units at breakfast, 15 units at lunch, and 20 units at supper, Metformin  mg 4 tablets daily and Ozempic 1 mg weekly.  He has lost 9 pounds since December 2024.  He stopped Mounjaro 10 mg in March 2024 because it was expensive and it did not make him lose weight.  He is using a freestyle jailyn 3+ sensor intermittently.  's.  His last meal was 11:30 AM     He has a high deductible insurance plan.      He denies blurry vision.  His last eye examination was in 12/24.  He has cataracts but no retinopathy.  He has microalbuminuria on urine sample taken 10/24.  He is on losartan.  He has numbness and burning in his feet which is partially improved with gabapentin 100 mg BID     He has hyperlipidemia and has been on Lipitor 40 mg once a day.  He denies muscle pain.       He has hypertension and is on losartan 100 mg once a day.  He denies any history of heart attack or stroke.  He denies any chest pain or shortness of breath.     He has sleep apnea and COPD.  He is using Trilogy with oxygen.  He wakes up rested.  He smokes <1 ppd.  He follows with Dr. Aguero.     He has seen Dr. Cui for hoarseness.       The following portions of the patient's history were reviewed and updated as appropriate: allergies, current medications, past family history, past medical history, past social history, past surgical history, and problem list.    Review of Systems   Respiratory:  Negative for shortness of breath.    Gastrointestinal: Negative.    Genitourinary:  Positive for frequency (nocturia 4-5 x a night).   Musculoskeletal:  Negative for myalgias.   Neurological:  Positive for numbness.     Vitals:    04/10/25 1330   BP: 140/84   Pulse: 81   SpO2: 97%   Weight: 114 kg (251 lb 3.2 oz)   Height: 172.7 cm (67.99\") "      Objective   Physical Exam  Constitutional:       General: He is not in acute distress.     Appearance: Normal appearance. He is obese. He is not ill-appearing or toxic-appearing.   Eyes:      General: No scleral icterus.        Right eye: No discharge.         Left eye: No discharge.   Neck:      Vascular: No carotid bruit.   Cardiovascular:      Rate and Rhythm: Normal rate and regular rhythm.      Heart sounds: Normal heart sounds. No murmur heard.     No friction rub. No gallop.   Pulmonary:      Breath sounds: Normal breath sounds. No rales.   Chest:      Chest wall: No tenderness.   Abdominal:      Palpations: Abdomen is soft.      Tenderness: There is no right CVA tenderness or left CVA tenderness.   Musculoskeletal:      Right lower leg: No edema.      Left lower leg: No edema.      Comments: Feet warm.  No cyanosis, pedal edema or clubbing no plantar ulcers.   Lymphadenopathy:      Cervical: No cervical adenopathy.   Neurological:      Mental Status: He is alert and oriented to person, place, and time.       Office Visit on 10/31/2024   Component Date Value Ref Range Status    Hemoglobin A1C 10/31/2024 7.00 (H)  4.80 - 5.60 % Final    Comment: Hemoglobin A1C Ranges:  Increased Risk for Diabetes  5.7% to 6.4%  Diabetes                     >= 6.5%  Diabetic Goal                < 7.0%      Glucose 10/31/2024 113 (H)  65 - 99 mg/dL Final    BUN 10/31/2024 14  8 - 23 mg/dL Final    Creatinine 10/31/2024 1.12  0.76 - 1.27 mg/dL Final    EGFR Result 10/31/2024 73.8  >60.0 mL/min/1.73 Final    Comment: GFR Normal >60  Chronic Kidney Disease <60  Kidney Failure <15      BUN/Creatinine Ratio 10/31/2024 12.5  7.0 - 25.0 Final    Sodium 10/31/2024 142  136 - 145 mmol/L Final    Potassium 10/31/2024 5.2  3.5 - 5.2 mmol/L Final    Chloride 10/31/2024 98  98 - 107 mmol/L Final    Total CO2 10/31/2024 33.6 (H)  22.0 - 29.0 mmol/L Final    Calcium 10/31/2024 10.3  8.6 - 10.5 mg/dL Final    Total Protein 10/31/2024 7.3   6.0 - 8.5 g/dL Final    Albumin 10/31/2024 4.5  3.5 - 5.2 g/dL Final    Globulin 10/31/2024 2.8  gm/dL Final    A/G Ratio 10/31/2024 1.6  g/dL Final    Total Bilirubin 10/31/2024 0.4  0.0 - 1.2 mg/dL Final    Alkaline Phosphatase 10/31/2024 111  39 - 117 U/L Final    AST (SGOT) 10/31/2024 14  1 - 40 U/L Final    ALT (SGPT) 10/31/2024 18  1 - 41 U/L Final    Creatinine, Urine 10/31/2024 162.5  Not Estab. mg/dL Final    Microalbumin, Urine 10/31/2024 819.2  Not Estab. ug/mL Final    Comment: Results confirmed on  dilution.      Microalbumin/Creatinine Ratio 10/31/2024 504 (H)  0 - 29 mg/g creat Final    Comment:                        Normal:                0 -  29                         Moderately increased: 30 - 300                         Severely increased:       >300      Total Cholesterol 10/31/2024 182  0 - 200 mg/dL Final    Comment: Cholesterol Reference Ranges  (U.S. Department of Health and Human Services ATP III  Classifications)  Desirable          <200 mg/dL  Borderline High    200-239 mg/dL  High Risk          >240 mg/dL  Triglyceride Reference Ranges  (U.S. Department of Health and Human Services ATP III  Classifications)  Normal           <150 mg/dL  Borderline High  150-199 mg/dL  High             200-499 mg/dL  Very High        >500 mg/dL  HDL Reference Ranges  (U.S. Department of Health and Human Services ATP III  Classifications)  Low     <40 mg/dl (major risk factor for CHD)  High    >60 mg/dl ('negative' risk factor for CHD)  LDL Reference Ranges  (U.S. Department of Health and Human Services ATP III  Classifications)  Optimal          <100 mg/dL  Near Optimal     100-129 mg/dL  Borderline High  130-159 mg/dL  High             160-189 mg/dL  Very High        >189 mg/dL      Triglycerides 10/31/2024 190 (H)  0 - 150 mg/dL Final    HDL Cholesterol 10/31/2024 46  40 - 60 mg/dL Final    VLDL Cholesterol Gerhard 10/31/2024 33  5 - 40 mg/dL Final    LDL Chol Calc (NIH) 10/31/2024 103 (H)  0 - 100 mg/dL  Final    Interpretation 10/31/2024 Note   Final    Supplemental report is available.     Assessment & Plan   Diagnoses and all orders for this visit:    1. Type 2 diabetes mellitus with peripheral neuropathy (Primary)  -     Comprehensive Metabolic Panel  -     Hemoglobin A1c  -     TSH Rfx On Abnormal To Free T4  -     Lipid Panel  -     gabapentin (NEURONTIN) 100 MG capsule; 1 capsule 3 times a day  Dispense: 90 capsule; Refill: 1    2. Type 2 diabetes mellitus with microalbuminuria, without long-term current use of insulin  -     Continuous Glucose Sensor (FreeStyle Sergio 3 Plus Sensor); Use 1 each Take As Directed. Use 1 every 15 days  Dispense: 6 each; Refill: 3  -     Comprehensive Metabolic Panel  -     Hemoglobin A1c  -     TSH Rfx On Abnormal To Free T4  -     Lipid Panel  -     Vitamin B12    3. Hyperlipidemia, unspecified hyperlipidemia type  -     Lipid Panel    4. Essential hypertension    5. Obstructive sleep apnea syndrome    6. Current tobacco use    7. Chronic obstructive pulmonary disease, unspecified COPD type    Other orders  -     Semaglutide, 2 MG/DOSE, (Ozempic, 2 MG/DOSE,) 8 MG/3ML solution pen-injector; Inject 2 mg under the skin into the appropriate area as directed 1 (One) Time Per Week.  Dispense: 3 mL; Refill: 5      Continue Novolin N and Novolin R.  Continue metformin  mg 4 tablets daily.  Increase Ozempic to  2 mg weekly.  SGLT2 inhibitors are good options except for the cost.    Increase gabapentin to 100 mg 3 times a day.    Continue Lipitor 40 mg a day.    Continue losartan 100 mg/day.  Will defer blood pressure control to Dr. Louise.    Follow-up with Dr. Aguero.  Discussed about smoking cessation.    Copy my note sent to Dr. Louise and Dr. Aguero.    RTC 4 mos.

## 2025-04-11 ENCOUNTER — PRIOR AUTHORIZATION (OUTPATIENT)
Dept: ENDOCRINOLOGY | Age: 65
End: 2025-04-11
Payer: COMMERCIAL

## 2025-04-11 LAB
ALBUMIN SERPL-MCNC: 4.3 G/DL (ref 3.5–5.2)
ALBUMIN/GLOB SERPL: 1.5 G/DL
ALP SERPL-CCNC: 110 U/L (ref 39–117)
ALT SERPL-CCNC: 16 U/L (ref 1–41)
AST SERPL-CCNC: 15 U/L (ref 1–40)
BILIRUB SERPL-MCNC: <0.2 MG/DL (ref 0–1.2)
BUN SERPL-MCNC: 21 MG/DL (ref 8–23)
BUN/CREAT SERPL: 20 (ref 7–25)
CALCIUM SERPL-MCNC: 10 MG/DL (ref 8.6–10.5)
CHLORIDE SERPL-SCNC: 96 MMOL/L (ref 98–107)
CHOLEST SERPL-MCNC: 181 MG/DL (ref 0–200)
CO2 SERPL-SCNC: 32.6 MMOL/L (ref 22–29)
CREAT SERPL-MCNC: 1.05 MG/DL (ref 0.76–1.27)
EGFRCR SERPLBLD CKD-EPI 2021: 79.3 ML/MIN/1.73
GLOBULIN SER CALC-MCNC: 2.9 GM/DL
GLUCOSE SERPL-MCNC: 314 MG/DL (ref 65–99)
HBA1C MFR BLD: 7.7 % (ref 4.8–5.6)
HDLC SERPL-MCNC: 30 MG/DL (ref 40–60)
IMP & REVIEW OF LAB RESULTS: NORMAL
LDLC SERPL CALC-MCNC: 67 MG/DL (ref 0–100)
POTASSIUM SERPL-SCNC: 4.5 MMOL/L (ref 3.5–5.2)
PROT SERPL-MCNC: 7.2 G/DL (ref 6–8.5)
SODIUM SERPL-SCNC: 139 MMOL/L (ref 136–145)
TRIGL SERPL-MCNC: 543 MG/DL (ref 0–150)
TSH SERPL DL<=0.005 MIU/L-ACNC: 2.56 UIU/ML (ref 0.27–4.2)
VIT B12 SERPL-MCNC: 367 PG/ML (ref 211–946)
VLDLC SERPL CALC-MCNC: 84 MG/DL (ref 5–40)

## 2025-04-11 NOTE — TELEPHONE ENCOUNTER
4/11 pa started on [employer] 3+  KEY  [id number]    PA Case: [id number], Status: Approved, Coverage Starts on: 4/11/2025 12:00:00 AM, Coverage Ends on: 4/11/2026 12:00:00 AM.    File

## 2025-04-30 ENCOUNTER — TREATMENT (OUTPATIENT)
Dept: ENDOCRINOLOGY | Age: 65
End: 2025-04-30
Payer: COMMERCIAL

## 2025-04-30 ENCOUNTER — TELEPHONE (OUTPATIENT)
Dept: ENDOCRINOLOGY | Age: 65
End: 2025-04-30
Payer: COMMERCIAL

## 2025-04-30 ENCOUNTER — CLINICAL SUPPORT (OUTPATIENT)
Dept: ENDOCRINOLOGY | Age: 65
End: 2025-04-30
Payer: COMMERCIAL

## 2025-04-30 DIAGNOSIS — R80.9 TYPE 2 DIABETES MELLITUS WITH MICROALBUMINURIA, WITHOUT LONG-TERM CURRENT USE OF INSULIN: Primary | ICD-10-CM

## 2025-04-30 DIAGNOSIS — E11.29 TYPE 2 DIABETES MELLITUS WITH MICROALBUMINURIA, WITHOUT LONG-TERM CURRENT USE OF INSULIN: Primary | ICD-10-CM

## 2025-04-30 PROCEDURE — 95251 CONT GLUC MNTR ANALYSIS I&R: CPT | Performed by: INTERNAL MEDICINE

## 2025-04-30 NOTE — Clinical Note
Freestyle jailyn 3 sensor data from April 17, 2025 through April 30, 2025 reviewed. Average glucose 129 mg per DL. GMI 6.4%. Time in range 87%. Time above range 10%. Time below range 3%.  Patient has been having hypoglycemia in the early morning and close to midnight. Decrease Novolin N to 60 units at bedtime. Decrease Novolin R to 20 units at breakfast, 15 units at lunch, and 15 units at supper. Continue metformin  mg 4 tablets daily and Ozempic 2 mg weekly. Please notify patient of results and instructions.

## 2025-04-30 NOTE — TELEPHONE ENCOUNTER
4/30 pt called in stating he is going to bring in his jailyn to be downloaded for dr Anaya to look at today

## 2025-04-30 NOTE — PROGRESS NOTES
CGM Follow-up and Printout    4/30/2025    Patient: Carrington Biggs  YOB: 1960    Provider: Bayron Anaya    Device Name: Freestyle Sergio 3 - This was patient provided equipment.    Carrington Biggs returns today for assistance with their CGM device and for data collection.    CGM was placed and has been worn for 14 days. No concerns with the device were noted during that time. He reports the device is functional and working properly.       CGM report was downloaded at today's visit and will be uploaded to the patient's electronic medical record for provider review and interpretation.      Patient denies any questions or concerns at this time. Patient reports he is comfortable using the device and will reach out if any questions arise.     Next Scheduled Follow-Up: 8/27/2025     Susannah Serrano  4/30/2025  14:04 EDT

## 2025-05-01 NOTE — PROGRESS NOTES
5/1 called and left detailed message on machine to take novolin N 55 QPM   Novolin R  20 @ breakfast 15 @ lunch and 15 @ supper

## 2025-07-25 ENCOUNTER — TREATMENT (OUTPATIENT)
Dept: ENDOCRINOLOGY | Age: 65
End: 2025-07-25
Payer: COMMERCIAL

## 2025-07-25 DIAGNOSIS — E11.29 TYPE 2 DIABETES MELLITUS WITH MICROALBUMINURIA, WITHOUT LONG-TERM CURRENT USE OF INSULIN: Primary | ICD-10-CM

## 2025-07-25 DIAGNOSIS — R80.9 TYPE 2 DIABETES MELLITUS WITH MICROALBUMINURIA, WITHOUT LONG-TERM CURRENT USE OF INSULIN: Primary | ICD-10-CM

## 2025-07-25 PROCEDURE — 95251 CONT GLUC MNTR ANALYSIS I&R: CPT | Performed by: INTERNAL MEDICINE

## 2025-07-25 NOTE — Clinical Note
Sensor data from July 12, 2025 to July 25, 2025 reviewed. Average glucose 128 mg per DL. GMI 6.4%. Time in range 88%. Time above range 10%. Time below range 2%.  He is having hypoglycemia in the 60s between 1 AM and 4 AM and in the early afternoon.  Decrease Novolin N to 50 units at bedtime Decrease Novolin R to 10 units with each meal. Continue metformin  mg 4 tablets daily. Continue Ozempic 2 mg weekly. Please notify patient of results and instructions.

## 2025-08-27 ENCOUNTER — OFFICE VISIT (OUTPATIENT)
Dept: ENDOCRINOLOGY | Age: 65
End: 2025-08-27
Payer: COMMERCIAL

## 2025-08-27 VITALS
BODY MASS INDEX: 37.16 KG/M2 | HEIGHT: 68 IN | SYSTOLIC BLOOD PRESSURE: 122 MMHG | DIASTOLIC BLOOD PRESSURE: 60 MMHG | TEMPERATURE: 98.1 F | WEIGHT: 245.2 LBS | OXYGEN SATURATION: 98 % | HEART RATE: 52 BPM

## 2025-08-27 DIAGNOSIS — Z72.0 CURRENT TOBACCO USE: ICD-10-CM

## 2025-08-27 DIAGNOSIS — E78.5 HYPERLIPIDEMIA, UNSPECIFIED HYPERLIPIDEMIA TYPE: ICD-10-CM

## 2025-08-27 DIAGNOSIS — J44.9 CHRONIC OBSTRUCTIVE PULMONARY DISEASE, UNSPECIFIED COPD TYPE: ICD-10-CM

## 2025-08-27 DIAGNOSIS — E11.29 TYPE 2 DIABETES MELLITUS WITH MICROALBUMINURIA, WITHOUT LONG-TERM CURRENT USE OF INSULIN: Primary | ICD-10-CM

## 2025-08-27 DIAGNOSIS — I10 ESSENTIAL HYPERTENSION: ICD-10-CM

## 2025-08-27 DIAGNOSIS — G47.33 OBSTRUCTIVE SLEEP APNEA SYNDROME: ICD-10-CM

## 2025-08-27 DIAGNOSIS — R80.9 TYPE 2 DIABETES MELLITUS WITH MICROALBUMINURIA, WITHOUT LONG-TERM CURRENT USE OF INSULIN: Primary | ICD-10-CM

## 2025-08-27 PROCEDURE — 99214 OFFICE O/P EST MOD 30 MIN: CPT | Performed by: INTERNAL MEDICINE

## 2025-08-27 RX ORDER — SEMAGLUTIDE 2.68 MG/ML
2 INJECTION, SOLUTION SUBCUTANEOUS WEEKLY
Qty: 3 ML | Refills: 5 | Status: SHIPPED | OUTPATIENT
Start: 2025-08-27

## 2025-08-27 RX ORDER — FLUTICASONE FUROATE AND VILANTEROL TRIFENATATE 200; 25 UG/1; UG/1
POWDER RESPIRATORY (INHALATION)
COMMUNITY

## (undated) DEVICE — BALN PRESS WEDGE 6F 110CM

## (undated) DEVICE — Device: Brand: DEFENDO AIR/WATER/SUCTION AND BIOPSY VALVE

## (undated) DEVICE — NDL HYPO PRECISIONGLIDE/REG 18G 11/2 PNK

## (undated) DEVICE — CANN NASL CO2 TRULINK W/O2 A/

## (undated) DEVICE — CATH VENT MIV RADL PIG ST TIP 5F 110CM

## (undated) DEVICE — GW EMR FIX EXCHG J STD .035 3MM 260CM

## (undated) DEVICE — CATH DIAG IMPULSE FR4 5F 100CM

## (undated) DEVICE — TBG 02 CRUSH RESIST LF CLR 7FT

## (undated) DEVICE — KT MANIFLD CARDIAC

## (undated) DEVICE — PK ENT 40

## (undated) DEVICE — PK CATH CARD 40

## (undated) DEVICE — TUBING, SUCTION, 1/4" X 10', STRAIGHT: Brand: MEDLINE

## (undated) DEVICE — GUARD TEETH GARDENT A/

## (undated) DEVICE — Device

## (undated) DEVICE — GLV SURG BIOGEL LTX PF 7 1/2

## (undated) DEVICE — GLIDESHEATH BASIC HYDROPHILIC COATED INTRODUCER SHEATH: Brand: GLIDESHEATH

## (undated) DEVICE — GLIDESHEATH SLENDER STAINLESS STEEL KIT: Brand: GLIDESHEATH SLENDER

## (undated) DEVICE — CATH DIAG IMPULSE FL3.5 5F 100CM

## (undated) DEVICE — THE TORRENT IRRIGATION SCOPE CONNECTOR IS USED WITH THE TORRENT IRRIGATION TUBING TO PROVIDE IRRIGATION FLUIDS SUCH AS STERILE WATER DURING GASTROINTESTINAL ENDOSCOPIC PROCEDURES WHEN USED IN CONJUNCTION WITH AN IRRIGATION PUMP (OR ELECTROSURGICAL UNIT).: Brand: TORRENT